# Patient Record
Sex: FEMALE | Race: WHITE | NOT HISPANIC OR LATINO | Employment: OTHER | URBAN - METROPOLITAN AREA
[De-identification: names, ages, dates, MRNs, and addresses within clinical notes are randomized per-mention and may not be internally consistent; named-entity substitution may affect disease eponyms.]

---

## 2017-01-18 RX ORDER — LOSARTAN POTASSIUM 50 MG/1
TABLET ORAL
Qty: 30 TABLET | Refills: 5 | Status: SHIPPED | OUTPATIENT
Start: 2017-01-18 | End: 2017-04-26 | Stop reason: SDUPTHER

## 2017-01-23 ENCOUNTER — OFFICE VISIT (OUTPATIENT)
Dept: GASTROENTEROLOGY | Facility: CLINIC | Age: 62
End: 2017-01-23
Payer: COMMERCIAL

## 2017-01-23 VITALS
DIASTOLIC BLOOD PRESSURE: 76 MMHG | WEIGHT: 171.94 LBS | HEART RATE: 85 BPM | HEIGHT: 61 IN | SYSTOLIC BLOOD PRESSURE: 123 MMHG | BODY MASS INDEX: 32.46 KG/M2

## 2017-01-23 DIAGNOSIS — R13.10 DYSPHAGIA, UNSPECIFIED TYPE: ICD-10-CM

## 2017-01-23 DIAGNOSIS — K21.9 GASTROESOPHAGEAL REFLUX DISEASE, ESOPHAGITIS PRESENCE NOT SPECIFIED: Primary | ICD-10-CM

## 2017-01-23 PROCEDURE — 3074F SYST BP LT 130 MM HG: CPT | Mod: S$GLB,,, | Performed by: NURSE PRACTITIONER

## 2017-01-23 PROCEDURE — 1159F MED LIST DOCD IN RCRD: CPT | Mod: S$GLB,,, | Performed by: NURSE PRACTITIONER

## 2017-01-23 PROCEDURE — 99203 OFFICE O/P NEW LOW 30 MIN: CPT | Mod: S$GLB,,, | Performed by: NURSE PRACTITIONER

## 2017-01-23 PROCEDURE — 99999 PR PBB SHADOW E&M-EST. PATIENT-LVL III: CPT | Mod: PBBFAC,,, | Performed by: NURSE PRACTITIONER

## 2017-01-23 PROCEDURE — 3078F DIAST BP <80 MM HG: CPT | Mod: S$GLB,,, | Performed by: NURSE PRACTITIONER

## 2017-01-23 NOTE — PROGRESS NOTES
Subjective:       Patient ID: Carina Montaño is a 61 y.o. female.    Chief Complaint: Dysphagia    HPI  Referred for dysphagia occurring 6-7 weeks ago which lasted for two weeks.  She described the sensation that food would get stuck when swallowing and with painful swallowing.  She denies regurgitation.  She reports that this completely resolved and she has had no trouble swallowing, food lodging or painful swallowing in over one month.  She reports EGD and colonoscopy last year with Dr De Leon.  She has history of acid reflux and heartburn that is well controlled with the use of ranitidine 300mg qhs.  She has been on pantoprazole in the past.  Has osteoporosis and takes fosamax weekly.  Reports regular bowel movements.  No blood with bowel movements or black stools.    Review of Systems   Constitutional: Negative.  Negative for activity change, appetite change, fatigue, fever and unexpected weight change.   HENT: Positive for postnasal drip. Negative for sore throat and trouble swallowing.    Respiratory: Negative for cough, choking and shortness of breath.    Cardiovascular: Negative for chest pain.   Gastrointestinal: Negative for abdominal pain, blood in stool, constipation, diarrhea, nausea and vomiting.   Musculoskeletal: Negative.    Skin: Negative.    Psychiatric/Behavioral: Negative.        Objective:      Physical Exam   Constitutional: She appears well-developed and well-nourished. No distress.   Eyes: No scleral icterus.   Neck: Normal range of motion. Neck supple.   Cardiovascular: Normal rate.    Pulmonary/Chest: Effort normal. No respiratory distress.   Neurological: She is alert.   Skin: Skin is warm and dry. She is not diaphoretic. No pallor.   Psychiatric: She has a normal mood and affect. Her behavior is normal. Judgment and thought content normal.   Vitals reviewed.      Assessment:       1. Gastroesophageal reflux disease, esophagitis presence not specified    2. Dysphagia, unspecified type         Plan:         Carina was seen today for dysphagia.    Diagnoses and all orders for this visit:    Gastroesophageal reflux disease, esophagitis presence not specified    Dysphagia, unspecified type  Comments:  Resolved  Continue ranitidine.    Chew food well.  Take all medications with plenty of water and stay upright after taking.    Will request records from previous endoscopic workup.    Will follow up in two months or sooner if needed.    She has been instructed to contact us with any recurrence, even mild, of dysphagia or painful or difficult swallowing.    She has verbalized understanding.

## 2017-01-23 NOTE — LETTER
January 23, 2017      John Gandara, DO  [address] [facility] [address] [facility] [id number]           [facility]  [address]  [address]  [place] [address]  Phone: [phone]          Patient: Carina Montaño   MR Number: [id number]   YOB: 1955   Date of Visit: 1/23/2017       Dear Dr. John Gandara:    Thank you for referring Carina Montaño to me for evaluation. Attached you will find relevant portions of my assessment and plan of care.    If you have questions, please do not hesitate to call me. I look forward to following Carina Montaño along with you.    Sincerely,    Francine Gatica, RORO    Enclosure  CC:  No Recipients    If you would like to receive this communication electronically, please contact [email or web address] or [phone] to request more information on [facility] [facility] access.    For providers and/or their staff who would like to refer a patient to [facility], please contact us through our one-stop-shop provider referral line, [facility], at [phone].    If you feel you have received this communication in error or would no longer like to receive these types of communications, please e-mail [email or web address]

## 2017-02-17 ENCOUNTER — TELEPHONE (OUTPATIENT)
Dept: INTERNAL MEDICINE | Facility: CLINIC | Age: 62
End: 2017-02-17

## 2017-02-17 RX ORDER — ALBUTEROL SULFATE 0.83 MG/ML
SOLUTION RESPIRATORY (INHALATION)
Qty: 75 ML | Refills: 5 | Status: SHIPPED | OUTPATIENT
Start: 2017-02-17 | End: 2022-03-28 | Stop reason: SDUPTHER

## 2017-02-17 RX ORDER — BUDESONIDE AND FORMOTEROL FUMARATE DIHYDRATE 160; 4.5 UG/1; UG/1
2 AEROSOL RESPIRATORY (INHALATION) EVERY 12 HOURS
Qty: 12.2 G | Refills: 11 | Status: SHIPPED | OUTPATIENT
Start: 2017-02-17 | End: 2018-03-10 | Stop reason: SDUPTHER

## 2017-02-17 NOTE — TELEPHONE ENCOUNTER
----- Message from Ashley Ofelia sent at 2/17/2017  8:05 AM CST -----  Contact: pt [phone]  RX request - refill or new RX.  Is this a refill or new RX:  refill  RX name and strength: Symbicort  Directions:   Is this a 30 day or 90 day RX:   Ph[facility] phone [facility]: [phone]  Comments:  Need authorization,pt is out at this time,also pt will like a call she has another script she want,but do not know the name of it.

## 2017-02-17 NOTE — TELEPHONE ENCOUNTER
----- Message from Ashley Gilbert sent at 2/17/2017  8:02 AM CST -----  Contact: pt [phone]  RX request - refill or new RX.  Is this a refill or new RX:  refill  RX name and strength: albuterol (PROVENTIL) 2.5 mg /3 mL (0.083 %) nebulizer solution  Directions:   Is this a 30 day or 90 day RX:30  Pharm[facility] and phone #:  [phone]  Comments:  Need authorization,pt is out at at this time

## 2017-02-20 RX ORDER — SIMVASTATIN 20 MG/1
TABLET, FILM COATED ORAL
Qty: 90 TABLET | Refills: 1 | Status: SHIPPED | OUTPATIENT
Start: 2017-02-20 | End: 2017-02-21 | Stop reason: SDUPTHER

## 2017-02-21 RX ORDER — SIMVASTATIN 20 MG/1
20 TABLET, FILM COATED ORAL DAILY
Qty: 90 TABLET | Refills: 1 | Status: SHIPPED | OUTPATIENT
Start: 2017-02-21 | End: 2018-03-16 | Stop reason: SDUPTHER

## 2017-03-28 ENCOUNTER — OFFICE VISIT (OUTPATIENT)
Dept: INTERNAL MEDICINE | Facility: CLINIC | Age: 62
End: 2017-03-28
Payer: COMMERCIAL

## 2017-03-28 ENCOUNTER — LAB VISIT (OUTPATIENT)
Dept: LAB | Facility: HOSPITAL | Age: 62
End: 2017-03-28
Attending: INTERNAL MEDICINE
Payer: COMMERCIAL

## 2017-03-28 VITALS
TEMPERATURE: 98 F | BODY MASS INDEX: 31.88 KG/M2 | HEIGHT: 61 IN | WEIGHT: 168.88 LBS | RESPIRATION RATE: 16 BRPM | HEART RATE: 72 BPM

## 2017-03-28 DIAGNOSIS — E66.9 OBESITY (BMI 30-39.9): ICD-10-CM

## 2017-03-28 DIAGNOSIS — M85.80 LOW BONE MASS: ICD-10-CM

## 2017-03-28 DIAGNOSIS — Z85.3 HISTORY OF CANCER OF LEFT BREAST: ICD-10-CM

## 2017-03-28 DIAGNOSIS — K21.9 GASTROESOPHAGEAL REFLUX DISEASE, ESOPHAGITIS PRESENCE NOT SPECIFIED: ICD-10-CM

## 2017-03-28 DIAGNOSIS — E78.00 HYPERCHOLESTEREMIA: ICD-10-CM

## 2017-03-28 DIAGNOSIS — R94.31 PROLONGED QT INTERVAL: ICD-10-CM

## 2017-03-28 DIAGNOSIS — I10 ESSENTIAL HYPERTENSION: Chronic | ICD-10-CM

## 2017-03-28 DIAGNOSIS — J30.9 ALLERGIC SINUSITIS: ICD-10-CM

## 2017-03-28 DIAGNOSIS — J45.30 MILD PERSISTENT ASTHMA WITHOUT COMPLICATION: ICD-10-CM

## 2017-03-28 DIAGNOSIS — I10 ESSENTIAL HYPERTENSION: Primary | Chronic | ICD-10-CM

## 2017-03-28 LAB
ALBUMIN SERPL BCP-MCNC: 3.7 G/DL
ALP SERPL-CCNC: 72 U/L
ALT SERPL W/O P-5'-P-CCNC: 20 U/L
ANION GAP SERPL CALC-SCNC: 10 MMOL/L
AST SERPL-CCNC: 19 U/L
BASOPHILS # BLD AUTO: 0.03 K/UL
BASOPHILS NFR BLD: 0.4 %
BILIRUB SERPL-MCNC: 0.9 MG/DL
BUN SERPL-MCNC: 12 MG/DL
CALCIUM SERPL-MCNC: 9.5 MG/DL
CHLORIDE SERPL-SCNC: 106 MMOL/L
CO2 SERPL-SCNC: 27 MMOL/L
CREAT SERPL-MCNC: 0.9 MG/DL
DIFFERENTIAL METHOD: ABNORMAL
EOSINOPHIL # BLD AUTO: 0.5 K/UL
EOSINOPHIL NFR BLD: 7.1 %
ERYTHROCYTE [DISTWIDTH] IN BLOOD BY AUTOMATED COUNT: 13.6 %
EST. GFR  (AFRICAN AMERICAN): >60 ML/MIN/1.73 M^2
EST. GFR  (NON AFRICAN AMERICAN): >60 ML/MIN/1.73 M^2
GLUCOSE SERPL-MCNC: 92 MG/DL
HCT VFR BLD AUTO: 42 %
HGB BLD-MCNC: 14.3 G/DL
LYMPHOCYTES # BLD AUTO: 1.6 K/UL
LYMPHOCYTES NFR BLD: 20.6 %
MCH RBC QN AUTO: 31.2 PG
MCHC RBC AUTO-ENTMCNC: 34 %
MCV RBC AUTO: 92 FL
MONOCYTES # BLD AUTO: 0.5 K/UL
MONOCYTES NFR BLD: 6.2 %
NEUTROPHILS # BLD AUTO: 5 K/UL
NEUTROPHILS NFR BLD: 65.6 %
PLATELET # BLD AUTO: 238 K/UL
PMV BLD AUTO: 10 FL
POTASSIUM SERPL-SCNC: 4.2 MMOL/L
PROT SERPL-MCNC: 7.3 G/DL
RBC # BLD AUTO: 4.59 M/UL
SODIUM SERPL-SCNC: 143 MMOL/L
WBC # BLD AUTO: 7.57 K/UL

## 2017-03-28 PROCEDURE — 99214 OFFICE O/P EST MOD 30 MIN: CPT | Mod: S$GLB,,, | Performed by: INTERNAL MEDICINE

## 2017-03-28 PROCEDURE — 80053 COMPREHEN METABOLIC PANEL: CPT

## 2017-03-28 PROCEDURE — 1160F RVW MEDS BY RX/DR IN RCRD: CPT | Mod: S$GLB,,, | Performed by: INTERNAL MEDICINE

## 2017-03-28 PROCEDURE — 36415 COLL VENOUS BLD VENIPUNCTURE: CPT | Mod: PO

## 2017-03-28 PROCEDURE — 99999 PR PBB SHADOW E&M-EST. PATIENT-LVL III: CPT | Mod: PBBFAC,,, | Performed by: INTERNAL MEDICINE

## 2017-03-28 PROCEDURE — 85025 COMPLETE CBC W/AUTO DIFF WBC: CPT

## 2017-03-28 RX ORDER — PREDNISONE 20 MG/1
TABLET ORAL
Qty: 20 TABLET | Refills: 0 | Status: SHIPPED | OUTPATIENT
Start: 2017-03-28 | End: 2017-09-12

## 2017-03-28 RX ORDER — MONTELUKAST SODIUM 10 MG/1
10 TABLET ORAL NIGHTLY
Qty: 30 TABLET | Refills: 11 | Status: SHIPPED | OUTPATIENT
Start: 2017-03-28 | End: 2017-04-27

## 2017-03-28 NOTE — PROGRESS NOTES
Subjective:       Patient ID: Carina Montaño is a 61 y.o. female.    Chief Complaint: Follow-up (6 month); Nasal Congestion; and Sinus Problem    HPI   Pt with HTN, HLD, Obesity, hx of left sided breast cancer, Asthma, Low bone mass, GERD, Allergic sinusitis is here for 6 month f/u. Pt has been doing well and denies any acute complaints except for mild wheezing/SOB from her asthma. No fevers/chills.   Review of Systems   Constitutional: Negative for activity change, appetite change, chills, diaphoresis, fatigue, fever and unexpected weight change.   HENT: Negative for postnasal drip, rhinorrhea, sinus pressure, sneezing, sore throat, trouble swallowing and voice change.    Respiratory: Positive for wheezing. Negative for cough and shortness of breath.    Cardiovascular: Negative for chest pain, palpitations and leg swelling.   Gastrointestinal: Negative for abdominal pain, blood in stool, constipation, diarrhea, nausea and vomiting.   Genitourinary: Negative for dysuria.   Musculoskeletal: Negative for arthralgias and myalgias.   Skin: Negative for rash and wound.   Allergic/Immunologic: Negative for environmental allergies and food allergies.   Hematological: Negative for adenopathy. Does not bruise/bleed easily.       Objective:      Physical Exam   Constitutional: She is oriented to person, place, and time. She appears well-developed and well-nourished. No distress.   HENT:   Head: Normocephalic and atraumatic.   Eyes: Conjunctivae and EOM are normal. Pupils are equal, round, and reactive to light. Right eye exhibits no discharge. Left eye exhibits no discharge. No scleral icterus.   Neck: Neck supple. No JVD present.   Cardiovascular: Normal rate, regular rhythm, normal heart sounds and intact distal pulses.    Pulmonary/Chest: Effort normal. No respiratory distress. She has wheezes (trace). She has no rales.   Abdominal: Soft. Bowel sounds are normal. There is no tenderness.   Musculoskeletal: She exhibits no  edema.   Lymphadenopathy:     She has no cervical adenopathy.   Neurological: She is alert and oriented to person, place, and time.   Skin: Skin is warm and dry. No rash noted. She is not diaphoretic. No pallor.       Assessment:       1. Essential hypertension    2. Hypercholesteremia    3. Obesity (BMI 30-39.9)    4. History of cancer of left breast    5. Gastroesophageal reflux disease, esophagitis presence not specified    6. Mild persistent asthma without complication    7. Low bone mass    8. Allergic sinusitis    9. Prolonged QT interval        Plan:    1. HTN- controlled on Losartan 50 mg daily       CBC/CMP   2. Hypercholesterolemia- stable on Zocor 20 mg daily   3. Obesity- pt advised on proper diet/exercise for weight loss   4. Hx of left sided breast cancer(2011)- s/p radiation       Followed by Oncology at        Await records   5. Mild persistent asthma- fair control on Symbicort twice daily and continue Albuterol inhaler PRN       Rx Singulair 10 mg qHS and will give a Prednisone taper    6. Low bone mass- currently on Fosamax and Calcium/Vitamin D daily       Await DEXA scan results   7. GERD- stable on Protonix 40 mg daily   8. Allergic sinusitis- fair control on Zyrtec/steroid nasal spray       Followed by Allergy at    9. Prolonged QT  10. F/u in 6 months for annual exam

## 2017-04-26 RX ORDER — LOSARTAN POTASSIUM 50 MG/1
50 TABLET ORAL EVERY MORNING
Qty: 30 TABLET | Refills: 5 | Status: SHIPPED | OUTPATIENT
Start: 2017-04-26 | End: 2017-08-01 | Stop reason: SDUPTHER

## 2017-04-26 NOTE — TELEPHONE ENCOUNTER
----- Message from Kelly Mcginnis sent at 4/26/2017  8:25 AM CDT -----  Contact: patient [phone]  RX request - refill or new RX.  Is this a refill or new RX:  losartan  RX name and strength: 50mg  Directions: 1 tab daily  Is this a 30 day or 90 day RX:  30  [facility]  [address] [phone]  Comments:  Pt will be out of meds today.

## 2017-08-01 RX ORDER — LOSARTAN POTASSIUM 50 MG/1
50 TABLET ORAL EVERY MORNING
Qty: 90 TABLET | Refills: 3 | Status: SHIPPED | OUTPATIENT
Start: 2017-08-01 | End: 2018-01-25

## 2017-08-28 RX ORDER — ALENDRONATE SODIUM 70 MG/1
TABLET ORAL
Qty: 12 TABLET | Refills: 3 | Status: SHIPPED | OUTPATIENT
Start: 2017-08-28 | End: 2018-08-14 | Stop reason: SDUPTHER

## 2017-09-12 ENCOUNTER — OFFICE VISIT (OUTPATIENT)
Dept: INTERNAL MEDICINE | Facility: CLINIC | Age: 62
End: 2017-09-12
Payer: COMMERCIAL

## 2017-09-12 VITALS
WEIGHT: 176.38 LBS | BODY MASS INDEX: 34.63 KG/M2 | HEART RATE: 84 BPM | DIASTOLIC BLOOD PRESSURE: 86 MMHG | HEIGHT: 60 IN | SYSTOLIC BLOOD PRESSURE: 137 MMHG | RESPIRATION RATE: 16 BRPM | TEMPERATURE: 98 F

## 2017-09-12 DIAGNOSIS — I10 HTN, GOAL BELOW 130/80: ICD-10-CM

## 2017-09-12 DIAGNOSIS — J20.9 ACUTE BRONCHITIS, UNSPECIFIED ORGANISM: ICD-10-CM

## 2017-09-12 DIAGNOSIS — B97.89 VIRAL SINUSITIS: Primary | ICD-10-CM

## 2017-09-12 DIAGNOSIS — J32.9 VIRAL SINUSITIS: Primary | ICD-10-CM

## 2017-09-12 PROCEDURE — 3008F BODY MASS INDEX DOCD: CPT | Mod: S$GLB,,, | Performed by: INTERNAL MEDICINE

## 2017-09-12 PROCEDURE — 3075F SYST BP GE 130 - 139MM HG: CPT | Mod: S$GLB,,, | Performed by: INTERNAL MEDICINE

## 2017-09-12 PROCEDURE — 99999 PR PBB SHADOW E&M-EST. PATIENT-LVL III: CPT | Mod: PBBFAC,,, | Performed by: INTERNAL MEDICINE

## 2017-09-12 PROCEDURE — 96372 THER/PROPH/DIAG INJ SC/IM: CPT | Mod: S$GLB,,, | Performed by: INTERNAL MEDICINE

## 2017-09-12 PROCEDURE — 99214 OFFICE O/P EST MOD 30 MIN: CPT | Mod: 25,S$GLB,, | Performed by: INTERNAL MEDICINE

## 2017-09-12 PROCEDURE — 3079F DIAST BP 80-89 MM HG: CPT | Mod: S$GLB,,, | Performed by: INTERNAL MEDICINE

## 2017-09-12 RX ORDER — TRIAMCINOLONE ACETONIDE 40 MG/ML
40 INJECTION, SUSPENSION INTRA-ARTICULAR; INTRAMUSCULAR
Status: COMPLETED | OUTPATIENT
Start: 2017-09-12 | End: 2017-09-12

## 2017-09-12 RX ORDER — CODEINE PHOSPHATE AND GUAIFENESIN 10; 100 MG/5ML; MG/5ML
5 SOLUTION ORAL 3 TIMES DAILY PRN
Qty: 236 ML | Refills: 0 | Status: SHIPPED | OUTPATIENT
Start: 2017-09-12 | End: 2017-09-22

## 2017-09-12 RX ADMIN — TRIAMCINOLONE ACETONIDE 40 MG: 40 INJECTION, SUSPENSION INTRA-ARTICULAR; INTRAMUSCULAR at 11:09

## 2017-11-17 ENCOUNTER — TELEPHONE (OUTPATIENT)
Dept: INTERNAL MEDICINE | Facility: CLINIC | Age: 62
End: 2017-11-17

## 2018-01-10 ENCOUNTER — TELEPHONE (OUTPATIENT)
Dept: INTERNAL MEDICINE | Facility: CLINIC | Age: 63
End: 2018-01-10

## 2018-01-10 DIAGNOSIS — Z00.00 ANNUAL PHYSICAL EXAM: Primary | ICD-10-CM

## 2018-01-10 NOTE — TELEPHONE ENCOUNTER
----- Message from Angel Ye sent at 1/9/2018 10:36 AM CST -----  Contact: Pt at [phone]  Doctor appointment and lab have been scheduled.  Please link lab orders to the lab appointment.  Date of doctor appointment:  1/18  Physical or EP:  physical  Date of lab appointment:  1/18  Comments:

## 2018-01-18 ENCOUNTER — LAB VISIT (OUTPATIENT)
Dept: LAB | Facility: HOSPITAL | Age: 63
End: 2018-01-18
Attending: INTERNAL MEDICINE
Payer: COMMERCIAL

## 2018-01-18 DIAGNOSIS — Z00.00 ANNUAL PHYSICAL EXAM: ICD-10-CM

## 2018-01-18 LAB
BASOPHILS # BLD AUTO: 0.01 K/UL
BASOPHILS NFR BLD: 0.1 %
DIFFERENTIAL METHOD: ABNORMAL
EOSINOPHIL # BLD AUTO: 0.2 K/UL
EOSINOPHIL NFR BLD: 2.6 %
ERYTHROCYTE [DISTWIDTH] IN BLOOD BY AUTOMATED COUNT: 13.2 %
HCT VFR BLD AUTO: 41.7 %
HGB BLD-MCNC: 14 G/DL
IMM GRANULOCYTES # BLD AUTO: 0.02 K/UL
IMM GRANULOCYTES NFR BLD AUTO: 0.2 %
LYMPHOCYTES # BLD AUTO: 0.7 K/UL
LYMPHOCYTES NFR BLD: 9.1 %
MCH RBC QN AUTO: 30.6 PG
MCHC RBC AUTO-ENTMCNC: 33.6 G/DL
MCV RBC AUTO: 91 FL
MONOCYTES # BLD AUTO: 0.5 K/UL
MONOCYTES NFR BLD: 6.7 %
NEUTROPHILS # BLD AUTO: 6.6 K/UL
NEUTROPHILS NFR BLD: 81.3 %
NRBC BLD-RTO: 0 /100 WBC
PLATELET # BLD AUTO: 254 K/UL
PMV BLD AUTO: 10.1 FL
RBC # BLD AUTO: 4.58 M/UL
WBC # BLD AUTO: 8.1 K/UL

## 2018-01-18 PROCEDURE — 80061 LIPID PANEL: CPT

## 2018-01-18 PROCEDURE — 36415 COLL VENOUS BLD VENIPUNCTURE: CPT | Mod: PO

## 2018-01-18 PROCEDURE — 84443 ASSAY THYROID STIM HORMONE: CPT

## 2018-01-18 PROCEDURE — 85025 COMPLETE CBC W/AUTO DIFF WBC: CPT

## 2018-01-18 PROCEDURE — 80053 COMPREHEN METABOLIC PANEL: CPT

## 2018-01-19 LAB
ALBUMIN SERPL BCP-MCNC: 3.7 G/DL
ALP SERPL-CCNC: 173 U/L
ALT SERPL W/O P-5'-P-CCNC: 179 U/L
ANION GAP SERPL CALC-SCNC: 8 MMOL/L
AST SERPL-CCNC: 142 U/L
BILIRUB SERPL-MCNC: 1.2 MG/DL
BUN SERPL-MCNC: 8 MG/DL
CALCIUM SERPL-MCNC: 9.4 MG/DL
CHLORIDE SERPL-SCNC: 104 MMOL/L
CHOLEST SERPL-MCNC: 168 MG/DL
CHOLEST/HDLC SERPL: 3.7 {RATIO}
CO2 SERPL-SCNC: 27 MMOL/L
CREAT SERPL-MCNC: 0.8 MG/DL
EST. GFR  (AFRICAN AMERICAN): >60 ML/MIN/1.73 M^2
EST. GFR  (NON AFRICAN AMERICAN): >60 ML/MIN/1.73 M^2
GLUCOSE SERPL-MCNC: 113 MG/DL
HDLC SERPL-MCNC: 45 MG/DL
HDLC SERPL: 26.8 %
LDLC SERPL CALC-MCNC: 108.6 MG/DL
NONHDLC SERPL-MCNC: 123 MG/DL
POTASSIUM SERPL-SCNC: 4.1 MMOL/L
PROT SERPL-MCNC: 7.2 G/DL
SODIUM SERPL-SCNC: 139 MMOL/L
TRIGL SERPL-MCNC: 72 MG/DL
TSH SERPL DL<=0.005 MIU/L-ACNC: 1.48 UIU/ML

## 2018-01-21 ENCOUNTER — OFFICE VISIT (OUTPATIENT)
Dept: URGENT CARE | Facility: CLINIC | Age: 63
End: 2018-01-21
Payer: COMMERCIAL

## 2018-01-21 VITALS
HEART RATE: 94 BPM | DIASTOLIC BLOOD PRESSURE: 79 MMHG | BODY MASS INDEX: 34.55 KG/M2 | OXYGEN SATURATION: 96 % | TEMPERATURE: 99 F | SYSTOLIC BLOOD PRESSURE: 125 MMHG | RESPIRATION RATE: 16 BRPM | WEIGHT: 176 LBS | HEIGHT: 60 IN

## 2018-01-21 DIAGNOSIS — R68.89 FLU-LIKE SYMPTOMS: ICD-10-CM

## 2018-01-21 DIAGNOSIS — J02.9 SORE THROAT: Primary | ICD-10-CM

## 2018-01-21 DIAGNOSIS — J11.1 INFLUENZA: ICD-10-CM

## 2018-01-21 DIAGNOSIS — J01.00 ACUTE NON-RECURRENT MAXILLARY SINUSITIS: ICD-10-CM

## 2018-01-21 LAB
CTP QC/QA: YES
CTP QC/QA: YES
FLUAV AG NPH QL: NEGATIVE
FLUBV AG NPH QL: NEGATIVE
S PYO RRNA THROAT QL PROBE: NEGATIVE

## 2018-01-21 PROCEDURE — 99214 OFFICE O/P EST MOD 30 MIN: CPT | Mod: 25,S$GLB,, | Performed by: FAMILY MEDICINE

## 2018-01-21 PROCEDURE — 96372 THER/PROPH/DIAG INJ SC/IM: CPT | Mod: S$GLB,,, | Performed by: FAMILY MEDICINE

## 2018-01-21 PROCEDURE — 87880 STREP A ASSAY W/OPTIC: CPT | Mod: QW,S$GLB,, | Performed by: FAMILY MEDICINE

## 2018-01-21 PROCEDURE — 87804 INFLUENZA ASSAY W/OPTIC: CPT | Mod: QW,S$GLB,, | Performed by: FAMILY MEDICINE

## 2018-01-21 RX ORDER — BENZONATATE 100 MG/1
100 CAPSULE ORAL EVERY 6 HOURS PRN
Qty: 30 CAPSULE | Refills: 1 | Status: SHIPPED | OUTPATIENT
Start: 2018-01-21 | End: 2018-02-15 | Stop reason: ALTCHOICE

## 2018-01-21 RX ORDER — AMOXICILLIN 875 MG/1
875 TABLET, FILM COATED ORAL 2 TIMES DAILY
Qty: 20 TABLET | Refills: 0 | Status: SHIPPED | OUTPATIENT
Start: 2018-01-21 | End: 2018-01-31

## 2018-01-21 RX ORDER — BETAMETHASONE SODIUM PHOSPHATE AND BETAMETHASONE ACETATE 3; 3 MG/ML; MG/ML
6 INJECTION, SUSPENSION INTRA-ARTICULAR; INTRALESIONAL; INTRAMUSCULAR; SOFT TISSUE
Status: COMPLETED | OUTPATIENT
Start: 2018-01-21 | End: 2018-01-21

## 2018-01-21 RX ADMIN — BETAMETHASONE SODIUM PHOSPHATE AND BETAMETHASONE ACETATE 6 MG: 3; 3 INJECTION, SUSPENSION INTRA-ARTICULAR; INTRALESIONAL; INTRAMUSCULAR; SOFT TISSUE at 11:01

## 2018-01-21 NOTE — PROGRESS NOTES
Subjective:       Patient ID: Carina Montaño is a 62 y.o. female.    Vitals:  height is 5' (1.524 m) and weight is 79.8 kg (176 lb). Her oral temperature is 98.7 °F (37.1 °C). Her blood pressure is 125/79 and her pulse is 94. Her respiration is 16 and oxygen saturation is 96%.     Chief Complaint: Cough    Cough   The current episode started in the past 7 days. The problem has been unchanged. The problem occurs hourly. The cough is productive of sputum. Associated symptoms include headaches, myalgias and a sore throat. Pertinent negatives include no chest pain, chills, ear pain, eye redness, fever, shortness of breath or wheezing. She has tried OTC cough suppressant for the symptoms. The treatment provided no relief.     Review of Systems   Constitution: Negative for chills, fever and malaise/fatigue.   HENT: Positive for congestion and sore throat. Negative for ear pain and hoarse voice.    Eyes: Negative for discharge and redness.   Cardiovascular: Negative for chest pain, dyspnea on exertion and leg swelling.   Respiratory: Positive for cough and sputum production. Negative for shortness of breath and wheezing.    Musculoskeletal: Positive for myalgias.   Gastrointestinal: Negative for abdominal pain and nausea.   Neurological: Positive for headaches.       Objective:      Physical Exam   Constitutional: She is oriented to person, place, and time. She appears well-developed and well-nourished. She is cooperative.  Non-toxic appearance. She does not appear ill. No distress.   HENT:   Head: Normocephalic and atraumatic.   Right Ear: Hearing, tympanic membrane, external ear and ear canal normal.   Left Ear: Hearing, tympanic membrane, external ear and ear canal normal.   Nose: Nose normal. No mucosal edema, rhinorrhea or nasal deformity. No epistaxis. Right sinus exhibits no maxillary sinus tenderness and no frontal sinus tenderness. Left sinus exhibits no maxillary sinus tenderness and no frontal sinus tenderness.    Mouth/Throat: Uvula is midline, oropharynx is clear and moist and mucous membranes are normal. No trismus in the jaw. Normal dentition. No uvula swelling. No posterior oropharyngeal erythema.   Eyes: Conjunctivae and lids are normal. Right eye exhibits no discharge. Left eye exhibits no discharge. No scleral icterus.   Sclera clear bilat   Neck: Trachea normal, normal range of motion, full passive range of motion without pain and phonation normal. Neck supple.   Cardiovascular: Normal rate, regular rhythm, normal heart sounds, intact distal pulses and normal pulses.    Pulmonary/Chest: Effort normal and breath sounds normal. She has no wheezes. She has no rales.   Abdominal: Soft. Normal appearance and bowel sounds are normal. She exhibits no distension, no pulsatile midline mass and no mass. There is no tenderness.   Musculoskeletal: Normal range of motion. She exhibits no edema or deformity.   Lymphadenopathy:     She has no cervical adenopathy.   Neurological: She is alert and oriented to person, place, and time. She exhibits normal muscle tone. Coordination normal.   Skin: Skin is warm, dry and intact. She is not diaphoretic. No pallor.   Psychiatric: She has a normal mood and affect. Her speech is normal and behavior is normal. Judgment and thought content normal. Cognition and memory are normal.   Nursing note and vitals reviewed.      Assessment:       1. Sore throat    2. Flu-like symptoms    3. Influenza    4. Acute non-recurrent maxillary sinusitis        Plan:         Sore throat  -     POCT rapid strep A    Flu-like symptoms  -     POCT Influenza A/B    Influenza    Acute non-recurrent maxillary sinusitis  -     betamethasone acetate-betamethasone sodium phosphate injection 6 mg; Inject 1 mL (6 mg total) into the muscle one time.  -     amoxicillin (AMOXIL) 875 MG tablet; Take 1 tablet (875 mg total) by mouth 2 (two) times daily.  Dispense: 20 tablet; Refill: 0  -     benzonatate (TESSALON PERLES) 100  MG capsule; Take 1 capsule (100 mg total) by mouth every 6 (six) hours as needed for Cough.  Dispense: 30 capsule; Refill: 1

## 2018-01-22 RX ORDER — FLUTICASONE PROPIONATE 50 MCG
SPRAY, SUSPENSION (ML) NASAL
Qty: 16 G | Refills: 0 | Status: SHIPPED | OUTPATIENT
Start: 2018-01-22 | End: 2018-01-25 | Stop reason: SDUPTHER

## 2018-01-25 ENCOUNTER — LAB VISIT (OUTPATIENT)
Dept: LAB | Facility: HOSPITAL | Age: 63
End: 2018-01-25
Attending: INTERNAL MEDICINE
Payer: COMMERCIAL

## 2018-01-25 ENCOUNTER — OFFICE VISIT (OUTPATIENT)
Dept: INTERNAL MEDICINE | Facility: CLINIC | Age: 63
End: 2018-01-25
Payer: COMMERCIAL

## 2018-01-25 VITALS
HEIGHT: 60 IN | TEMPERATURE: 98 F | WEIGHT: 174.19 LBS | BODY MASS INDEX: 34.2 KG/M2 | DIASTOLIC BLOOD PRESSURE: 84 MMHG | SYSTOLIC BLOOD PRESSURE: 138 MMHG

## 2018-01-25 DIAGNOSIS — R74.8 ELEVATED LIVER ENZYMES: ICD-10-CM

## 2018-01-25 DIAGNOSIS — J45.30 MILD PERSISTENT ASTHMA WITHOUT COMPLICATION: ICD-10-CM

## 2018-01-25 DIAGNOSIS — E78.00 HYPERCHOLESTEREMIA: ICD-10-CM

## 2018-01-25 DIAGNOSIS — I10 ESSENTIAL HYPERTENSION: Chronic | ICD-10-CM

## 2018-01-25 DIAGNOSIS — Z00.00 ANNUAL PHYSICAL EXAM: Primary | ICD-10-CM

## 2018-01-25 DIAGNOSIS — M85.80 LOW BONE MASS: ICD-10-CM

## 2018-01-25 DIAGNOSIS — R94.31 PROLONGED QT INTERVAL: ICD-10-CM

## 2018-01-25 DIAGNOSIS — E66.9 OBESITY (BMI 30-39.9): ICD-10-CM

## 2018-01-25 DIAGNOSIS — Z85.3 HISTORY OF CANCER OF LEFT BREAST: ICD-10-CM

## 2018-01-25 DIAGNOSIS — K21.9 GASTROESOPHAGEAL REFLUX DISEASE, ESOPHAGITIS PRESENCE NOT SPECIFIED: ICD-10-CM

## 2018-01-25 DIAGNOSIS — J30.9 ALLERGIC SINUSITIS: ICD-10-CM

## 2018-01-25 LAB
ALT SERPL W/O P-5'-P-CCNC: 28 U/L
AST SERPL-CCNC: 17 U/L

## 2018-01-25 PROCEDURE — 84450 TRANSFERASE (AST) (SGOT): CPT

## 2018-01-25 PROCEDURE — 99396 PREV VISIT EST AGE 40-64: CPT | Mod: S$GLB,,, | Performed by: INTERNAL MEDICINE

## 2018-01-25 PROCEDURE — 99999 PR PBB SHADOW E&M-EST. PATIENT-LVL III: CPT | Mod: PBBFAC,,, | Performed by: INTERNAL MEDICINE

## 2018-01-25 PROCEDURE — 84460 ALANINE AMINO (ALT) (SGPT): CPT

## 2018-01-25 PROCEDURE — 36415 COLL VENOUS BLD VENIPUNCTURE: CPT | Mod: PO

## 2018-01-25 PROCEDURE — 80074 ACUTE HEPATITIS PANEL: CPT

## 2018-01-25 RX ORDER — LOSARTAN POTASSIUM 100 MG/1
100 TABLET ORAL DAILY
Qty: 90 TABLET | Refills: 3 | Status: SHIPPED | OUTPATIENT
Start: 2018-01-25 | End: 2018-08-14 | Stop reason: SDUPTHER

## 2018-01-25 RX ORDER — FLUTICASONE PROPIONATE 50 MCG
SPRAY, SUSPENSION (ML) NASAL
Qty: 16 G | Refills: 11 | Status: SHIPPED | OUTPATIENT
Start: 2018-01-25 | End: 2018-08-14 | Stop reason: SDUPTHER

## 2018-01-25 NOTE — PROGRESS NOTES
Subjective:       Patient ID: Carina Montaño is a 62 y.o. female.    Chief Complaint: Annual Exam    HPI   62 y.o. Female here for annual exam.      Cholesterol: (normal)  Vaccines: Influenza (declined); Tetanus (2015); Zoster (2016)  Eye exam: 2016  Mammogram: 11/17  Gyn exam: May 2016  Colonoscopy: 2015- will await records   DEXA: await records     Exercise: no  Diet: regular     Past Medical History:    Arthritis                                                     Asthma                                                        Cancer                                                          Comment:breast    Hypertension                                                Past Surgical History:    KNEE SURGERY                                                   SHOULDER SURGERY                                               ABDOMINAL SURGERY                                            Social History    Marital status:              Spouse name:                       Years of education:                 Number of children: 1              Occupational History  Occupation          Employer            Comment               Human resources as*                          Social History Main Topics    Smoking status: Never Smoker                                                                 Smokeless status: Never Used                        Alcohol use: Yes                Comment: social    Drug use: No              Sexual activity: Yes                    -- Lipitor [Atorvastatin]   Ms. Montaño had no medications administered during this visit.    Review of Systems   Constitutional: Negative for activity change, appetite change, chills, diaphoresis, fatigue, fever and unexpected weight change.   HENT: Negative for congestion, mouth sores, postnasal drip, rhinorrhea, sinus pressure, sneezing, sore throat, trouble swallowing and voice change.    Eyes: Negative for pain, discharge and visual disturbance.   Respiratory: Negative for  cough, shortness of breath and wheezing.    Cardiovascular: Negative for chest pain, palpitations and leg swelling.   Gastrointestinal: Negative for abdominal pain, blood in stool, constipation, diarrhea, nausea and vomiting.   Endocrine: Negative for cold intolerance and heat intolerance.   Genitourinary: Negative for difficulty urinating, dysuria, frequency, hematuria and urgency.   Musculoskeletal: Negative for arthralgias and myalgias.   Skin: Negative for rash and wound.   Allergic/Immunologic: Negative for environmental allergies and food allergies.   Neurological: Negative for dizziness, tremors, seizures, syncope, weakness, light-headedness and headaches.   Hematological: Negative for adenopathy. Does not bruise/bleed easily.   Psychiatric/Behavioral: Negative for confusion and sleep disturbance. The patient is not nervous/anxious.        Objective:      Physical Exam   Constitutional: She is oriented to person, place, and time. She appears well-developed and well-nourished. No distress.   HENT:   Head: Normocephalic and atraumatic.   Right Ear: External ear normal.   Left Ear: External ear normal.   Nose: Nose normal.   Mouth/Throat: Oropharynx is clear and moist. No oropharyngeal exudate.   Eyes: Conjunctivae and EOM are normal. Pupils are equal, round, and reactive to light. Right eye exhibits no discharge. Left eye exhibits no discharge. No scleral icterus.   Neck: Neck supple. No JVD present. No thyromegaly present.   Cardiovascular: Normal rate, regular rhythm, normal heart sounds and intact distal pulses.    No murmur heard.  Pulmonary/Chest: Effort normal and breath sounds normal. No respiratory distress. She has no wheezes. She has no rales. She exhibits no tenderness.   Abdominal: Soft. Bowel sounds are normal. She exhibits no distension. There is no tenderness. There is no guarding.   Musculoskeletal: She exhibits no edema.   Lymphadenopathy:     She has no cervical adenopathy.   Neurological: She  is alert and oriented to person, place, and time. No cranial nerve deficit. Coordination normal.   Skin: Skin is warm and dry. No rash noted. She is not diaphoretic. No pallor.   Psychiatric: She has a normal mood and affect. Judgment normal.   Nursing note and vitals reviewed.      Assessment:       1. Annual physical exam    2. Essential hypertension    3. Hypercholesteremia    4. Obesity (BMI 30-39.9)    5. History of cancer of left breast    6. Mild persistent asthma without complication    7. Gastroesophageal reflux disease, esophagitis presence not specified    8. Low bone mass    9. Allergic sinusitis    10. Prolonged QT interval    11. Elevated liver enzymes        Plan:    1. Blood work reviewed with pt       Vaccines: Influenza (declined); Tetanus (2015); Zoster (2016)       Eye exam: 2016       Mammogram: 11/17       Gyn exam: May 2016   2. HTN- controlled on Losartan 100 mg daily   3. Hypercholesterolemia- stable on Zocor 20 mg daily   4. Obesity- pt advised on proper diet/exercise for weight loss   5. Hx of left sided breast cancer(2011)- s/p radiation       Followed by Oncology at        Await records   6. Mild persistent asthma- stable on Symbicort twice daily and continue Albuterol inhaler PRN   7. Low bone mass- currently on Fosamax       Start Calcium/Vitamin D daily       Await DEXA scan results   8. GERD- stable on Protonix 40 mg daily   9. Allergic sinusitis- fair control on Zyrtec/steroid nasal spray       Followed by Allergy at   10. Prolonged QT  11. Elevated LAEs- repeat levels with hepatitis panel and RUQ US        Possible 2/2 flu  12. F/u in 6 months or PRN

## 2018-01-26 LAB
HAV IGM SERPL QL IA: NEGATIVE
HBV CORE IGM SERPL QL IA: NEGATIVE
HBV SURFACE AG SERPL QL IA: NEGATIVE
HCV AB SERPL QL IA: NEGATIVE

## 2018-02-07 ENCOUNTER — TELEPHONE (OUTPATIENT)
Dept: INTERNAL MEDICINE | Facility: CLINIC | Age: 63
End: 2018-02-07

## 2018-02-07 NOTE — TELEPHONE ENCOUNTER
----- Message from Jeremías Richardson sent at 2/5/2018 12:19 PM CST -----  Contact: self/[phone]  Pt is calling to speak with someone in the office about blood work that she had taken on last week. Pt is very worried she states that she believes something may be wrong with her. Please call and advise.    Thank You

## 2018-02-15 ENCOUNTER — OFFICE VISIT (OUTPATIENT)
Dept: INTERNAL MEDICINE | Facility: CLINIC | Age: 63
End: 2018-02-15
Payer: COMMERCIAL

## 2018-02-15 ENCOUNTER — HOSPITAL ENCOUNTER (OUTPATIENT)
Dept: RADIOLOGY | Facility: HOSPITAL | Age: 63
Discharge: HOME | End: 2018-02-15
Attending: INTERNAL MEDICINE
Payer: COMMERCIAL

## 2018-02-15 VITALS
WEIGHT: 169.75 LBS | SYSTOLIC BLOOD PRESSURE: 118 MMHG | HEIGHT: 60 IN | RESPIRATION RATE: 16 BRPM | DIASTOLIC BLOOD PRESSURE: 80 MMHG | BODY MASS INDEX: 33.33 KG/M2 | HEART RATE: 68 BPM | TEMPERATURE: 98 F

## 2018-02-15 DIAGNOSIS — M79.605 PAIN OF LEFT LOWER EXTREMITY: ICD-10-CM

## 2018-02-15 DIAGNOSIS — M25.552 PAIN OF LEFT HIP JOINT: ICD-10-CM

## 2018-02-15 DIAGNOSIS — M25.552 PAIN OF LEFT HIP JOINT: Primary | ICD-10-CM

## 2018-02-15 PROCEDURE — 73502 X-RAY EXAM HIP UNI 2-3 VIEWS: CPT | Mod: 26,LT,, | Performed by: RADIOLOGY

## 2018-02-15 PROCEDURE — 99999 PR PBB SHADOW E&M-EST. PATIENT-LVL III: CPT | Mod: PBBFAC,,, | Performed by: INTERNAL MEDICINE

## 2018-02-15 PROCEDURE — 3008F BODY MASS INDEX DOCD: CPT | Mod: S$GLB,,, | Performed by: INTERNAL MEDICINE

## 2018-02-15 PROCEDURE — 99214 OFFICE O/P EST MOD 30 MIN: CPT | Mod: S$GLB,,, | Performed by: INTERNAL MEDICINE

## 2018-02-15 PROCEDURE — 73502 X-RAY EXAM HIP UNI 2-3 VIEWS: CPT | Mod: TC,PO,LT

## 2018-02-15 RX ORDER — NAPROXEN 500 MG/1
500 TABLET ORAL 2 TIMES DAILY WITH MEALS
Qty: 60 TABLET | Refills: 1 | Status: SHIPPED | OUTPATIENT
Start: 2018-02-15 | End: 2018-03-17

## 2018-02-15 NOTE — PROGRESS NOTES
Subjective:       Patient ID: Carina Montaño is a 62 y.o. female.    Chief Complaint: Leg Pain (left left pain at 10 today.  says hasn't never had before)    HPI   Pt here c/o 1 month of worsening left hip pain radiating to her groin and upper leg. It is described a dull ache made worse with walking/standing. She has not tried any OTC medications for relief. No obvious trauma to the area.   Review of Systems   Constitutional: Negative for activity change, appetite change, chills, diaphoresis, fatigue, fever and unexpected weight change.   HENT: Negative for postnasal drip, rhinorrhea, sinus pressure, sneezing, sore throat, trouble swallowing and voice change.    Respiratory: Negative for cough, shortness of breath and wheezing.    Cardiovascular: Negative for chest pain, palpitations and leg swelling.   Gastrointestinal: Negative for abdominal pain, blood in stool, constipation, diarrhea, nausea and vomiting.   Genitourinary: Negative for dysuria.   Musculoskeletal: Positive for myalgias. Negative for arthralgias.   Skin: Negative for rash and wound.   Allergic/Immunologic: Negative for environmental allergies and food allergies.   Hematological: Negative for adenopathy. Does not bruise/bleed easily.       Objective:      Physical Exam   Constitutional: She is oriented to person, place, and time. She appears well-developed and well-nourished. No distress.   HENT:   Head: Normocephalic and atraumatic.   Eyes: Conjunctivae and EOM are normal. Pupils are equal, round, and reactive to light. Right eye exhibits no discharge. Left eye exhibits no discharge. No scleral icterus.   Neck: Neck supple. No JVD present.   Cardiovascular: Normal rate, regular rhythm, normal heart sounds and intact distal pulses.    Pulmonary/Chest: Effort normal and breath sounds normal. No respiratory distress. She has no wheezes. She has no rales.   Musculoskeletal: She exhibits no edema.        Left hip: She exhibits decreased range of motion  and tenderness.   Lymphadenopathy:     She has no cervical adenopathy.   Neurological: She is alert and oriented to person, place, and time.   Skin: Skin is warm and dry. No rash noted. She is not diaphoretic. No pallor.       Assessment:       1. Pain of left hip joint    2. Pain of left lower extremity        Plan:    1/2. X-ray hip          Rx Naproxen 500 mg BID PRN          Referral to PT

## 2018-02-20 ENCOUNTER — TELEPHONE (OUTPATIENT)
Dept: INTERNAL MEDICINE | Facility: CLINIC | Age: 63
End: 2018-02-20

## 2018-02-20 NOTE — TELEPHONE ENCOUNTER
----- Message from Kylee Marino sent at 2/20/2018 10:25 AM CST -----  Contact: pt [phone]  Patient would like a call back in regards to a call that she received. Please advise pt Patient is returning a phone call.  Who left a message for the patient: Renata  Does patient know what this is regarding:  A call from the nurse.   Comments:

## 2018-02-27 ENCOUNTER — HOSPITAL ENCOUNTER (OUTPATIENT)
Dept: RADIOLOGY | Facility: HOSPITAL | Age: 63
Discharge: HOME | End: 2018-02-27
Attending: INTERNAL MEDICINE
Payer: COMMERCIAL

## 2018-02-27 DIAGNOSIS — R74.8 ELEVATED LIVER ENZYMES: ICD-10-CM

## 2018-02-27 PROCEDURE — 76705 ECHO EXAM OF ABDOMEN: CPT | Mod: 26,,, | Performed by: RADIOLOGY

## 2018-02-27 PROCEDURE — 76705 ECHO EXAM OF ABDOMEN: CPT | Mod: TC

## 2018-02-28 PROBLEM — K76.0 NAFLD (NONALCOHOLIC FATTY LIVER DISEASE): Status: ACTIVE | Noted: 2018-02-28

## 2018-03-12 RX ORDER — BUDESONIDE AND FORMOTEROL FUMARATE DIHYDRATE 160; 4.5 UG/1; UG/1
2 AEROSOL RESPIRATORY (INHALATION) EVERY 12 HOURS
Qty: 10.2 INHALER | Refills: 4 | Status: SHIPPED | OUTPATIENT
Start: 2018-03-12 | End: 2018-08-14 | Stop reason: SDUPTHER

## 2018-03-16 RX ORDER — SIMVASTATIN 20 MG/1
20 TABLET, FILM COATED ORAL DAILY
Qty: 90 TABLET | Refills: 3 | Status: SHIPPED | OUTPATIENT
Start: 2018-03-16 | End: 2018-08-14 | Stop reason: SDUPTHER

## 2018-08-08 ENCOUNTER — HOSPITAL ENCOUNTER (EMERGENCY)
Facility: HOSPITAL | Age: 63
Discharge: HOME | End: 2018-08-08
Attending: EMERGENCY MEDICINE
Payer: COMMERCIAL

## 2018-08-08 VITALS
OXYGEN SATURATION: 95 % | RESPIRATION RATE: 16 BRPM | HEIGHT: 61 IN | HEART RATE: 78 BPM | WEIGHT: 156 LBS | DIASTOLIC BLOOD PRESSURE: 59 MMHG | SYSTOLIC BLOOD PRESSURE: 113 MMHG | TEMPERATURE: 98 F | BODY MASS INDEX: 29.45 KG/M2

## 2018-08-08 DIAGNOSIS — M25.532 LEFT WRIST PAIN: Primary | ICD-10-CM

## 2018-08-08 DIAGNOSIS — M25.562 LEFT KNEE PAIN: ICD-10-CM

## 2018-08-08 DIAGNOSIS — R06.2 WHEEZING: ICD-10-CM

## 2018-08-08 DIAGNOSIS — R07.81 RIB PAIN ON LEFT SIDE: ICD-10-CM

## 2018-08-08 PROCEDURE — 93005 ELECTROCARDIOGRAM TRACING: CPT

## 2018-08-08 PROCEDURE — 94640 AIRWAY INHALATION TREATMENT: CPT

## 2018-08-08 PROCEDURE — 96372 THER/PROPH/DIAG INJ SC/IM: CPT

## 2018-08-08 PROCEDURE — 93010 ELECTROCARDIOGRAM REPORT: CPT | Mod: ,,, | Performed by: INTERNAL MEDICINE

## 2018-08-08 PROCEDURE — 25000003 PHARM REV CODE 250: Performed by: PHYSICIAN ASSISTANT

## 2018-08-08 PROCEDURE — 99284 EMERGENCY DEPT VISIT MOD MDM: CPT | Mod: 25

## 2018-08-08 PROCEDURE — 63600175 PHARM REV CODE 636 W HCPCS: Performed by: PHYSICIAN ASSISTANT

## 2018-08-08 PROCEDURE — 25000242 PHARM REV CODE 250 ALT 637 W/ HCPCS: Performed by: PHYSICIAN ASSISTANT

## 2018-08-08 RX ORDER — NAPROXEN 500 MG/1
500 TABLET ORAL 2 TIMES DAILY
Qty: 30 TABLET | Refills: 0 | Status: SHIPPED | OUTPATIENT
Start: 2018-08-08 | End: 2019-11-13

## 2018-08-08 RX ORDER — KETOROLAC TROMETHAMINE 30 MG/ML
30 INJECTION, SOLUTION INTRAMUSCULAR; INTRAVENOUS
Status: COMPLETED | OUTPATIENT
Start: 2018-08-08 | End: 2018-08-08

## 2018-08-08 RX ORDER — TRAMADOL HYDROCHLORIDE 50 MG/1
50 TABLET ORAL EVERY 6 HOURS PRN
Qty: 12 TABLET | Refills: 0 | Status: SHIPPED | OUTPATIENT
Start: 2018-08-08 | End: 2018-08-18

## 2018-08-08 RX ORDER — HYDROCODONE BITARTRATE AND ACETAMINOPHEN 5; 325 MG/1; MG/1
1 TABLET ORAL
Status: COMPLETED | OUTPATIENT
Start: 2018-08-08 | End: 2018-08-08

## 2018-08-08 RX ORDER — IPRATROPIUM BROMIDE AND ALBUTEROL SULFATE 2.5; .5 MG/3ML; MG/3ML
3 SOLUTION RESPIRATORY (INHALATION) EVERY 6 HOURS PRN
Qty: 1 BOX | Refills: 0 | Status: SHIPPED | OUTPATIENT
Start: 2018-08-08 | End: 2018-08-14 | Stop reason: SDUPTHER

## 2018-08-08 RX ORDER — IPRATROPIUM BROMIDE AND ALBUTEROL SULFATE 2.5; .5 MG/3ML; MG/3ML
3 SOLUTION RESPIRATORY (INHALATION)
Status: COMPLETED | OUTPATIENT
Start: 2018-08-08 | End: 2018-08-08

## 2018-08-08 RX ADMIN — KETOROLAC TROMETHAMINE 30 MG: 30 INJECTION, SOLUTION INTRAMUSCULAR at 07:08

## 2018-08-08 RX ADMIN — HYDROCODONE BITARTRATE AND ACETAMINOPHEN 1 TABLET: 5; 325 TABLET ORAL at 09:08

## 2018-08-08 RX ADMIN — IPRATROPIUM BROMIDE AND ALBUTEROL SULFATE 3 ML: .5; 3 SOLUTION RESPIRATORY (INHALATION) at 07:08

## 2018-08-08 NOTE — ED TRIAGE NOTES
Patient presents to the ED with complaints of left rib and left knee pain. Patient states she was doing a tour on [place] on  where the ground was unsteady and she fell on her hands and knees. Patient had knee surgery 5 years ago. No deformity noted. Redness noted to left knee.     Review of patient's allergies indicates:   Allergen Reactions    Lipitor [atorvastatin]         Patient has verified the spelling of their name and  on armband.   APPEARANCE: Patient is alert, calm, oriented x 4, and does not appear distressed.  SKIN: Skin is normal for race, warm, and dry. Normal skin turgor and mucous membranes moist. Redness noted to left knee. No deformity noted  CARDIAC: Normal rate and rhythm, no murmur heard.   RESPIRATORY:Normal rate and effort. Breath sounds clear bilaterally throughout chest. Respirations are equal and unlabored.    GASTRO: Bowel sounds normal, abdomen is soft, no tenderness, and no abdominal distention.  MUSCLE: Full ROM. No bony tenderness or soft tissue tenderness. No obvious deformity. +left side pain +left knee pain. No deformity noted

## 2018-08-08 NOTE — ED PROVIDER NOTES
Encounter Date: 8/8/2018       History     Chief Complaint   Patient presents with    Fall     Patient fell outside on Sunday. Complaining of left rib and left knee pain. Pain is 10/10     63-year-old female with history of asthma, HTN, breast cancer and arthritis presents to the ED for evaluation of pain status post ground level fall.  She states fall occurred on Sunday.  She states that she had a misstep causing her to fall forward on bilateral knees and outstretched hands.  She does report secondary impact of the chest to the ground.  She states that she was able to get up with some assistance at that time and had some minimal pain with prevalent to the left knee.  She states since this time the left knee pain has continued and that she began with gradual onset of left rib and left wrist pain. She states that she thought that the symptoms would resolve but they have continued.  She states pain is worse with certain movements.  She denies any shortness of breath, palpitations, numbness, tingling or inability to bear weight.  She does present with some bruising of the left knee.  She has not tried any medications for the symptoms.      The history is provided by the patient.     Review of patient's allergies indicates:   Allergen Reactions    Lipitor [atorvastatin]      Past Medical History:   Diagnosis Date    Arthritis     Asthma     Cancer     breast    Hypertension      Past Surgical History:   Procedure Laterality Date    ABDOMINAL SURGERY      KNEE SURGERY      SHOULDER SURGERY       Family History   Problem Relation Age of Onset    Hypertension Mother     Hypertension Father      Social History   Substance Use Topics    Smoking status: Never Smoker    Smokeless tobacco: Never Used    Alcohol use Yes      Comment: social     Review of Systems   Constitutional: Negative for fever.   Respiratory: Positive for cough (no change). Negative for shortness of breath and wheezing.    Cardiovascular:  Negative for chest pain (left lateral rib pain) and palpitations.   Gastrointestinal: Negative for nausea and vomiting.   Genitourinary: Negative for flank pain.   Musculoskeletal: Positive for arthralgias (left wrist and left knee pain) and joint swelling. Negative for back pain, neck pain and neck stiffness.   Skin: Positive for color change. Negative for rash.   Neurological: Negative for weakness and headaches.   Hematological: Does not bruise/bleed easily.       Physical Exam     Initial Vitals [08/08/18 0643]   BP Pulse Resp Temp SpO2   (!) 146/74 83 12 97.6 °F (36.4 °C) (!) 93 %      MAP       --         Physical Exam    Nursing note and vitals reviewed.  Constitutional: Vital signs are normal. She appears well-developed and well-nourished. She is cooperative.  Non-toxic appearance. She does not appear ill. She appears distressed (mild).   HENT:   Head: Normocephalic and atraumatic.   Eyes: Conjunctivae and lids are normal.   Neck: Neck supple. No neck rigidity.   Cardiovascular: Normal rate and regular rhythm.   Pulmonary/Chest: No respiratory distress. She has wheezes. She has no rhonchi. She exhibits tenderness. She exhibits no edema, no deformity and no swelling.       Abdominal: Soft. Normal appearance. There is no tenderness. There is no rigidity.   Musculoskeletal: Normal range of motion.        Left wrist: She exhibits tenderness. She exhibits normal range of motion and no bony tenderness.        Left knee: She exhibits swelling and ecchymosis. She exhibits normal range of motion, no effusion, no deformity, no LCL laxity and no MCL laxity. Tenderness found.        Legs:  Neurological: She is alert and oriented to person, place, and time. GCS eye subscore is 4. GCS verbal subscore is 5. GCS motor subscore is 6.   Skin: Skin is warm, dry and intact. Ecchymosis noted. No rash noted.   Psychiatric: She has a normal mood and affect. Her speech is normal and behavior is normal. Thought content normal.          ED Course   Procedures  Labs Reviewed - No data to display       Imaging Results          X-Ray Wrist 2 View Left (No Result on File)                X-Ray Chest 1 View (No Result on File)                X-Ray Ribs 2 View Left (No Result on File)                X-Ray Knee 3 View Left (No Result on File)                               Attending Attestation:     Physician Attestation Statement for NP/PA:   I have conducted a face to face encounter with this patient in addition to the NP/PA, due to Medical Complexity    Other NP/PA Attestation Additions:      Medical Decision Making: Pt presents s/p fall on outstretched hands and knees. Complaining of pain to bilat knees and wrists as well as left sided rib pain. Exam remarkable for reproducible L chest wall TTP. Left knee mild abrasion anteriorly however full ROM, no effusion/swelling. bilat wrist exam also mostly unremarkable other than mild discomfort to left wrist with extreme flexion. Workup per LYNDSAY unremarkable. MSK contusion of knees/wrist and strain to chest wall. Symptomatic care per LYNDSAY. F/U with PCP as needed. Rtn for any emergent issues.                 ED Course as of Aug 08 1240   Wed Aug 08, 2018   0654 I, Dr. Kapil Centeno, personally performed the services described in this documentation.   All medical record entries made by the scribe were at my direction and in my presence.   I have reviewed the chart and agree that the record is accurate and complete.   Kapil Centeno MD.    [NP]   5191 EKG:  Normal sinus rhythm at 75 bpm, nl axis, nl intervals, no hypertrophy, T-wave flattening in leads III and AVF, T-wave inversion in leads V3-V6 as read by me (Dr. Centeno).   There are no significant changes in comparison to previous EKG on 02/13/2014.    [NP]      ED Course User Index  [NP] Kapil Centeno MD     Clinical Impression:     1. Left wrist pain    2. Left rib pain    3. Left knee contusion    4. Wheezing                                  Kapil Centeno,  MD  08/11/18 0209

## 2018-08-14 ENCOUNTER — OFFICE VISIT (OUTPATIENT)
Dept: INTERNAL MEDICINE | Facility: CLINIC | Age: 63
End: 2018-08-14
Payer: COMMERCIAL

## 2018-08-14 ENCOUNTER — LAB VISIT (OUTPATIENT)
Dept: LAB | Facility: HOSPITAL | Age: 63
End: 2018-08-14
Attending: INTERNAL MEDICINE
Payer: COMMERCIAL

## 2018-08-14 VITALS
HEIGHT: 61 IN | WEIGHT: 175.25 LBS | OXYGEN SATURATION: 99 % | DIASTOLIC BLOOD PRESSURE: 64 MMHG | SYSTOLIC BLOOD PRESSURE: 117 MMHG | BODY MASS INDEX: 33.09 KG/M2 | HEART RATE: 81 BPM

## 2018-08-14 DIAGNOSIS — J45.30 MILD PERSISTENT ASTHMA WITHOUT COMPLICATION: ICD-10-CM
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
## 2018-08-14 DIAGNOSIS — I10 ESSENTIAL HYPERTENSION: ICD-10-CM

## 2018-08-14 DIAGNOSIS — E66.9 OBESITY (BMI 30-39.9): ICD-10-CM

## 2018-08-14 DIAGNOSIS — Z00.00 ANNUAL PHYSICAL EXAM: ICD-10-CM

## 2018-08-14 DIAGNOSIS — K21.9 GASTROESOPHAGEAL REFLUX DISEASE, ESOPHAGITIS PRESENCE NOT SPECIFIED: ICD-10-CM

## 2018-08-14 DIAGNOSIS — Z76.89 ENCOUNTER TO ESTABLISH CARE: ICD-10-CM

## 2018-08-14 DIAGNOSIS — J30.9 ALLERGIC RHINITIS, UNSPECIFIED SEASONALITY, UNSPECIFIED TRIGGER: ICD-10-CM

## 2018-08-14 DIAGNOSIS — R93.7 ABNORMAL BONE DENSITY SCREENING: ICD-10-CM

## 2018-08-14 DIAGNOSIS — E78.00 HYPERCHOLESTEREMIA: ICD-10-CM

## 2018-08-14 DIAGNOSIS — Z00.00 ANNUAL PHYSICAL EXAM: Primary | ICD-10-CM

## 2018-08-14 LAB
25(OH)D3+25(OH)D2 SERPL-MCNC: 23 NG/ML
ALBUMIN SERPL BCP-MCNC: 4 G/DL
ALP SERPL-CCNC: 86 U/L
ALT SERPL W/O P-5'-P-CCNC: 22 U/L
ANION GAP SERPL CALC-SCNC: 7 MMOL/L
AST SERPL-CCNC: 21 U/L
BASOPHILS # BLD AUTO: 0.03 K/UL
BASOPHILS NFR BLD: 0.5 %
BILIRUB SERPL-MCNC: 0.7 MG/DL
BUN SERPL-MCNC: 15 MG/DL
CALCIUM SERPL-MCNC: 9.8 MG/DL
CHLORIDE SERPL-SCNC: 106 MMOL/L
CHOLEST SERPL-MCNC: 151 MG/DL
CHOLEST/HDLC SERPL: 3.9 {RATIO}
CO2 SERPL-SCNC: 30 MMOL/L
CREAT SERPL-MCNC: 0.8 MG/DL
DIFFERENTIAL METHOD: ABNORMAL
EOSINOPHIL # BLD AUTO: 0.5 K/UL
EOSINOPHIL NFR BLD: 8 %
ERYTHROCYTE [DISTWIDTH] IN BLOOD BY AUTOMATED COUNT: 13.7 %
EST. GFR  (AFRICAN AMERICAN): >60 ML/MIN/1.73 M^2
EST. GFR  (NON AFRICAN AMERICAN): >60 ML/MIN/1.73 M^2
ESTIMATED AVG GLUCOSE: 105 MG/DL
GLUCOSE SERPL-MCNC: 118 MG/DL
HBA1C MFR BLD HPLC: 5.3 %
HCT VFR BLD AUTO: 44.7 %
HDLC SERPL-MCNC: 39 MG/DL
HDLC SERPL: 25.8 %
HGB BLD-MCNC: 14.2 G/DL
IMM GRANULOCYTES # BLD AUTO: 0.01 K/UL
IMM GRANULOCYTES NFR BLD AUTO: 0.2 %
LDLC SERPL CALC-MCNC: 85 MG/DL
LYMPHOCYTES # BLD AUTO: 1.2 K/UL
LYMPHOCYTES NFR BLD: 20.1 %
MCH RBC QN AUTO: 30.4 PG
MCHC RBC AUTO-ENTMCNC: 31.8 G/DL
MCV RBC AUTO: 96 FL
MONOCYTES # BLD AUTO: 0.3 K/UL
MONOCYTES NFR BLD: 5.2 %
NEUTROPHILS # BLD AUTO: 4.1 K/UL
NEUTROPHILS NFR BLD: 66 %
NONHDLC SERPL-MCNC: 112 MG/DL
NRBC BLD-RTO: 0 /100 WBC
PLATELET # BLD AUTO: 247 K/UL
PMV BLD AUTO: 10.5 FL
POTASSIUM SERPL-SCNC: 4.1 MMOL/L
PROT SERPL-MCNC: 7.2 G/DL
RBC # BLD AUTO: 4.67 M/UL
SODIUM SERPL-SCNC: 143 MMOL/L
TRIGL SERPL-MCNC: 135 MG/DL
TSH SERPL DL<=0.005 MIU/L-ACNC: 2.71 UIU/ML
WBC # BLD AUTO: 6.13 K/UL

## 2018-08-14 PROCEDURE — 80061 LIPID PANEL: CPT

## 2018-08-14 PROCEDURE — 99999 PR PBB SHADOW E&M-EST. PATIENT-LVL III: CPT | Mod: PBBFAC,,, | Performed by: INTERNAL MEDICINE

## 2018-08-14 PROCEDURE — 3074F SYST BP LT 130 MM HG: CPT | Mod: CPTII,S$GLB,, | Performed by: INTERNAL MEDICINE

## 2018-08-14 PROCEDURE — 84443 ASSAY THYROID STIM HORMONE: CPT

## 2018-08-14 PROCEDURE — 83036 HEMOGLOBIN GLYCOSYLATED A1C: CPT

## 2018-08-14 PROCEDURE — 85025 COMPLETE CBC W/AUTO DIFF WBC: CPT

## 2018-08-14 PROCEDURE — 80053 COMPREHEN METABOLIC PANEL: CPT

## 2018-08-14 PROCEDURE — 99214 OFFICE O/P EST MOD 30 MIN: CPT | Mod: S$GLB,,, | Performed by: INTERNAL MEDICINE

## 2018-08-14 PROCEDURE — 36415 COLL VENOUS BLD VENIPUNCTURE: CPT | Mod: PO

## 2018-08-14 PROCEDURE — 3078F DIAST BP <80 MM HG: CPT | Mod: CPTII,S$GLB,, | Performed by: INTERNAL MEDICINE

## 2018-08-14 PROCEDURE — 82306 VITAMIN D 25 HYDROXY: CPT

## 2018-08-14 RX ORDER — ALENDRONATE SODIUM 70 MG/1
TABLET ORAL
Qty: 12 TABLET | Refills: 3 | Status: SHIPPED | OUTPATIENT
Start: 2018-08-14 | End: 2019-09-14 | Stop reason: SDUPTHER

## 2018-08-14 RX ORDER — FLUTICASONE PROPIONATE 50 MCG
SPRAY, SUSPENSION (ML) NASAL
Qty: 16 G | Refills: 11 | Status: SHIPPED | OUTPATIENT
Start: 2018-08-14 | End: 2020-11-06

## 2018-08-14 RX ORDER — SIMVASTATIN 20 MG/1
20 TABLET, FILM COATED ORAL DAILY
Qty: 90 TABLET | Refills: 3 | Status: SHIPPED | OUTPATIENT
Start: 2018-08-14 | End: 2019-04-03 | Stop reason: SDUPTHER

## 2018-08-14 RX ORDER — BUDESONIDE AND FORMOTEROL FUMARATE DIHYDRATE 160; 4.5 UG/1; UG/1
2 AEROSOL RESPIRATORY (INHALATION) EVERY 12 HOURS
Qty: 10.2 INHALER | Refills: 6 | Status: SHIPPED | OUTPATIENT
Start: 2018-08-14 | End: 2019-09-25 | Stop reason: SDUPTHER

## 2018-08-14 RX ORDER — IPRATROPIUM BROMIDE AND ALBUTEROL SULFATE 2.5; .5 MG/3ML; MG/3ML
3 SOLUTION RESPIRATORY (INHALATION) EVERY 6 HOURS PRN
Qty: 1 BOX | Refills: 2 | Status: SHIPPED | OUTPATIENT
Start: 2018-08-14 | End: 2019-03-29

## 2018-08-14 RX ORDER — LOSARTAN POTASSIUM 100 MG/1
100 TABLET ORAL DAILY
Qty: 90 TABLET | Refills: 3 | Status: SHIPPED | OUTPATIENT
Start: 2018-08-14 | End: 2019-09-17 | Stop reason: SDUPTHER

## 2018-08-14 NOTE — PROGRESS NOTES
Subjective:       Patient ID: Carina Montaño is a 63 y.o. female.    Chief Complaint: Establish [facility]    HPI Mrs. Montaño is a 63 year old female with allergy, asthma, arthritis, GERD, hypertension, hyperlipidemia, history of breast cancer who presents to establish [facility] and for annual exam.  Of note, she has recently fallen twice.  She fell down on Sunday while walking outside on uneven terrain.  She went to the emergency department on Wednesday as a result of this fall.  They did an x-ray and told her that nothing was broken.  She was prescribed naproxen and tramadol for pain relief.  Her pain is much improved with these medications.  She was able to return to work yesterday.  For treatment of hypertension she takes losartan 10 mg p.o. daily  For treatment of GERD she takes ranitidine 300 mg p.o. daily  For treatment of hyperlipidemia she takes simvastatin 20 mg p.o. daily  She takes Flonase and Claritin for treatment of allergic rhinitis.  For treatment of asthma she takes Symbicort and uses a ProAir/albuterol inhalations as needed.  She has not had any recent exacerbations of her asthma.  The patient takes Fosamax.  She has been on Fosamax for 2 years.  We do not have the records of her DEXA scan here, but she states that this is at the [facility].  She plans to have a repeat DEXA scan performed next month.    Review of Systems   Musculoskeletal:        Muscle pain due to fall (see HPI)     All other systems reviewed and are negative.      Objective:      Physical Exam   Constitutional: She is oriented to person, place, and time. She appears well-developed and well-nourished. No distress.   HENT:   Head: Normocephalic and atraumatic.   Right Ear: External ear normal.   Left Ear: External ear normal.   Nose: Nose normal.   Eyes: Conjunctivae and EOM are normal.   Neck: Neck supple. No tracheal deviation present. No thyromegaly present.   Cardiovascular: Normal rate, regular rhythm, normal heart sounds and  intact distal pulses. Exam reveals no gallop and no friction rub.   No murmur heard.  Pulmonary/Chest: Effort normal and breath sounds normal. No stridor. No respiratory distress. She has no wheezes. She has no rales.   Abdominal: Soft. Bowel sounds are normal. She exhibits no distension and no mass. There is no tenderness. There is no rebound and no guarding.   Musculoskeletal: She exhibits no edema or deformity.   Lymphadenopathy:     She has no cervical adenopathy.   Neurological: She is alert and oriented to person, place, and time.   Skin: Skin is warm and dry. She is not diaphoretic.   Psychiatric: She has a normal mood and affect. Her behavior is normal. Judgment and thought content normal.   Vitals reviewed.      Assessment:       1. Annual physical exam    2. Encounter to establish care    3. Essential hypertension    4. Gastroesophageal reflux disease, esophagitis presence not specified    5. Hypercholesteremia    6. Mild persistent asthma without complication    7. Allergic rhinitis, unspecified seasonality, unspecified trigger    8. Abnormal bone density screening    9. Fall, initial encounter    10. Obesity (BMI 30-39.9)        Plan:     1.  Annual exam  CMP, CBC, lipids, A1c, vitamin-D, TSH  Obtaining records for pap smear, mammogram, colonoscopy    2.  Essential hypertension  Stable, well controlled.  Continue current medications    3.  GERD  Stable, well controlled  Continue current medications    4.  Hypercholesterolemia  Checking lipid profile today  Continue simvastatin    5.  Mild persistent asthma without complication  Stable, well controlled  Continue current medications    6.  Allergic rhinitis  Stable, well controlled.  Continue current medications    7.  Abnormal DEXA scan  Unclear if osteopenia with high FRAX index versus osteoporosis, the patient is currently on Fosamax.  Continue Fosamax at this time.  Checking vitamin-D    8. Fall  Patient declines physical therapy referral at this  time.  Checking vitamin-D level    9.  Obesity  Patient has recently started exercising with her family.  Encouraged continued exercise.    RTC 6 months

## 2018-08-21 ENCOUNTER — PATIENT MESSAGE (OUTPATIENT)
Dept: INTERNAL MEDICINE | Facility: CLINIC | Age: 63
End: 2018-08-21

## 2018-08-21 DIAGNOSIS — E55.9 VITAMIN D INSUFFICIENCY: Primary | ICD-10-CM

## 2018-08-21 RX ORDER — VIT C/E/ZN/COPPR/LUTEIN/ZEAXAN 250MG-90MG
1000 CAPSULE ORAL DAILY
Qty: 90 CAPSULE | Refills: 1 | Status: SHIPPED | OUTPATIENT
Start: 2018-08-21 | End: 2019-08-13

## 2018-09-27 ENCOUNTER — TELEPHONE (OUTPATIENT)
Dept: ADMINISTRATIVE | Facility: HOSPITAL | Age: 63
End: 2018-09-27

## 2018-12-01 ENCOUNTER — OFFICE VISIT (OUTPATIENT)
Dept: URGENT CARE | Facility: CLINIC | Age: 63
End: 2018-12-01
Payer: COMMERCIAL

## 2018-12-01 VITALS
SYSTOLIC BLOOD PRESSURE: 140 MMHG | DIASTOLIC BLOOD PRESSURE: 76 MMHG | BODY MASS INDEX: 32.47 KG/M2 | WEIGHT: 172 LBS | HEART RATE: 77 BPM | OXYGEN SATURATION: 95 % | RESPIRATION RATE: 16 BRPM | TEMPERATURE: 99 F | HEIGHT: 61 IN

## 2018-12-01 DIAGNOSIS — H65.113 ACUTE ALLERGIC SEROUS OTITIS MEDIA OF BOTH EARS: ICD-10-CM

## 2018-12-01 DIAGNOSIS — J30.1 SEASONAL ALLERGIC RHINITIS DUE TO POLLEN: Primary | ICD-10-CM

## 2018-12-01 DIAGNOSIS — J40 BRONCHITIS: ICD-10-CM

## 2018-12-01 DIAGNOSIS — M77.8 TENDONITIS OF ELBOW, LEFT: ICD-10-CM

## 2018-12-01 DIAGNOSIS — J06.9 URI, ACUTE: ICD-10-CM

## 2018-12-01 PROCEDURE — 96372 THER/PROPH/DIAG INJ SC/IM: CPT | Mod: 59,S$GLB,, | Performed by: FAMILY MEDICINE

## 2018-12-01 PROCEDURE — 3008F BODY MASS INDEX DOCD: CPT | Mod: CPTII,S$GLB,, | Performed by: FAMILY MEDICINE

## 2018-12-01 PROCEDURE — 3077F SYST BP >= 140 MM HG: CPT | Mod: CPTII,S$GLB,, | Performed by: FAMILY MEDICINE

## 2018-12-01 PROCEDURE — 3078F DIAST BP <80 MM HG: CPT | Mod: CPTII,S$GLB,, | Performed by: FAMILY MEDICINE

## 2018-12-01 PROCEDURE — 99214 OFFICE O/P EST MOD 30 MIN: CPT | Mod: 25,S$GLB,, | Performed by: FAMILY MEDICINE

## 2018-12-01 RX ORDER — PROMETHAZINE HYDROCHLORIDE AND DEXTROMETHORPHAN HYDROBROMIDE 6.25; 15 MG/5ML; MG/5ML
SYRUP ORAL
Qty: 180 ML | Refills: 0 | Status: SHIPPED | OUTPATIENT
Start: 2018-12-01 | End: 2019-05-08

## 2018-12-01 RX ORDER — NAPROXEN 500 MG/1
500 TABLET ORAL 2 TIMES DAILY WITH MEALS
Qty: 30 TABLET | Refills: 2 | Status: SHIPPED | OUTPATIENT
Start: 2018-12-01 | End: 2019-05-08 | Stop reason: SDUPTHER

## 2018-12-01 RX ORDER — BETAMETHASONE SODIUM PHOSPHATE AND BETAMETHASONE ACETATE 3; 3 MG/ML; MG/ML
6 INJECTION, SUSPENSION INTRA-ARTICULAR; INTRALESIONAL; INTRAMUSCULAR; SOFT TISSUE
Status: COMPLETED | OUTPATIENT
Start: 2018-12-01 | End: 2018-12-01

## 2018-12-01 RX ORDER — AMOXICILLIN AND CLAVULANATE POTASSIUM 875; 125 MG/1; MG/1
1 TABLET, FILM COATED ORAL 2 TIMES DAILY
Qty: 20 TABLET | Refills: 0 | Status: SHIPPED | OUTPATIENT
Start: 2018-12-01 | End: 2018-12-11

## 2018-12-01 RX ADMIN — BETAMETHASONE SODIUM PHOSPHATE AND BETAMETHASONE ACETATE 6 MG: 3; 3 INJECTION, SUSPENSION INTRA-ARTICULAR; INTRALESIONAL; INTRAMUSCULAR; SOFT TISSUE at 10:12

## 2018-12-01 NOTE — PATIENT INSTRUCTIONS
What Is Acute Bronchitis?  Acute bronchitis is when the airways in your lungs (bronchial tubes) become red and swollen (inflamed). It is usually caused by a viral infection. But it can also occur because of a bacteria or allergen. Symptoms include a cough that produces yellow or greenish mucus and can last for days or sometimes weeks.  Inside healthy lungs    Air travels in and out of the lungs through the airways. The linings of these airways produce sticky mucus. This mucus traps particles that enter the lungs. Tiny structures called cilia then sweep the particles out of the airways.     Healthy airway: Airways are normally open. Air moves in and out easily.      Healthy cilia: Tiny, hairlike cilia sweep mucus and particles up and out of the airways.   Lungs with bronchitis  Bronchitis often occurs with a cold or the flu virus. The airways become inflamed (red and swollen). There is a deep hacking cough from the extra mucus. Other symptoms may include:  · Wheezing  · Chest discomfort  · Shortness of breath  · Mild fever  A second infection, this time due to bacteria, may then occur. And airways irritated by allergens or smoke are more likely to get infected.        Inflamed airway: Inflammation and extra mucus narrow the airway, causing shortness of breath.      Impaired cilia: Extra mucus impairs cilia, causing congestion and wheezing. Smoking makes the problem worse.   Making a diagnosis  A physical exam, health history, and certain tests help your healthcare provider make the diagnosis.  Health history  Your healthcare provider will ask you about your symptoms.  The exam  Your provider listens to your chest for signs of congestion. He or she may also check your ears, nose, and throat.  Possible tests  · A sputum test for bacteria. This requires a sample of mucus from your lungs.  · A nasal or throat swab. This tests to see if you have a bacterial infection.  · A chest X-ray. This is done if your healthcare  provider thinks you have pneumonia.  · Tests to check for an underlying condition. Other tests may be done to check for things such as allergies, asthma, or COPD (chronic obstructive pulmonary disease). You may need to see a specialist for more lung function testing.  Treating a cough  The main treatment for bronchitis is easing symptoms. Avoiding smoke, allergens, and other things that trigger coughing can often help. If the infection is bacterial, you may be given antibiotics. During the illness, it's important to get plenty of sleep. To ease symptoms:  · Dont smoke. Also avoid secondhand smoke.  · Use a humidifier. Or try breathing in steam from a hot shower. This may help loosen mucus.  · Drink a lot of water and juice. They can soothe the throat and may help thin mucus.  · Sit up or use extra pillows when in bed. This helps to lessen coughing and congestion.  · Ask your provider about using medicine. Ask about using cough medicine, pain and fever medicine, or a decongestant.  Antibiotics  Most cases of bronchitis are caused by cold or flu viruses. They dont need antibiotics to treat them, even if your mucus is thick and green or yellow. Antibiotics dont treat viral illness and antibiotics have not been shown to have any benefit in cases of acute bronchitis. Taking antibiotics when they are not needed increases your risk of getting an infection later that is antibiotic-resistant. Antibiotics can also cause severe cases of diarrhea that require other antibiotics to treat.  It is important that you accept your healthcare provider's opinion to not use antibiotics. Your provider will prescribe antibiotics if the infection is caused by bacteria. If they are prescribed:  · Take all of the medicine. Take the medicine until it is used up, even if symptoms have improved. If you dont, the bronchitis may come back.  · Take the medicines as directed. For instance, some medicines should be taken with food.  · Ask about  side effects. Ask your provider or pharmacist what side effects are common, and what to do about them.  Follow-up care  You should see your provider again in 2 to 3 weeks. By this time, symptoms should have improved. An infection that lasts longer may mean you have a more serious problem.  Prevention  · Avoid tobacco smoke. If you smoke, quit. Stay away from smoky places. Ask friends and family not to smoke around you, or in your home or car.  · Get checked for allergies.  · Ask your provider about getting a yearly flu shot. Also ask about pneumococcal or pneumonia shots.  · Wash your hands often. This helps reduce the chance of picking up viruses that cause colds and flu.  Call your healthcare provider if:  · Symptoms worsen, or you have new symptoms  · Breathing problems worsen or  become severe  · Symptoms dont get better within a week, or within 3 days of taking antibiotics   Date Last Reviewed: 2/1/2017  © 1428-8465 [employer] [address] [place] [address], [place] [address]. All rights reserved. This information is not intended as a substitute for professional medical care. Always follow your healthcare professional's instructions.

## 2018-12-01 NOTE — PROGRESS NOTES
"Subjective:       Patient ID: Carina Montaño is a 63 y.o. female.    Vitals:  height is 5' 1" (1.549 m) and weight is 78 kg (172 lb). Her tympanic temperature is 99 °F (37.2 °C). Her blood pressure is 140/76 (abnormal) and her pulse is 77. Her respiration is 16 and oxygen saturation is 95%.     Chief Complaint: Cough and Arm Pain    C/o sore throat, cough and congestion x one week, now feels ear is full, and fluid feeling also c/o left elbow pain, off and on x 3 weeks, no chest pain        Cough   This is a new problem. Episode onset: 3 weeks. The problem has been gradually worsening. The problem occurs every few minutes. The cough is productive of sputum. Associated symptoms include chest pain, ear congestion, ear pain, headaches, myalgias, nasal congestion, postnasal drip and a sore throat. Pertinent negatives include no chills, eye redness, fever, hemoptysis, rash, shortness of breath or wheezing. Nothing aggravates the symptoms. She has tried OTC cough suppressant (cough drops) for the symptoms. The treatment provided mild relief. Her past medical history is significant for bronchitis and pneumonia.   Arm Pain    Incident onset: 3 weeks. There was no injury mechanism. The pain is present in the left elbow, left shoulder, left hand, right forearm, right hand, right shoulder, upper left arm and upper right arm. The quality of the pain is described as aching. The pain does not radiate (upper back). The pain is at a severity of 8/10. The pain is severe. The pain has been constant since the incident. Associated symptoms include chest pain. The symptoms are aggravated by movement. She has tried nothing for the symptoms.       Constitution: Negative for chills, sweating, fatigue and fever.   HENT: Positive for ear pain, congestion, postnasal drip, sinus pain, sinus pressure and sore throat. Negative for voice change.    Neck: Negative for painful lymph nodes.   Cardiovascular: Positive for chest pain.   Eyes: Negative " for eye redness.   Respiratory: Positive for cough and sputum production. Negative for chest tightness, bloody sputum, COPD, shortness of breath, stridor, wheezing and asthma.    Gastrointestinal: Positive for diarrhea. Negative for nausea and vomiting.   Musculoskeletal: Positive for muscle ache.   Skin: Negative for rash.   Allergic/Immunologic: Negative for seasonal allergies and asthma.   Neurological: Positive for headaches.   Hematologic/Lymphatic: Negative for swollen lymph nodes.       Objective:      Physical Exam   Constitutional: She is oriented to person, place, and time. She appears well-developed and well-nourished. She is cooperative.  Non-toxic appearance. She does not appear ill. No distress.   HENT:   Head: Normocephalic and atraumatic.   Right Ear: Hearing, tympanic membrane, external ear and ear canal normal.   Left Ear: Hearing, tympanic membrane, external ear and ear canal normal.   Nose: Nose normal. No mucosal edema, rhinorrhea or nasal deformity. No epistaxis. Right sinus exhibits no maxillary sinus tenderness and no frontal sinus tenderness. Left sinus exhibits no maxillary sinus tenderness and no frontal sinus tenderness.   Mouth/Throat: Uvula is midline, oropharynx is clear and moist and mucous membranes are normal. No trismus in the jaw. Normal dentition. No uvula swelling. No oropharyngeal exudate or posterior oropharyngeal erythema.   TMS with fluid, no eryythema     Eyes: Conjunctivae and lids are normal. Right eye exhibits no discharge. No scleral icterus.   Sclera clear bilat   Neck: Trachea normal, normal range of motion, full passive range of motion without pain and phonation normal. Neck supple. No JVD present.   Cardiovascular: Normal rate, regular rhythm, normal heart sounds, intact distal pulses and normal pulses. Exam reveals no friction rub.   No murmur heard.  Pulmonary/Chest: Effort normal and breath sounds normal. No respiratory distress. She has no wheezes.   Abdominal:  Soft. Normal appearance and bowel sounds are normal. She exhibits no distension, no pulsatile midline mass and no mass. There is no tenderness.   Musculoskeletal: Normal range of motion. She exhibits no edema or deformity.   Lymphadenopathy:     She has no cervical adenopathy.   Neurological: She is alert and oriented to person, place, and time. She exhibits normal muscle tone. Coordination normal.   Skin: Skin is warm, dry and intact. She is not diaphoretic. No pallor.   Psychiatric: She has a normal mood and affect. Her speech is normal and behavior is normal. Judgment and thought content normal. Cognition and memory are normal.   Nursing note and vitals reviewed.      Assessment:       1. Seasonal allergic rhinitis due to pollen    2. URI, acute    3. Bronchitis    4. Acute allergic serous otitis media of both ears    5. Tendonitis of elbow, left        Plan:         Seasonal allergic rhinitis due to pollen  -     betamethasone acetate-betamethasone sodium phosphate injection 6 mg  -     amoxicillin-clavulanate 875-125mg (AUGMENTIN) 875-125 mg per tablet; Take 1 tablet by mouth 2 (two) times daily. for 10 days  Dispense: 20 tablet; Refill: 0  -     promethazine-dextromethorphan (PROMETHAZINE-DM) 6.25-15 mg/5 mL Syrp; Take one tsp po q 6 hrs prn cough  Dispense: 180 mL; Refill: 0    URI, acute    Bronchitis    Acute allergic serous otitis media of both ears  -     betamethasone acetate-betamethasone sodium phosphate injection 6 mg    Tendonitis of elbow, left  -     naproxen (NAPROSYN) 500 MG tablet; Take 1 tablet (500 mg total) by mouth 2 (two) times daily with meals.  Dispense: 30 tablet; Refill: 2        FU with PCP

## 2018-12-06 ENCOUNTER — OFFICE VISIT (OUTPATIENT)
Dept: URGENT CARE | Facility: CLINIC | Age: 63
End: 2018-12-06
Payer: COMMERCIAL

## 2018-12-06 VITALS
HEIGHT: 61 IN | TEMPERATURE: 98 F | OXYGEN SATURATION: 100 % | HEART RATE: 84 BPM | WEIGHT: 172 LBS | DIASTOLIC BLOOD PRESSURE: 90 MMHG | SYSTOLIC BLOOD PRESSURE: 148 MMHG | RESPIRATION RATE: 20 BRPM | BODY MASS INDEX: 32.47 KG/M2

## 2018-12-06 DIAGNOSIS — R21 RASH: Primary | ICD-10-CM

## 2018-12-06 DIAGNOSIS — B02.9 HERPES ZOSTER WITHOUT COMPLICATION: ICD-10-CM

## 2018-12-06 PROCEDURE — 3077F SYST BP >= 140 MM HG: CPT | Mod: CPTII,S$GLB,, | Performed by: FAMILY MEDICINE

## 2018-12-06 PROCEDURE — 3008F BODY MASS INDEX DOCD: CPT | Mod: CPTII,S$GLB,, | Performed by: FAMILY MEDICINE

## 2018-12-06 PROCEDURE — 99213 OFFICE O/P EST LOW 20 MIN: CPT | Mod: S$GLB,,, | Performed by: FAMILY MEDICINE

## 2018-12-06 PROCEDURE — 3080F DIAST BP >= 90 MM HG: CPT | Mod: CPTII,S$GLB,, | Performed by: FAMILY MEDICINE

## 2018-12-06 RX ORDER — TRIAMCINOLONE ACETONIDE 1 MG/G
CREAM TOPICAL 2 TIMES DAILY
Qty: 80 G | Refills: 1 | Status: SHIPPED | OUTPATIENT
Start: 2018-12-06 | End: 2019-05-08

## 2018-12-06 RX ORDER — VALACYCLOVIR HYDROCHLORIDE 500 MG/1
500 TABLET, FILM COATED ORAL 3 TIMES DAILY
Qty: 30 TABLET | Refills: 0 | Status: SHIPPED | OUTPATIENT
Start: 2018-12-06 | End: 2019-05-08

## 2018-12-06 NOTE — PROGRESS NOTES
"Subjective:       Patient ID: Carina Montaño is a 63 y.o. female.    Vitals:  height is 5' 1" (1.549 m) and weight is 78 kg (172 lb). Her temperature is 98 °F (36.7 °C). Her blood pressure is 148/90 (abnormal) and her pulse is 84. Her respiration is 20 and oxygen saturation is 100%.     Chief Complaint: Rash (neck and back)    Rash with itching and pain,neck and back x yesterday, no unusual exposure. Pain feels like pinching sensation,       Rash   This is a new problem. The current episode started today. The affected locations include the neck. The rash is characterized by dryness and pain. Pertinent negatives include no cough, fever or sore throat. Past treatments include nothing.       Constitution: Negative for chills and fever.   HENT: Negative for facial swelling and sore throat.    Neck: Negative for painful lymph nodes.   Eyes: Negative for eye itching and eyelid swelling.   Respiratory: Negative for cough.    Musculoskeletal: Negative for joint pain and joint swelling.   Skin: Positive for rash. Negative for color change, pale, wound, abrasion, laceration, lesion, skin thickening/induration, puncture wound, erythema, bruising, abscess, avulsion and hives.   Allergic/Immunologic: Positive for itching. Negative for environmental allergies, immunocompromised state and hives.   Hematologic/Lymphatic: Negative for swollen lymph nodes.       Objective:      Physical Exam   Constitutional: She is oriented to person, place, and time. She appears well-developed and well-nourished. She is cooperative.  Non-toxic appearance. She does not appear ill. No distress.   HENT:   Head: Normocephalic and atraumatic.   Right Ear: Hearing, tympanic membrane, external ear and ear canal normal.   Left Ear: Hearing, tympanic membrane, external ear and ear canal normal.   Nose: Nose normal. No mucosal edema, rhinorrhea or nasal deformity. No epistaxis. Right sinus exhibits no maxillary sinus tenderness and no frontal sinus " tenderness. Left sinus exhibits no maxillary sinus tenderness and no frontal sinus tenderness.   Mouth/Throat: Uvula is midline, oropharynx is clear and moist and mucous membranes are normal. No trismus in the jaw. Normal dentition. No uvula swelling. No oropharyngeal exudate or posterior oropharyngeal erythema.   Eyes: Conjunctivae and lids are normal. Right eye exhibits no discharge. Left eye exhibits no discharge. No scleral icterus.   Sclera clear bilat   Neck: Trachea normal, normal range of motion, full passive range of motion without pain and phonation normal. Neck supple. No JVD present. No tracheal deviation present.   Cardiovascular: Normal rate, regular rhythm, normal heart sounds, intact distal pulses and normal pulses. Exam reveals no friction rub.   No murmur heard.  Pulmonary/Chest: Effort normal and breath sounds normal. No stridor. She has no wheezes.   Abdominal: Soft. Normal appearance and bowel sounds are normal. She exhibits no distension, no pulsatile midline mass and no mass. There is no tenderness.   Musculoskeletal: Normal range of motion. She exhibits no edema or deformity.   Lower neck and left upper shoulder area with superficial erythematous rash, no vesicles seen, no other rash seen   Lymphadenopathy:     She has no cervical adenopathy.   Neurological: She is alert and oriented to person, place, and time. She exhibits normal muscle tone. Coordination normal.   Skin: Skin is warm, dry and intact. She is not diaphoretic. No erythema. No pallor.   Psychiatric: She has a normal mood and affect. Her speech is normal and behavior is normal. Judgment and thought content normal. Cognition and memory are normal.   Nursing note and vitals reviewed.      Assessment:       1. Rash    2. Herpes zoster without complication        Plan:         Rash  -     valACYclovir (VALTREX) 500 MG tablet; Take 1 tablet (500 mg total) by mouth 3 (three) times daily. for 10 days  Dispense: 30 tablet; Refill: 0  -      triamcinolone acetonide 0.1% (KENALOG) 0.1 % cream; Apply topically 2 (two) times daily. for 10 days  Dispense: 80 g; Refill: 1    Herpes zoster without complication  -     triamcinolone acetonide 0.1% (KENALOG) 0.1 % cream; Apply topically 2 (two) times daily. for 10 days  Dispense: 80 g; Refill: 1        RTC if rash persists

## 2018-12-06 NOTE — PATIENT INSTRUCTIONS

## 2019-02-11 ENCOUNTER — OFFICE VISIT (OUTPATIENT)
Dept: URGENT CARE | Facility: CLINIC | Age: 64
End: 2019-02-11
Payer: COMMERCIAL

## 2019-02-11 VITALS
OXYGEN SATURATION: 95 % | BODY MASS INDEX: 29.83 KG/M2 | RESPIRATION RATE: 18 BRPM | SYSTOLIC BLOOD PRESSURE: 156 MMHG | HEIGHT: 61 IN | DIASTOLIC BLOOD PRESSURE: 58 MMHG | WEIGHT: 158 LBS | HEART RATE: 66 BPM | TEMPERATURE: 98 F

## 2019-02-11 DIAGNOSIS — J40 BRONCHITIS: Primary | ICD-10-CM

## 2019-02-11 PROCEDURE — 3008F PR BODY MASS INDEX (BMI) DOCUMENTED: ICD-10-PCS | Mod: CPTII,S$GLB,, | Performed by: NURSE PRACTITIONER

## 2019-02-11 PROCEDURE — 3078F PR MOST RECENT DIASTOLIC BLOOD PRESSURE < 80 MM HG: ICD-10-PCS | Mod: CPTII,S$GLB,, | Performed by: NURSE PRACTITIONER

## 2019-02-11 PROCEDURE — 3008F BODY MASS INDEX DOCD: CPT | Mod: CPTII,S$GLB,, | Performed by: NURSE PRACTITIONER

## 2019-02-11 PROCEDURE — 71046 X-RAY EXAM CHEST 2 VIEWS: CPT | Mod: FY,S$GLB,, | Performed by: RADIOLOGY

## 2019-02-11 PROCEDURE — 3078F DIAST BP <80 MM HG: CPT | Mod: CPTII,S$GLB,, | Performed by: NURSE PRACTITIONER

## 2019-02-11 PROCEDURE — 99214 OFFICE O/P EST MOD 30 MIN: CPT | Mod: 25,S$GLB,, | Performed by: NURSE PRACTITIONER

## 2019-02-11 PROCEDURE — 96372 THER/PROPH/DIAG INJ SC/IM: CPT | Mod: 59,S$GLB,, | Performed by: NURSE PRACTITIONER

## 2019-02-11 PROCEDURE — 96372 PR INJECTION,THERAP/PROPH/DIAG2ST, IM OR SUBCUT: ICD-10-PCS | Mod: 59,S$GLB,, | Performed by: NURSE PRACTITIONER

## 2019-02-11 PROCEDURE — 71046 XR CHEST PA AND LATERAL: ICD-10-PCS | Mod: FY,S$GLB,, | Performed by: RADIOLOGY

## 2019-02-11 PROCEDURE — 3077F SYST BP >= 140 MM HG: CPT | Mod: CPTII,S$GLB,, | Performed by: NURSE PRACTITIONER

## 2019-02-11 PROCEDURE — 99214 PR OFFICE/OUTPT VISIT, EST, LEVL IV, 30-39 MIN: ICD-10-PCS | Mod: 25,S$GLB,, | Performed by: NURSE PRACTITIONER

## 2019-02-11 PROCEDURE — 3077F PR MOST RECENT SYSTOLIC BLOOD PRESSURE >= 140 MM HG: ICD-10-PCS | Mod: CPTII,S$GLB,, | Performed by: NURSE PRACTITIONER

## 2019-02-11 RX ORDER — ALBUTEROL SULFATE 90 UG/1
2 AEROSOL, METERED RESPIRATORY (INHALATION) EVERY 6 HOURS PRN
Qty: 18 G | Refills: 0 | Status: SHIPPED | OUTPATIENT
Start: 2019-02-11 | End: 2019-03-13 | Stop reason: SDUPTHER

## 2019-02-11 RX ORDER — BETAMETHASONE SODIUM PHOSPHATE AND BETAMETHASONE ACETATE 3; 3 MG/ML; MG/ML
9 INJECTION, SUSPENSION INTRA-ARTICULAR; INTRALESIONAL; INTRAMUSCULAR; SOFT TISSUE
Status: COMPLETED | OUTPATIENT
Start: 2019-02-11 | End: 2019-02-11

## 2019-02-11 RX ORDER — AZITHROMYCIN 250 MG/1
TABLET, FILM COATED ORAL
Qty: 6 TABLET | Refills: 0 | Status: SHIPPED | OUTPATIENT
Start: 2019-02-11 | End: 2019-05-08

## 2019-02-11 RX ADMIN — BETAMETHASONE SODIUM PHOSPHATE AND BETAMETHASONE ACETATE 9 MG: 3; 3 INJECTION, SUSPENSION INTRA-ARTICULAR; INTRALESIONAL; INTRAMUSCULAR; SOFT TISSUE at 10:02

## 2019-02-11 NOTE — PATIENT INSTRUCTIONS
Use an antihistamine such as Claritin, Zyrtec or Allegra to dry you out.     Use mucinex (guaifenisin) to break up mucous up to 2400mg/day to loosen any mucous. The mucinex DM pill has a cough suppressant that can be used at night to stop the tickle at the back of your throat.    Use Flonase 1-2 sprays/nostril per day. It is a local acting steroid nasal spray, if you develop a bloody nose, stop using the medication immediately.    Use warm salt water gargles to ease your throat pain. Warm salt water gargles as needed for sore throat-  1/2 tsp salt to 1 cup warm water, gargle as desired. Hot tea with honey.     Sometimes Nyquil at night is beneficial to help you get some rest, however it is sedating and it does have an antihistamine, and tylenol.    Use inhaler every 6 hours as needed for wheezing    If symptoms persist or worsen follow up with primary.       Bronchitis with Wheezing (Viral or Bacterial: Adult)    Bronchitis is an infection of the air passages. It often occurs during a cold and is usually caused by a virus. Symptoms include cough with mucus (phlegm) and low-grade fever. This illness is contagious during the first few days and is spread through the air by coughing and sneezing, or by direct contact (touching the sick person and then touching your own eyes, nose, or mouth).  If there is a lot of inflammation, air flow is restricted. The air passages may also go into spasm, especially if you have asthma. This causes wheezing and difficulty breathing even in people who do not have asthma.  Bronchitis usually lasts 7 to 14 days. The wheezing should improve with treatment during the first week. An inhaler is often prescribed to relax the air passages and stop wheezing. Antibiotics will be prescribed if your doctor thinks there is also a secondary bacterial infection.  Home care  · If symptoms are severe, rest at home for the first 2 to 3 days. When you go back to your usual activities, don't let yourself  get too tired.  · Do not smoke. Also avoid being exposed to secondhand smoke.  · You may use over-the-counter medicine to control fever or pain, unless another medicine was prescribed. Note: If you have chronic liver or kidney disease or have ever had a stomach ulcer or gastrointestinal bleeding, talk with your healthcare provider before using these medicines. Also talk to your provider if you are taking medicine to prevent blood clots.) Aspirin should never be given to anyone younger than 18 years of age who is ill with a viral infection or fever. It may cause severe liver or brain damage.  · Your appetite may be poor, so a light diet is fine. Avoid dehydration by drinking 6 to 8 glasses of fluids per day (such as water, soft drinks, sports drinks, juices, tea, or soup). Extra fluids will help loosen secretions in the nose and lungs.  · Over-the-counter cough, cold, and sore-throat medicines will not shorten the length of the illness, but they may be helpful to reduce symptoms. (Note: Do not use decongestants if you have high blood pressure.)  · If you were given an inhaler, use it exactly as directed. If you need to use it more often than prescribed, your condition may be worsening. If this happens, contact your healthcare provider.  · If prescribed, finish all antibiotic medicine, even if you are feeling better after only a few days.  Follow-up care  Follow up with your healthcare provider, or as advised. If you had an X-ray or ECG (electrocardiogram), a specialist will review it. You will be notified of any new findings that may affect your care.  Note: If you are age 65 or older, or if you have a chronic lung disease or condition that affects your immune system, or you smoke, talk to your healthcare provider about having a pneumococcal vaccinations and a yearly influenza vaccination (flu shot).  When to seek medical advice  Call your healthcare provider right away if any of these occur:  · Fever of 100.4°F  (38°C) or higher  · Coughing up increasing amounts of colored sputum  · Weakness, drowsiness, headache, facial pain, ear pain, or a stiff neck  Call 911, or get immediate medical care  Contact emergency services right away if any of these occur.  · Coughing up blood  · Worsening weakness, drowsiness, headache, or stiff neck  · Increased wheezing not helped with medication, shortness of breath, or pain with breathing  Date Last Reviewed: 9/13/2015  © 4011-6276 [employer]. [address] [place] [address]. All rights reserved. This information is not intended as a substitute for professional medical care. Always follow your healthcare professional's instructions.

## 2019-02-11 NOTE — PROGRESS NOTES
"Subjective:       Patient ID: Carina Montaño is a 63 y.o. female.    Vitals:  height is 5' 1" (1.549 m) and weight is 71.7 kg (158 lb). Her temperature is 97.7 °F (36.5 °C). Her blood pressure is 156/58 (abnormal) and her pulse is 66. Her respiration is 18 and oxygen saturation is 95%.     Chief Complaint: Cough    Cough   This is a new problem. Episode onset: 3 days. The problem has been unchanged. The problem occurs constantly. The cough is productive of sputum. Associated symptoms include shortness of breath and wheezing. Pertinent negatives include no chills, ear pain, eye redness, fever, hemoptysis, myalgias, rash or sore throat. Nothing aggravates the symptoms. She has tried OTC cough suppressant for the symptoms. The treatment provided mild relief.       Constitution: Negative for chills, sweating, fatigue and fever.   HENT: Positive for congestion. Negative for ear pain, sinus pain, sinus pressure, sore throat and voice change.    Neck: Negative for painful lymph nodes.   Eyes: Negative for eye redness.   Respiratory: Positive for cough, sputum production, shortness of breath and wheezing. Negative for chest tightness, bloody sputum, COPD, stridor and asthma.    Gastrointestinal: Negative for nausea and vomiting.   Musculoskeletal: Negative for muscle ache.   Skin: Negative for rash.   Allergic/Immunologic: Negative for seasonal allergies and asthma.   Hematologic/Lymphatic: Negative for swollen lymph nodes.       Objective:      Physical Exam   Constitutional: She is oriented to person, place, and time. She appears well-developed and well-nourished. She is cooperative.  Non-toxic appearance. She does not appear ill. No distress.   HENT:   Head: Normocephalic and atraumatic.   Right Ear: Hearing, tympanic membrane, external ear and ear canal normal.   Left Ear: Hearing, tympanic membrane, external ear and ear canal normal.   Nose: Nose normal. No mucosal edema, rhinorrhea or nasal deformity. No epistaxis. " Right sinus exhibits no maxillary sinus tenderness and no frontal sinus tenderness. Left sinus exhibits no maxillary sinus tenderness and no frontal sinus tenderness.   Mouth/Throat: Uvula is midline and mucous membranes are normal. No trismus in the jaw. Normal dentition. No uvula swelling. Posterior oropharyngeal erythema present.   Post nasal drip  Hoarseness     Eyes: Conjunctivae and lids are normal. No scleral icterus.   Sclera clear bilat   Neck: Trachea normal, full passive range of motion without pain and phonation normal. Neck supple.   Cardiovascular: Normal rate, regular rhythm, normal heart sounds, intact distal pulses and normal pulses.   Pulmonary/Chest: Effort normal. No respiratory distress. She has wheezes.   Abdominal: Soft. Normal appearance and bowel sounds are normal. She exhibits no distension. There is no tenderness.   Musculoskeletal: Normal range of motion. She exhibits no edema or deformity.   Neurological: She is alert and oriented to person, place, and time. She exhibits normal muscle tone. Coordination normal.   Skin: Skin is warm, dry and intact. She is not diaphoretic. No pallor.   Psychiatric: She has a normal mood and affect. Her speech is normal and behavior is normal. Judgment and thought content normal. Cognition and memory are normal.   Nursing note and vitals reviewed.      X-ray Chest Pa And Lateral    Result Date: 2/11/2019  EXAMINATION: XR CHEST PA AND LATERAL CLINICAL HISTORY: Bronchitis, not specified as acute or chronic TECHNIQUE: PA and lateral views of the chest were performed. COMPARISON: Chest 08/08/2018 FINDINGS: The bilateral lungs are clear.  No pleural effusion or pneumothorax.  There is nonspecific prominence of the pulmonary vasculature.  The heart and mediastinum are otherwise unremarkable.  There is grossly stable postsurgical change of the right humeral head.  The osseous structures are otherwise unremarkable.     #1. As above. Electronically signed  by: Chun Kay MD Date:    02/11/2019 Time:    10:21  Assessment:       1. Bronchitis        Plan:         Bronchitis  -     X-Ray Chest PA And Lateral; Future; Expected date: 02/11/2019  -     albuterol (PROVENTIL HFA) 90 mcg/actuation inhaler; Inhale 2 puffs into the lungs every 6 (six) hours as needed for Wheezing. Rescue  Dispense: 18 g; Refill: 0  -     betamethasone acetate-betamethasone sodium phosphate injection 9 mg  -     azithromycin (ZITHROMAX Z-ELIZABETH) 250 MG tablet; Take 2 tablets (500 mg) on  Day 1,  followed by 1 tablet (250 mg) once daily on Days 2 through 5.  Dispense: 6 tablet; Refill: 0      Patient Instructions   Use an antihistamine such as Claritin, Zyrtec or Allegra to dry you out.     Use mucinex (guaifenisin) to break up mucous up to 2400mg/day to loosen any mucous. The mucinex DM pill has a cough suppressant that can be used at night to stop the tickle at the back of your throat.    Use Flonase 1-2 sprays/nostril per day. It is a local acting steroid nasal spray, if you develop a bloody nose, stop using the medication immediately.    Use warm salt water gargles to ease your throat pain. Warm salt water gargles as needed for sore throat-  1/2 tsp salt to 1 cup warm water, gargle as desired. Hot tea with honey.     Sometimes Nyquil at night is beneficial to help you get some rest, however it is sedating and it does have an antihistamine, and tylenol.    Use inhaler every 6 hours as needed for wheezing    If symptoms persist or worsen follow up with primary.       Bronchitis with Wheezing (Viral or Bacterial: Adult)    Bronchitis is an infection of the air passages. It often occurs during a cold and is usually caused by a virus. Symptoms include cough with mucus (phlegm) and low-grade fever. This illness is contagious during the first few days and is spread through the air by coughing and sneezing, or by direct contact (touching the sick person and then touching your own eyes, nose, or  mouth).  If there is a lot of inflammation, air flow is restricted. The air passages may also go into spasm, especially if you have asthma. This causes wheezing and difficulty breathing even in people who do not have asthma.  Bronchitis usually lasts 7 to 14 days. The wheezing should improve with treatment during the first week. An inhaler is often prescribed to relax the air passages and stop wheezing. Antibiotics will be prescribed if your doctor thinks there is also a secondary bacterial infection.  Home care  · If symptoms are severe, rest at home for the first 2 to 3 days. When you go back to your usual activities, don't let yourself get too tired.  · Do not smoke. Also avoid being exposed to secondhand smoke.  · You may use over-the-counter medicine to control fever or pain, unless another medicine was prescribed. Note: If you have chronic liver or kidney disease or have ever had a stomach ulcer or gastrointestinal bleeding, talk with your healthcare provider before using these medicines. Also talk to your provider if you are taking medicine to prevent blood clots.) Aspirin should never be given to anyone younger than 18 years of age who is ill with a viral infection or fever. It may cause severe liver or brain damage.  · Your appetite may be poor, so a light diet is fine. Avoid dehydration by drinking 6 to 8 glasses of fluids per day (such as water, soft drinks, sports drinks, juices, tea, or soup). Extra fluids will help loosen secretions in the nose and lungs.  · Over-the-counter cough, cold, and sore-throat medicines will not shorten the length of the illness, but they may be helpful to reduce symptoms. (Note: Do not use decongestants if you have high blood pressure.)  · If you were given an inhaler, use it exactly as directed. If you need to use it more often than prescribed, your condition may be worsening. If this happens, contact your healthcare provider.  · If prescribed, finish all antibiotic  medicine, even if you are feeling better after only a few days.  Follow-up care  Follow up with your healthcare provider, or as advised. If you had an X-ray or ECG (electrocardiogram), a specialist will review it. You will be notified of any new findings that may affect your care.  Note: If you are age 65 or older, or if you have a chronic lung disease or condition that affects your immune system, or you smoke, talk to your healthcare provider about having a pneumococcal vaccinations and a yearly influenza vaccination (flu shot).  When to seek medical advice  Call your healthcare provider right away if any of these occur:  · Fever of 100.4°F (38°C) or higher  · Coughing up increasing amounts of colored sputum  · Weakness, drowsiness, headache, facial pain, ear pain, or a stiff neck  Call 911, or get immediate medical care  Contact emergency services right away if any of these occur.  · Coughing up blood  · Worsening weakness, drowsiness, headache, or stiff neck  · Increased wheezing not helped with medication, shortness of breath, or pain with breathing  Date Last Reviewed: 9/13/2015 © 2000-2017 [employer]. [address] [place] [address]. All rights reserved. This information is not intended as a substitute for professional medical care. Always follow your healthcare professional's instructions.

## 2019-03-13 ENCOUNTER — OFFICE VISIT (OUTPATIENT)
Dept: URGENT CARE | Facility: CLINIC | Age: 64
End: 2019-03-13
Payer: COMMERCIAL

## 2019-03-13 VITALS
WEIGHT: 158 LBS | BODY MASS INDEX: 29.83 KG/M2 | OXYGEN SATURATION: 95 % | RESPIRATION RATE: 18 BRPM | DIASTOLIC BLOOD PRESSURE: 78 MMHG | HEIGHT: 61 IN | HEART RATE: 79 BPM | TEMPERATURE: 98 F | SYSTOLIC BLOOD PRESSURE: 119 MMHG

## 2019-03-13 DIAGNOSIS — J30.1 SEASONAL ALLERGIC RHINITIS DUE TO POLLEN: ICD-10-CM

## 2019-03-13 DIAGNOSIS — J45.909 ASTHMA, UNSPECIFIED ASTHMA SEVERITY, UNSPECIFIED WHETHER COMPLICATED, UNSPECIFIED WHETHER PERSISTENT: Primary | ICD-10-CM

## 2019-03-13 DIAGNOSIS — R05.9 COUGH IN ADULT: ICD-10-CM

## 2019-03-13 PROCEDURE — 3008F BODY MASS INDEX DOCD: CPT | Mod: CPTII,S$GLB,, | Performed by: PHYSICIAN ASSISTANT

## 2019-03-13 PROCEDURE — 3008F PR BODY MASS INDEX (BMI) DOCUMENTED: ICD-10-PCS | Mod: CPTII,S$GLB,, | Performed by: PHYSICIAN ASSISTANT

## 2019-03-13 PROCEDURE — 99214 PR OFFICE/OUTPT VISIT, EST, LEVL IV, 30-39 MIN: ICD-10-PCS | Mod: S$GLB,,, | Performed by: PHYSICIAN ASSISTANT

## 2019-03-13 PROCEDURE — 99214 OFFICE O/P EST MOD 30 MIN: CPT | Mod: S$GLB,,, | Performed by: PHYSICIAN ASSISTANT

## 2019-03-13 PROCEDURE — 3074F PR MOST RECENT SYSTOLIC BLOOD PRESSURE < 130 MM HG: ICD-10-PCS | Mod: CPTII,S$GLB,, | Performed by: PHYSICIAN ASSISTANT

## 2019-03-13 PROCEDURE — 3078F PR MOST RECENT DIASTOLIC BLOOD PRESSURE < 80 MM HG: ICD-10-PCS | Mod: CPTII,S$GLB,, | Performed by: PHYSICIAN ASSISTANT

## 2019-03-13 PROCEDURE — 3074F SYST BP LT 130 MM HG: CPT | Mod: CPTII,S$GLB,, | Performed by: PHYSICIAN ASSISTANT

## 2019-03-13 PROCEDURE — 3078F DIAST BP <80 MM HG: CPT | Mod: CPTII,S$GLB,, | Performed by: PHYSICIAN ASSISTANT

## 2019-03-13 RX ORDER — PREDNISONE 20 MG/1
40 TABLET ORAL DAILY
Qty: 10 TABLET | Refills: 0 | Status: SHIPPED | OUTPATIENT
Start: 2019-03-13 | End: 2019-03-18

## 2019-03-13 RX ORDER — ALBUTEROL SULFATE 90 UG/1
2 AEROSOL, METERED RESPIRATORY (INHALATION) EVERY 4 HOURS PRN
Qty: 18 G | Refills: 0 | Status: SHIPPED | OUTPATIENT
Start: 2019-03-13 | End: 2019-05-08 | Stop reason: SDUPTHER

## 2019-03-13 RX ORDER — LEVOCETIRIZINE DIHYDROCHLORIDE 5 MG/1
5 TABLET, FILM COATED ORAL NIGHTLY
Qty: 30 TABLET | Refills: 0 | Status: SHIPPED | OUTPATIENT
Start: 2019-03-13 | End: 2019-04-08

## 2019-03-13 NOTE — PATIENT INSTRUCTIONS
Please return here or go to the Emergency Department for any concerns or worsening of condition.  If you were prescribed antibiotics, please take them to completion.  If you were prescribed a narcotic medication, do not drive or operate heavy equipment or machinery while taking these medications.  Please follow up with your primary care doctor or specialist as needed.    If you  smoke, please stop smoking.        Allergic Rhinitis  Allergic rhinitis is an allergic reaction that affects the nose, and often the eyes. Its often known as nasal allergies. Nasal allergies are often due to things in the environment that are breathed in. Depending what you are sensitive to, nasal allergies may occur only during certain seasons. Or they may occur year round. Common indoor allergens include house dust mites, mold, cockroaches, and pet dander. Outdoor allergens include pollen from trees, grasses, and weeds.   Symptoms include a drippy, stuffy, and itchy nose. They also include sneezing and red and itchy eyes. You may feel tired more often. Severe allergies may also affect your breathing and trigger a condition called asthma.   Tests can be done to see what allergens are affecting you. You may be referred to an allergy specialist for testing and further evaluation.  Home care  Your healthcare provider may prescribe medicines to help relieve allergy symptoms. These may include oral medicines, nasal sprays, or eye drops.  Ask your provider for advice on how to avoid substances that you are allergic to. Below are a few tips for each type of allergen.  Pet dander:  · Do not have pets with fur and feathers.  · If you can't avoid having a pet, keep it out of your bedroom and off upholstered furniture.  Pollen:  · When pollen counts are high, keep windows of your car and home closed. If possible, use an air conditioner instead.  · Wear a filter mask when mowing or doing yard work.  House dust mites:  · Wash bedding every week in  warm water and detergent and dry on a hot setting.  · Cover the mattress, box spring, and pillows with allergy covers.   · If possible, sleep in a room with no carpet, curtains, or upholstered furniture.  Cockroaches:  · Store food in sealed containers.  · Remove garbage from the home promptly.  · Fix water leaks  Mold:  · Keep humidity low by using a dehumidifier or air conditioner. Keep the dehumidifier and air conditioner clean and free of mold.  · Clean moldy areas with bleach and water.  In general:  · Vacuum once or twice a week. If possible, use a vacuum with a high-efficiency particulate air (HEPA) filter.  · Do not smoke. Avoid cigarette smoke. Cigarette smoke is an irritant that can make symptoms worse.  Follow-up care  Follow up as advised by the healthcare provider or our staff. If you were referred to an allergy specialist, make this appointment promptly.  When to seek medical advice  Call your healthcare provider right away if the following occur:  · Coughing or wheezing  · Fever greater than 100.4°F (38°C)  · Hives (raised red bumps)  · Continuing symptoms, new symptoms, or worsening symptoms  Call 911 right away if you have:  · Trouble breathing  · Severe swelling of the face or severe itching of the eyes or mouth  Date Last Reviewed: 3/1/2017  © 8147-3093 [employer]. [address] [place], [place] [id number]. All rights reserved. This information is not intended as a substitute for professional medical care. Always follow your healthcare professional's instructions.        Controlling Your Asthma  You can do a lot to manage your asthma and improve your quality of life. You will need to work with your healthcare provider to develop a plan. But its up to you to put this plan into action.  Why you need to take control  You need to control the inflammation in your lungs. Take all medicine as directed, especially controller medicines, even if you feel that your asthma is under good  control. You also need to relieve symptoms when you have them. These are long-term tasks. But the more you stay in control, the better youll feel. If you dont stay in control:  · Asthma symptoms may cause you to miss school, work, or activities that you enjoy.  · Asthma flare-ups can be dangerous, even deadly.  · Uncontrolled asthma makes it more likely that you will need emergency department and in-hospital care.  · Uncontrolled asthma may cause permanent damage to your lungs.    Peak flow monitoring helps measure how open your airways are.   Taking medicine helps you control your asthma and relieve symptoms when they occur.     Using an Asthma Action Plan will help you keep track of and respond to asthma symptoms.   Avoiding triggers--the things that inflame your airways--will help prevent symptoms and flare-ups.   Your action plan  Your healthcare provider will help you prepare, and when needed, update your personal Asthma Action Plan. Your plan tells you what to do based on your current symptoms. If you don't have an Asthma Action Plan, or if yours isn't up-to-date, make sure you talk with your healthcare provider.  Date Last Reviewed: 1/1/2017  © 7692-3048 [employer]. [address] [place] [address], [place] [address]. All rights reserved. This information is not intended as a substitute for professional medical care. Always follow your healthcare professional's instructions.

## 2019-03-13 NOTE — PROGRESS NOTES
"Subjective:       Patient ID: Carina Montaño is a 63 y.o. female.    Vitals:  height is 5' 1" (1.549 m) and weight is 71.7 kg (158 lb). Her temperature is 98 °F (36.7 °C). Her blood pressure is 119/78 and her pulse is 79. Her respiration is 18 and oxygen saturation is 95%.     Chief Complaint: Cough    Patient presents with persistent cough has gotten worse over the past few days.  Patient has asthma and has been taking her Symbicort daily as well as nebulizer treatments occasionally.  She denies increased shortness of breath or leg swelling.  Patient was seen here last month and diagnosed with bronchitis.  She was given a Z-Lang in a steroid shot which she states helped.  She was using her metered dose inhaler of albuterol however has run out of that recently.       Cough   This is a new problem. The current episode started 1 to 4 weeks ago. The problem has been unchanged. The problem occurs constantly. The cough is productive of sputum. Associated symptoms include wheezing. Pertinent negatives include no chest pain, chills, ear pain, eye redness, fever, hemoptysis, myalgias, rash, sore throat or shortness of breath. Nothing aggravates the symptoms. Treatments tried: mucinex. The treatment provided no relief.       Constitution: Negative for chills, sweating, fatigue and fever.   HENT: Positive for congestion. Negative for ear pain, sinus pain, sinus pressure, sore throat and voice change.    Neck: Negative for painful lymph nodes.   Cardiovascular: Negative for chest pain and palpitations.   Eyes: Negative for eye redness.   Respiratory: Positive for chest tightness, cough, sputum production, wheezing and asthma. Negative for bloody sputum, COPD, shortness of breath and stridor.    Gastrointestinal: Negative for nausea and vomiting.   Musculoskeletal: Negative for trauma and muscle ache.   Skin: Negative for rash.   Allergic/Immunologic: Positive for seasonal allergies and asthma.   Hematologic/Lymphatic: Negative " for swollen lymph nodes.       Objective:      Physical Exam   Constitutional: She is oriented to person, place, and time. She appears well-developed and well-nourished. She is cooperative.  Non-toxic appearance. She does not appear ill. No distress.   HENT:   Head: Normocephalic and atraumatic.   Right Ear: Hearing, tympanic membrane, external ear and ear canal normal.   Left Ear: Hearing, tympanic membrane, external ear and ear canal normal.   Nose: Nose normal. No mucosal edema, rhinorrhea or nasal deformity. No epistaxis. Right sinus exhibits no maxillary sinus tenderness and no frontal sinus tenderness. Left sinus exhibits no maxillary sinus tenderness and no frontal sinus tenderness.   Mouth/Throat: Uvula is midline, oropharynx is clear and moist and mucous membranes are normal. No trismus in the jaw. Normal dentition. No uvula swelling. No posterior oropharyngeal erythema.   Eyes: Conjunctivae and lids are normal. No scleral icterus.   Sclera clear bilat   Neck: Trachea normal, full passive range of motion without pain and phonation normal. Neck supple.   Cardiovascular: Normal rate, regular rhythm, normal heart sounds, intact distal pulses and normal pulses.   Pulmonary/Chest: Effort normal and breath sounds normal. No respiratory distress.   Abdominal: Soft. Normal appearance and bowel sounds are normal. She exhibits no distension. There is no tenderness.   Musculoskeletal: Normal range of motion. She exhibits no edema or deformity.   Neurological: She is alert and oriented to person, place, and time. She exhibits normal muscle tone. Coordination normal.   Skin: Skin is warm, dry and intact. She is not diaphoretic. No pallor.   Psychiatric: She has a normal mood and affect. Her speech is normal and behavior is normal. Judgment and thought content normal. Cognition and memory are normal.   Nursing note and vitals reviewed.      Assessment:       1. Asthma, unspecified asthma severity, unspecified whether  complicated, unspecified whether persistent    2. Cough in adult    3. Seasonal allergic rhinitis due to pollen        Plan:         Asthma, unspecified asthma severity, unspecified whether complicated, unspecified whether persistent  -     predniSONE (DELTASONE) 20 MG tablet; Take 2 tablets (40 mg total) by mouth once daily. for 5 days  Dispense: 10 tablet; Refill: 0  -     albuterol (PROVENTIL HFA) 90 mcg/actuation inhaler; Inhale 2 puffs into the lungs every 4 (four) hours as needed for Wheezing (cough). Rescue  Dispense: 18 g; Refill: 0    Cough in adult    Seasonal allergic rhinitis due to pollen  -     levocetirizine (XYZAL) 5 MG tablet; Take 1 tablet (5 mg total) by mouth every evening.  Dispense: 30 tablet; Refill: 0      Patient Instructions       Please return here or go to the Emergency Department for any concerns or worsening of condition.  If you were prescribed antibiotics, please take them to completion.  If you were prescribed a narcotic medication, do not drive or operate heavy equipment or machinery while taking these medications.  Please follow up with your primary care doctor or specialist as needed.    If you  smoke, please stop smoking.        Allergic Rhinitis  Allergic rhinitis is an allergic reaction that affects the nose, and often the eyes. Its often known as nasal allergies. Nasal allergies are often due to things in the environment that are breathed in. Depending what you are sensitive to, nasal allergies may occur only during certain seasons. Or they may occur year round. Common indoor allergens include house dust mites, mold, cockroaches, and pet dander. Outdoor allergens include pollen from trees, grasses, and weeds.   Symptoms include a drippy, stuffy, and itchy nose. They also include sneezing and red and itchy eyes. You may feel tired more often. Severe allergies may also affect your breathing and trigger a condition called asthma.   Tests can be done to see what allergens are  affecting you. You may be referred to an allergy specialist for testing and further evaluation.  Home care  Your healthcare provider may prescribe medicines to help relieve allergy symptoms. These may include oral medicines, nasal sprays, or eye drops.  Ask your provider for advice on how to avoid substances that you are allergic to. Below are a few tips for each type of allergen.  Pet dander:  · Do not have pets with fur and feathers.  · If you can't avoid having a pet, keep it out of your bedroom and off upholstered furniture.  Pollen:  · When pollen counts are high, keep windows of your car and home closed. If possible, use an air conditioner instead.  · Wear a filter mask when mowing or doing yard work.  House dust mites:  · Wash bedding every week in warm water and detergent and dry on a hot setting.  · Cover the mattress, box spring, and pillows with allergy covers.   · If possible, sleep in a room with no carpet, curtains, or upholstered furniture.  Cockroaches:  · Store food in sealed containers.  · Remove garbage from the home promptly.  · Fix water leaks  Mold:  · Keep humidity low by using a dehumidifier or air conditioner. Keep the dehumidifier and air conditioner clean and free of mold.  · Clean moldy areas with bleach and water.  In general:  · Vacuum once or twice a week. If possible, use a vacuum with a high-efficiency particulate air (HEPA) filter.  · Do not smoke. Avoid cigarette smoke. Cigarette smoke is an irritant that can make symptoms worse.  Follow-up care  Follow up as advised by the healthcare provider or our staff. If you were referred to an allergy specialist, make this appointment promptly.  When to seek medical advice  Call your healthcare provider right away if the following occur:  · Coughing or wheezing  · Fever greater than 100.4°F (38°C)  · Hives (raised red bumps)  · Continuing symptoms, new symptoms, or worsening symptoms  Call 911 right away if you have:  · Trouble  breathing  · Severe swelling of the face or severe itching of the eyes or mouth  Date Last Reviewed: 3/1/2017  © 7175-5575 [employer] [address] [place] [address], [place] [address]. All rights reserved. This information is not intended as a substitute for professional medical care. Always follow your healthcare professional's instructions.        Controlling Your Asthma  You can do a lot to manage your asthma and improve your quality of life. You will need to work with your healthcare provider to develop a plan. But its up to you to put this plan into action.  Why you need to take control  You need to control the inflammation in your lungs. Take all medicine as directed, especially controller medicines, even if you feel that your asthma is under good control. You also need to relieve symptoms when you have them. These are long-term tasks. But the more you stay in control, the better youll feel. If you dont stay in control:  · Asthma symptoms may cause you to miss school, work, or activities that you enjoy.  · Asthma flare-ups can be dangerous, even deadly.  · Uncontrolled asthma makes it more likely that you will need emergency department and in-hospital care.  · Uncontrolled asthma may cause permanent damage to your lungs.    Peak flow monitoring helps measure how open your airways are.   Taking medicine helps you control your asthma and relieve symptoms when they occur.     Using an Asthma Action Plan will help you keep track of and respond to asthma symptoms.   Avoiding triggers--the things that inflame your airways--will help prevent symptoms and flare-ups.   Your action plan  Your healthcare provider will help you prepare, and when needed, update your personal Asthma Action Plan. Your plan tells you what to do based on your current symptoms. If you don't have an Asthma Action Plan, or if yours isn't up-to-date, make sure you talk with your healthcare provider.  Date Last Reviewed: 1/1/2017  ©  5993-1661 [employer]. [address] [place] [address], [place] [address]. All rights reserved. This information is not intended as a substitute for professional medical care. Always follow your healthcare professional's instructions.

## 2019-03-20 ENCOUNTER — PATIENT MESSAGE (OUTPATIENT)
Dept: INTERNAL MEDICINE | Facility: CLINIC | Age: 64
End: 2019-03-20

## 2019-03-25 ENCOUNTER — TELEPHONE (OUTPATIENT)
Dept: INTERNAL MEDICINE | Facility: CLINIC | Age: 64
End: 2019-03-25

## 2019-03-25 NOTE — TELEPHONE ENCOUNTER
----- Message from Awais Ferguson sent at 3/22/2019 10:30 AM CDT -----  Contact: self/[phone]  Patient called to state she received a letter from her pharmacy that her losartan has been recalled and she needs a replacement.    Please call and advise if she needs to come in to see you or if a replacement will be sent to the pharmacy.

## 2019-03-25 NOTE — TELEPHONE ENCOUNTER
Spoke with patient and instructed to call pharmacy to see if medication is on the voluntary recall. Patient voices understanding.

## 2019-03-28 ENCOUNTER — HOSPITAL ENCOUNTER (EMERGENCY)
Facility: HOSPITAL | Age: 64
Discharge: HOME | End: 2019-03-29
Attending: EMERGENCY MEDICINE
Payer: COMMERCIAL

## 2019-03-28 DIAGNOSIS — J45.909 ASTHMA, UNSPECIFIED ASTHMA SEVERITY, UNSPECIFIED WHETHER COMPLICATED, UNSPECIFIED WHETHER PERSISTENT: ICD-10-CM

## 2019-03-28 DIAGNOSIS — R06.00 DYSPNEA: ICD-10-CM

## 2019-03-28 DIAGNOSIS — J45.901 EXACERBATION OF ASTHMA, UNSPECIFIED ASTHMA SEVERITY, UNSPECIFIED WHETHER PERSISTENT: Primary | ICD-10-CM

## 2019-03-28 LAB
ALBUMIN SERPL BCP-MCNC: 4.1 G/DL (ref 3.5–5.2)
ALP SERPL-CCNC: 71 U/L (ref 55–135)
ALT SERPL W/O P-5'-P-CCNC: 19 U/L (ref 10–44)
ANION GAP SERPL CALC-SCNC: 8 MMOL/L (ref 8–16)
AST SERPL-CCNC: 19 U/L (ref 10–40)
BASOPHILS # BLD AUTO: 0.02 K/UL (ref 0–0.2)
BASOPHILS NFR BLD: 0.2 % (ref 0–1.9)
BILIRUB SERPL-MCNC: 0.7 MG/DL (ref 0.1–1)
BNP SERPL-MCNC: 21 PG/ML (ref 0–99)
BUN SERPL-MCNC: 12 MG/DL (ref 8–23)
CALCIUM SERPL-MCNC: 9.6 MG/DL (ref 8.7–10.5)
CHLORIDE SERPL-SCNC: 105 MMOL/L (ref 95–110)
CO2 SERPL-SCNC: 27 MMOL/L (ref 23–29)
CREAT SERPL-MCNC: 1 MG/DL (ref 0.5–1.4)
DIFFERENTIAL METHOD: ABNORMAL
EOSINOPHIL # BLD AUTO: 0.8 K/UL (ref 0–0.5)
EOSINOPHIL NFR BLD: 8.3 % (ref 0–8)
ERYTHROCYTE [DISTWIDTH] IN BLOOD BY AUTOMATED COUNT: 13.5 % (ref 11.5–14.5)
EST. GFR  (AFRICAN AMERICAN): >60 ML/MIN/1.73 M^2
EST. GFR  (NON AFRICAN AMERICAN): >60 ML/MIN/1.73 M^2
GLUCOSE SERPL-MCNC: 99 MG/DL (ref 70–110)
HCT VFR BLD AUTO: 41 % (ref 37–48.5)
HGB BLD-MCNC: 13.7 G/DL (ref 12–16)
INFLUENZA A, MOLECULAR: NEGATIVE
INFLUENZA B, MOLECULAR: NEGATIVE
LYMPHOCYTES # BLD AUTO: 1.6 K/UL (ref 1–4.8)
LYMPHOCYTES NFR BLD: 15.6 % (ref 18–48)
MCH RBC QN AUTO: 30.9 PG (ref 27–31)
MCHC RBC AUTO-ENTMCNC: 33.4 G/DL (ref 32–36)
MCV RBC AUTO: 92 FL (ref 82–98)
MONOCYTES # BLD AUTO: 0.6 K/UL (ref 0.3–1)
MONOCYTES NFR BLD: 6 % (ref 4–15)
NEUTROPHILS # BLD AUTO: 7 K/UL (ref 1.8–7.7)
NEUTROPHILS NFR BLD: 69.6 % (ref 38–73)
PLATELET # BLD AUTO: 229 K/UL (ref 150–350)
PMV BLD AUTO: 9.7 FL (ref 9.2–12.9)
POTASSIUM SERPL-SCNC: 3.8 MMOL/L (ref 3.5–5.1)
PROT SERPL-MCNC: 6.8 G/DL (ref 6–8.4)
RBC # BLD AUTO: 4.44 M/UL (ref 4–5.4)
SODIUM SERPL-SCNC: 140 MMOL/L (ref 136–145)
SPECIMEN SOURCE: NORMAL
TROPONIN I SERPL DL<=0.01 NG/ML-MCNC: <0.006 NG/ML (ref 0–0.03)
WBC # BLD AUTO: 10.04 K/UL (ref 3.9–12.7)

## 2019-03-28 PROCEDURE — 80053 COMPREHEN METABOLIC PANEL: CPT

## 2019-03-28 PROCEDURE — 84484 ASSAY OF TROPONIN QUANT: CPT

## 2019-03-28 PROCEDURE — 83880 ASSAY OF NATRIURETIC PEPTIDE: CPT

## 2019-03-28 PROCEDURE — 99283 EMERGENCY DEPT VISIT LOW MDM: CPT

## 2019-03-28 PROCEDURE — 94644 CONT INHLJ TX 1ST HOUR: CPT

## 2019-03-28 PROCEDURE — 85025 COMPLETE CBC W/AUTO DIFF WBC: CPT

## 2019-03-28 PROCEDURE — 93005 ELECTROCARDIOGRAM TRACING: CPT

## 2019-03-28 PROCEDURE — 99285 EMERGENCY DEPT VISIT HI MDM: CPT

## 2019-03-28 PROCEDURE — 93010 EKG 12-LEAD: ICD-10-PCS | Mod: ,,, | Performed by: STUDENT IN AN ORGANIZED HEALTH CARE EDUCATION/TRAINING PROGRAM

## 2019-03-28 PROCEDURE — 87502 INFLUENZA DNA AMP PROBE: CPT

## 2019-03-28 PROCEDURE — 93010 ELECTROCARDIOGRAM REPORT: CPT | Mod: ,,, | Performed by: STUDENT IN AN ORGANIZED HEALTH CARE EDUCATION/TRAINING PROGRAM

## 2019-03-28 PROCEDURE — 81000 URINALYSIS NONAUTO W/SCOPE: CPT

## 2019-03-28 PROCEDURE — 63600175 PHARM REV CODE 636 W HCPCS: Performed by: EMERGENCY MEDICINE

## 2019-03-28 PROCEDURE — 25000242 PHARM REV CODE 250 ALT 637 W/ HCPCS: Performed by: EMERGENCY MEDICINE

## 2019-03-28 RX ORDER — IPRATROPIUM BROMIDE 0.5 MG/2.5ML
1 SOLUTION RESPIRATORY (INHALATION)
Status: DISCONTINUED | OUTPATIENT
Start: 2019-03-28 | End: 2019-03-28

## 2019-03-28 RX ORDER — IPRATROPIUM BROMIDE 0.5 MG/2.5ML
500 SOLUTION RESPIRATORY (INHALATION)
Status: COMPLETED | OUTPATIENT
Start: 2019-03-28 | End: 2019-03-28

## 2019-03-28 RX ORDER — ALBUTEROL SULFATE 2.5 MG/.5ML
10 SOLUTION RESPIRATORY (INHALATION)
Status: COMPLETED | OUTPATIENT
Start: 2019-03-28 | End: 2019-03-28

## 2019-03-28 RX ORDER — PREDNISONE 20 MG/1
60 TABLET ORAL
Status: COMPLETED | OUTPATIENT
Start: 2019-03-28 | End: 2019-03-28

## 2019-03-28 RX ORDER — ALBUTEROL SULFATE 2.5 MG/.5ML
15 SOLUTION RESPIRATORY (INHALATION)
Status: DISCONTINUED | OUTPATIENT
Start: 2019-03-28 | End: 2019-03-28

## 2019-03-28 RX ADMIN — PREDNISONE 60 MG: 20 TABLET ORAL at 10:03

## 2019-03-28 RX ADMIN — IPRATROPIUM BROMIDE 500 MCG: 0.5 SOLUTION RESPIRATORY (INHALATION) at 09:03

## 2019-03-28 RX ADMIN — ALBUTEROL SULFATE 10 MG: 2.5 SOLUTION RESPIRATORY (INHALATION) at 09:03

## 2019-03-29 ENCOUNTER — PATIENT MESSAGE (OUTPATIENT)
Dept: INTERNAL MEDICINE | Facility: CLINIC | Age: 64
End: 2019-03-29

## 2019-03-29 VITALS
DIASTOLIC BLOOD PRESSURE: 72 MMHG | TEMPERATURE: 99 F | SYSTOLIC BLOOD PRESSURE: 134 MMHG | WEIGHT: 158 LBS | OXYGEN SATURATION: 96 % | BODY MASS INDEX: 29.83 KG/M2 | RESPIRATION RATE: 17 BRPM | HEART RATE: 92 BPM | HEIGHT: 61 IN

## 2019-03-29 LAB
BACTERIA #/AREA URNS HPF: ABNORMAL /HPF
BILIRUB UR QL STRIP: NEGATIVE
CLARITY UR: ABNORMAL
COLOR UR: YELLOW
GLUCOSE UR QL STRIP: NEGATIVE
HGB UR QL STRIP: NEGATIVE
HYALINE CASTS #/AREA URNS LPF: 0 /LPF
KETONES UR QL STRIP: ABNORMAL
LEUKOCYTE ESTERASE UR QL STRIP: ABNORMAL
MICROSCOPIC COMMENT: ABNORMAL
NITRITE UR QL STRIP: NEGATIVE
PH UR STRIP: >8 [PH] (ref 5–8)
PROT UR QL STRIP: ABNORMAL
RBC #/AREA URNS HPF: 2 /HPF (ref 0–4)
SP GR UR STRIP: 1.01 (ref 1–1.03)
SQUAMOUS #/AREA URNS HPF: 1 /HPF
URN SPEC COLLECT METH UR: ABNORMAL
UROBILINOGEN UR STRIP-ACNC: NEGATIVE EU/DL
WBC #/AREA URNS HPF: 3 /HPF (ref 0–5)

## 2019-03-29 RX ORDER — PREDNISONE 50 MG/1
50 TABLET ORAL DAILY
Qty: 4 TABLET | Refills: 0 | Status: SHIPPED | OUTPATIENT
Start: 2019-03-29 | End: 2019-04-02

## 2019-03-29 RX ORDER — BENZONATATE 100 MG/1
100 CAPSULE ORAL 3 TIMES DAILY PRN
Qty: 20 CAPSULE | Refills: 0 | Status: SHIPPED | OUTPATIENT
Start: 2019-03-29 | End: 2019-04-08

## 2019-03-29 RX ORDER — IPRATROPIUM BROMIDE AND ALBUTEROL SULFATE 2.5; .5 MG/3ML; MG/3ML
3 SOLUTION RESPIRATORY (INHALATION) EVERY 4 HOURS PRN
Qty: 1 BOX | Refills: 1 | Status: SHIPPED | OUTPATIENT
Start: 2019-03-29 | End: 2019-04-08

## 2019-03-29 NOTE — ED PROVIDER NOTES
"Encounter Date: 3/28/2019    SCRIBE #1 NOTE: I, Indiana Deal, am scribing for, and in the presence of,  Dr. Zapien. I have scribed the entire note.       History     Chief Complaint   Patient presents with    Shortness of Breath     63y F ambulatory to ED with c/o SOB x2 days, cough "for a while." pt reports home resp treatments not working, h/o asthma     Carina Montaño is a 63 y.o. female who  has a past medical history of Allergy, Arthritis, Asthma, Cancer, GERD (gastroesophageal reflux disease), Hyperlipidemia, and Hypertension.    The patient presents to the ED due to increasing SOB with wheezing an onset of yesterday. She also reports of associating cough productive of mucus today. Patient reports of no relief with at home Albuterol and nebulizer treatment. She reports of associating "white pee." She denies nausea, vomiting, abdominal pain, or fever.  She states she has not seen her pulmonologist in 2 years. Patient with history of breast cancer.  at home with sore throat.     The history is provided by the patient.     Review of patient's allergies indicates:   Allergen Reactions    Grass pollen-perennial rye, standard     Lipitor [atorvastatin]      Past Medical History:   Diagnosis Date    Allergy     Arthritis     Asthma     Cancer     breast    GERD (gastroesophageal reflux disease)     Hyperlipidemia     Hypertension      Past Surgical History:   Procedure Laterality Date    ABDOMINAL SURGERY      KNEE SURGERY      SHOULDER SURGERY       Family History   Problem Relation Age of Onset    Hypertension Mother     Hypertension Father      Social History     Tobacco Use    Smoking status: Never Smoker    Smokeless tobacco: Never Used   Substance Use Topics    Alcohol use: Yes     Comment: social    Drug use: No     Review of Systems   Constitutional: Negative for chills and fever.   HENT: Negative for rhinorrhea, sore throat and trouble swallowing.    Eyes: Negative for visual " disturbance.   Respiratory: Positive for cough, shortness of breath and wheezing.    Cardiovascular: Negative for chest pain.   Gastrointestinal: Negative for abdominal pain, diarrhea and vomiting.   Genitourinary: Negative for dysuria and hematuria.        (+) white pee   Musculoskeletal: Negative for back pain.   Skin: Negative for rash.   Neurological: Negative for numbness and headaches.   Hematological: Negative for adenopathy.       Physical Exam     Initial Vitals [03/28/19 2100]   BP Pulse Resp Temp SpO2   (!) 165/78 103 18 98.2 °F (36.8 °C) 97 %      MAP       --         Physical Exam    Nursing note and vitals reviewed.  Constitutional: She appears well-developed and well-nourished. She is not diaphoretic. No distress.   HENT:   Head: Normocephalic and atraumatic.   Mouth/Throat: Oropharynx is clear and moist.   Eyes: EOM are normal. Pupils are equal, round, and reactive to light.   Neck: No tracheal deviation present.   Cardiovascular: Normal rate, regular rhythm, normal heart sounds and intact distal pulses.   Pulmonary/Chest: No stridor. No respiratory distress. She has wheezes (bilateral inspiratory and expiratory).   Diminished breath sounds   Abdominal: Soft. Bowel sounds are normal. She exhibits no distension and no mass. There is no tenderness.   Musculoskeletal: Normal range of motion. She exhibits no edema.   Neurological: She is alert and oriented to person, place, and time. No cranial nerve deficit or sensory deficit.   Skin: Skin is warm and dry. Capillary refill takes less than 2 seconds. No rash noted.   Psychiatric: She has a normal mood and affect. Her behavior is normal. Thought content normal.         ED Course   Procedures  Labs Reviewed   CBC W/ AUTO DIFFERENTIAL - Abnormal; Notable for the following components:       Result Value    Eos # 0.8 (*)     Lymph% 15.6 (*)     Eosinophil% 8.3 (*)     All other components within normal limits   URINALYSIS, REFLEX TO URINE CULTURE - Abnormal;  Notable for the following components:    Appearance, UA Cloudy (*)     pH, UA >8.0 (*)     Protein, UA 2+ (*)     Ketones, UA Trace (*)     Leukocytes, UA 2+ (*)     All other components within normal limits    Narrative:     Preferred Collection Type->Urine, Clean Catch   URINALYSIS MICROSCOPIC - Abnormal; Notable for the following components:    Bacteria, UA Few (*)     All other components within normal limits    Narrative:     Preferred Collection Type->Urine, Clean Catch   INFLUENZA A & B BY MOLECULAR   B-TYPE NATRIURETIC PEPTIDE   COMPREHENSIVE METABOLIC PANEL   TROPONIN I     EKG Readings: (Independently Interpreted)   Rate of 84 bpm, Normal sinus rhythm, no STEMI       Imaging Results          X-Ray Chest AP Portable (Final result)  Result time 03/28/19 22:09:41    Final result by Julio Wheeler MD (03/28/19 22:09:41)                 Impression:      No acute or active disease.      Electronically signed by: Julio Wheeler  Date:    03/28/2019  Time:    22:09             Narrative:    EXAMINATION:  XR CHEST AP PORTABLE    CLINICAL HISTORY:  Asthma;    TECHNIQUE:  Single frontal view of the chest was performed.    COMPARISON:  02/11/2019    FINDINGS:  Single frontal view at 21:51.  There is a single metallic fixation screw of the right humeral head.  There is overlying leads.  There is overlying metallic necklace.    Heart and hilar shadows in trachea and mediastinum contours appear normal.  Lungs are clear.  No pneumothorax or pleural effusion or consolidation or nodule or cavitary lesion.  No abdominal free air or fracture.                                 Medical Decision Making:   Differential Diagnosis:   Differential Diagnosis includes, but is not limited to:  PE, MI/ACS, pneumothorax, pericardial effusion/tamonade, pneumonia, lung abscess, pericarditis/myocarditis, pleural effusion, lung mass, CHF exacerbation, asthma exacerbation, COPD exacerbation, aspirated/ingested foreign body, airway  obstruction, CO poisoning, anemia, metabolic derangement, allergy/atopy, influenza, viral URI, viral syndrome.    Independently Interpreted Test(s):   I have ordered and independently interpreted EKG Reading(s) - see prior notes  Clinical Tests:   Lab Tests: Ordered and Reviewed  Radiological Study: Ordered and Reviewed  Medical Tests: Ordered and Reviewed  ED Management:  62 yo female with history of asthma presents with dyspnea. Reports similar symptoms to previous asthma attacks. Vital signs do not suggest sepsis. Labs were grossly wnl, cxr did not reveal pneumonia, ECG did not demonstrate acute MI. Suspect asthma exacerbation, will dc with steroid course albuterol neb. Recommended pcp follow up, return precautions for worsening dyspnea, fever, chest pain or any other concerns.    After taking into careful account the historical factors and physical exam findings of the patient's presentation today, in conjunction with the empirical and objective data obtained on ED workup, no acute emergent medical condition has been identified. The patient appears to be low risk for an emergent medical condition and I feel it is safe and appropriate at this time for the patient to be discharged to follow-up as detailed in their discharge instructions for reevaluation and possible continued outpatient workup and management. I have discussed the specifics of the workup with the patient and the patient has verbalized understanding of the details of the workup, the diagnosis, the treatment plan, and the need for outpatient follow-up.  Although the patient has no emergent etiology today this does not preclude the development of an emergent condition so in addition, I have advised the patient that they can return to the ED and/or activate EMS at any time with worsening of their symptoms, change of their symptoms, or with any other medical complaint.  The patient remained comfortable and stable during their visit in the ED.  Discharge  and follow-up instructions discussed with the patient who expressed understanding and willingness to comply with my recommendations.                      ED Course as of Mar 29 2335   Fri Mar 29, 2019   0116 Reassess patient.  She feels much better and would like to go home.  Discussed need for admission for respiratory treatments if she begins to feel recurrence shortness of breath.  Patient verbalized understanding and feels ready to go home.    [RN]      ED Course User Index  [RN] Newton Zapien Jr., MD     Clinical Impression:       ICD-10-CM ICD-9-CM   1. Exacerbation of asthma, unspecified asthma severity, unspecified whether persistent J45.901 493.92   2. Dyspnea R06.00 786.09   3. Asthma, unspecified asthma severity, unspecified whether complicated, unspecified whether persistent J45.909 493.90     Disposition:   Disposition: Discharged  Condition: Stable    I, Newton Zapien,  personally performed the services described in this documentation. All medical record entries made by the scribe were at my direction and in my presence.  I have reviewed the chart and agree that the record reflects my personal performance and is accurate and complete. Newton Zapien M.D. 11:39 PM03/29/2019 Scribe attestation                    Newton Zapien Jr., MD  03/29/19 1095

## 2019-03-29 NOTE — ED NOTES
"Pt ambulated back from bathroom with a steady gait. Pt connected to cardiac monitor. Pt reports "I am feeling better after that breathing treatment."   "

## 2019-03-29 NOTE — ED TRIAGE NOTES
"Patient states she has had difficulty breathing x 1 month. Patient states "I haven't been able to breathe right since the weather changed and we got all this pollen". Patient states home albuterol and nebulizing treatments have been ineffective. Patient denies any chest pain, fever, or any other complaints at this time.   "

## 2019-04-02 ENCOUNTER — TELEPHONE (OUTPATIENT)
Dept: INTERNAL MEDICINE | Facility: CLINIC | Age: 64
End: 2019-04-02

## 2019-04-02 NOTE — TELEPHONE ENCOUNTER
----- Message from Lacy Lopez sent at 4/2/2019  9:41 AM CDT -----  Contact: [phone]/self  Patient would like a callback concerning her recent ER visit. Please call and advise.

## 2019-04-03 DIAGNOSIS — E78.00 HYPERCHOLESTEREMIA: ICD-10-CM

## 2019-04-04 RX ORDER — SIMVASTATIN 20 MG/1
20 TABLET, FILM COATED ORAL DAILY
Qty: 90 TABLET | Refills: 2 | Status: SHIPPED | OUTPATIENT
Start: 2019-04-04 | End: 2019-09-25 | Stop reason: SDUPTHER

## 2019-04-08 ENCOUNTER — OFFICE VISIT (OUTPATIENT)
Dept: INTERNAL MEDICINE | Facility: CLINIC | Age: 64
End: 2019-04-08
Payer: COMMERCIAL

## 2019-04-08 VITALS
HEART RATE: 90 BPM | DIASTOLIC BLOOD PRESSURE: 60 MMHG | OXYGEN SATURATION: 96 % | TEMPERATURE: 99 F | HEIGHT: 61 IN | SYSTOLIC BLOOD PRESSURE: 98 MMHG | BODY MASS INDEX: 31.34 KG/M2 | WEIGHT: 166 LBS

## 2019-04-08 DIAGNOSIS — J30.9 ALLERGIC RHINITIS, UNSPECIFIED SEASONALITY, UNSPECIFIED TRIGGER: ICD-10-CM

## 2019-04-08 DIAGNOSIS — J45.51 SEVERE PERSISTENT ASTHMA WITH ACUTE EXACERBATION: Primary | ICD-10-CM

## 2019-04-08 DIAGNOSIS — K21.9 GASTROESOPHAGEAL REFLUX DISEASE, ESOPHAGITIS PRESENCE NOT SPECIFIED: ICD-10-CM

## 2019-04-08 DIAGNOSIS — R94.31 ABNORMAL EKG: ICD-10-CM

## 2019-04-08 PROCEDURE — 99214 OFFICE O/P EST MOD 30 MIN: CPT | Mod: S$GLB,,, | Performed by: INTERNAL MEDICINE

## 2019-04-08 PROCEDURE — 99999 PR PBB SHADOW E&M-EST. PATIENT-LVL IV: ICD-10-PCS | Mod: PBBFAC,,, | Performed by: INTERNAL MEDICINE

## 2019-04-08 PROCEDURE — 3074F SYST BP LT 130 MM HG: CPT | Mod: CPTII,S$GLB,, | Performed by: INTERNAL MEDICINE

## 2019-04-08 PROCEDURE — 99214 PR OFFICE/OUTPT VISIT, EST, LEVL IV, 30-39 MIN: ICD-10-PCS | Mod: S$GLB,,, | Performed by: INTERNAL MEDICINE

## 2019-04-08 PROCEDURE — 3008F PR BODY MASS INDEX (BMI) DOCUMENTED: ICD-10-PCS | Mod: CPTII,S$GLB,, | Performed by: INTERNAL MEDICINE

## 2019-04-08 PROCEDURE — 3008F BODY MASS INDEX DOCD: CPT | Mod: CPTII,S$GLB,, | Performed by: INTERNAL MEDICINE

## 2019-04-08 PROCEDURE — 99999 PR PBB SHADOW E&M-EST. PATIENT-LVL IV: CPT | Mod: PBBFAC,,, | Performed by: INTERNAL MEDICINE

## 2019-04-08 PROCEDURE — 3078F PR MOST RECENT DIASTOLIC BLOOD PRESSURE < 80 MM HG: ICD-10-PCS | Mod: CPTII,S$GLB,, | Performed by: INTERNAL MEDICINE

## 2019-04-08 PROCEDURE — 3078F DIAST BP <80 MM HG: CPT | Mod: CPTII,S$GLB,, | Performed by: INTERNAL MEDICINE

## 2019-04-08 PROCEDURE — 3074F PR MOST RECENT SYSTOLIC BLOOD PRESSURE < 130 MM HG: ICD-10-PCS | Mod: CPTII,S$GLB,, | Performed by: INTERNAL MEDICINE

## 2019-04-08 RX ORDER — ALBUTEROL SULFATE 90 UG/1
2 AEROSOL, METERED RESPIRATORY (INHALATION) EVERY 6 HOURS PRN
Qty: 1 EACH | Refills: 2 | Status: SHIPPED | OUTPATIENT
Start: 2019-04-08 | End: 2020-09-10 | Stop reason: SDUPTHER

## 2019-04-08 RX ORDER — PREDNISONE 20 MG/1
TABLET ORAL
Qty: 21 TABLET | Refills: 0 | Status: SHIPPED | OUTPATIENT
Start: 2019-04-08 | End: 2019-05-08

## 2019-04-08 RX ORDER — FLUTICASONE PROPIONATE 50 MCG
2 SPRAY, SUSPENSION (ML) NASAL DAILY
Qty: 16 G | Refills: 11 | Status: SHIPPED | OUTPATIENT
Start: 2019-04-08 | End: 2019-05-08

## 2019-04-08 RX ORDER — IPRATROPIUM BROMIDE AND ALBUTEROL SULFATE 2.5; .5 MG/3ML; MG/3ML
3 SOLUTION RESPIRATORY (INHALATION)
Status: DISCONTINUED | OUTPATIENT
Start: 2019-04-08 | End: 2021-09-22

## 2019-04-08 RX ORDER — PANTOPRAZOLE SODIUM 40 MG/1
40 TABLET, DELAYED RELEASE ORAL DAILY
Qty: 30 TABLET | Refills: 11 | Status: SHIPPED | OUTPATIENT
Start: 2019-04-08 | End: 2020-09-10 | Stop reason: SDUPTHER

## 2019-04-08 RX ORDER — ALBUTEROL SULFATE 0.63 MG/3ML
0.63 SOLUTION RESPIRATORY (INHALATION) EVERY 6 HOURS PRN
Qty: 1 BOX | Refills: 2 | Status: SHIPPED | OUTPATIENT
Start: 2019-04-08 | End: 2020-04-07

## 2019-04-08 RX ORDER — MONTELUKAST SODIUM 10 MG/1
10 TABLET ORAL NIGHTLY
Qty: 30 TABLET | Refills: 11 | Status: SHIPPED | OUTPATIENT
Start: 2019-04-08 | End: 2019-05-08

## 2019-04-08 NOTE — PROGRESS NOTES
Subjective:       Patient ID: Carina Montaño is a 63 y.o. female.    Chief Complaint: Asthma; Cough; and URI    HPI Mrs. Montaño is a 63-year-old female with asthma and GERD who presents for follow-up of asthma after emergency room visit.  The patient states that she has had trouble breathing and swallowing.  She was seen in the emergency department on March 28th for acute asthma exacerbation.  A chest x-ray was normal.  Patient was treated in the emergency department and discharged. Patient has previously been followed by pulmonologist Dr. Proctor. She is currently taking symbicort at a maximum dose of 2 puffs every 12 hr.  Despite being compliant with her Symbicort, she must take her albuterol frequently due to symptoms such as shortness of breath.  She typically uses her albuterol 4 times daily and at nighttime as well.  Last use was this morning. This provides minimal, temporary relief.  Patient is not currently taking an allergy pill.  She does not regularly use any sort of nasal spray.    Onset of asthma was years ago. It is a constant problem. Relieved by albuterol. It has increased in frequency lately.    She reports symptoms of acid reflux and has been seen by Gastroenterology in the past.  She is taking Zantac twice daily, but with no relief of her symptoms.  She has noticed that they are worsened by eating certain foods.      Finally patient reports that she was asked by someone if she had seen a cardiologist recently.  Thinks this may have been due to her EKGs.    Review of Systems   Constitutional: Negative for chills and fever.   HENT: Positive for postnasal drip. Negative for ear pain, rhinorrhea and sore throat.    Respiratory: Positive for cough and shortness of breath. Negative for wheezing.    Cardiovascular: Positive for chest pain.   Musculoskeletal: Negative for myalgias.   Skin: Negative for rash.   Neurological: Negative for headaches.       Objective:      Physical Exam   Constitutional: She is  oriented to person, place, and time. She appears well-developed and well-nourished. No distress.   HENT:   Head: Normocephalic and atraumatic.   Eyes: Conjunctivae and EOM are normal.   Cardiovascular: Normal rate, regular rhythm, normal heart sounds and intact distal pulses. Exam reveals no gallop and no friction rub.   No murmur heard.  Pulmonary/Chest: Effort normal. No stridor. No respiratory distress. She has wheezes (diffuse, bilateral, inspiratory and expiratory). She has rales (diffuse and bilateral).   Neurological: She is alert and oriented to person, place, and time.   Skin: Skin is warm. She is not diaphoretic.   Psychiatric: She has a normal mood and affect. Her behavior is normal. Judgment and thought content normal.   Vitals reviewed.      Assessment:       1. Severe persistent asthma with acute exacerbation    2. Allergic rhinitis, unspecified seasonality, unspecified trigger    3. Gastroesophageal reflux disease, esophagitis presence not specified    4. Abnormal EKG        Plan:     1.  Severe persistent asthma with acute exacerbation  DuoNeb treatment given in clinic today  Six day prednisone taper prescribed  Continue albuterol as needed for symptoms such as shortness of breath, coughing, or wheezing  Continue Symbicort at maximum dose  Referral placed to pulmonology-Dr. Proctor    2.  Allergic rhinitis  Allergies are likely precipitant for acute asthma exacerbations  Starting patient on Singulair 10 mg p.o. daily  Starting Flonase, 2 sprays daily    3. GERD  Counseled patient to avoid offending foods such as alcohol, coffee, tomato sauces  Recommend patient stops eating 3 hr prior to bedtime  Starting Protonix 40 mg p.o. Daily  Referral placed back to TOMMY Gatica    4. Abnormal EKGs  EKGs reviewed. Some abnormalities in T waves noted  Stress echo for further evaluation    RTC one month, f/u asthma

## 2019-04-12 ENCOUNTER — OFFICE VISIT (OUTPATIENT)
Dept: GASTROENTEROLOGY | Facility: CLINIC | Age: 64
End: 2019-04-12
Payer: COMMERCIAL

## 2019-04-12 VITALS
WEIGHT: 171.06 LBS | DIASTOLIC BLOOD PRESSURE: 65 MMHG | SYSTOLIC BLOOD PRESSURE: 114 MMHG | BODY MASS INDEX: 32.3 KG/M2 | HEIGHT: 61 IN

## 2019-04-12 DIAGNOSIS — R10.13 ABDOMINAL PAIN, EPIGASTRIC: ICD-10-CM

## 2019-04-12 DIAGNOSIS — R12 HEARTBURN: ICD-10-CM

## 2019-04-12 DIAGNOSIS — R13.10 ODYNOPHAGIA: ICD-10-CM

## 2019-04-12 DIAGNOSIS — K21.9 GASTROESOPHAGEAL REFLUX DISEASE, ESOPHAGITIS PRESENCE NOT SPECIFIED: Primary | ICD-10-CM

## 2019-04-12 PROCEDURE — 3078F DIAST BP <80 MM HG: CPT | Mod: CPTII,S$GLB,, | Performed by: NURSE PRACTITIONER

## 2019-04-12 PROCEDURE — 3074F PR MOST RECENT SYSTOLIC BLOOD PRESSURE < 130 MM HG: ICD-10-PCS | Mod: CPTII,S$GLB,, | Performed by: NURSE PRACTITIONER

## 2019-04-12 PROCEDURE — 99999 PR PBB SHADOW E&M-EST. PATIENT-LVL III: CPT | Mod: PBBFAC,,, | Performed by: NURSE PRACTITIONER

## 2019-04-12 PROCEDURE — 3008F BODY MASS INDEX DOCD: CPT | Mod: CPTII,S$GLB,, | Performed by: NURSE PRACTITIONER

## 2019-04-12 PROCEDURE — 3008F PR BODY MASS INDEX (BMI) DOCUMENTED: ICD-10-PCS | Mod: CPTII,S$GLB,, | Performed by: NURSE PRACTITIONER

## 2019-04-12 PROCEDURE — 99213 OFFICE O/P EST LOW 20 MIN: CPT | Mod: S$GLB,,, | Performed by: NURSE PRACTITIONER

## 2019-04-12 PROCEDURE — 99213 PR OFFICE/OUTPT VISIT, EST, LEVL III, 20-29 MIN: ICD-10-PCS | Mod: S$GLB,,, | Performed by: NURSE PRACTITIONER

## 2019-04-12 PROCEDURE — 3074F SYST BP LT 130 MM HG: CPT | Mod: CPTII,S$GLB,, | Performed by: NURSE PRACTITIONER

## 2019-04-12 PROCEDURE — 99999 PR PBB SHADOW E&M-EST. PATIENT-LVL III: ICD-10-PCS | Mod: PBBFAC,,, | Performed by: NURSE PRACTITIONER

## 2019-04-12 PROCEDURE — 3078F PR MOST RECENT DIASTOLIC BLOOD PRESSURE < 80 MM HG: ICD-10-PCS | Mod: CPTII,S$GLB,, | Performed by: NURSE PRACTITIONER

## 2019-04-12 NOTE — PATIENT INSTRUCTIONS
EGD Prep Instructions    [facility] [address] Clinic Office [phone]  Endoscopy Lab [phone]    You are scheduled for an EGD with Dr. Vidal on 5/17/19 at [facility].  You will check in at Admit on the first floor of the hospital.    Nothing to eat or drink after midnight before the procedure.  You MAY brush your teeth.    You MAY take your blood pressure, heart, and seizure medication on the morning of the procedure, with a SIP of water.  Hold ALL other medications until after the procedure.    If you are on blood thinners THAT YOU HAVE BEEN INSTRUCTED TO HOLD BY YOUR DOCTOR FOR THIS PROCEDURE, then do NOT take this the morning of your EGD.  Do NOT stop these medications on your own, they must be approved to be held by your doctor.  Your EGD can NOT be done if you are on these medications.  Examples of blood thinners include: Coumadin, Aggrenox, Plavix, Pradaxa, Reapro, Pletal, Xarelto, Ticagrelor, Brilinta, Eliquis, and high dose aspirin (325 mg).  You do not have to stop baby aspirin 81 mg.    You will receive a call 2 days before your EGD to tell you the time to arrive.  If you have not received a call by the day before your procedure, call the Endoscopy Lab at [phone].

## 2019-04-12 NOTE — PROGRESS NOTES
Subjective:       Patient ID: Carina Montaño is a 63 y.o. female.    Chief Complaint: Gastroesophageal Reflux    HPI Reports painful swallowing.  Describes chest pain with swallowing and will have to work hard to swallow.  Does not think food lodges in the esophagus.  No ansuea, vomiting, abdominal pain.    She has had reflux and heartburn.  Recently rantidine was changed to pantoprazole which she has only been taking a few days.  Has had coughing, hoarseness and asthma complaints.  Does report increased stress at home and workk.  The pain and burning has been present for at least several months.  Has upcoming stress test and appointment with pulmonology.    She had EGD and colonoscopy in 2015.  Polyp removed from colon ( due in 2020 for repeat).  EGD with normal stomach, erosive gastritis, non erosive duodenitis.    Review of Systems   Constitutional: Negative.  Negative for activity change, appetite change, fatigue, fever and unexpected weight change.   HENT: Positive for sore throat and trouble swallowing.    Respiratory: Positive for cough.    Cardiovascular: Positive for chest pain.   Gastrointestinal: Negative for abdominal pain, blood in stool, constipation, diarrhea, nausea and vomiting.   Skin: Negative.    Neurological: Negative.    Psychiatric/Behavioral: Negative.        Objective:      Physical Exam   Constitutional: She is oriented to person, place, and time. She appears well-developed and well-nourished. No distress.   HENT:   hoarseness   Eyes: No scleral icterus.   Neck: Neck supple.   Cardiovascular: Normal rate.   Pulmonary/Chest: Effort normal. No respiratory distress.   Abdominal: Soft. She exhibits no distension and no mass. There is tenderness in the epigastric area. There is no guarding.   Lymphadenopathy:     She has no cervical adenopathy.   Neurological: She is alert and oriented to person, place, and time.   Skin: Skin is warm and dry. She is not diaphoretic.   Psychiatric: She has a  normal mood and affect. Her behavior is normal. Judgment and thought content normal.   Vitals reviewed.      Assessment:       1. Gastroesophageal reflux disease, esophagitis presence not specified    2. Odynophagia    3. Heartburn    4. Abdominal pain, epigastric        Plan:         Carina was seen today for gastroesophageal reflux.    Diagnoses and all orders for this visit:    Gastroesophageal reflux disease, esophagitis presence not specified  -     Case request GI: ESOPHAGOGASTRODUODENOSCOPY (EGD)    Odynophagia  -     Case request GI: ESOPHAGOGASTRODUODENOSCOPY (EGD)    Heartburn  -     Case request GI: ESOPHAGOGASTRODUODENOSCOPY (EGD)    Abdominal pain, epigastric  -     Case request GI: ESOPHAGOGASTRODUODENOSCOPY (EGD)         I have explained the planned procedures to the patient.The risks, benefits and alternatives of the procedure were also explained in detail. Patient verbalized understanding, all questions were answered. The patient agrees to proceed as planned    Continue PPI.  Discussed reflux prevention.

## 2019-04-12 NOTE — LETTER
April 12, 2019      Adelaide De Leon MD  [address] [facility]  [place] [id number]           [place]  [address] [place] [address]  [place] [address]  Phone: [phone]          Patient: Carina Montaño   MR Number: [id number]   YOB: 1955   Date of Visit: 4/12/2019       Dear Dr. Adelaide De Leon:    Thank you for referring Carina Montaño to me for evaluation. Attached you will find relevant portions of my assessment and plan of care.    If you have questions, please do not hesitate to call me. I look forward to following Carina Montaño along with you.    Sincerely,    Francine Gatica, [facility]    Enclosure  CC:  No Recipients    If you would like to receive this communication electronically, please contact [email or web address] or [phone] to request more information on [employer] access.    For providers and/or their staff who would like to refer a patient to [facility], please contact us through our one-stop-shop provider referral line, [facility], at [phone].    If you feel you have received this communication in error or would no longer like to receive these types of communications, please e-mail [email or web address]

## 2019-04-22 ENCOUNTER — HOSPITAL ENCOUNTER (OUTPATIENT)
Dept: CARDIOLOGY | Facility: HOSPITAL | Age: 64
Discharge: HOME | End: 2019-04-22
Attending: INTERNAL MEDICINE
Payer: COMMERCIAL

## 2019-04-22 DIAGNOSIS — R94.31 ABNORMAL EKG: ICD-10-CM

## 2019-04-22 LAB
CV STRESS BASE HR: 77 BPM
DIASTOLIC BLOOD PRESSURE: 67 MMHG
OHS CV CPX 1 MINUTE RECOVERY HEART RATE: 112 BPM
OHS CV CPX 85 PERCENT MAX PREDICTED HEART RATE MALE: 128
OHS CV CPX ESTIMATED METS: 10
OHS CV CPX MAX PREDICTED HEART RATE: 151
OHS CV CPX PATIENT IS FEMALE: 1
OHS CV CPX PATIENT IS MALE: 0
OHS CV CPX PEAK DIASTOLIC BLOOD PRESSURE: 69 MMHG
OHS CV CPX PEAK HEAR RATE: 141 BPM
OHS CV CPX PEAK RATE PRESSURE PRODUCT: NORMAL
OHS CV CPX PEAK SYSTOLIC BLOOD PRESSURE: 144 MMHG
OHS CV CPX PERCENT MAX PREDICTED HEART RATE ACHIEVED: 94
OHS CV CPX PERCENT TARGET HEART RATE ACHIEVED: 106.02
OHS CV CPX RATE PRESSURE PRODUCT PRESENTING: 9394
OHS CV CPX TARGET HEART RATE: 133
STRESS ANGINA INDEX: 0
STRESS ECHO POST EXERCISE DUR MIN: 9 MIN
STRESS ECHO POST EXERCISE DUR SEC: 15
SYSTOLIC BLOOD PRESSURE: 122 MMHG

## 2019-04-22 PROCEDURE — 93351 STRESS TTE COMPLETE: CPT | Mod: 26,,, | Performed by: INTERNAL MEDICINE

## 2019-04-22 PROCEDURE — 93351 ECHOCARDIOGRAM STRESS TEST (CUPID ONLY): ICD-10-PCS | Mod: 26,,, | Performed by: INTERNAL MEDICINE

## 2019-04-22 PROCEDURE — 93351 STRESS TTE COMPLETE: CPT

## 2019-04-23 ENCOUNTER — PATIENT MESSAGE (OUTPATIENT)
Dept: INTERNAL MEDICINE | Facility: CLINIC | Age: 64
End: 2019-04-23

## 2019-05-08 ENCOUNTER — OFFICE VISIT (OUTPATIENT)
Dept: INTERNAL MEDICINE | Facility: CLINIC | Age: 64
End: 2019-05-08
Payer: COMMERCIAL

## 2019-05-08 VITALS
DIASTOLIC BLOOD PRESSURE: 84 MMHG | HEART RATE: 84 BPM | BODY MASS INDEX: 32.89 KG/M2 | WEIGHT: 174.19 LBS | OXYGEN SATURATION: 96 % | HEIGHT: 61 IN | TEMPERATURE: 98 F | SYSTOLIC BLOOD PRESSURE: 122 MMHG

## 2019-05-08 DIAGNOSIS — K21.9 GASTROESOPHAGEAL REFLUX DISEASE, ESOPHAGITIS PRESENCE NOT SPECIFIED: ICD-10-CM

## 2019-05-08 DIAGNOSIS — Z00.00 ANNUAL PHYSICAL EXAM: ICD-10-CM

## 2019-05-08 DIAGNOSIS — J45.50 SEVERE PERSISTENT ASTHMA WITHOUT COMPLICATION: Primary | ICD-10-CM

## 2019-05-08 DIAGNOSIS — J30.9 ALLERGIC RHINITIS, UNSPECIFIED SEASONALITY, UNSPECIFIED TRIGGER: ICD-10-CM

## 2019-05-08 PROCEDURE — 3008F PR BODY MASS INDEX (BMI) DOCUMENTED: ICD-10-PCS | Mod: CPTII,S$GLB,, | Performed by: INTERNAL MEDICINE

## 2019-05-08 PROCEDURE — 3079F PR MOST RECENT DIASTOLIC BLOOD PRESSURE 80-89 MM HG: ICD-10-PCS | Mod: CPTII,S$GLB,, | Performed by: INTERNAL MEDICINE

## 2019-05-08 PROCEDURE — 99999 PR PBB SHADOW E&M-EST. PATIENT-LVL III: CPT | Mod: PBBFAC,,, | Performed by: INTERNAL MEDICINE

## 2019-05-08 PROCEDURE — 3008F BODY MASS INDEX DOCD: CPT | Mod: CPTII,S$GLB,, | Performed by: INTERNAL MEDICINE

## 2019-05-08 PROCEDURE — 3074F SYST BP LT 130 MM HG: CPT | Mod: CPTII,S$GLB,, | Performed by: INTERNAL MEDICINE

## 2019-05-08 PROCEDURE — 99214 OFFICE O/P EST MOD 30 MIN: CPT | Mod: S$GLB,,, | Performed by: INTERNAL MEDICINE

## 2019-05-08 PROCEDURE — 3079F DIAST BP 80-89 MM HG: CPT | Mod: CPTII,S$GLB,, | Performed by: INTERNAL MEDICINE

## 2019-05-08 PROCEDURE — 99999 PR PBB SHADOW E&M-EST. PATIENT-LVL III: ICD-10-PCS | Mod: PBBFAC,,, | Performed by: INTERNAL MEDICINE

## 2019-05-08 PROCEDURE — 99214 PR OFFICE/OUTPT VISIT, EST, LEVL IV, 30-39 MIN: ICD-10-PCS | Mod: S$GLB,,, | Performed by: INTERNAL MEDICINE

## 2019-05-08 PROCEDURE — 3074F PR MOST RECENT SYSTOLIC BLOOD PRESSURE < 130 MM HG: ICD-10-PCS | Mod: CPTII,S$GLB,, | Performed by: INTERNAL MEDICINE

## 2019-05-08 RX ORDER — ALBUTEROL SULFATE 90 UG/1
2 AEROSOL, METERED RESPIRATORY (INHALATION) EVERY 4 HOURS PRN
Qty: 18 G | Refills: 2 | Status: SHIPPED | OUTPATIENT
Start: 2019-05-08 | End: 2019-09-25 | Stop reason: SDUPTHER

## 2019-05-08 NOTE — PROGRESS NOTES
Subjective:       Patient ID: Carina Montaño is a 63 y.o. female.    Chief Complaint: Follow-up    HPI Mrs. Montaño is a 63-year-old female with severe persistent asthma, GERD, and allergic rhinitis who presents for follow-up of these medical conditions.  The patient has been compliant with use of Symbicort.  She has not had to use her albuterol since she was last seen in clinic about 1 month ago.  She plans to go on a trip out of state soon and I did recommend that she take her albuterol with her.  She is also using Singulair and Flonase for treatment of allergic rhinitis.  She is not currently having any allergic issues today other than some dry itchy eyes.  She has followed up with Gastroenterology.  She plans to have an EGD performed on May 17th.  She is on PPI daily.  No further acute complaints or concerns today.    Review of Systems   Eyes: Positive for itching.   All other systems reviewed and are negative.      Objective:      Physical Exam   Constitutional: She is oriented to person, place, and time. She appears well-developed and well-nourished. No distress.   HENT:   Head: Normocephalic and atraumatic.   Right Ear: External ear normal.   Left Ear: External ear normal.   Nose: Nose normal.   Eyes: Conjunctivae and EOM are normal.   Cardiovascular: Normal rate, regular rhythm, normal heart sounds and intact distal pulses. Exam reveals no gallop and no friction rub.   No murmur heard.  Pulmonary/Chest: Effort normal and breath sounds normal. No stridor. No respiratory distress. She has no wheezes. She has no rales.   Neurological: She is alert and oriented to person, place, and time.   Skin: Skin is warm and dry. She is not diaphoretic.   Psychiatric: She has a normal mood and affect. Her behavior is normal. Judgment and thought content normal.   Vitals reviewed.      Assessment:       1. Severe persistent asthma without complication    2. Gastroesophageal reflux disease, esophagitis presence not specified     3. Allergic rhinitis, unspecified seasonality, unspecified trigger    4. Annual physical exam        Plan:     1.  Severe persistent asthma without complication  Stable, well controlled  Continue current medications  Has upcoming appt with Pulm    2.  GERD  Stable, well controlled  Continue current medication  Followed by Gastroenterology; has plans for EGD on May 17th.    3.  Allergic rhinitis  Stable, well controlled  Continue current medication    4.  Annual exam  Patient due for annual exam in August; pre labs ordered    Return to clinic in August for annual exam

## 2019-05-14 ENCOUNTER — TELEPHONE (OUTPATIENT)
Dept: GASTROENTEROLOGY | Facility: CLINIC | Age: 64
End: 2019-05-14

## 2019-05-14 NOTE — TELEPHONE ENCOUNTER
----- Message from Selene Vaca sent at 5/14/2019  8:45 AM CDT -----  Contact: Self [phone] or [phone]  Patient would like to speak with you about needing the time of her procedure. Please advise

## 2019-05-14 NOTE — TELEPHONE ENCOUNTER
----- Message from Stefanie Aponte sent at 5/14/2019  9:17 AM CDT -----  Contact: [phone] Self  Patient is requesting an endo procedure time for early Friday morning.

## 2019-05-15 ENCOUNTER — TELEPHONE (OUTPATIENT)
Dept: ENDOSCOPY | Facility: HOSPITAL | Age: 64
End: 2019-05-15

## 2019-05-15 NOTE — TELEPHONE ENCOUNTER
Spoke with patient about arrival time @ 1000.     NPO status reviewed: Patient must have nothing to eat after midnight.    Medications: Do not take Insulin or oral diabetic medications the day of the procedure.  Take as prescribed: heart, seizure and blood pressure medication in the morning with a sip of water (less than an ounce).  Take any breathing medications and bring inhalers to hospital with you Leave all valuables and jewelry at home.     Wear comfortable clothes to procedure to change into hospital gown You cannot drive for 24 hours after your procedure because you will receive sedation for your procedure to make you comfortable.  A ride must be provided at discharge.

## 2019-05-17 ENCOUNTER — HOSPITAL ENCOUNTER (OUTPATIENT)
Facility: HOSPITAL | Age: 64
Discharge: HOME | End: 2019-05-17
Attending: INTERNAL MEDICINE | Admitting: INTERNAL MEDICINE
Payer: COMMERCIAL

## 2019-05-17 ENCOUNTER — ANESTHESIA (OUTPATIENT)
Dept: ENDOSCOPY | Facility: HOSPITAL | Age: 64
End: 2019-05-17
Payer: COMMERCIAL

## 2019-05-17 ENCOUNTER — ANESTHESIA EVENT (OUTPATIENT)
Dept: ENDOSCOPY | Facility: HOSPITAL | Age: 64
End: 2019-05-17
Payer: COMMERCIAL

## 2019-05-17 VITALS
WEIGHT: 171 LBS | OXYGEN SATURATION: 99 % | DIASTOLIC BLOOD PRESSURE: 70 MMHG | BODY MASS INDEX: 32.28 KG/M2 | HEART RATE: 71 BPM | HEIGHT: 61 IN | SYSTOLIC BLOOD PRESSURE: 113 MMHG | TEMPERATURE: 97 F | RESPIRATION RATE: 18 BRPM

## 2019-05-17 DIAGNOSIS — K21.9 GERD (GASTROESOPHAGEAL REFLUX DISEASE): ICD-10-CM

## 2019-05-17 PROCEDURE — 27201012 HC FORCEPS, HOT/COLD, DISP: Performed by: INTERNAL MEDICINE

## 2019-05-17 PROCEDURE — 88305 TISSUE EXAM BY PATHOLOGIST: CPT | Mod: 26,,, | Performed by: PATHOLOGY

## 2019-05-17 PROCEDURE — 43239 EGD BIOPSY SINGLE/MULTIPLE: CPT | Performed by: INTERNAL MEDICINE

## 2019-05-17 PROCEDURE — 37000009 HC ANESTHESIA EA ADD 15 MINS: Performed by: INTERNAL MEDICINE

## 2019-05-17 PROCEDURE — 25000003 PHARM REV CODE 250: Performed by: INTERNAL MEDICINE

## 2019-05-17 PROCEDURE — 88305 TISSUE SPECIMEN TO PATHOLOGY - SURGERY: ICD-10-PCS | Mod: 26,,, | Performed by: PATHOLOGY

## 2019-05-17 PROCEDURE — 37000008 HC ANESTHESIA 1ST 15 MINUTES: Performed by: INTERNAL MEDICINE

## 2019-05-17 PROCEDURE — 43239 PR EGD, FLEX, W/BIOPSY, SGL/MULTI: ICD-10-PCS | Mod: ,,, | Performed by: INTERNAL MEDICINE

## 2019-05-17 PROCEDURE — 43239 EGD BIOPSY SINGLE/MULTIPLE: CPT | Mod: ,,, | Performed by: INTERNAL MEDICINE

## 2019-05-17 PROCEDURE — 63600175 PHARM REV CODE 636 W HCPCS: Performed by: NURSE ANESTHETIST, CERTIFIED REGISTERED

## 2019-05-17 PROCEDURE — 88305 TISSUE EXAM BY PATHOLOGIST: CPT | Performed by: PATHOLOGY

## 2019-05-17 RX ORDER — SODIUM CHLORIDE 9 MG/ML
INJECTION, SOLUTION INTRAVENOUS CONTINUOUS
Status: DISCONTINUED | OUTPATIENT
Start: 2019-05-17 | End: 2019-05-17 | Stop reason: HOSPADM

## 2019-05-17 RX ORDER — SODIUM CHLORIDE 0.9 % (FLUSH) 0.9 %
10 SYRINGE (ML) INJECTION
Status: DISCONTINUED | OUTPATIENT
Start: 2019-05-17 | End: 2019-05-17 | Stop reason: HOSPADM

## 2019-05-17 RX ORDER — PROPOFOL 10 MG/ML
VIAL (ML) INTRAVENOUS
Status: DISCONTINUED | OUTPATIENT
Start: 2019-05-17 | End: 2019-05-17

## 2019-05-17 RX ORDER — LIDOCAINE HCL/PF 100 MG/5ML
SYRINGE (ML) INTRAVENOUS
Status: DISCONTINUED | OUTPATIENT
Start: 2019-05-17 | End: 2019-05-17

## 2019-05-17 RX ADMIN — SODIUM CHLORIDE: 0.9 INJECTION, SOLUTION INTRAVENOUS at 10:05

## 2019-05-17 RX ADMIN — LIDOCAINE HYDROCHLORIDE 100 MG: 20 INJECTION, SOLUTION INTRAVENOUS at 11:05

## 2019-05-17 RX ADMIN — PROPOFOL 80 MG: 10 INJECTION, EMULSION INTRAVENOUS at 11:05

## 2019-05-17 NOTE — DISCHARGE INSTRUCTIONS
Post EGD Discharge Instruction    Carina Beatty Danyel  5/17/2019  Ema Vidal MD    RESTRICTIONS ON ACTIVITY:    -DO NOT drive a car, operate machinery or make critical decisions, or do activities that require coordination or balance for 24 hours.  -Following Day: Return to full activities including work.  -Diet: Eat and drink normally unless instructed otherwise.    TREATMENT FOR COMMON SIDE EFFECTS:  *Sore Throat - treat with throat lozenges, gargle with warm salt water.  *Mild abdominal pain & bloating- rest and take liquids only.    SYMPTOMS TO WATCH FOR AND REPORT TO YOUR PHYSICIAN:  1. Chills or fever occurring 24 hours after procedure.  2. Pain in chest.  3. SEVERE abdominal pain or bloating.  4. Rectal bleeding which could be maroon or black.    If you have any questions or problems, please call your Physician:    Ema Vidal MD     If a complication or emergency situation arises and you are unable to reach your Physician - GO TO THE EMERGENCY ROOM.

## 2019-05-17 NOTE — ANESTHESIA PREPROCEDURE EVALUATION
05/17/2019  Carina Montaño is a 63 y.o., female for EGD under MAC    Past Medical History:   Diagnosis Date    Allergy     Arthritis     Asthma     Cancer     breast    GERD (gastroesophageal reflux disease)     Hyperlipidemia     Hypertension          Anesthesia Evaluation    I have reviewed the Patient Summary Reports.    I have reviewed the Nursing Notes.   I have reviewed the Medications.     Review of Systems  Social:  Non-Smoker        Physical Exam  General:  Obesity    Airway/Jaw/Neck:  Airway Findings: Mallampati: II      Chest/Lungs:  Chest/Lungs Clear    Heart/Vascular:  Heart Findings: Normal                Anesthesia Plan  Type of Anesthesia, risks & benefits discussed:  Anesthesia Type:  MAC  Patient's Preference:   Intra-op Monitoring Plan:   Intra-op Monitoring Plan Comments:   Post Op Pain Control Plan:   Post Op Pain Control Plan Comments:   Induction:    Beta Blocker:  Patient is not currently on a Beta-Blocker (No further documentation required).       Informed Consent: Patient understands risks and agrees with Anesthesia plan.  Questions answered. Anesthesia consent signed with patient.  ASA Score: 2     Day of Surgery Review of History & Physical:            Ready For Surgery From Anesthesia Perspective.

## 2019-05-17 NOTE — TRANSFER OF CARE
"Anesthesia Transfer of Care Note    Patient: Carina Montaño    Procedure(s) Performed: Procedure(s) (LRB):  ESOPHAGOGASTRODUODENOSCOPY (EGD) (N/A)    Patient location: GI    Anesthesia Type: MAC    Transport from OR: Transported from OR on room air with adequate spontaneous ventilation    Post pain: adequate analgesia    Post assessment: no apparent anesthetic complications and tolerated procedure well    Post vital signs: stable    Level of consciousness: awake, alert and oriented    Nausea/Vomiting: no nausea/vomiting    Complications: none    Transfer of care protocol was followed      Last vitals:   Visit Vitals  /61 (BP Location: Left arm, Patient Position: Lying)   Pulse 80   Temp 36.4 °C (97.5 °F)   Resp 17   Ht 5' 1" (1.549 m)   Wt 77.6 kg (171 lb)   SpO2 96%   Breastfeeding? No   BMI 32.31 kg/m²     "

## 2019-05-17 NOTE — ANESTHESIA POSTPROCEDURE EVALUATION
Anesthesia Post Evaluation    Patient: Carina Montaño    Procedure(s) Performed: Procedure(s) (LRB):  ESOPHAGOGASTRODUODENOSCOPY (EGD) (N/A)    Final Anesthesia Type: MAC  Patient location during evaluation: GI PACU  Patient participation: Yes- Able to Participate  Level of consciousness: awake and alert and oriented  Post-procedure vital signs: reviewed and stable  Pain management: adequate  Airway patency: patent  PONV status at discharge: No PONV  Anesthetic complications: no      Cardiovascular status: blood pressure returned to baseline, hemodynamically stable and stable  Respiratory status: unassisted, spontaneous ventilation and room air  Hydration status: euvolemic  Follow-up not needed.          Vitals Value Taken Time   /64 5/17/2019 11:59 AM   Temp 36 °C (96.8 °F) 5/17/2019 11:59 AM   Pulse 70 5/17/2019 11:59 AM   Resp 15 5/17/2019 11:59 AM   SpO2 96 % 5/17/2019 11:59 AM         No case tracking events are documented in the log.      Pain/Mary Alice Score: No data recorded

## 2019-05-17 NOTE — H&P
[facility]  Gastroenterology  H&P    Patient Name: Carina Montaño  MRN: [id number]  Admission Date: 5/17/2019  Code Status: Full Code    Attending Provider: Ema Vidal MD   Primary Care Physician: Adelaide De Leon MD  Principal Problem:<principal problem not specified>    Subjective:     History of Present Illness: GERD    Past Medical History:   Diagnosis Date    Allergy     Arthritis     Asthma     Cancer     breast    GERD (gastroesophageal reflux disease)     Hyperlipidemia     Hypertension        Past Surgical History:   Procedure Laterality Date    ABDOMINAL SURGERY      KNEE SURGERY      SHOULDER SURGERY         Review of patient's allergies indicates:   Allergen Reactions    Grass pollen-perennial rye, standard     Lipitor [atorvastatin]      Family History     Problem Relation (Age of Onset)    Hypertension Mother, Father        Tobacco Use    Smoking status: Never Smoker    Smokeless tobacco: Never Used   Substance and Sexual Activity    Alcohol use: Yes     Frequency: Monthly or less     Drinks per session: 1 or 2     Binge frequency: Never     Comment: social    Drug use: No    Sexual activity: Yes     Review of Systems   Constitutional: Negative for chills and unexpected weight change.   Cardiovascular: Negative for chest pain and palpitations.   Gastrointestinal: Negative for abdominal distention and abdominal pain.     Objective:     Vital Signs (Most Recent):  Temp: 97.5 °F (36.4 °C) (05/17/19 1039)  Pulse: 80 (05/17/19 1039)  Resp: 17 (05/17/19 1039)  BP: 125/61 (05/17/19 1039)  SpO2: 96 % (05/17/19 1039) Vital Signs (24h Range):  Temp:  [97.5 °F (36.4 °C)] 97.5 °F (36.4 °C)  Pulse:  [80] 80  Resp:  [17] 17  SpO2:  [96 %] 96 %  BP: (125)/(61) 125/61     Weight: 77.6 kg (171 lb) (05/17/19 1039)  Body mass index is 32.31 kg/m².    No intake or output data in the 24 hours ending 05/17/19 1055    Lines/Drains/Airways     Peripheral Intravenous Line                  Peripheral IV - Single Lumen 05/17/19 1049 20 G Right Antecubital less than 1 day                Physical Exam   Constitutional: She is oriented to person, place, and time. She appears well-developed and well-nourished. No distress.   HENT:   Head: Normocephalic and atraumatic.   Eyes: Conjunctivae are normal. No scleral icterus.   Neck: Normal range of motion. Neck supple. No tracheal deviation present. No thyromegaly present.   Cardiovascular: Normal rate and regular rhythm. Exam reveals no gallop and no friction rub.   Pulmonary/Chest: Effort normal and breath sounds normal. No respiratory distress. She has no wheezes.   Abdominal: Soft. Bowel sounds are normal. She exhibits no distension. There is no tenderness.   Neurological: She is alert and oriented to person, place, and time.   Skin: Skin is warm and dry. No rash noted. She is not diaphoretic. No erythema.   Psychiatric: She has a normal mood and affect. Her behavior is normal.   Nursing note and vitals reviewed.          Assessment/Plan:     Active Diagnoses:    Diagnosis Date Noted POA    GERD (gastroesophageal reflux disease) [K21.9] 05/17/2019 Yes      Problems Resolved During this Admission:     Plan  1. EGD    Ema Vidal MD  Gastroenterology  [facility]

## 2019-05-17 NOTE — [employer] PATIENT INSTRUCTIONS
Discharge Summary/Instructions after an Endoscopic Procedure  Patient Name: Carina Montaño  Patient MRN: [id number]  Patient YOB: 1955  Friday, May 17, 2019  Ema Vidal MD  RESTRICTIONS:  During your procedure today, you received medications for sedation.  These   medications may affect your judgment, balance and coordination.  Therefore,   for 24 hours, you have the following restrictions:   - DO NOT drive a car, operate machinery, make legal/financial decisions,   sign important papers or drink alcohol.    ACTIVITY:  Today: no heavy lifting, straining or running due to procedural   sedation/anesthesia.  The following day: return to full activity including work.  DIET:  Eat and drink normally unless instructed otherwise.     TREATMENT FOR COMMON SIDE EFFECTS:  - Mild abdominal pain, nausea, belching, bloating or excessive gas:  rest,   eat lightly and use a heating pad.  - Sore Throat: treat with throat lozenges and/or gargle with warm salt   water.  - Because air was used during the procedure, expelling large amounts of air   from your rectum or belching is normal.  - If a bowel prep was taken, you may not have a bowel movement for 1-3 days.    This is normal.  SYMPTOMS TO WATCH FOR AND REPORT TO YOUR PHYSICIAN:  1. Abdominal pain or bloating, other than gas cramps.  2. Chest pain.  3. Back pain.  4. Signs of infection such as: chills or fever occurring within 24 hours   after the procedure.  5. Rectal bleeding, which would show as bright red, maroon, or black stools.   (A tablespoon of blood from the rectum is not serious, especially if   hemorrhoids are present.)  6. Vomiting.  7. Weakness or dizziness.  GO DIRECTLY TO THE NEAREST EMERGENCY ROOM IF YOU HAVE ANY OF THE FOLLOWING:      Difficulty breathing              Chills and/or fever over 101 F   Persistent vomiting and/or vomiting blood   Severe abdominal pain   Severe chest pain   Black, tarry stools   Bleeding- more than one tablespoon   Any  other symptom or condition that you feel may need urgent attention  Your doctor recommends these additional instructions:  If any biopsies were taken, your doctors clinic will contact you in 1 to 2   weeks with any results.  - Discharge patient to home (via wheelchair).   - Patient has a contact number available for emergencies.  The signs and   symptoms of potential delayed complications were discussed with the   patient.  Return to normal activities tomorrow.  Written discharge   instructions were provided to the patient.   - Resume previous diet.   - Continue present medications.   - Await pathology results.   - If symptoms persist and biopsies negative, will proceed with esophagram   with tablet or manometry  For questions, problems or results please call your physician - Ema Vidal MD at Work:  [phone].  EMERGENCY PHONE NUMBER: [phone],  LAB RESULTS: [phone]  IF A COMPLICATION OR EMERGENCY SITUATION ARISES AND YOU ARE UNABLE TO REACH   YOUR PHYSICIAN - GO DIRECTLY TO THE EMERGENCY ROOM.  Ema Vidal MD  5/17/2019 12:07:17 PM  This report has been verified and signed electronically.  [employer]

## 2019-05-31 ENCOUNTER — TELEPHONE (OUTPATIENT)
Dept: GASTROENTEROLOGY | Facility: CLINIC | Age: 64
End: 2019-05-31

## 2019-05-31 ENCOUNTER — PATIENT MESSAGE (OUTPATIENT)
Dept: GASTROENTEROLOGY | Facility: CLINIC | Age: 64
End: 2019-05-31

## 2019-05-31 NOTE — TELEPHONE ENCOUNTER
----- Message from Ema Vidal MD sent at 5/30/2019  9:17 PM CDT -----  Esophageal bx with mild distal inflammation but no evidence of eosinophilic esophagitis. Can we schedule an esophagram with tablet then f/u with me in clinic afterwards

## 2019-06-23 ENCOUNTER — OFFICE VISIT (OUTPATIENT)
Dept: URGENT CARE | Facility: CLINIC | Age: 64
End: 2019-06-23
Payer: COMMERCIAL

## 2019-06-23 VITALS
DIASTOLIC BLOOD PRESSURE: 70 MMHG | RESPIRATION RATE: 18 BRPM | SYSTOLIC BLOOD PRESSURE: 121 MMHG | WEIGHT: 171 LBS | OXYGEN SATURATION: 95 % | BODY MASS INDEX: 32.28 KG/M2 | HEART RATE: 95 BPM | TEMPERATURE: 99 F | HEIGHT: 61 IN

## 2019-06-23 DIAGNOSIS — J30.2 SEASONAL ALLERGIES: ICD-10-CM

## 2019-06-23 DIAGNOSIS — J00 NASOPHARYNGITIS: Primary | ICD-10-CM

## 2019-06-23 PROCEDURE — 3074F SYST BP LT 130 MM HG: CPT | Mod: CPTII,S$GLB,, | Performed by: STUDENT IN AN ORGANIZED HEALTH CARE EDUCATION/TRAINING PROGRAM

## 2019-06-23 PROCEDURE — 3074F PR MOST RECENT SYSTOLIC BLOOD PRESSURE < 130 MM HG: ICD-10-PCS | Mod: CPTII,S$GLB,, | Performed by: STUDENT IN AN ORGANIZED HEALTH CARE EDUCATION/TRAINING PROGRAM

## 2019-06-23 PROCEDURE — 3008F BODY MASS INDEX DOCD: CPT | Mod: CPTII,S$GLB,, | Performed by: STUDENT IN AN ORGANIZED HEALTH CARE EDUCATION/TRAINING PROGRAM

## 2019-06-23 PROCEDURE — 99214 PR OFFICE/OUTPT VISIT, EST, LEVL IV, 30-39 MIN: ICD-10-PCS | Mod: S$GLB,,, | Performed by: STUDENT IN AN ORGANIZED HEALTH CARE EDUCATION/TRAINING PROGRAM

## 2019-06-23 PROCEDURE — 99214 OFFICE O/P EST MOD 30 MIN: CPT | Mod: S$GLB,,, | Performed by: STUDENT IN AN ORGANIZED HEALTH CARE EDUCATION/TRAINING PROGRAM

## 2019-06-23 PROCEDURE — 3008F PR BODY MASS INDEX (BMI) DOCUMENTED: ICD-10-PCS | Mod: CPTII,S$GLB,, | Performed by: STUDENT IN AN ORGANIZED HEALTH CARE EDUCATION/TRAINING PROGRAM

## 2019-06-23 PROCEDURE — 3078F DIAST BP <80 MM HG: CPT | Mod: CPTII,S$GLB,, | Performed by: STUDENT IN AN ORGANIZED HEALTH CARE EDUCATION/TRAINING PROGRAM

## 2019-06-23 PROCEDURE — 3078F PR MOST RECENT DIASTOLIC BLOOD PRESSURE < 80 MM HG: ICD-10-PCS | Mod: CPTII,S$GLB,, | Performed by: STUDENT IN AN ORGANIZED HEALTH CARE EDUCATION/TRAINING PROGRAM

## 2019-06-23 RX ORDER — OLOPATADINE HYDROCHLORIDE 1 MG/ML
1 SOLUTION/ DROPS OPHTHALMIC 2 TIMES DAILY
Qty: 5 ML | Refills: 0 | Status: SHIPPED | OUTPATIENT
Start: 2019-06-23 | End: 2020-06-09 | Stop reason: SDUPTHER

## 2019-06-23 RX ORDER — MONTELUKAST SODIUM 10 MG/1
10 TABLET ORAL NIGHTLY
Refills: 11 | COMMUNITY
Start: 2019-06-03 | End: 2019-09-25 | Stop reason: SDUPTHER

## 2019-06-23 RX ORDER — PROMETHAZINE HYDROCHLORIDE AND DEXTROMETHORPHAN HYDROBROMIDE 6.25; 15 MG/5ML; MG/5ML
5 SYRUP ORAL NIGHTLY PRN
Qty: 75 ML | Refills: 0 | Status: SHIPPED | OUTPATIENT
Start: 2019-06-23 | End: 2019-07-07

## 2019-06-23 RX ORDER — AZELASTINE 1 MG/ML
1 SPRAY, METERED NASAL 2 TIMES DAILY
Qty: 10 ML | Refills: 0 | Status: SHIPPED | OUTPATIENT
Start: 2019-06-23 | End: 2019-06-25 | Stop reason: SDUPTHER

## 2019-06-23 NOTE — PROGRESS NOTES
"Subjective:       Patient ID: Carina Montaño is a 63 y.o. female.    Vitals:  height is 5' 1" (1.549 m) and weight is 77.6 kg (171 lb). Her temperature is 98.6 °F (37 °C). Her blood pressure is 121/70 and her pulse is 95. Her respiration is 18 and oxygen saturation is 95%.     Chief Complaint: Sinus Problem    Pt states that she has been having congestion, runny nose, and sore throat since Friday. Here with daughter who became concerned today because she had subjective fever while at friends house with fatigue and decreased appetite. Took tylenol @ ~1pm. Pt with asthma, has some worsening of chronic cough at night but no wheezing, feels near baseline with breathing. Taking flonase and singulair for allergies currently.    Sinus Problem   This is a new problem. The current episode started in the past 7 days. The problem has been gradually worsening since onset. Maximum temperature: subjective. The fever has been present for less than 1 day. Her pain is at a severity of 5/10. The pain is moderate. Associated symptoms include congestion, coughing (chronic), sinus pressure and a sore throat. Pertinent negatives include no chills, diaphoresis, ear pain, headaches, hoarse voice, neck pain, shortness of breath, sneezing or swollen glands. Past treatments include oral decongestants, acetaminophen and spray decongestants. The treatment provided mild relief.       Constitution: Positive for fatigue and fever (subjective). Negative for chills and sweating.   HENT: Positive for congestion, sinus pressure and sore throat. Negative for ear pain, sinus pain, trouble swallowing and voice change.    Neck: Negative for neck pain, neck stiffness, painful lymph nodes and neck swelling.   Cardiovascular: Negative for chest pain, leg swelling and palpitations.   Eyes: Negative for eye discharge, eye itching and eye redness.   Respiratory: Positive for cough (chronic), sputum production (chronic) and asthma. Negative for chest " "tightness, shortness of breath, stridor and wheezing.    Gastrointestinal: Negative for nausea, vomiting and diarrhea.   Musculoskeletal: Negative for joint pain, joint swelling and muscle ache.   Skin: Negative for color change, rash and lesion.   Allergic/Immunologic: Positive for environmental allergies, seasonal allergies and asthma. Negative for sneezing.   Neurological: Negative for dizziness, light-headedness, passing out and headaches.   Hematologic/Lymphatic: Negative for swollen lymph nodes.   Psychiatric/Behavioral: Positive for sleep disturbance (2/2 sx's). Negative for confusion and agitation.       Objective:       Vitals:    06/23/19 1539   BP: 121/70   Pulse: 95   Resp: 18   Temp: 98.6 °F (37 °C)   SpO2: 95%   Weight: 77.6 kg (171 lb)   Height: 5' 1" (1.549 m)     Physical Exam   Constitutional: She is oriented to person, place, and time. She appears well-developed and well-nourished. No distress.   HENT:   Head: Normocephalic and atraumatic.   Right Ear: Hearing, tympanic membrane, external ear and ear canal normal.   Left Ear: Hearing, tympanic membrane, external ear and ear canal normal.   Nose: Mucosal edema and rhinorrhea present. Right sinus exhibits no maxillary sinus tenderness and no frontal sinus tenderness. Left sinus exhibits no maxillary sinus tenderness and no frontal sinus tenderness.   Mouth/Throat: Uvula is midline and mucous membranes are normal. Posterior oropharyngeal erythema present. No oropharyngeal exudate, posterior oropharyngeal edema or tonsillar abscesses. Tonsils are 1+ on the right. Tonsils are 1+ on the left. No tonsillar exudate.   Eyes: Conjunctivae and EOM are normal. Right eye exhibits no discharge. Left eye exhibits no discharge.   Neck: Normal range of motion. Neck supple.   Cardiovascular: Normal rate, regular rhythm and normal heart sounds. Exam reveals no gallop and no friction rub.   No murmur heard.  Pulmonary/Chest: Effort normal and breath sounds normal. No " stridor. She has no wheezes. She has no rales.   Abdominal: Soft. Bowel sounds are normal. She exhibits no distension. There is no tenderness.   Musculoskeletal: She exhibits no edema or tenderness.   Lymphadenopathy:     She has no cervical adenopathy.   Neurological: She is alert and oriented to person, place, and time. No cranial nerve deficit (grossly intact).   Skin: Skin is warm and dry. No rash noted.   Psychiatric: She has a normal mood and affect. Her behavior is normal. Judgment and thought content normal.   Nursing note and vitals reviewed.      Assessment:       1. Nasopharyngitis    2. Seasonal allergies        Plan:         Nasopharyngitis  -     azelastine (ASTELIN) 137 mcg (0.1 %) nasal spray; 1 spray (137 mcg total) by Nasal route 2 (two) times daily. for 14 days  Dispense: 10 mL; Refill: 0  -     promethazine-dextromethorphan (PROMETHAZINE-DM) 6.25-15 mg/5 mL Syrp; Take 5 mLs by mouth nightly as needed (congestion/cough).  Dispense: 75 mL; Refill: 0  - start AM claritin, continue singulair and flonase  - discussed steroids with pt, reviewed chart and she has received at least 5 doses IM/PO in last year, agreed to defer for now and will follow-up with PCP this week for further management if not improving    Seasonal allergies  -     olopatadine (PATANOL) 0.1 % ophthalmic solution; Place 1 drop into both eyes 2 (two) times daily.  Dispense: 5 mL; Refill: 0    Medications and diagnosis reviewed with patient and daughter, questions answered, and return precautions given.    Follow up in about 5 days (around 6/28/2019) for re-evaluation of symptoms and further management.    Erasmo Escamilla MD/MPH  [facility]  [facility]

## 2019-06-23 NOTE — PATIENT INSTRUCTIONS

## 2019-06-25 ENCOUNTER — OFFICE VISIT (OUTPATIENT)
Dept: INTERNAL MEDICINE | Facility: CLINIC | Age: 64
End: 2019-06-25
Payer: COMMERCIAL

## 2019-06-25 VITALS
OXYGEN SATURATION: 95 % | DIASTOLIC BLOOD PRESSURE: 82 MMHG | SYSTOLIC BLOOD PRESSURE: 124 MMHG | HEIGHT: 63 IN | TEMPERATURE: 100 F | HEART RATE: 104 BPM | WEIGHT: 170.88 LBS | BODY MASS INDEX: 30.28 KG/M2

## 2019-06-25 DIAGNOSIS — B96.89 ACUTE BACTERIAL SINUSITIS: Primary | ICD-10-CM

## 2019-06-25 DIAGNOSIS — J01.90 ACUTE BACTERIAL SINUSITIS: Primary | ICD-10-CM

## 2019-06-25 PROCEDURE — 3079F PR MOST RECENT DIASTOLIC BLOOD PRESSURE 80-89 MM HG: ICD-10-PCS | Mod: CPTII,S$GLB,, | Performed by: INTERNAL MEDICINE

## 2019-06-25 PROCEDURE — 99999 PR PBB SHADOW E&M-EST. PATIENT-LVL III: CPT | Mod: PBBFAC,,, | Performed by: INTERNAL MEDICINE

## 2019-06-25 PROCEDURE — 99214 PR OFFICE/OUTPT VISIT, EST, LEVL IV, 30-39 MIN: ICD-10-PCS | Mod: S$GLB,,, | Performed by: INTERNAL MEDICINE

## 2019-06-25 PROCEDURE — 3008F BODY MASS INDEX DOCD: CPT | Mod: CPTII,S$GLB,, | Performed by: INTERNAL MEDICINE

## 2019-06-25 PROCEDURE — 99214 OFFICE O/P EST MOD 30 MIN: CPT | Mod: S$GLB,,, | Performed by: INTERNAL MEDICINE

## 2019-06-25 PROCEDURE — 3008F PR BODY MASS INDEX (BMI) DOCUMENTED: ICD-10-PCS | Mod: CPTII,S$GLB,, | Performed by: INTERNAL MEDICINE

## 2019-06-25 PROCEDURE — 3074F SYST BP LT 130 MM HG: CPT | Mod: CPTII,S$GLB,, | Performed by: INTERNAL MEDICINE

## 2019-06-25 PROCEDURE — 3074F PR MOST RECENT SYSTOLIC BLOOD PRESSURE < 130 MM HG: ICD-10-PCS | Mod: CPTII,S$GLB,, | Performed by: INTERNAL MEDICINE

## 2019-06-25 PROCEDURE — 99999 PR PBB SHADOW E&M-EST. PATIENT-LVL III: ICD-10-PCS | Mod: PBBFAC,,, | Performed by: INTERNAL MEDICINE

## 2019-06-25 PROCEDURE — 3079F DIAST BP 80-89 MM HG: CPT | Mod: CPTII,S$GLB,, | Performed by: INTERNAL MEDICINE

## 2019-06-25 RX ORDER — AZELASTINE 1 MG/ML
1 SPRAY, METERED NASAL 2 TIMES DAILY
Qty: 10 ML | Refills: 5 | Status: SHIPPED | OUTPATIENT
Start: 2019-06-25 | End: 2019-09-25 | Stop reason: SDUPTHER

## 2019-06-25 RX ORDER — AMOXICILLIN AND CLAVULANATE POTASSIUM 875; 125 MG/1; MG/1
1 TABLET, FILM COATED ORAL EVERY 12 HOURS
Qty: 14 TABLET | Refills: 0 | Status: SHIPPED | OUTPATIENT
Start: 2019-06-25 | End: 2019-07-02

## 2019-06-25 RX ORDER — PREDNISONE 20 MG/1
TABLET ORAL
Qty: 21 TABLET | Refills: 0 | Status: SHIPPED | OUTPATIENT
Start: 2019-06-25 | End: 2019-07-09

## 2019-06-25 NOTE — PATIENT INSTRUCTIONS

## 2019-06-25 NOTE — PROGRESS NOTES
Subjective:       Patient ID: Carina Montaño is a 63 y.o. female.    Chief Complaint: Sinusitis (x 5 days) and Cough    HPI Mrs. Montaño is a 63-year-old female with asthma who presents with a chief complaint of cough and congestion.  Onset of symptoms was Friday.  She began having cough with associated sneezing.  Sunday was the worst.  Her symptoms are constant.  They have waxed and waned in severity.  She has associated sinus pressure/pain, sore throat, and postnasal drip.  She has no associated ear pain. She was seen at an urgent care 2 days ago and diagnosed with nasopharyngitis.  She was prescribed medications, but she reports medications are not helping.  Currently nothing is making her symptoms better.    Review of Systems   Constitutional: Positive for fever.   HENT: Positive for congestion, postnasal drip, sinus pressure, sinus pain, sneezing and sore throat. Negative for ear pain.    Respiratory: Positive for cough.        Objective:      Physical Exam   Constitutional: She is oriented to person, place, and time. She appears well-developed and well-nourished. No distress.   HENT:   Head: Normocephalic and atraumatic.   Right Ear: Tympanic membrane and external ear normal.   Left Ear: Tympanic membrane and external ear normal.   Nose: Mucosal edema present.   Mouth/Throat: Posterior oropharyngeal erythema present. No oropharyngeal exudate or posterior oropharyngeal edema.   Erythema bilateral auditory canals.   Eyes: Conjunctivae and EOM are normal.   Cardiovascular: Normal rate, regular rhythm, normal heart sounds and intact distal pulses. Exam reveals no gallop and no friction rub.   No murmur heard.  Pulmonary/Chest: Effort normal and breath sounds normal. No stridor. No respiratory distress. She has no wheezes. She has no rales.   Neurological: She is alert and oriented to person, place, and time.   Skin: Skin is warm and dry. She is not diaphoretic.   Psychiatric: She has a normal mood and affect.  Her behavior is normal. Judgment and thought content normal.   Vitals reviewed.      Assessment:       1. Acute bacterial sinusitis        Plan:     1.  Acute bacterial sinusitis  Augmentin 875/125 mg p.o. b.i.d. x7 days  Six day prednisone taper prescribed  Continue azelastine nasal spray  Alert me if no improvement in symptoms after two days of Abx and steroids    RTC PRN

## 2019-06-28 ENCOUNTER — TELEPHONE (OUTPATIENT)
Dept: PULMONOLOGY | Facility: CLINIC | Age: 64
End: 2019-06-28

## 2019-06-28 DIAGNOSIS — J45.909 ASTHMA, UNSPECIFIED ASTHMA SEVERITY, UNSPECIFIED WHETHER COMPLICATED, UNSPECIFIED WHETHER PERSISTENT: Primary | ICD-10-CM

## 2019-07-09 ENCOUNTER — OFFICE VISIT (OUTPATIENT)
Dept: INTERNAL MEDICINE | Facility: CLINIC | Age: 64
End: 2019-07-09
Payer: COMMERCIAL

## 2019-07-09 ENCOUNTER — LAB VISIT (OUTPATIENT)
Dept: LAB | Facility: HOSPITAL | Age: 64
End: 2019-07-09
Attending: INTERNAL MEDICINE
Payer: COMMERCIAL

## 2019-07-09 ENCOUNTER — TELEPHONE (OUTPATIENT)
Dept: INTERNAL MEDICINE | Facility: CLINIC | Age: 64
End: 2019-07-09

## 2019-07-09 VITALS
DIASTOLIC BLOOD PRESSURE: 78 MMHG | HEIGHT: 61 IN | BODY MASS INDEX: 32.72 KG/M2 | HEART RATE: 75 BPM | TEMPERATURE: 98 F | SYSTOLIC BLOOD PRESSURE: 118 MMHG | WEIGHT: 173.31 LBS | OXYGEN SATURATION: 96 %

## 2019-07-09 DIAGNOSIS — Z01.818 PREOPERATIVE EXAMINATION: ICD-10-CM

## 2019-07-09 DIAGNOSIS — Z01.818 PREOPERATIVE EXAMINATION: Primary | ICD-10-CM

## 2019-07-09 LAB
ALBUMIN SERPL BCP-MCNC: 3.7 G/DL (ref 3.5–5.2)
ALP SERPL-CCNC: 67 U/L (ref 55–135)
ALT SERPL W/O P-5'-P-CCNC: 20 U/L (ref 10–44)
ANION GAP SERPL CALC-SCNC: 8 MMOL/L (ref 8–16)
AST SERPL-CCNC: 18 U/L (ref 10–40)
BASOPHILS # BLD AUTO: 0.03 K/UL (ref 0–0.2)
BASOPHILS NFR BLD: 0.3 % (ref 0–1.9)
BILIRUB SERPL-MCNC: 1 MG/DL (ref 0.1–1)
BUN SERPL-MCNC: 14 MG/DL (ref 8–23)
CALCIUM SERPL-MCNC: 9.9 MG/DL (ref 8.7–10.5)
CHLORIDE SERPL-SCNC: 104 MMOL/L (ref 95–110)
CO2 SERPL-SCNC: 29 MMOL/L (ref 23–29)
CREAT SERPL-MCNC: 0.8 MG/DL (ref 0.5–1.4)
DIFFERENTIAL METHOD: ABNORMAL
EOSINOPHIL # BLD AUTO: 0.3 K/UL (ref 0–0.5)
EOSINOPHIL NFR BLD: 3.4 % (ref 0–8)
ERYTHROCYTE [DISTWIDTH] IN BLOOD BY AUTOMATED COUNT: 13.2 % (ref 11.5–14.5)
EST. GFR  (AFRICAN AMERICAN): >60 ML/MIN/1.73 M^2
EST. GFR  (NON AFRICAN AMERICAN): >60 ML/MIN/1.73 M^2
GLUCOSE SERPL-MCNC: 115 MG/DL (ref 70–110)
HCT VFR BLD AUTO: 45.9 % (ref 37–48.5)
HGB BLD-MCNC: 14.4 G/DL (ref 12–16)
IMM GRANULOCYTES # BLD AUTO: 0.07 K/UL (ref 0–0.04)
IMM GRANULOCYTES NFR BLD AUTO: 0.7 % (ref 0–0.5)
LYMPHOCYTES # BLD AUTO: 1.6 K/UL (ref 1–4.8)
LYMPHOCYTES NFR BLD: 17.1 % (ref 18–48)
MCH RBC QN AUTO: 30.3 PG (ref 27–31)
MCHC RBC AUTO-ENTMCNC: 31.4 G/DL (ref 32–36)
MCV RBC AUTO: 97 FL (ref 82–98)
MONOCYTES # BLD AUTO: 0.5 K/UL (ref 0.3–1)
MONOCYTES NFR BLD: 5.7 % (ref 4–15)
NEUTROPHILS # BLD AUTO: 6.9 K/UL (ref 1.8–7.7)
NEUTROPHILS NFR BLD: 72.8 % (ref 38–73)
NRBC BLD-RTO: 0 /100 WBC
PLATELET # BLD AUTO: 247 K/UL (ref 150–350)
PMV BLD AUTO: 10.5 FL (ref 9.2–12.9)
POTASSIUM SERPL-SCNC: 4.2 MMOL/L (ref 3.5–5.1)
PROT SERPL-MCNC: 7.1 G/DL (ref 6–8.4)
RBC # BLD AUTO: 4.75 M/UL (ref 4–5.4)
SODIUM SERPL-SCNC: 141 MMOL/L (ref 136–145)
WBC # BLD AUTO: 9.42 K/UL (ref 3.9–12.7)

## 2019-07-09 PROCEDURE — 99999 PR PBB SHADOW E&M-EST. PATIENT-LVL III: CPT | Mod: PBBFAC,,, | Performed by: INTERNAL MEDICINE

## 2019-07-09 PROCEDURE — 99999 PR PBB SHADOW E&M-EST. PATIENT-LVL III: ICD-10-PCS | Mod: PBBFAC,,, | Performed by: INTERNAL MEDICINE

## 2019-07-09 PROCEDURE — 3074F SYST BP LT 130 MM HG: CPT | Mod: CPTII,S$GLB,, | Performed by: INTERNAL MEDICINE

## 2019-07-09 PROCEDURE — 3008F PR BODY MASS INDEX (BMI) DOCUMENTED: ICD-10-PCS | Mod: CPTII,S$GLB,, | Performed by: INTERNAL MEDICINE

## 2019-07-09 PROCEDURE — 3078F DIAST BP <80 MM HG: CPT | Mod: CPTII,S$GLB,, | Performed by: INTERNAL MEDICINE

## 2019-07-09 PROCEDURE — 80053 COMPREHEN METABOLIC PANEL: CPT

## 2019-07-09 PROCEDURE — 99214 OFFICE O/P EST MOD 30 MIN: CPT | Mod: S$GLB,,, | Performed by: INTERNAL MEDICINE

## 2019-07-09 PROCEDURE — 36415 COLL VENOUS BLD VENIPUNCTURE: CPT | Mod: PO

## 2019-07-09 PROCEDURE — 3008F BODY MASS INDEX DOCD: CPT | Mod: CPTII,S$GLB,, | Performed by: INTERNAL MEDICINE

## 2019-07-09 PROCEDURE — 99214 PR OFFICE/OUTPT VISIT, EST, LEVL IV, 30-39 MIN: ICD-10-PCS | Mod: S$GLB,,, | Performed by: INTERNAL MEDICINE

## 2019-07-09 PROCEDURE — 85025 COMPLETE CBC W/AUTO DIFF WBC: CPT

## 2019-07-09 PROCEDURE — 3074F PR MOST RECENT SYSTOLIC BLOOD PRESSURE < 130 MM HG: ICD-10-PCS | Mod: CPTII,S$GLB,, | Performed by: INTERNAL MEDICINE

## 2019-07-09 PROCEDURE — 3078F PR MOST RECENT DIASTOLIC BLOOD PRESSURE < 80 MM HG: ICD-10-PCS | Mod: CPTII,S$GLB,, | Performed by: INTERNAL MEDICINE

## 2019-07-09 NOTE — PROGRESS NOTES
Patient complains of leg pain, states it is related to her diabetes. Tylenol was administered this morning with no relief.  Notified  via perfect serve for her request for norco. Awaiting response Subjective:       Patient ID: Carina Montaño is a 63 y.o. female.    Chief Complaint: Pre-op Exam    HPI Mrs Montaño is a 63 year old female with seasonal allergies, severe persistent asthma, arthritis, GERD, hypertension, hyperlipidemia, and history of breast cancer who presents for pre-op evaluation. She plans to have left hand surgery with orthopedist Dr. Dixon.   She is unsure if they plan general anesthesia but thinks it will be topical/local only. She has not brought any documentation from Dr. Dixon's office with her today.  She has hypertension but this is well controlled on current medication. Severe persistent asthma is currently well controlled with current medication and she plans to follow up with pulmonology soon. She has no history of CHF, abnormal cardiac valves, cardiac arrhythmias, myocardial infarction, kidney disease or COPD. She has undergone multiple operations in the past (knee surgery, shoulder surgery, abdominal surgery) with no known complications.   She is able to walk up a flight of stairs without shortness of breath or chest pain. She believes she could play a sport without shortness of breath or chest pain.  Of note, patient states she also plans to have left breast biopsy soon (7/16/19) at [facility].  She has no acute complaints or concerns today.    Review of Systems   All other systems reviewed and are negative.      Objective:      Physical Exam   Constitutional: She is oriented to person, place, and time. She appears well-developed and well-nourished. No distress.   HENT:   Head: Normocephalic and atraumatic.   Right Ear: External ear normal.   Left Ear: External ear normal.   Nose: Nose normal.   Cardiovascular: Normal rate, regular rhythm, normal heart sounds and intact distal pulses. Exam reveals no gallop and no friction rub.   No murmur heard.  Pulmonary/Chest: Effort normal and breath sounds normal. No stridor. No respiratory distress. She has no wheezes. She has no rales.    Neurological: She is alert and oriented to person, place, and time.   Skin: Skin is warm and dry. She is not diaphoretic.   Psychiatric: She has a normal mood and affect. Her behavior is normal. Judgment and thought content normal.   Vitals reviewed.      Assessment:       1. Preoperative examination        Plan:     1. Preoperative evaluation  CMP and CBC both within normal ranges  Revised cardiac risk index score 0 indicating low risk of any adverse cardiac events occurring during this low risk surgery.  Overall, patient is at low risk for any adverse events occurring during this low risk surgery    RTC PRN

## 2019-07-10 ENCOUNTER — HOSPITAL ENCOUNTER (OUTPATIENT)
Dept: PULMONOLOGY | Facility: CLINIC | Age: 64
Discharge: HOME | End: 2019-07-10
Payer: COMMERCIAL

## 2019-07-10 ENCOUNTER — OFFICE VISIT (OUTPATIENT)
Dept: PULMONOLOGY | Facility: CLINIC | Age: 64
End: 2019-07-10
Payer: COMMERCIAL

## 2019-07-10 VITALS
SYSTOLIC BLOOD PRESSURE: 118 MMHG | OXYGEN SATURATION: 98 % | WEIGHT: 174 LBS | DIASTOLIC BLOOD PRESSURE: 72 MMHG | BODY MASS INDEX: 34.16 KG/M2 | HEIGHT: 60 IN | HEART RATE: 90 BPM

## 2019-07-10 DIAGNOSIS — J45.30 MILD PERSISTENT ASTHMA WITHOUT COMPLICATION: ICD-10-CM

## 2019-07-10 DIAGNOSIS — J45.909 ASTHMA, UNSPECIFIED ASTHMA SEVERITY, UNSPECIFIED WHETHER COMPLICATED, UNSPECIFIED WHETHER PERSISTENT: ICD-10-CM

## 2019-07-10 DIAGNOSIS — J20.9 BRONCHITIS, ACUTE, WITH BRONCHOSPASM: ICD-10-CM

## 2019-07-10 DIAGNOSIS — J30.9 ALLERGIC SINUSITIS: ICD-10-CM

## 2019-07-10 LAB
POST FEV1 FVC: 0.78
POST FEV1: 1.9
POST FVC: 2.43
PRE FEV1 FVC: 80
PRE FEV1: 1.78
PRE FVC: 2.22
PREDICTED FEV1 FVC: 82
PREDICTED FEV1: 2.02
PREDICTED FVC: 2.52

## 2019-07-10 PROCEDURE — 99244 OFF/OP CNSLTJ NEW/EST MOD 40: CPT | Mod: 25,S$GLB,, | Performed by: INTERNAL MEDICINE

## 2019-07-10 PROCEDURE — 99999 PR PBB SHADOW E&M-EST. PATIENT-LVL III: ICD-10-PCS | Mod: PBBFAC,,, | Performed by: INTERNAL MEDICINE

## 2019-07-10 PROCEDURE — 99244 PR OFFICE CONSULTATION,LEVEL IV: ICD-10-PCS | Mod: 25,S$GLB,, | Performed by: INTERNAL MEDICINE

## 2019-07-10 PROCEDURE — 99999 PR PBB SHADOW E&M-EST. PATIENT-LVL III: CPT | Mod: PBBFAC,,, | Performed by: INTERNAL MEDICINE

## 2019-07-10 NOTE — LETTER
July 10, 2019      Adelaide De Leon MD  [address]  [place] [address]           [facility]  [address]  [place] [address]  Phone: [phone]          Patient: Carina Montaño   MR Number: [id number]   YOB: 1955   Date of Visit: 7/10/2019       Dear Dr. Adelaide De Leon:    Thank you for referring Carina Montaño to me for evaluation. Attached you will find relevant portions of my assessment and plan of care.    If you have questions, please do not hesitate to call me. I look forward to following Carina Montaño along with you.    Sincerely,    Lali Proctor MD    Enclosure  CC:  No Recipients    If you would like to receive this communication electronically, please contact [email or web address] or [phone] to request more information on [facility] [email or web address] Link access.    For providers and/or their staff who would like to refer a patient to [facility], please contact us through our one-stop-shop provider referral line, [facility], at [phone].    If you feel you have received this communication in error or would no longer like to receive these types of communications, please e-mail [email or web address]

## 2019-07-10 NOTE — PROGRESS NOTES
"Subjective:       Patient ID: Carina Montaño is a 64 y.o. female.    Chief Complaint: Asthma    Consult from Dr. De Leon for asthma/shortness of breath.    Patient was previously seen for allergic rhinitis/bronchitis in 2014.    64 year old with history of URI.  Had a viral URI with fever and cough.  Since that time had some allergic rhinitis and asthma.  Is not using symbicort regularly.  Previously had used "as needed" and is not taking it daily.  Has sinus congestion as well.     Triggers include allergic rhinitis/sinusitis.     Controller medications that she takes regularly include  Singulair, claritin and flonase.  Does not use albuterol daily  Does not use symbicort regularly.     Review of Systems   Constitutional: Negative for weight loss and weight gain.        Fever resolved.   HENT: Negative for trouble swallowing.    Eyes: Negative for redness and itching.   Respiratory: Positive for cough, sputum production (clear to green) and wheezing. Negative for shortness of breath.    Cardiovascular: Negative for chest pain and leg swelling.   Genitourinary: Negative for difficulty urinating.   Endocrine: Negative for cold intolerance and heat intolerance.    Musculoskeletal: Negative for arthralgias.   Skin: Negative for rash.   Gastrointestinal: Negative for acid reflux.        Controlled with protonix   Neurological: Negative for headaches.   Hematological: Negative for adenopathy.   Psychiatric/Behavioral: Negative for confusion.       Objective:       Vitals:    07/10/19 0958   BP: 118/72   BP Location: Right arm   Patient Position: Sitting   Pulse: 90   SpO2: 98%   Weight: 78.9 kg (174 lb)   Height: 5' (1.524 m)     Physical Exam   Constitutional: She is oriented to person, place, and time. She appears well-developed and well-nourished.   HENT:   Head: Normocephalic.   Nose: Nose normal.   Neck: Normal range of motion. Neck supple. No tracheal deviation present.   Cardiovascular: Normal rate, regular " rhythm, normal heart sounds and intact distal pulses.   Pulmonary/Chest: Normal expansion, symmetric chest wall expansion, effort normal and breath sounds normal.   Abdominal: Soft. Bowel sounds are normal. There is no hepatosplenomegaly.   Musculoskeletal: Normal range of motion. She exhibits no edema.   Lymphadenopathy: No supraclavicular adenopathy is present.     She has no cervical adenopathy.   Neurological: She is alert and oriented to person, place, and time. No cranial nerve deficit.   Skin: Skin is warm and dry.   Psychiatric: She has a normal mood and affect.   Vitals reviewed.       Personal Diagnostic Review:  PFTs today are normal without obstruction and normal diffusion.  Markedly improved when compared to 12/9/2014.  Pulmonary Studies Review 7/10/2019   FVC 2.43   FVC% 98   FEV1 1.9   FEV1% 95   FEV1/FVC 78.2   DLCO (ml/mmHg sec) 18.6   DLCO% 108   SpO2 98   FiO2 (%) 21   Height 60.000   Weight 2784   BMI (Calculated) 34.1   Predicted Distance 292.1   Predicted Distance Meters (Calculated) 437.9     CXR normal from March 2019    No flowsheet data found.      Assessment:       1. Allergic sinusitis    2. Mild persistent asthma without complication    3. Bronchitis, acute, with bronchospasm        Outpatient Encounter Medications as of 7/10/2019   Medication Sig Dispense Refill    albuterol (ACCUNEB) 0.63 mg/3 mL Nebu Take 3 mLs (0.63 mg total) by nebulization every 6 (six) hours as needed. Rescue 1 Box 2    albuterol (PROVENTIL HFA) 90 mcg/actuation inhaler Inhale 2 puffs into the lungs every 4 (four) hours as needed for Wheezing (cough). Rescue 18 g 2    albuterol (PROVENTIL) 2.5 mg /3 mL (0.083 %) nebulizer solution USE 1 VIAL IN NEBULIZER 4 TIMES PER DAY 75 mL 5    albuterol (PROVENTIL/VENTOLIN HFA) 90 mcg/actuation inhaler Inhale 2 puffs into the lungs every 6 (six) hours as needed for Wheezing. 1 each 2    alendronate (FOSAMAX) 70 MG tablet 1 TAB ORAL EVERY WEEK IN A.M. W/ GLASS OF WATER ON  EMPTY STOMACH DO NOT LIE DOWN/EAT FOR 30MIN AFTER 12 tablet 3    budesonide-formoterol 160-4.5 mcg (SYMBICORT) 160-4.5 mcg/actuation HFAA Inhale 2 puffs into the lungs every 12 (twelve) hours. 10.2 Inhaler 6    cholecalciferol, vitamin D3, 1,000 unit capsule Take 1 capsule (1,000 Units total) by mouth once daily. 90 capsule 1    fluticasone (FLONASE) 50 mcg/actuation nasal spray TAKE 1-2 SPRAY(S) (INTRANASAL) 2 TIMES PER DAY AS NEEDED FOR CONGESTION 16 g 11    losartan (COZAAR) 100 MG tablet Take 1 tablet (100 mg total) by mouth once daily. 90 tablet 3    montelukast (SINGULAIR) 10 mg tablet Take 10 mg by mouth every evening.  11    MULTIVIT,CALC,MINS/IRON/FOLIC (ONE-A-DAY WOMENS FORMULA ORAL) Take 2 tablets by mouth once daily.      naproxen (NAPROSYN) 500 MG tablet Take 1 tablet (500 mg total) by mouth 2 (two) times daily. 30 tablet 0    olopatadine (PATANOL) 0.1 % ophthalmic solution Place 1 drop into both eyes 2 (two) times daily. 5 mL 0    pantoprazole (PROTONIX) 40 MG tablet Take 1 tablet (40 mg total) by mouth once daily. 30 tablet 11    simvastatin (ZOCOR) 20 MG tablet TAKE 1 TABLET (20 MG TOTAL) BY MOUTH ONCE DAILY. 90 tablet 2    azelastine (ASTELIN) 137 mcg (0.1 %) nasal spray 1 spray (137 mcg total) by Nasal route 2 (two) times daily. for 14 days 10 mL 5     Facility-Administered Encounter Medications as of 7/10/2019   Medication Dose Route Frequency Provider Last Rate Last Dose    albuterol-ipratropium 2.5 mg-0.5 mg/3 mL nebulizer solution 3 mL  3 mL Nebulization 1 time in Clinic/ALENA De Leon MD        albuterol-ipratropium 2.5 mg-0.5 mg/3 mL nebulizer solution 3 mL  3 mL Nebulization 1 time in Clinic/HOD Adelaide De Leon MD         No orders of the defined types were placed in this encounter.    Plan:     Problem List Items Addressed This Visit     Mild persistent asthma without complication    Overview     Stable to improved.  Per PCP, Dr. De Leon.  Triggered by allergies.   See allergic sinusitis  Must be adherent to symbicort 2 puffs twice daily for control  Albuterol for rescue and prevention.         Allergic sinusitis    Overview     Change from claritin to allegra daily  Continue singulair daily  Flonase, two sprays per nostril daily  Simply saline mist to irrigate sinuses daily while in the shower.         RESOLVED: Bronchitis, acute, with bronchospasm    Overview     Resolved.

## 2019-07-10 NOTE — TELEPHONE ENCOUNTER
Spoke with patient and informed labs normal and will fax surg clr to Dr. Dixon tomorrow. Patient voices understanding.

## 2019-07-10 NOTE — TELEPHONE ENCOUNTER
"----- Message from Adelaide De Leon MD sent at 7/9/2019  7:30 PM CDT -----  Please tell patient that her labs are normal. We need to print out my note from today and a list of her current medications, and fax this to "Dr Dixon" the orthopedist who is doing her upcoming surgery. Thanks  "

## 2019-07-10 NOTE — PATIENT INSTRUCTIONS
Change from claritin to allegra daily  Continue singulair daily  Flonase, two sprays per nostril daily  Simply saline mist to irrigate sinuses daily while in the shower.    Triggered by allergies.  See allergic sinusitis  Must be adherent to symbicort 2 puffs twice daily for control  Albuterol for rescue and prevention.      
Detail Level: Simple
Add 74252 Cpt? (Important Note: In 2017 The Use Of 99668 Is Being Tracked By Cms To Determine Future Global Period Reimbursement For Global Periods): yes
Body Location Override (Optional - Billing Will Still Be Based On Selected Body Map Location If Applicable): left superior lateral buccal cheek

## 2019-07-23 ENCOUNTER — TELEPHONE (OUTPATIENT)
Dept: INTERNAL MEDICINE | Facility: CLINIC | Age: 64
End: 2019-07-23

## 2019-07-23 NOTE — TELEPHONE ENCOUNTER
----- Message from Candi Ferguson sent at 7/23/2019  9:57 AM CDT -----  Contact: [phone]/ self   Patient is requesting results of labs and surgery clearance be faxed to the following as she's scheduled for surgery 7/26.  Please advise.     [phone] Dr.Eric Dixon

## 2019-08-12 ENCOUNTER — LAB VISIT (OUTPATIENT)
Dept: LAB | Facility: HOSPITAL | Age: 64
End: 2019-08-12
Attending: INTERNAL MEDICINE
Payer: COMMERCIAL

## 2019-08-12 DIAGNOSIS — Z00.00 ANNUAL PHYSICAL EXAM: ICD-10-CM

## 2019-08-12 LAB
25(OH)D3+25(OH)D2 SERPL-MCNC: 14 NG/ML (ref 30–96)
ALBUMIN SERPL BCP-MCNC: 3.8 G/DL (ref 3.5–5.2)
ALP SERPL-CCNC: 83 U/L (ref 55–135)
ALT SERPL W/O P-5'-P-CCNC: 24 U/L (ref 10–44)
ANION GAP SERPL CALC-SCNC: 9 MMOL/L (ref 8–16)
AST SERPL-CCNC: 19 U/L (ref 10–40)
BASOPHILS # BLD AUTO: 0.03 K/UL (ref 0–0.2)
BASOPHILS NFR BLD: 0.3 % (ref 0–1.9)
BILIRUB SERPL-MCNC: 0.6 MG/DL (ref 0.1–1)
BUN SERPL-MCNC: 15 MG/DL (ref 8–23)
CALCIUM SERPL-MCNC: 9.4 MG/DL (ref 8.7–10.5)
CHLORIDE SERPL-SCNC: 106 MMOL/L (ref 95–110)
CHOLEST SERPL-MCNC: 171 MG/DL (ref 120–199)
CHOLEST/HDLC SERPL: 3.3 {RATIO} (ref 2–5)
CO2 SERPL-SCNC: 27 MMOL/L (ref 23–29)
CREAT SERPL-MCNC: 0.8 MG/DL (ref 0.5–1.4)
DIFFERENTIAL METHOD: ABNORMAL
EOSINOPHIL # BLD AUTO: 0.4 K/UL (ref 0–0.5)
EOSINOPHIL NFR BLD: 3.6 % (ref 0–8)
ERYTHROCYTE [DISTWIDTH] IN BLOOD BY AUTOMATED COUNT: 13.7 % (ref 11.5–14.5)
EST. GFR  (AFRICAN AMERICAN): >60 ML/MIN/1.73 M^2
EST. GFR  (NON AFRICAN AMERICAN): >60 ML/MIN/1.73 M^2
ESTIMATED AVG GLUCOSE: 117 MG/DL (ref 68–131)
GLUCOSE SERPL-MCNC: 102 MG/DL (ref 70–110)
HBA1C MFR BLD HPLC: 5.7 % (ref 4–5.6)
HCT VFR BLD AUTO: 44.5 % (ref 37–48.5)
HDLC SERPL-MCNC: 52 MG/DL (ref 40–75)
HDLC SERPL: 30.4 % (ref 20–50)
HGB BLD-MCNC: 14 G/DL (ref 12–16)
IMM GRANULOCYTES # BLD AUTO: 0.02 K/UL (ref 0–0.04)
IMM GRANULOCYTES NFR BLD AUTO: 0.2 % (ref 0–0.5)
LDLC SERPL CALC-MCNC: 108.2 MG/DL (ref 63–159)
LYMPHOCYTES # BLD AUTO: 1.6 K/UL (ref 1–4.8)
LYMPHOCYTES NFR BLD: 16 % (ref 18–48)
MCH RBC QN AUTO: 30.3 PG (ref 27–31)
MCHC RBC AUTO-ENTMCNC: 31.5 G/DL (ref 32–36)
MCV RBC AUTO: 96 FL (ref 82–98)
MONOCYTES # BLD AUTO: 0.7 K/UL (ref 0.3–1)
MONOCYTES NFR BLD: 7.1 % (ref 4–15)
NEUTROPHILS # BLD AUTO: 7.1 K/UL (ref 1.8–7.7)
NEUTROPHILS NFR BLD: 72.8 % (ref 38–73)
NONHDLC SERPL-MCNC: 119 MG/DL
NRBC BLD-RTO: 0 /100 WBC
PLATELET # BLD AUTO: 237 K/UL (ref 150–350)
PMV BLD AUTO: 10.5 FL (ref 9.2–12.9)
POTASSIUM SERPL-SCNC: 4.3 MMOL/L (ref 3.5–5.1)
PROT SERPL-MCNC: 7 G/DL (ref 6–8.4)
RBC # BLD AUTO: 4.62 M/UL (ref 4–5.4)
SODIUM SERPL-SCNC: 142 MMOL/L (ref 136–145)
TRIGL SERPL-MCNC: 54 MG/DL (ref 30–150)
TSH SERPL DL<=0.005 MIU/L-ACNC: 2.62 UIU/ML (ref 0.4–4)
WBC # BLD AUTO: 9.77 K/UL (ref 3.9–12.7)

## 2019-08-12 PROCEDURE — 80053 COMPREHEN METABOLIC PANEL: CPT

## 2019-08-12 PROCEDURE — 85025 COMPLETE CBC W/AUTO DIFF WBC: CPT

## 2019-08-12 PROCEDURE — 36415 COLL VENOUS BLD VENIPUNCTURE: CPT | Mod: PO

## 2019-08-12 PROCEDURE — 84443 ASSAY THYROID STIM HORMONE: CPT

## 2019-08-12 PROCEDURE — 82306 VITAMIN D 25 HYDROXY: CPT

## 2019-08-12 PROCEDURE — 83036 HEMOGLOBIN GLYCOSYLATED A1C: CPT

## 2019-08-12 PROCEDURE — 80061 LIPID PANEL: CPT

## 2019-08-13 DIAGNOSIS — E55.9 VITAMIN D DEFICIENCY: Primary | ICD-10-CM

## 2019-08-13 RX ORDER — ASPIRIN 325 MG
50000 TABLET, DELAYED RELEASE (ENTERIC COATED) ORAL WEEKLY
Qty: 12 CAPSULE | Refills: 3 | COMMUNITY
Start: 2019-08-13 | End: 2019-09-25 | Stop reason: SDUPTHER

## 2019-08-14 ENCOUNTER — TELEPHONE (OUTPATIENT)
Dept: INTERNAL MEDICINE | Facility: CLINIC | Age: 64
End: 2019-08-14

## 2019-08-14 ENCOUNTER — PATIENT MESSAGE (OUTPATIENT)
Dept: INTERNAL MEDICINE | Facility: CLINIC | Age: 64
End: 2019-08-14

## 2019-08-14 NOTE — TELEPHONE ENCOUNTER
----- Message from Adelaide De Leon MD sent at 8/13/2019  3:16 PM CDT -----  Does this patient need surgical clearance? If so, can we make sure she is scheduled to see me in an urgent care appt for this surgical clearance? Thanks!

## 2019-08-21 ENCOUNTER — TELEPHONE (OUTPATIENT)
Dept: INTERNAL MEDICINE | Facility: CLINIC | Age: 64
End: 2019-08-21

## 2019-08-21 NOTE — TELEPHONE ENCOUNTER
Called pt LVM requesting a call back regarding previous message sent about surgery clearance and labs.

## 2019-09-14 DIAGNOSIS — R93.7 ABNORMAL BONE DENSITY SCREENING: ICD-10-CM

## 2019-09-16 ENCOUNTER — TELEPHONE (OUTPATIENT)
Dept: INTERNAL MEDICINE | Facility: CLINIC | Age: 64
End: 2019-09-16

## 2019-09-16 ENCOUNTER — OFFICE VISIT (OUTPATIENT)
Dept: FAMILY MEDICINE | Facility: CLINIC | Age: 64
End: 2019-09-16
Payer: COMMERCIAL

## 2019-09-16 VITALS
HEART RATE: 76 BPM | SYSTOLIC BLOOD PRESSURE: 120 MMHG | WEIGHT: 176.38 LBS | TEMPERATURE: 98 F | OXYGEN SATURATION: 98 % | BODY MASS INDEX: 34.63 KG/M2 | DIASTOLIC BLOOD PRESSURE: 80 MMHG | HEIGHT: 60 IN
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
## 2019-09-16 DIAGNOSIS — S09.93XA FACIAL TRAUMA, INITIAL ENCOUNTER: ICD-10-CM

## 2019-09-16 PROCEDURE — 99999 PR PBB SHADOW E&M-EST. PATIENT-LVL V: ICD-10-PCS | Mod: PBBFAC,,, | Performed by: NURSE PRACTITIONER

## 2019-09-16 PROCEDURE — 99212 PR OFFICE/OUTPT VISIT, EST, LEVL II, 10-19 MIN: ICD-10-PCS | Mod: S$GLB,,, | Performed by: NURSE PRACTITIONER

## 2019-09-16 PROCEDURE — 3008F BODY MASS INDEX DOCD: CPT | Mod: CPTII,S$GLB,, | Performed by: NURSE PRACTITIONER

## 2019-09-16 PROCEDURE — 99999 PR PBB SHADOW E&M-EST. PATIENT-LVL V: CPT | Mod: PBBFAC,,, | Performed by: NURSE PRACTITIONER

## 2019-09-16 PROCEDURE — 3079F DIAST BP 80-89 MM HG: CPT | Mod: CPTII,S$GLB,, | Performed by: NURSE PRACTITIONER

## 2019-09-16 PROCEDURE — 3079F PR MOST RECENT DIASTOLIC BLOOD PRESSURE 80-89 MM HG: ICD-10-PCS | Mod: CPTII,S$GLB,, | Performed by: NURSE PRACTITIONER

## 2019-09-16 PROCEDURE — 99212 OFFICE O/P EST SF 10 MIN: CPT | Mod: S$GLB,,, | Performed by: NURSE PRACTITIONER

## 2019-09-16 PROCEDURE — 3074F PR MOST RECENT SYSTOLIC BLOOD PRESSURE < 130 MM HG: ICD-10-PCS | Mod: CPTII,S$GLB,, | Performed by: NURSE PRACTITIONER

## 2019-09-16 PROCEDURE — 3074F SYST BP LT 130 MM HG: CPT | Mod: CPTII,S$GLB,, | Performed by: NURSE PRACTITIONER

## 2019-09-16 PROCEDURE — 3008F PR BODY MASS INDEX (BMI) DOCUMENTED: ICD-10-PCS | Mod: CPTII,S$GLB,, | Performed by: NURSE PRACTITIONER

## 2019-09-16 RX ORDER — ALENDRONATE SODIUM 70 MG/1
TABLET ORAL
Qty: 4 TABLET | Refills: 11 | Status: SHIPPED | OUTPATIENT
Start: 2019-09-16 | End: 2019-09-25 | Stop reason: SDUPTHER

## 2019-09-16 NOTE — TELEPHONE ENCOUNTER
----- Message from Kyara Gomez sent at 9/16/2019 10:41 AM CDT -----  No. [phone]    Patient returned your call.

## 2019-09-16 NOTE — PROGRESS NOTES
Subjective:       Patient ID: Carina Montaño is a 64 y.o. female.    Chief Complaint: Fall (9/14)    65yo female who presents to after hours clinic with complaints of fall. She reports tripping over a parking bump and falling forward onto her knees. She scraped her left knee and hit her face and feels she hit it on a car bumper rather than the ground. She wears glasses and feels this was the major cause of her trauma since she broke the lenses. She denies any trauma to her head. She denies any LOC, dizziness, headaches. She rested for the weekend and reports soreness to the bridge of her nose that is improving. She has been taking Tylenol OTC for pain. She decided to come for check since her tenderness and bruising to the bridge of her nose was persisting     Review of Systems   Constitutional: Negative for activity change, appetite change, diaphoresis, fatigue, fever and unexpected weight change.   Respiratory: Negative for apnea, cough, choking, chest tightness and wheezing.    Cardiovascular: Negative for chest pain, palpitations and leg swelling.   Gastrointestinal: Negative for diarrhea, nausea and vomiting.   Neurological: Negative for dizziness.       Objective:      Physical Exam   Constitutional: She is oriented to person, place, and time. She appears well-developed and well-nourished. No distress.   Cardiovascular: Normal rate and normal heart sounds.   Pulmonary/Chest: Effort normal.   Abdominal: Soft. Bowel sounds are normal. She exhibits no distension. There is no tenderness.   Neurological: She is alert and oriented to person, place, and time.   Skin:   Abrasion to left knee with ecchymosis noted to bridge of nose with no orbital ecchymosis       Assessment:       1. Fall, initial encounter    2. Facial trauma, initial encounter        Plan:       -Xray of facial bones and xray of left knee to rule out fracture-low suspicion but will xray for completeness and reassurance  -Continue Tylenol prn  pain   -RTC with PCP as previously recommended

## 2019-09-16 NOTE — TELEPHONE ENCOUNTER
----- Message from Francine Pizarro sent at 9/16/2019  8:34 AM CDT -----  Contact: Self [phone] or [phone]  Patient is requesting to be seen today due to a fall she had and would like to get checked by the doctor because she hit her face.

## 2019-09-16 NOTE — TELEPHONE ENCOUNTER
Spoke with she had a fall and wanted  to be seen on today, offered an appt during after hours, pt accepted

## 2019-09-17 ENCOUNTER — TELEPHONE (OUTPATIENT)
Dept: FAMILY MEDICINE | Facility: CLINIC | Age: 64
End: 2019-09-17

## 2019-09-17 ENCOUNTER — HOSPITAL ENCOUNTER (OUTPATIENT)
Dept: RADIOLOGY | Facility: HOSPITAL | Age: 64
Discharge: HOME | End: 2019-09-17
Attending: NURSE PRACTITIONER
Payer: COMMERCIAL

## 2019-09-17 DIAGNOSIS — S09.93XA FACIAL TRAUMA, INITIAL ENCOUNTER: ICD-10-CM

## 2019-09-17 DIAGNOSIS — I10 ESSENTIAL HYPERTENSION: ICD-10-CM
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
## 2019-09-17 PROCEDURE — 73562 XR KNEE 3 VIEW LEFT: ICD-10-PCS | Mod: 26,LT,, | Performed by: RADIOLOGY

## 2019-09-17 PROCEDURE — 70150 X-RAY EXAM OF FACIAL BONES: CPT | Mod: TC,FY,PO

## 2019-09-17 PROCEDURE — 73562 X-RAY EXAM OF KNEE 3: CPT | Mod: TC,FY,PO,LT

## 2019-09-17 PROCEDURE — 73562 X-RAY EXAM OF KNEE 3: CPT | Mod: 26,LT,, | Performed by: RADIOLOGY

## 2019-09-17 PROCEDURE — 70150 XR FACIAL BONES 3 OR MORE VIEW: ICD-10-PCS | Mod: 26,,, | Performed by: RADIOLOGY

## 2019-09-17 PROCEDURE — 70150 X-RAY EXAM OF FACIAL BONES: CPT | Mod: 26,,, | Performed by: RADIOLOGY

## 2019-09-17 RX ORDER — LOSARTAN POTASSIUM 100 MG/1
TABLET ORAL
Qty: 90 TABLET | Refills: 3 | Status: SHIPPED | OUTPATIENT
Start: 2019-09-17 | End: 2020-09-10 | Stop reason: SDUPTHER

## 2019-09-17 NOTE — TELEPHONE ENCOUNTER
----- Message from MEMO Smith, ANP sent at 9/17/2019 11:38 AM CDT -----  Xrays normal with no evidence of fracture. Continue with Tylenol prn and bruising will resolve. Follow up with PCP as scheduled    Thanks   Maria E

## 2019-09-18 NOTE — TELEPHONE ENCOUNTER
----- Message from Lissy Devine sent at 9/17/2019  4:29 PM CDT -----  Contact: [phone]  Patient states she is returning your call. Please advise.

## 2019-09-18 NOTE — TELEPHONE ENCOUNTER
----- Message from Nikky Cihlds sent at 9/18/2019 12:02 PM CDT -----  Contact: Carina Danyel  No [phone]    Patient is calling regarding test results since she fell.

## 2019-09-25 ENCOUNTER — OFFICE VISIT (OUTPATIENT)
Dept: INTERNAL MEDICINE | Facility: CLINIC | Age: 64
End: 2019-09-25
Payer: COMMERCIAL

## 2019-09-25 VITALS
OXYGEN SATURATION: 98 % | TEMPERATURE: 98 F | DIASTOLIC BLOOD PRESSURE: 70 MMHG | HEART RATE: 77 BPM | WEIGHT: 177.25 LBS | BODY MASS INDEX: 34.8 KG/M2 | SYSTOLIC BLOOD PRESSURE: 128 MMHG | HEIGHT: 60 IN

## 2019-09-25 DIAGNOSIS — E55.9 VITAMIN D DEFICIENCY: ICD-10-CM

## 2019-09-25 DIAGNOSIS — E78.00 HYPERCHOLESTEREMIA: ICD-10-CM

## 2019-09-25 DIAGNOSIS — J30.9 ALLERGIC RHINITIS, UNSPECIFIED SEASONALITY, UNSPECIFIED TRIGGER: ICD-10-CM

## 2019-09-25 DIAGNOSIS — J45.50 SEVERE PERSISTENT ASTHMA WITHOUT COMPLICATION: ICD-10-CM

## 2019-09-25 DIAGNOSIS — R73.03 PREDIABETES: ICD-10-CM

## 2019-09-25 DIAGNOSIS — R93.7 ABNORMAL BONE DENSITY SCREENING: ICD-10-CM

## 2019-09-25 DIAGNOSIS — J45.30 MILD PERSISTENT ASTHMA WITHOUT COMPLICATION: ICD-10-CM

## 2019-09-25 DIAGNOSIS — Z00.00 ANNUAL PHYSICAL EXAM: Primary | ICD-10-CM

## 2019-09-25 DIAGNOSIS — E66.9 OBESITY (BMI 30-39.9): ICD-10-CM

## 2019-09-25 PROCEDURE — 3074F SYST BP LT 130 MM HG: CPT | Mod: CPTII,S$GLB,, | Performed by: INTERNAL MEDICINE

## 2019-09-25 PROCEDURE — 3078F PR MOST RECENT DIASTOLIC BLOOD PRESSURE < 80 MM HG: ICD-10-PCS | Mod: CPTII,S$GLB,, | Performed by: INTERNAL MEDICINE

## 2019-09-25 PROCEDURE — 99396 PREV VISIT EST AGE 40-64: CPT | Mod: S$GLB,,, | Performed by: INTERNAL MEDICINE

## 2019-09-25 PROCEDURE — 3078F DIAST BP <80 MM HG: CPT | Mod: CPTII,S$GLB,, | Performed by: INTERNAL MEDICINE

## 2019-09-25 PROCEDURE — 99396 PR PREVENTIVE VISIT,EST,40-64: ICD-10-PCS | Mod: S$GLB,,, | Performed by: INTERNAL MEDICINE

## 2019-09-25 PROCEDURE — 3074F PR MOST RECENT SYSTOLIC BLOOD PRESSURE < 130 MM HG: ICD-10-PCS | Mod: CPTII,S$GLB,, | Performed by: INTERNAL MEDICINE

## 2019-09-25 PROCEDURE — 99999 PR PBB SHADOW E&M-EST. PATIENT-LVL III: ICD-10-PCS | Mod: PBBFAC,,, | Performed by: INTERNAL MEDICINE

## 2019-09-25 PROCEDURE — 99999 PR PBB SHADOW E&M-EST. PATIENT-LVL III: CPT | Mod: PBBFAC,,, | Performed by: INTERNAL MEDICINE

## 2019-09-25 RX ORDER — MONTELUKAST SODIUM 10 MG/1
10 TABLET ORAL NIGHTLY
Qty: 90 TABLET | Refills: 3 | Status: SHIPPED | OUTPATIENT
Start: 2019-09-25 | End: 2020-09-10 | Stop reason: SDUPTHER

## 2019-09-25 RX ORDER — METFORMIN HYDROCHLORIDE 850 MG/1
TABLET ORAL
Qty: 180 TABLET | Refills: 3 | Status: SHIPPED | OUTPATIENT
Start: 2019-09-25 | End: 2020-09-10 | Stop reason: SDUPTHER

## 2019-09-25 RX ORDER — ALBUTEROL SULFATE 90 UG/1
2 AEROSOL, METERED RESPIRATORY (INHALATION) EVERY 4 HOURS PRN
Qty: 18 G | Refills: 2 | Status: SHIPPED | OUTPATIENT
Start: 2019-09-25 | End: 2020-09-10

## 2019-09-25 RX ORDER — SIMVASTATIN 20 MG/1
20 TABLET, FILM COATED ORAL DAILY
Qty: 90 TABLET | Refills: 2 | Status: SHIPPED | OUTPATIENT
Start: 2019-09-25 | End: 2020-08-05

## 2019-09-25 RX ORDER — ALENDRONATE SODIUM 70 MG/1
TABLET ORAL
Qty: 4 TABLET | Refills: 11 | Status: SHIPPED | OUTPATIENT
Start: 2019-09-25 | End: 2020-09-10 | Stop reason: SDUPTHER

## 2019-09-25 RX ORDER — AZELASTINE 1 MG/ML
1 SPRAY, METERED NASAL 2 TIMES DAILY
Qty: 10 ML | Refills: 5 | Status: SHIPPED | OUTPATIENT
Start: 2019-09-25 | End: 2021-09-22 | Stop reason: SDUPTHER

## 2019-09-25 RX ORDER — BUDESONIDE AND FORMOTEROL FUMARATE DIHYDRATE 160; 4.5 UG/1; UG/1
2 AEROSOL RESPIRATORY (INHALATION) EVERY 12 HOURS
Qty: 10.2 INHALER | Refills: 6 | Status: SHIPPED | OUTPATIENT
Start: 2019-09-25 | End: 2020-11-06

## 2019-09-25 RX ORDER — ASPIRIN 325 MG
50000 TABLET, DELAYED RELEASE (ENTERIC COATED) ORAL WEEKLY
Qty: 12 CAPSULE | Refills: 3 | COMMUNITY
Start: 2019-09-25 | End: 2020-05-06 | Stop reason: SDUPTHER

## 2019-09-25 NOTE — PATIENT INSTRUCTIONS
Understanding Carbohydrates, Fats, and Protein  Food is a source of fuel and nourishment for your body. Its also a source of pleasure. Having diabetes doesnt mean you have to eat special foods or give up desserts. Instead, your dietitian can show you how to plan meals to suit your body. To start, learn how different foods affect blood sugar.  Carbohydrates  Carbohydrates are the main source of fuel for the body. Carbohydrates raise blood sugar. Many people think carbohydrates are only found in pasta or bread. But carbohydrates are actually in many kinds of foods:  · Sugars occur naturally in foods such as fruit, milk, honey, and molasses. Sugars can also be added to many foods, from cereals and yogurt to candy and desserts. Sugars raise blood sugar.  · Starches are found in bread, cereals, pasta, and dried beans. Theyre also found in corn, peas, potatoes, yam, acorn squash, and butternut squash. Starches also raise blood sugar.   · Fiber is found in foods such as vegetables, fruits, beans, and whole grains. Unlike other carbs, fiber isnt digested or absorbed. So it doesnt raise blood sugar. In fact, fiber can help keep blood sugar from rising too fast. It also helps keep blood cholesterol at a healthy level.  Did you know?  Even though carbohydrates raise blood sugar, its best to have some in every meal. They are an important part of a healthy diet.   Fat  Fat is an energy source that can be stored until needed. Fat does not raise blood sugar. However, it can raise blood cholesterol, increasing the risk of heart disease. Fat is also high in calories, which can cause weight gain. Not all types of fat are the same.  More Healthy:  · Monounsaturated fats are mostly found in vegetable oils, such as olive, canola, and peanut oils. They are also found in avocados and some nuts. Monounsaturated fats are healthy for your heart. Thats because they lower LDL (unhealthy) cholesterol.  · Polyunsaturated fats are mostly  found in vegetable oils, such as corn, safflower, and soybean oils. They are also found in some seeds, nuts, and fish. Polyunsaturated fats lower LDL (unhealthy) cholesterol. So, choosing them instead of saturated fats is healthy for your heart. Certain unsaturated fats can help lower triglycerides.   Less Healthy:  · Saturated fats are found in animal products, such as meat, poultry, whole milk, lard, and butter. Saturated fats raise LDL cholesterol and are not healthy for your heart.  · Hydrogenated oils and trans fats are formed when vegetable oils are processed into solid fats. They are found in many processed foods. Hydrogenated oils and trans fats raise LDL cholesterol and lower HDL (healthy) cholesterol. They are not healthy for your heart.  Protein  Protein helps the body build and repair muscle and other tissue. Protein has little or no effect on blood sugar. However, many foods that contain protein also contain saturated fat. By choosing low-fat protein sources, you can get the benefits of protein without the extra fat:  · Plant protein is found in dry beans and peas, nuts, and soy products, such as tofu and soymilk. These sources tend to be cholesterol-free and low in saturated fat.  · Animal protein is found in fish, poultry, meat, cheese, milk, and eggs. These contain cholesterol and can be high in saturated fat. Aim for lean, lower-fat choices.  Date Last Reviewed: 3/1/2016  © 4573-7284 [employer]. [address] [place] [address], [place] [address]. All rights reserved. This information is not intended as a substitute for professional medical care. Always follow your healthcare professional's instructions.

## 2019-09-25 NOTE — PROGRESS NOTES
Subjective:       Patient ID: Carina Montñao is a 64 y.o. female.    Chief Complaint: Annual Exam    HPI Mrs. Montaño is a 64 year old female with allergies, asthma, arthritis, GERD, HTN, HLD and history of breast cancer who presents for annual exam. She reports recently falling and being seen in an urgent care visit with [facility] for this. Xrays were performed but no fractures seen. Still with some pain but overall improving.  Does pap smears with OB-GYN Dr Balbuena  Has repeat 6 month mammogram scheduled for January due to abnormality in the left breast.   Per chart review, patient has new diagnosis of pre-diabetes and is due for colonoscopy in 2020.  No acute complaints or concerns today    Review of Systems   All other systems reviewed and are negative.      Objective:      Physical Exam   Constitutional: She is oriented to person, place, and time. She appears well-developed and well-nourished. No distress.   HENT:   Head: Normocephalic and atraumatic.   Right Ear: External ear normal.   Left Ear: External ear normal.   Nose: Nose normal.   Mouth/Throat: Oropharynx is clear and moist.   Eyes: Pupils are equal, round, and reactive to light. Conjunctivae and EOM are normal. Right eye exhibits no discharge. Left eye exhibits no discharge. No scleral icterus.   Neck: Neck supple. No thyromegaly present.   Cardiovascular: Normal rate, regular rhythm, normal heart sounds and intact distal pulses. Exam reveals no gallop and no friction rub.   No murmur heard.  Pulmonary/Chest: Effort normal and breath sounds normal. No respiratory distress. She has no wheezes. She has no rales.   Abdominal: Soft. Bowel sounds are normal. She exhibits no distension and no mass. There is no tenderness. There is no rebound and no guarding.   Musculoskeletal: She exhibits no edema, tenderness or deformity.   Lymphadenopathy:     She has no cervical adenopathy.   Neurological: She is alert and oriented to person, place, and time.   Skin:  Skin is warm and dry. No rash noted. She is not diaphoretic. No erythema.   Psychiatric: She has a normal mood and affect. Her behavior is normal. Judgment and thought content normal.   Vitals reviewed.      Assessment:       1. Annual physical exam    2. Vitamin D deficiency    3. Hypercholesteremia    4. Mild persistent asthma without complication    5. Abnormal bone density screening    6. Severe persistent asthma without complication    7. Prediabetes    8. Allergic rhinitis, unspecified seasonality, unspecified trigger    9. Obesity (BMI 30-39.9)        Plan:     Labs for annual exam reviewed with the patient. Discussed the diagnosis of pre-diabetes. Discussed the importance of dietary improvement (limiting starches and sugars) and increasing exercise (at least 150 minutes of cardiovascular exercise per week). Patient also presented with the option of starting metformin and she would like to start this medication.  Repeat A1c and vitamin D in 6 months.   All other conditions as below are stable, well controlled. Continuing current medications  States that she is UTD with shingles vaccine. Will try to get records from OB-GYN for pap smears. Mammogram will be done in January, per patient report. Colonoscopy due in 2020.    Carina was seen today for annual exam.    Diagnoses and all orders for this visit:    Annual physical exam    Vitamin D deficiency  -     cholecalciferol, vitamin D3, 50,000 unit capsule; Take 1 capsule (50,000 Units total) by mouth once a week.  -     Vitamin D; Future    Hypercholesteremia  -     simvastatin (ZOCOR) 20 MG tablet; Take 1 tablet (20 mg total) by mouth once daily.    Mild persistent asthma without complication  -     montelukast (SINGULAIR) 10 mg tablet; Take 1 tablet (10 mg total) by mouth every evening.  -     budesonide-formoterol 160-4.5 mcg (SYMBICORT) 160-4.5 mcg/actuation HFAA; Inhale 2 puffs into the lungs every 12 (twelve) hours.    Abnormal bone density screening  -      alendronate (FOSAMAX) 70 MG tablet; TAKE 1 TAB BY MOUTH EVERY WEEK WITH GLASS OF WATER/EMPTY STOMACH (DONT LIE DOWN OR EAT FOR 30 MIN)    Severe persistent asthma without complication  -     albuterol (PROVENTIL HFA) 90 mcg/actuation inhaler; Inhale 2 puffs into the lungs every 4 (four) hours as needed for Wheezing (cough). Rescue    Prediabetes  -     metFORMIN (GLUCOPHAGE) 850 MG tablet; Take one tablet PO daily for 1 month and then increase to one tablet PO BID thereafter  -     Hemoglobin A1c; Future    Allergic rhinitis, unspecified seasonality, unspecified trigger  -     azelastine (ASTELIN) 137 mcg (0.1 %) nasal spray; 1 spray (137 mcg total) by Nasal route 2 (two) times daily. for 14 days    Obesity (BMI 30-39.9)      RTC 6 months

## 2019-11-13 ENCOUNTER — OFFICE VISIT (OUTPATIENT)
Dept: FAMILY MEDICINE | Facility: CLINIC | Age: 64
End: 2019-11-13
Payer: COMMERCIAL

## 2019-11-13 ENCOUNTER — LAB VISIT (OUTPATIENT)
Dept: LAB | Facility: HOSPITAL | Age: 64
End: 2019-11-13
Attending: FAMILY MEDICINE
Payer: COMMERCIAL

## 2019-11-13 VITALS
BODY MASS INDEX: 35.53 KG/M2 | OXYGEN SATURATION: 99 % | SYSTOLIC BLOOD PRESSURE: 144 MMHG | DIASTOLIC BLOOD PRESSURE: 78 MMHG | WEIGHT: 181 LBS | HEIGHT: 60 IN | HEART RATE: 68 BPM

## 2019-11-13 DIAGNOSIS — M25.50 ARTHRALGIA, UNSPECIFIED JOINT: Primary | ICD-10-CM

## 2019-11-13 DIAGNOSIS — M25.50 ARTHRALGIA, UNSPECIFIED JOINT: ICD-10-CM

## 2019-11-13 DIAGNOSIS — R94.31 NONSPECIFIC ABNORMAL ELECTROCARDIOGRAM (ECG) (EKG): ICD-10-CM

## 2019-11-13 DIAGNOSIS — R07.89 ATYPICAL CHEST PAIN: ICD-10-CM

## 2019-11-13 LAB
CRP SERPL-MCNC: 0.6 MG/L (ref 0–8.2)
ERYTHROCYTE [SEDIMENTATION RATE] IN BLOOD BY WESTERGREN METHOD: 6 MM/HR (ref 0–36)
RHEUMATOID FACT SERPL-ACNC: <10 IU/ML (ref 0–15)
URATE SERPL-MCNC: 3.5 MG/DL (ref 2.4–5.7)

## 2019-11-13 PROCEDURE — 86038 ANTINUCLEAR ANTIBODIES: CPT

## 2019-11-13 PROCEDURE — 96372 PR INJECTION,THERAP/PROPH/DIAG2ST, IM OR SUBCUT: ICD-10-PCS | Mod: S$GLB,,, | Performed by: FAMILY MEDICINE

## 2019-11-13 PROCEDURE — 93010 EKG 12-LEAD: ICD-10-PCS | Mod: S$GLB,,, | Performed by: INTERNAL MEDICINE

## 2019-11-13 PROCEDURE — 3078F DIAST BP <80 MM HG: CPT | Mod: CPTII,S$GLB,, | Performed by: FAMILY MEDICINE

## 2019-11-13 PROCEDURE — 99999 PR PBB SHADOW E&M-EST. PATIENT-LVL III: CPT | Mod: PBBFAC,,, | Performed by: FAMILY MEDICINE

## 2019-11-13 PROCEDURE — 3077F PR MOST RECENT SYSTOLIC BLOOD PRESSURE >= 140 MM HG: ICD-10-PCS | Mod: CPTII,S$GLB,, | Performed by: FAMILY MEDICINE

## 2019-11-13 PROCEDURE — 93005 ELECTROCARDIOGRAM TRACING: CPT | Mod: S$GLB,,, | Performed by: FAMILY MEDICINE

## 2019-11-13 PROCEDURE — 93005 EKG 12-LEAD: ICD-10-PCS | Mod: S$GLB,,, | Performed by: FAMILY MEDICINE

## 2019-11-13 PROCEDURE — 3008F PR BODY MASS INDEX (BMI) DOCUMENTED: ICD-10-PCS | Mod: CPTII,S$GLB,, | Performed by: FAMILY MEDICINE

## 2019-11-13 PROCEDURE — 93010 ELECTROCARDIOGRAM REPORT: CPT | Mod: S$GLB,,, | Performed by: INTERNAL MEDICINE

## 2019-11-13 PROCEDURE — 3078F PR MOST RECENT DIASTOLIC BLOOD PRESSURE < 80 MM HG: ICD-10-PCS | Mod: CPTII,S$GLB,, | Performed by: FAMILY MEDICINE

## 2019-11-13 PROCEDURE — 84550 ASSAY OF BLOOD/URIC ACID: CPT

## 2019-11-13 PROCEDURE — 36415 COLL VENOUS BLD VENIPUNCTURE: CPT | Mod: PO

## 2019-11-13 PROCEDURE — 3077F SYST BP >= 140 MM HG: CPT | Mod: CPTII,S$GLB,, | Performed by: FAMILY MEDICINE

## 2019-11-13 PROCEDURE — 85652 RBC SED RATE AUTOMATED: CPT

## 2019-11-13 PROCEDURE — 96372 THER/PROPH/DIAG INJ SC/IM: CPT | Mod: S$GLB,,, | Performed by: FAMILY MEDICINE

## 2019-11-13 PROCEDURE — 99999 PR PBB SHADOW E&M-EST. PATIENT-LVL III: ICD-10-PCS | Mod: PBBFAC,,, | Performed by: FAMILY MEDICINE

## 2019-11-13 PROCEDURE — 86431 RHEUMATOID FACTOR QUANT: CPT

## 2019-11-13 PROCEDURE — 3008F BODY MASS INDEX DOCD: CPT | Mod: CPTII,S$GLB,, | Performed by: FAMILY MEDICINE

## 2019-11-13 PROCEDURE — 99214 PR OFFICE/OUTPT VISIT, EST, LEVL IV, 30-39 MIN: ICD-10-PCS | Mod: 25,S$GLB,, | Performed by: FAMILY MEDICINE

## 2019-11-13 PROCEDURE — 86140 C-REACTIVE PROTEIN: CPT

## 2019-11-13 PROCEDURE — 99214 OFFICE O/P EST MOD 30 MIN: CPT | Mod: 25,S$GLB,, | Performed by: FAMILY MEDICINE

## 2019-11-13 RX ORDER — KETOROLAC TROMETHAMINE 30 MG/ML
30 INJECTION, SOLUTION INTRAMUSCULAR; INTRAVENOUS
Status: COMPLETED | OUTPATIENT
Start: 2019-11-13 | End: 2019-11-13

## 2019-11-13 RX ORDER — CELECOXIB 200 MG/1
200 CAPSULE ORAL 2 TIMES DAILY
Qty: 60 CAPSULE | Refills: 0 | Status: SHIPPED | OUTPATIENT
Start: 2019-11-13 | End: 2019-12-13

## 2019-11-13 RX ORDER — CYCLOBENZAPRINE HCL 5 MG
5 TABLET ORAL NIGHTLY
Qty: 30 TABLET | Refills: 0 | Status: SHIPPED | OUTPATIENT
Start: 2019-11-13 | End: 2019-12-13

## 2019-11-13 RX ADMIN — KETOROLAC TROMETHAMINE 30 MG: 30 INJECTION, SOLUTION INTRAMUSCULAR; INTRAVENOUS at 10:11

## 2019-11-13 NOTE — PROGRESS NOTES
Subjective:       Patient ID: Carina Montaño is a 64 y.o. female.    Chief Complaint: Back Pain (x 3 days) and Elbow Pain (x 3 days)    64 years old female came to the clinic with multiple joints pain for the last with after the change of weather.  The pain is 6 of 10 of intensity on and off aggravated with palpation and better with rest.  Pain localizing both arms chest and upper back.  No palpitations, orthopnea or PND.    Review of Systems   Constitutional: Negative.    HENT: Negative.    Eyes: Negative.    Respiratory: Negative.    Cardiovascular: Positive for chest pain. Negative for palpitations and leg swelling.   Gastrointestinal: Negative.    Genitourinary: Negative.    Musculoskeletal: Positive for arthralgias.   Skin: Negative.    Neurological: Negative.    Psychiatric/Behavioral: Negative.        Objective:      Physical Exam   Constitutional: She is oriented to person, place, and time. She appears well-developed and well-nourished. No distress.   HENT:   Head: Normocephalic and atraumatic.   Right Ear: External ear normal.   Left Ear: External ear normal.   Nose: Nose normal.   Mouth/Throat: Oropharynx is clear and moist. No oropharyngeal exudate.   Eyes: Pupils are equal, round, and reactive to light. Conjunctivae and EOM are normal. Right eye exhibits no discharge. Left eye exhibits no discharge. No scleral icterus.   Neck: Normal range of motion. Neck supple. No JVD present. No tracheal deviation present. No thyromegaly present.   Cardiovascular: Normal rate, regular rhythm, normal heart sounds and intact distal pulses. Exam reveals no gallop and no friction rub.   No murmur heard.  Pulmonary/Chest: Effort normal and breath sounds normal. No stridor. No respiratory distress. She has no wheezes. She has no rales. She exhibits no tenderness.   Abdominal: Soft. Bowel sounds are normal. She exhibits no distension and no mass. There is no tenderness. There is no rebound and no guarding.    Musculoskeletal: Normal range of motion. She exhibits no edema or tenderness.   Lymphadenopathy:     She has no cervical adenopathy.   Neurological: She is alert and oriented to person, place, and time. She has normal reflexes. No cranial nerve deficit. She exhibits normal muscle tone. Coordination normal.   Skin: Skin is warm and dry. No rash noted. She is not diaphoretic. No erythema. No pallor.   Psychiatric: She has a normal mood and affect. Her behavior is normal. Judgment and thought content normal.       Assessment:       1. Arthralgia, unspecified joint    2. Atypical chest pain        Plan:          Carina was seen today for back pain and elbow pain.    Diagnoses and all orders for this visit:    Arthralgia, unspecified joint  -     ketorolac injection 30 mg  -     Sedimentation rate; Future  -     R[facility] Screen w/Reflex; Future  -     Rheumatoid factor; Future  -     Uric acid; Future  -     C-reactive protein; Future  -     celecoxib (CELEBREX) 200 MG capsule; Take 1 capsule (200 mg total) by mouth 2 (two) times daily.  -     cyclobenzaprine (FLEXERIL) 5 MG tablet; Take 1 tablet (5 mg total) by mouth nightly.    Atypical chest pain  -     IN OFFICE EKG 12-LEAD (to Muse)    Nonspecific abnormal electrocardiogram (ECG) (EKG)     EKG is stable in comparison with previous reports .

## 2019-11-14 LAB — ANA SER QL IF: NORMAL

## 2019-11-18 NOTE — TELEPHONE ENCOUNTER
Left message returning call.   Include Location In Plan?: Yes Hide Include Location In Plan Question?: No Detail Level: Simple

## 2020-02-12 ENCOUNTER — OFFICE VISIT (OUTPATIENT)
Dept: URGENT CARE | Facility: CLINIC | Age: 65
End: 2020-02-12
Payer: OTHER GOVERNMENT

## 2020-02-12 VITALS
HEART RATE: 77 BPM | DIASTOLIC BLOOD PRESSURE: 84 MMHG | TEMPERATURE: 98 F | BODY MASS INDEX: 35.53 KG/M2 | RESPIRATION RATE: 16 BRPM | HEIGHT: 60 IN | WEIGHT: 181 LBS | SYSTOLIC BLOOD PRESSURE: 130 MMHG | OXYGEN SATURATION: 99 %

## 2020-02-12 DIAGNOSIS — M25.562 ACUTE PAIN OF LEFT KNEE: ICD-10-CM

## 2020-02-12 DIAGNOSIS — S80.212A ABRASION, LEFT KNEE, INITIAL ENCOUNTER: ICD-10-CM

## 2020-02-12 DIAGNOSIS — S80.02XA CONTUSION OF LEFT KNEE, INITIAL ENCOUNTER: Primary | ICD-10-CM
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
## 2020-02-12 PROCEDURE — 73562 XR KNEE 3 VIEW LEFT: ICD-10-PCS | Mod: FY,LT,S$GLB, | Performed by: RADIOLOGY

## 2020-02-12 PROCEDURE — 73562 X-RAY EXAM OF KNEE 3: CPT | Mod: FY,LT,S$GLB, | Performed by: RADIOLOGY

## 2020-02-12 PROCEDURE — 99214 OFFICE O/P EST MOD 30 MIN: CPT | Mod: S$GLB,,, | Performed by: PHYSICIAN ASSISTANT

## 2020-02-12 PROCEDURE — 99214 PR OFFICE/OUTPT VISIT, EST, LEVL IV, 30-39 MIN: ICD-10-PCS | Mod: S$GLB,,, | Performed by: PHYSICIAN ASSISTANT

## 2020-02-12 NOTE — PROGRESS NOTES
Subjective:       Patient ID: Carina Montaño is a 64 y.o. female.    Chief Complaint: Knee Injury (LT KNEE)    Pt works for as a . Pt states she was at work today sitting in a meeting and when she stood up to walk out of the room she tripped over another  Chair in front of her landing on her hands and LT KNEE. Pt states her knee is in a lot of pain.   Unable to fully bend or extend. IJ    Knee Injury   This is a new problem. The current episode started today. The problem occurs constantly. The problem has been unchanged. Associated symptoms include arthralgias and myalgias. Pertinent negatives include no abdominal pain, chest pain, chills, congestion, coughing, fatigue, fever, headaches, joint swelling, nausea, neck pain, rash, sore throat, vertigo, vomiting or weakness. The symptoms are aggravated by bending and walking. She has tried ice and immobilization for the symptoms. The treatment provided no relief.       Constitution: Negative for chills, fatigue and fever.   HENT: Negative for congestion and sore throat.    Neck: Negative for neck pain and painful lymph nodes.   Cardiovascular: Negative for chest pain and leg swelling.   Eyes: Negative for double vision and blurred vision.   Respiratory: Negative for cough and shortness of breath.    Gastrointestinal: Negative for abdominal pain, nausea, vomiting and diarrhea.   Genitourinary: Negative for dysuria, frequency, urgency and history of kidney stones.   Musculoskeletal: Positive for trauma, joint pain and muscle ache. Negative for joint swelling and muscle cramps.   Skin: Negative for color change, pale, rash and bruising.   Allergic/Immunologic: Negative for seasonal allergies.   Neurological: Negative for dizziness, history of vertigo, light-headedness, passing out and headaches.   Hematologic/Lymphatic: Negative for swollen lymph nodes.   Psychiatric/Behavioral: Negative for nervous/anxious, sleep disturbance and depression. The patient  is not nervous/anxious.         Objective:      Physical Exam   Constitutional: She appears well-developed and well-nourished. She is active. No distress.   HENT:   Head: Normocephalic and atraumatic.   Right Ear: Hearing and external ear normal.   Left Ear: Hearing and external ear normal.   Nose: Nose normal. No nasal deformity. No epistaxis.   Mouth/Throat: Oropharynx is clear and moist and mucous membranes are normal.   Eyes: Conjunctivae and lids are normal. No scleral icterus.   Neck: Trachea normal and normal range of motion.   Cardiovascular: Intact distal pulses and normal pulses.   Pulmonary/Chest: Effort normal. No stridor. No respiratory distress.   Musculoskeletal:        Left knee: She exhibits bony tenderness. She exhibits normal range of motion, no swelling, no effusion, no LCL laxity, normal meniscus (Dinorah negative) and no MCL laxity. Tenderness found. LCL tenderness noted.        Legs:  Neurological: She is alert. She has normal strength. She is not disoriented. No sensory deficit. GCS eye subscore is 4. GCS verbal subscore is 5. GCS motor subscore is 6.   Skin: Skin is warm, dry and intact. Capillary refill takes less than 2 seconds. She is not diaphoretic.   Psychiatric: She has a normal mood and affect. Her speech is normal and behavior is normal. She is attentive.   Nursing note and vitals reviewed.      X-ray Knee 3 View Left    Result Date: 2/12/2020  EXAMINATION: XR KNEE 3 VIEW LEFT CLINICAL HISTORY: fall onto knee; Pain in left knee TECHNIQUE: AP, lateral, and Merchant views of the left knee were performed. COMPARISON: Left knee series 09/17/2019 FINDINGS: Bones are well mineralized. Overall alignment is within normal limits.  No displaced fracture, dislocation or destructive osseous process.  No large suprapatellar joint effusion.  Joint spaces appear relatively maintained.  No subcutaneous emphysema or radiodense retained foreign body.     No acute displaced fracture-dislocation  identified. Electronically signed by: John Fontaine MD Date:    02/12/2020 Time:    17:02    Assessment:       1. Contusion of left knee, initial encounter    2. Acute pain of left knee    3. Abrasion, left knee, initial encounter    4. Work related injury        Plan:                Restrictions: Regular Duty  Follow up in about 1 week (around 2/19/2020).      Patient Instructions     Lower Extremity Contusion  You have a contusion (bruise) of a lower extremity (leg, knee, ankle, foot, or toe). Symptoms include pain, swelling, and skin discoloration. No bones are broken. This injury may take from a few days to a few weeks to heal.  During that time, the bruise may change from reddish in color, to purple-blue, to green-yellow, to yellow-brown.  Home care  · Unless another medicine was prescribed, you can take acetaminophen, ibuprofen, or naproxen to control pain. (If you have chronic liver or kidney disease or ever had a stomach ulcer or gastrointestinal bleeding, talk with your doctor before using these medicines.)  · Elevate the injured area to reduce pain and swelling. As much as possible, sit or lie down with the injured area raised about the level of your heart. This is especially important during the first 48 hours.  · Ice the injured area to help reduce pain and swelling. Wrap a cold source (ice pack or ice cubes in a plastic bag) in a thin towel. Apply to the bruised area for 20 minutes every 1 to 2 hours the first day. Continue this 3 to 4 times a day until the pain and swelling goes away.  · If crutches have been advised, do not bear full weight on the injured leg until you can do so without pain. You may return to sports when you are able to put full weight and impact on the injured leg without pain.  Follow up  Follow up with your healthcare provider or our staff as advised. Call if you are not improving within the next 1 to 2 weeks.  When to seek medical advice   Call your healthcare provider right away  if any of these occur:  · Increased pain or swelling  · Foot or toes become cold, blue, numb or tingly  · Signs of infection: Warmth, drainage, or increased redness or pain around the injury  · Inability to move the injured area   · Frequent bruising for unknown reasons  Date Last Reviewed: 2/1/2017  © 3621-0052 [employer]. [address] [place] [id number]. All rights reserved. This information is not intended as a substitute for professional medical care. Always follow your healthcare professional's instructions.

## 2020-02-12 NOTE — PATIENT INSTRUCTIONS
Lower Extremity Contusion  You have a contusion (bruise) of a lower extremity (leg, knee, ankle, foot, or toe). Symptoms include pain, swelling, and skin discoloration. No bones are broken. This injury may take from a few days to a few weeks to heal.  During that time, the bruise may change from reddish in color, to purple-blue, to green-yellow, to yellow-brown.  Home care  · Unless another medicine was prescribed, you can take acetaminophen, ibuprofen, or naproxen to control pain. (If you have chronic liver or kidney disease or ever had a stomach ulcer or gastrointestinal bleeding, talk with your doctor before using these medicines.)  · Elevate the injured area to reduce pain and swelling. As much as possible, sit or lie down with the injured area raised about the level of your heart. This is especially important during the first 48 hours.  · Ice the injured area to help reduce pain and swelling. Wrap a cold source (ice pack or ice cubes in a plastic bag) in a thin towel. Apply to the bruised area for 20 minutes every 1 to 2 hours the first day. Continue this 3 to 4 times a day until the pain and swelling goes away.  · If crutches have been advised, do not bear full weight on the injured leg until you can do so without pain. You may return to sports when you are able to put full weight and impact on the injured leg without pain.  Follow up  Follow up with your healthcare provider or our staff as advised. Call if you are not improving within the next 1 to 2 weeks.  When to seek medical advice   Call your healthcare provider right away if any of these occur:  · Increased pain or swelling  · Foot or toes become cold, blue, numb or tingly  · Signs of infection: Warmth, drainage, or increased redness or pain around the injury  · Inability to move the injured area   · Frequent bruising for unknown reasons  Date Last Reviewed: 2/1/2017  © 2515-4338 [employer]. [address] [place] [address], [place] [address].  All rights reserved. This information is not intended as a substitute for professional medical care. Always follow your healthcare professional's instructions.

## 2020-02-12 NOTE — LETTER
[facility]  [address]  [place] [address]  Phone: [phone]  Fax: [phone]  [facility]: [email or web address]    Pt Name: Carina Montaño  Injury Date: 02/12/2020   Employee ID: [id number] Date of First Treatment: 02/12/2020   Company: [employer]       Appointment Time: 04:05 PM Arrived: 4:15 pm   Provider: Garett Ortega PA-C Time Out: 5:15 pm     Office Treatment:   EXAM  X-RAYS  REGULAR DUTY       1. Contusion of left knee, initial encounter    2. Acute pain of left knee    3. Abrasion, left knee, initial encounter    4. Work related injury             Restrictions: Regular Duty     Return Appointment: 02/19/2020 at 9:00 AM  BRANDY

## 2020-03-12 ENCOUNTER — PATIENT OUTREACH (OUTPATIENT)
Dept: ADMINISTRATIVE | Facility: HOSPITAL | Age: 65
End: 2020-03-12

## 2020-03-12 NOTE — LETTER
March 12, 2020    Carina Montaño  [address] [facility] [place] [address]             [facility]  [address]  [facility] [address]  Phone: [phone] Dear Carina Montaño    [facility] is committed to your overall health.  To help you get the most out of each of your visits, we will review your information to make sure you are up to date on all of your recommended tests and/or procedures.      A review of your chart shows you may be due for   Health Maintenance Due   Topic    Colonoscopy     .     If you have had any of the above done at another facility, please bring the records or information with you so that your record at [facility] will be complete.  If you would like to schedule any of these, please contact me.    If you are currently taking medication, please bring it with you to your appointment for review.    Also, if you have any type of Advanced Directives, please bring them with you to your office visit so we may scan them into your chart.    Sincerely,    Ashley Wiggins LPN   Clinical Care Coordinator   [facility]

## 2020-05-04 ENCOUNTER — PATIENT MESSAGE (OUTPATIENT)
Dept: INTERNAL MEDICINE | Facility: CLINIC | Age: 65
End: 2020-05-04

## 2020-05-04 ENCOUNTER — LAB VISIT (OUTPATIENT)
Dept: LAB | Facility: HOSPITAL | Age: 65
End: 2020-05-04
Attending: INTERNAL MEDICINE
Payer: COMMERCIAL

## 2020-05-04 DIAGNOSIS — R73.03 PREDIABETES: ICD-10-CM

## 2020-05-04 DIAGNOSIS — E55.9 VITAMIN D DEFICIENCY: ICD-10-CM

## 2020-05-04 LAB
25(OH)D3+25(OH)D2 SERPL-MCNC: 29 NG/ML (ref 30–96)
ESTIMATED AVG GLUCOSE: 111 MG/DL (ref 68–131)
HBA1C MFR BLD HPLC: 5.5 % (ref 4–5.6)

## 2020-05-04 PROCEDURE — 82306 VITAMIN D 25 HYDROXY: CPT

## 2020-05-04 PROCEDURE — 83036 HEMOGLOBIN GLYCOSYLATED A1C: CPT

## 2020-05-04 PROCEDURE — 36415 COLL VENOUS BLD VENIPUNCTURE: CPT | Mod: PO

## 2020-05-06 DIAGNOSIS — E55.9 VITAMIN D DEFICIENCY: ICD-10-CM

## 2020-05-06 RX ORDER — ASPIRIN 325 MG
50000 TABLET, DELAYED RELEASE (ENTERIC COATED) ORAL WEEKLY
Qty: 12 CAPSULE | Refills: 3 | COMMUNITY
Start: 2020-05-06 | End: 2020-11-06 | Stop reason: SDUPTHER

## 2020-06-09 ENCOUNTER — OFFICE VISIT (OUTPATIENT)
Dept: INTERNAL MEDICINE | Facility: CLINIC | Age: 65
End: 2020-06-09
Payer: COMMERCIAL

## 2020-06-09 VITALS
DIASTOLIC BLOOD PRESSURE: 72 MMHG | SYSTOLIC BLOOD PRESSURE: 120 MMHG | BODY MASS INDEX: 34.63 KG/M2 | WEIGHT: 176.38 LBS | TEMPERATURE: 98 F | HEIGHT: 60 IN | HEART RATE: 100 BPM | OXYGEN SATURATION: 98 %

## 2020-06-09 DIAGNOSIS — Z12.11 ENCOUNTER FOR SCREENING COLONOSCOPY: ICD-10-CM

## 2020-06-09 DIAGNOSIS — J30.2 SEASONAL ALLERGIES: ICD-10-CM

## 2020-06-09 DIAGNOSIS — Z00.00 ANNUAL PHYSICAL EXAM: ICD-10-CM

## 2020-06-09 DIAGNOSIS — I10 ESSENTIAL HYPERTENSION: ICD-10-CM

## 2020-06-09 DIAGNOSIS — I49.3 PVC (PREMATURE VENTRICULAR CONTRACTION): Primary | ICD-10-CM

## 2020-06-09 DIAGNOSIS — J30.1 SEASONAL ALLERGIC RHINITIS DUE TO POLLEN: ICD-10-CM

## 2020-06-09 DIAGNOSIS — J45.40 MODERATE PERSISTENT ASTHMA WITHOUT COMPLICATION: ICD-10-CM

## 2020-06-09 DIAGNOSIS — E78.5 HYPERLIPIDEMIA, UNSPECIFIED HYPERLIPIDEMIA TYPE: ICD-10-CM

## 2020-06-09 PROCEDURE — 99999 PR PBB SHADOW E&M-EST. PATIENT-LVL III: CPT | Mod: PBBFAC,,, | Performed by: INTERNAL MEDICINE

## 2020-06-09 PROCEDURE — 3078F DIAST BP <80 MM HG: CPT | Mod: CPTII,S$GLB,, | Performed by: INTERNAL MEDICINE

## 2020-06-09 PROCEDURE — 99214 PR OFFICE/OUTPT VISIT, EST, LEVL IV, 30-39 MIN: ICD-10-PCS | Mod: S$GLB,,, | Performed by: INTERNAL MEDICINE

## 2020-06-09 PROCEDURE — 3008F PR BODY MASS INDEX (BMI) DOCUMENTED: ICD-10-PCS | Mod: CPTII,S$GLB,, | Performed by: INTERNAL MEDICINE

## 2020-06-09 PROCEDURE — 99999 PR PBB SHADOW E&M-EST. PATIENT-LVL III: ICD-10-PCS | Mod: PBBFAC,,, | Performed by: INTERNAL MEDICINE

## 2020-06-09 PROCEDURE — 3078F PR MOST RECENT DIASTOLIC BLOOD PRESSURE < 80 MM HG: ICD-10-PCS | Mod: CPTII,S$GLB,, | Performed by: INTERNAL MEDICINE

## 2020-06-09 PROCEDURE — 99214 OFFICE O/P EST MOD 30 MIN: CPT | Mod: S$GLB,,, | Performed by: INTERNAL MEDICINE

## 2020-06-09 PROCEDURE — 3074F SYST BP LT 130 MM HG: CPT | Mod: CPTII,S$GLB,, | Performed by: INTERNAL MEDICINE

## 2020-06-09 PROCEDURE — 3008F BODY MASS INDEX DOCD: CPT | Mod: CPTII,S$GLB,, | Performed by: INTERNAL MEDICINE

## 2020-06-09 PROCEDURE — 3074F PR MOST RECENT SYSTOLIC BLOOD PRESSURE < 130 MM HG: ICD-10-PCS | Mod: CPTII,S$GLB,, | Performed by: INTERNAL MEDICINE

## 2020-06-09 RX ORDER — OLOPATADINE HYDROCHLORIDE 1 MG/ML
1 SOLUTION/ DROPS OPHTHALMIC 2 TIMES DAILY
Qty: 5 ML | Refills: 0 | Status: SHIPPED | OUTPATIENT
Start: 2020-06-09 | End: 2021-09-22

## 2020-06-09 NOTE — PROGRESS NOTES
Patient ID: Carina Montaño is a 64 y.o. female.    Chief Complaint: Follow-up (6 month )    HPI Carina is a 64 y.o. female with asthma, hypertension, hyperlipidemia, GERD, and history of breast cancer who presents for routine follow-up of medical conditions.  She plans to have a surgery for tendinitis.  She will do preop workup later today.  States that they do not need anything from me regarding her preop evaluation.  Reports that her allergies and asthma are doing okay.  She has not had any recent asthma exacerbations.  She is compliant with her inhaler (as well as all other prescribed medications). No recent allergy problems.  Blood pressure is well controlled in clinic today at 120/72.  She follows with Dr. Balbuena for Pap smears.  We reviewed her recent health maintenance.  She is due for colonoscopy.  No acute complaints or concerns today.    Review of Systems   All other systems reviewed and are negative.      Objective:     Vitals:    06/09/20 0914   BP: 120/72   Pulse: 100   Temp: 97.9 °F (36.6 °C)        Physical Exam   Constitutional: She is oriented to person, place, and time. She appears well-developed and well-nourished. No distress.   HENT:   Head: Normocephalic and atraumatic.   Right Ear: External ear normal.   Left Ear: External ear normal.   Nose: Nose normal.   Eyes: Conjunctivae and EOM are normal. Right eye exhibits no discharge. Left eye exhibits no discharge. No scleral icterus.   Cardiovascular: Normal rate, regular rhythm, normal heart sounds and intact distal pulses. Exam reveals no gallop and no friction rub.   No murmur heard.  Pulmonary/Chest: Effort normal and breath sounds normal. No stridor. No respiratory distress. She has no wheezes. She has no rales.   Neurological: She is alert and oriented to person, place, and time.   Skin: Skin is warm and dry. She is not diaphoretic.   Psychiatric: She has a normal mood and affect. Her behavior is normal. Judgment and thought  content normal.   Vitals reviewed.      Assessment:       1. Encounter for screening colonoscopy    2. Hyperlipidemia, unspecified hyperlipidemia type Well controlled   3. Essential hypertension Well controlled   4. Moderate persistent asthma without complication Well controlled   5. Seasonal allergic rhinitis due to pollen Well controlled   6. Annual physical exam        Plan:         Encounter for screening colonoscopy  -     Case request GI: COLONOSCOPY    Hyperlipidemia, unspecified hyperlipidemia type  Comments:  Continue current medication    Essential hypertension  Comments:  Continue current medication    Moderate persistent asthma without complication  Comments:  Continue current medication    Seasonal allergic rhinitis due to pollen  Comments:  Continue current medication    Annual physical exam  -     CBC auto differential; Future; Expected date: 09/09/2020  -     Comprehensive metabolic panel; Future; Expected date: 09/09/2020  -     Hemoglobin A1C; Future; Expected date: 09/09/2020  -     Lipid Panel; Future; Expected date: 09/09/2020  -     TSH; Future; Expected date: 09/09/2020      RTC 3 months for annual exam      Warning signs discussed, patient to call with any further issues or worsening of symptoms.       Parts of the above note were dictated using a voice dictation software. Please excuse any grammatical or typographical errors.

## 2020-06-10 ENCOUNTER — TELEPHONE (OUTPATIENT)
Dept: INTERNAL MEDICINE | Facility: CLINIC | Age: 65
End: 2020-06-10

## 2020-06-10 NOTE — TELEPHONE ENCOUNTER
----- Message from Adelaide De Leon MD sent at 6/9/2020  4:11 PM CDT -----  Needs repeat EKG and cardiology appt. Thanks

## 2020-06-11 ENCOUNTER — TELEPHONE (OUTPATIENT)
Dept: INTERNAL MEDICINE | Facility: CLINIC | Age: 65
End: 2020-06-11

## 2020-06-11 NOTE — TELEPHONE ENCOUNTER
----- Message from Adri Solano sent at 6/11/2020  8:31 AM CDT -----  Contact: [phone]  [phone] is requesting a callback regarding the Status for Clearance for the pt's Surgery tomorrow. Please call   Additional Progress Note...

## 2020-06-11 NOTE — TELEPHONE ENCOUNTER
Spoke with PATRICIA at [facility]. Explained I tried to schedule appt for EKG and Cardiologist, patient reported she already has appt scheduled.

## 2020-06-18 ENCOUNTER — TELEPHONE (OUTPATIENT)
Dept: GASTROENTEROLOGY | Facility: CLINIC | Age: 65
End: 2020-06-18

## 2020-07-27 ENCOUNTER — LAB VISIT (OUTPATIENT)
Dept: PRIMARY CARE CLINIC | Facility: OTHER | Age: 65
End: 2020-07-27
Attending: INTERNAL MEDICINE
Payer: COMMERCIAL

## 2020-07-27 DIAGNOSIS — Z11.59 SCREENING FOR VIRAL DISEASE: ICD-10-CM

## 2020-07-27 PROCEDURE — U0003 INFECTIOUS AGENT DETECTION BY NUCLEIC ACID (DNA OR RNA); SEVERE ACUTE RESPIRATORY SYNDROME CORONAVIRUS 2 (SARS-COV-2) (CORONAVIRUS DISEASE [COVID-19]), AMPLIFIED PROBE TECHNIQUE, MAKING USE OF HIGH THROUGHPUT TECHNOLOGIES AS DESCRIBED BY CMS-2020-01-R: HCPCS

## 2020-07-30 LAB — SARS-COV-2 RNA RESP QL NAA+PROBE: NEGATIVE

## 2020-08-19 ENCOUNTER — TELEPHONE (OUTPATIENT)
Dept: GASTROENTEROLOGY | Facility: CLINIC | Age: 65
End: 2020-08-19

## 2020-09-08 ENCOUNTER — LAB VISIT (OUTPATIENT)
Dept: LAB | Facility: HOSPITAL | Age: 65
End: 2020-09-08
Attending: INTERNAL MEDICINE
Payer: MEDICARE

## 2020-09-08 DIAGNOSIS — Z00.00 ANNUAL PHYSICAL EXAM: ICD-10-CM

## 2020-09-08 LAB
ALBUMIN SERPL BCP-MCNC: 4 G/DL (ref 3.5–5.2)
ALP SERPL-CCNC: 69 U/L (ref 55–135)
ALT SERPL W/O P-5'-P-CCNC: 38 U/L (ref 10–44)
ANION GAP SERPL CALC-SCNC: 10 MMOL/L (ref 8–16)
AST SERPL-CCNC: 27 U/L (ref 10–40)
BASOPHILS # BLD AUTO: 0.03 K/UL (ref 0–0.2)
BASOPHILS NFR BLD: 0.4 % (ref 0–1.9)
BILIRUB SERPL-MCNC: 0.5 MG/DL (ref 0.1–1)
BUN SERPL-MCNC: 12 MG/DL (ref 8–23)
CALCIUM SERPL-MCNC: 8.9 MG/DL (ref 8.7–10.5)
CHLORIDE SERPL-SCNC: 109 MMOL/L (ref 95–110)
CHOLEST SERPL-MCNC: 149 MG/DL (ref 120–199)
CHOLEST/HDLC SERPL: 3.4 {RATIO} (ref 2–5)
CO2 SERPL-SCNC: 25 MMOL/L (ref 23–29)
CREAT SERPL-MCNC: 0.8 MG/DL (ref 0.5–1.4)
DIFFERENTIAL METHOD: ABNORMAL
EOSINOPHIL # BLD AUTO: 0.5 K/UL (ref 0–0.5)
EOSINOPHIL NFR BLD: 6 % (ref 0–8)
ERYTHROCYTE [DISTWIDTH] IN BLOOD BY AUTOMATED COUNT: 13.3 % (ref 11.5–14.5)
EST. GFR  (AFRICAN AMERICAN): >60 ML/MIN/1.73 M^2
EST. GFR  (NON AFRICAN AMERICAN): >60 ML/MIN/1.73 M^2
ESTIMATED AVG GLUCOSE: 108 MG/DL (ref 68–131)
GLUCOSE SERPL-MCNC: 111 MG/DL (ref 70–110)
HBA1C MFR BLD HPLC: 5.4 % (ref 4–5.6)
HCT VFR BLD AUTO: 43.1 % (ref 37–48.5)
HDLC SERPL-MCNC: 44 MG/DL (ref 40–75)
HDLC SERPL: 29.5 % (ref 20–50)
HGB BLD-MCNC: 13.4 G/DL (ref 12–16)
IMM GRANULOCYTES # BLD AUTO: 0.03 K/UL (ref 0–0.04)
IMM GRANULOCYTES NFR BLD AUTO: 0.4 % (ref 0–0.5)
LDLC SERPL CALC-MCNC: 90.2 MG/DL (ref 63–159)
LYMPHOCYTES # BLD AUTO: 1.3 K/UL (ref 1–4.8)
LYMPHOCYTES NFR BLD: 16.4 % (ref 18–48)
MCH RBC QN AUTO: 29.9 PG (ref 27–31)
MCHC RBC AUTO-ENTMCNC: 31.1 G/DL (ref 32–36)
MCV RBC AUTO: 96 FL (ref 82–98)
MONOCYTES # BLD AUTO: 0.5 K/UL (ref 0.3–1)
MONOCYTES NFR BLD: 5.7 % (ref 4–15)
NEUTROPHILS # BLD AUTO: 5.8 K/UL (ref 1.8–7.7)
NEUTROPHILS NFR BLD: 71.1 % (ref 38–73)
NONHDLC SERPL-MCNC: 105 MG/DL
NRBC BLD-RTO: 0 /100 WBC
PLATELET # BLD AUTO: 226 K/UL (ref 150–350)
PMV BLD AUTO: 10.7 FL (ref 9.2–12.9)
POTASSIUM SERPL-SCNC: 4.5 MMOL/L (ref 3.5–5.1)
PROT SERPL-MCNC: 7 G/DL (ref 6–8.4)
RBC # BLD AUTO: 4.48 M/UL (ref 4–5.4)
SODIUM SERPL-SCNC: 144 MMOL/L (ref 136–145)
TRIGL SERPL-MCNC: 74 MG/DL (ref 30–150)
TSH SERPL DL<=0.005 MIU/L-ACNC: 2.72 UIU/ML (ref 0.4–4)
WBC # BLD AUTO: 8.11 K/UL (ref 3.9–12.7)

## 2020-09-08 PROCEDURE — 80061 LIPID PANEL: CPT

## 2020-09-08 PROCEDURE — 83036 HEMOGLOBIN GLYCOSYLATED A1C: CPT

## 2020-09-08 PROCEDURE — 80053 COMPREHEN METABOLIC PANEL: CPT

## 2020-09-08 PROCEDURE — 36415 COLL VENOUS BLD VENIPUNCTURE: CPT | Mod: PO

## 2020-09-08 PROCEDURE — 85025 COMPLETE CBC W/AUTO DIFF WBC: CPT

## 2020-09-08 PROCEDURE — 84443 ASSAY THYROID STIM HORMONE: CPT

## 2020-09-10 ENCOUNTER — OFFICE VISIT (OUTPATIENT)
Dept: INTERNAL MEDICINE | Facility: CLINIC | Age: 65
End: 2020-09-10
Payer: COMMERCIAL

## 2020-09-10 VITALS
DIASTOLIC BLOOD PRESSURE: 70 MMHG | OXYGEN SATURATION: 95 % | HEART RATE: 89 BPM | WEIGHT: 173.06 LBS | SYSTOLIC BLOOD PRESSURE: 124 MMHG | TEMPERATURE: 98 F | HEIGHT: 60 IN | BODY MASS INDEX: 33.98 KG/M2

## 2020-09-10 DIAGNOSIS — Z23 NEED FOR VACCINATION AGAINST STREPTOCOCCUS PNEUMONIAE: ICD-10-CM

## 2020-09-10 DIAGNOSIS — E78.00 HYPERCHOLESTEREMIA: ICD-10-CM

## 2020-09-10 DIAGNOSIS — Z00.00 ANNUAL PHYSICAL EXAM: Primary | ICD-10-CM

## 2020-09-10 DIAGNOSIS — R73.03 PREDIABETES: ICD-10-CM

## 2020-09-10 DIAGNOSIS — J45.51 SEVERE PERSISTENT ASTHMA WITH ACUTE EXACERBATION: ICD-10-CM

## 2020-09-10 DIAGNOSIS — E55.9 VITAMIN D DEFICIENCY: ICD-10-CM

## 2020-09-10 DIAGNOSIS — E66.9 OBESITY (BMI 30-39.9): ICD-10-CM

## 2020-09-10 DIAGNOSIS — Z12.11 ENCOUNTER FOR SCREENING COLONOSCOPY: ICD-10-CM

## 2020-09-10 DIAGNOSIS — I10 ESSENTIAL HYPERTENSION: ICD-10-CM

## 2020-09-10 DIAGNOSIS — R93.7 ABNORMAL BONE DENSITY SCREENING: ICD-10-CM

## 2020-09-10 DIAGNOSIS — K21.9 GASTROESOPHAGEAL REFLUX DISEASE, ESOPHAGITIS PRESENCE NOT SPECIFIED: ICD-10-CM

## 2020-09-10 PROCEDURE — 3074F SYST BP LT 130 MM HG: CPT | Mod: CPTII,S$GLB,, | Performed by: INTERNAL MEDICINE

## 2020-09-10 PROCEDURE — 90670 PNEUMOCOCCAL CONJUGATE VACCINE 13-VALENT LESS THAN 5YO & GREATER THAN: ICD-10-PCS | Mod: S$GLB,,, | Performed by: INTERNAL MEDICINE

## 2020-09-10 PROCEDURE — 3078F PR MOST RECENT DIASTOLIC BLOOD PRESSURE < 80 MM HG: ICD-10-PCS | Mod: CPTII,S$GLB,, | Performed by: INTERNAL MEDICINE

## 2020-09-10 PROCEDURE — 3078F DIAST BP <80 MM HG: CPT | Mod: CPTII,S$GLB,, | Performed by: INTERNAL MEDICINE

## 2020-09-10 PROCEDURE — 99999 PR PBB SHADOW E&M-EST. PATIENT-LVL IV: ICD-10-PCS | Mod: PBBFAC,,, | Performed by: INTERNAL MEDICINE

## 2020-09-10 PROCEDURE — 99397 PER PM REEVAL EST PAT 65+ YR: CPT | Mod: 25,S$GLB,, | Performed by: INTERNAL MEDICINE

## 2020-09-10 PROCEDURE — 90471 IMMUNIZATION ADMIN: CPT | Mod: S$GLB,,, | Performed by: INTERNAL MEDICINE

## 2020-09-10 PROCEDURE — 90670 PCV13 VACCINE IM: CPT | Mod: S$GLB,,, | Performed by: INTERNAL MEDICINE

## 2020-09-10 PROCEDURE — 3074F PR MOST RECENT SYSTOLIC BLOOD PRESSURE < 130 MM HG: ICD-10-PCS | Mod: CPTII,S$GLB,, | Performed by: INTERNAL MEDICINE

## 2020-09-10 PROCEDURE — 90471 PNEUMOCOCCAL CONJUGATE VACCINE 13-VALENT LESS THAN 5YO & GREATER THAN: ICD-10-PCS | Mod: S$GLB,,, | Performed by: INTERNAL MEDICINE

## 2020-09-10 PROCEDURE — 99397 PR PREVENTIVE VISIT,EST,65 & OVER: ICD-10-PCS | Mod: 25,S$GLB,, | Performed by: INTERNAL MEDICINE

## 2020-09-10 PROCEDURE — 99999 PR PBB SHADOW E&M-EST. PATIENT-LVL IV: CPT | Mod: PBBFAC,,, | Performed by: INTERNAL MEDICINE

## 2020-09-10 RX ORDER — PANTOPRAZOLE SODIUM 40 MG/1
40 TABLET, DELAYED RELEASE ORAL DAILY
Qty: 30 TABLET | Refills: 11 | Status: SHIPPED | OUTPATIENT
Start: 2020-09-10 | End: 2021-09-22 | Stop reason: SDUPTHER

## 2020-09-10 RX ORDER — LOSARTAN POTASSIUM 100 MG/1
100 TABLET ORAL DAILY
Qty: 90 TABLET | Refills: 3 | Status: SHIPPED | OUTPATIENT
Start: 2020-09-10 | End: 2021-09-15 | Stop reason: SDUPTHER

## 2020-09-10 RX ORDER — METFORMIN HYDROCHLORIDE 850 MG/1
TABLET ORAL
Qty: 180 TABLET | Refills: 3 | Status: SHIPPED | OUTPATIENT
Start: 2020-09-10 | End: 2021-03-17 | Stop reason: SDUPTHER

## 2020-09-10 RX ORDER — ALBUTEROL SULFATE 90 UG/1
2 AEROSOL, METERED RESPIRATORY (INHALATION) EVERY 6 HOURS PRN
Qty: 18 G | Refills: 2 | Status: SHIPPED | OUTPATIENT
Start: 2020-09-10 | End: 2021-09-22 | Stop reason: SDUPTHER

## 2020-09-10 RX ORDER — MONTELUKAST SODIUM 10 MG/1
10 TABLET ORAL NIGHTLY
Qty: 90 TABLET | Refills: 3 | Status: SHIPPED | OUTPATIENT
Start: 2020-09-10 | End: 2021-09-22 | Stop reason: SDUPTHER

## 2020-09-10 RX ORDER — ALENDRONATE SODIUM 70 MG/1
TABLET ORAL
Qty: 4 TABLET | Refills: 11 | Status: SHIPPED | OUTPATIENT
Start: 2020-09-10 | End: 2021-09-22 | Stop reason: SDUPTHER

## 2020-09-10 RX ORDER — SIMVASTATIN 20 MG/1
20 TABLET, FILM COATED ORAL DAILY
Qty: 90 TABLET | Refills: 2 | Status: SHIPPED | OUTPATIENT
Start: 2020-09-10 | End: 2021-08-16

## 2020-09-10 NOTE — PROGRESS NOTES
Patient ID: Carina Montaño is a 65 y.o. female.    Chief Complaint: Follow-up (6 mos), Hyperlipidemia, and Hypertension    HPI Carina is a 65 y.o. female with HTN, HLD, prediabetes, GERD, asthma and history of breast cancer who presents for annual exam. Has no acute complaints or concerns today.   She recently did her mammogram today.  She plans to do a Pap smear tomorrow.  She would like to see Cecy Beal  in Endocrinology to assist with prediabetes.    Review of Systems   All other systems reviewed and are negative.        Objective:     Vitals:    09/10/20 1107   BP: 124/70   Pulse: 89   Temp: 97.8 °F (36.6 °C)        Physical Exam  Vitals signs reviewed.   Constitutional:       General: She is not in acute distress.     Appearance: Normal appearance. She is well-developed. She is obese. She is not ill-appearing, toxic-appearing or diaphoretic.   HENT:      Head: Normocephalic and atraumatic.      Right Ear: External ear normal.      Left Ear: External ear normal.      Nose: Nose normal.   Eyes:      General: No scleral icterus.        Right eye: No discharge.         Left eye: No discharge.      Extraocular Movements: Extraocular movements intact.      Conjunctiva/sclera: Conjunctivae normal.   Neck:      Musculoskeletal: Neck supple.      Thyroid: No thyromegaly.      Trachea: No tracheal deviation.   Cardiovascular:      Rate and Rhythm: Normal rate and regular rhythm.      Heart sounds: Normal heart sounds. No murmur. No friction rub. No gallop.    Pulmonary:      Effort: Pulmonary effort is normal. No respiratory distress.      Breath sounds: Normal breath sounds. No stridor. No wheezing, rhonchi or rales.   Chest:      Chest wall: No tenderness.   Abdominal:      General: Bowel sounds are normal. There is no distension.      Palpations: Abdomen is soft. There is no mass.      Tenderness: There is no abdominal tenderness. There is no guarding or rebound.      Hernia: No hernia is present.    Musculoskeletal:         General: No tenderness or deformity.   Lymphadenopathy:      Cervical: No cervical adenopathy.   Skin:     General: Skin is warm and dry.      Findings: No erythema or rash.   Neurological:      General: No focal deficit present.      Mental Status: She is alert and oriented to person, place, and time. Mental status is at baseline.   Psychiatric:         Mood and Affect: Mood normal.         Behavior: Behavior normal.         Thought Content: Thought content normal.         Judgment: Judgment normal.         Assessment:       1. Annual physical exam    2. Abnormal bone density screening Chronic   3. Essential hypertension Well controlled   4. Prediabetes Well controlled   5. Hypercholesteremia Well controlled   6. Severe persistent asthma with acute exacerbation Well controlled   7. Gastroesophageal reflux disease, esophagitis presence not specified Well controlled   8. Encounter for screening colonoscopy    9. Need for vaccination against Streptococcus pneumoniae    10. Vitamin D deficiency    11. Obesity (BMI 30-39.9) Active       Plan:         Annual physical exam  -     DXA Bone Density Spine And Hip; Future; Expected date: 09/10/2020    Abnormal bone density screening  -     alendronate (FOSAMAX) 70 MG tablet; TAKE 1 TAB BY MOUTH EVERY WEEK WITH GLASS OF WATER/EMPTY STOMACH (DONT LIE DOWN OR EAT FOR 30 MIN)  Dispense: 4 tablet; Refill: 11    Essential hypertension  Comments:  Cont current meds  Orders:  -     losartan (COZAAR) 100 MG tablet; Take 1 tablet (100 mg total) by mouth once daily.  Dispense: 90 tablet; Refill: 3  -     Comprehensive metabolic panel; Future; Expected date: 03/10/2021    Prediabetes  Comments:  Cont current meds  Orders:  -     metFORMIN (GLUCOPHAGE) 850 MG tablet; Take one tablet PO daily for 1 month and then increase to one tablet PO BID thereafter  Dispense: 180 tablet; Refill: 3  -     Ambulatory referral/consult to Endocrinology; Future; Expected date:  09/17/2020  -     Hemoglobin A1C; Future; Expected date: 03/09/2021    Hypercholesteremia  Comments:  Cont current meds  Orders:  -     simvastatin (ZOCOR) 20 MG tablet; Take 1 tablet (20 mg total) by mouth once daily.  Dispense: 90 tablet; Refill: 2    Severe persistent asthma with acute exacerbation  Comments:  Cont current meds  Orders:  -     montelukast (SINGULAIR) 10 mg tablet; Take 1 tablet (10 mg total) by mouth every evening.  Dispense: 90 tablet; Refill: 3  -     albuterol (PROVENTIL/VENTOLIN HFA) 90 mcg/actuation inhaler; Inhale 2 puffs into the lungs every 6 (six) hours as needed for Wheezing or Shortness of Breath (or cough).  Dispense: 18 g; Refill: 2    Gastroesophageal reflux disease, esophagitis presence not specified  Comments:  Cont current meds  Orders:  -     pantoprazole (PROTONIX) 40 MG tablet; Take 1 tablet (40 mg total) by mouth once daily.  Dispense: 30 tablet; Refill: 11    Encounter for screening colonoscopy  -     Case request GI: COLONOSCOPY  Patient requests Dr. Vidal    Need for vaccination against Streptococcus pneumoniae  -     (In Office Administered) Pneumococcal Conjugate Vaccine (13 Valent) (IM)    Vitamin D deficiency  -     Vitamin D; Future; Expected date: 03/10/2021    Obesity (BMI 30-39.9)        RTC 6 months         Warning signs discussed, patient to call with any further issues or worsening of symptoms.       Parts of the above note were dictated using a voice dictation software. Please excuse any grammatical or typographical errors.

## 2020-09-11 ENCOUNTER — TELEPHONE (OUTPATIENT)
Dept: ADMINISTRATIVE | Facility: OTHER | Age: 65
End: 2020-09-11

## 2020-09-14 ENCOUNTER — TELEPHONE (OUTPATIENT)
Dept: INTERNAL MEDICINE | Facility: CLINIC | Age: 65
End: 2020-09-14

## 2020-09-14 NOTE — TELEPHONE ENCOUNTER
----- Message from Nikky [facility] sent at 9/14/2020  2:04 PM CDT -----  Good afternoon,  The patient has a new referral from Dr. Adelaide De Leon, the diagnosis is Prediabetes. Patient states her  is an endocrinology patient of this provider and she would like to establish with her as well. Can an appointment be scheduled?    Thank you

## 2020-09-16 PROBLEM — R73.03 PRE-DIABETES: Status: ACTIVE | Noted: 2020-09-16

## 2020-09-16 NOTE — PROGRESS NOTES
CHIEF COMPLAINT: pre -diabetes, 2019 5.7%  Since then improved   is a patient of mine  +mother, brother-dm type 2      HPI: Mrs. Carina Montaño is a 65 y.o. female who was diagnosed with pre-diabetes 2019.   Lab Results   Component Value Date    HGBA1C 5.4 09/08/2020     Doing much better  Recently retired- previously dealing with stressors w/ work, etc, now pandemic    Drinking half and half tea, crystal light w/ water     Breakfast-raisin bran, or yogurt w/ fruits , juice glass- oj, low fat milk    Lunch-skips, fruit (1) mid day   Dinner- varies---- baked chicken or fish, vegetables, starch -alt cauliflower rice    Exercise: daily  Elliptical/ bike- not so much, ROM/stretches      Bone scan-oct 8, 2020  Colonoscopy pending   Mammogram-done   Flu -oct 2020    PREVIOUS DIABETES MEDICATIONS TRIED  none    CURRENT DIABETIC MEDS: none     Diabetes Management Status    Statin: Taking  ACE/ARB: Taking    Screening or Prevention Patient's value Goal Complete/Controlled?   HgA1C Testing and Control   Lab Results   Component Value Date    HGBA1C 5.4 09/08/2020      Annually/Less than 8% Yes   Lipid profile : 09/08/2020 Annually Yes   LDL control Lab Results   Component Value Date    LDLCALC 90.2 09/08/2020    Annually/Less than 100 mg/dl  Yes   Nephropathy screening No results found for: LABMICR  Lab Results   Component Value Date    PROTEINUA 2+ (A) 03/28/2019    Annually No   Blood pressure BP Readings from Last 1 Encounters:   09/10/20 124/70    Less than 140/90 Yes   Dilated retinal exam Most Recent Eye Exam Date: Not Found Annually Yes   Foot exam   Most Recent Foot Exam Date: Not Found Annually Yes     REVIEW OF SYSTEMS  General: no weakness, fatigue, + weight changes 3# loss over the past year  Eyes: no double or blurred vision, eye pain, or redness  Cardiovascular: no chest pain, palpitations, edema, or murmurs.   Respiratory: no cough or dyspnea.   GI: no heartburn, nausea, or changes in bowel patterns;  good appetite.   Skin: no rashes, dryness, itching, or reactions at insulin injection sites.  Neuro: no numbness, tingling, tremors, or vertigo.   Endocrine: no polyuria, polydipsia, polyphagia, heat or cold intolerance.     Vital Signs  /80 (BP Location: Right arm, Patient Position: Sitting, BP Method: Large (Manual))   Pulse 79   Ht 5' (1.524 m)   Wt 78.8 kg (173 lb 11.6 oz)   SpO2 97%   BMI 33.93 kg/m²     Hemoglobin A1C   Date Value Ref Range Status   09/08/2020 5.4 4.0 - 5.6 % Final     Comment:     ADA Screening Guidelines:  5.7-6.4%  Consistent with prediabetes  >or=6.5%  Consistent with diabetes  High levels of fetal hemoglobin interfere with the HbA1C  assay. Heterozygous hemoglobin variants (HbS, HgC, etc)do  not significantly interfere with this assay.   However, presence of multiple variants may affect accuracy.     05/04/2020 5.5 4.0 - 5.6 % Final     Comment:     ADA Screening Guidelines:  5.7-6.4%  Consistent with prediabetes  >or=6.5%  Consistent with diabetes  High levels of fetal hemoglobin interfere with the HbA1C  assay. Heterozygous hemoglobin variants (HbS, HgC, etc)do  not significantly interfere with this assay.   However, presence of multiple variants may affect accuracy.     08/12/2019 5.7 (H) 4.0 - 5.6 % Final     Comment:     ADA Screening Guidelines:  5.7-6.4%  Consistent with prediabetes  >or=6.5%  Consistent with diabetes  High levels of fetal hemoglobin interfere with the HbA1C  assay. Heterozygous hemoglobin variants (HbS, HgC, etc)do  not significantly interfere with this assay.   However, presence of multiple variants may affect accuracy.          Chemistry        Component Value Date/Time     09/08/2020 0827    K 4.5 09/08/2020 0827     09/08/2020 0827    CO2 25 09/08/2020 0827    BUN 12 09/08/2020 0827    CREATININE 0.8 09/08/2020 0827     (H) 09/08/2020 0827        Component Value Date/Time    CALCIUM 8.9 09/08/2020 0827    ALKPHOS 69 09/08/2020  0827    AST 27 09/08/2020 0827    ALT 38 09/08/2020 0827    BILITOT 0.5 09/08/2020 0827    ESTGFRAFRICA >60.0 09/08/2020 0827    EGFRNONAA >60.0 09/08/2020 0827           Lab Results   Component Value Date    TSH 2.722 09/08/2020      Lab Results   Component Value Date    CHOL 149 09/08/2020    CHOL 171 08/12/2019    CHOL 151 08/14/2018     Lab Results   Component Value Date    HDL 44 09/08/2020    HDL 52 08/12/2019    HDL 39 (L) 08/14/2018     Lab Results   Component Value Date    LDLCALC 90.2 09/08/2020    LDLCALC 108.2 08/12/2019    LDLCALC 85.0 08/14/2018     Lab Results   Component Value Date    TRIG 74 09/08/2020    TRIG 54 08/12/2019    TRIG 135 08/14/2018     Lab Results   Component Value Date    CHOLHDL 29.5 09/08/2020    CHOLHDL 30.4 08/12/2019    CHOLHDL 25.8 08/14/2018         PHYSICAL EXAMINATION  Constitutional: Appears well, no distress Reviewed vitals above.  Eyes: conjunctivae & lids intact; PERRLA, EOMs intact; optic discs   Neck: Supple, trachea midline.   Respiratory: No wheezes, even and unlabored, upon palpation wnl, percussion wnl  Cardiovascular: RRR; no edema or varicosities  Lymph: no lymphadenopathy palpated  Skin: warm and dry; no injection site reactions, no acanthosis nigracans observed.  Neuro:patient alert and cooperative, normal affect; steady gait.  Psychiatric: judgement & insight intact, orientation of time, place & person intact, memory; mood & affect wnl     Diabetes Foot Exam:   Visual Inspection:  Normal -  Bilateral and Dry Skin -  Bilateral    Pedal Pulses:   Right: Present  Left: Present        Assessment/Plan  1. Pre-diabetes  Hemoglobin A1C    POCT Glucose, Hand-Held Device   2. NAFLD (nonalcoholic fatty liver disease)     3. Obesity (BMI 30-39.9)     4. Hypercholesteremia     5. History of cancer of left breast     6. Essential hypertension       1. F/u in 1 year, prn   a1c prior  Carb book by [employer] given  Eat fit maria esther  Www.diabetes.org -food hub  More plant based eating  -whole grains, more fiber 3 gm or more for bar options/breakfast-oats  Discussed dietary habits  Went over food labels/carb allowance 15-30 gm per meal   Snack options placed on avs   Support group info given via zoom 2nd thursdays  a1c goal less than 5.7% to avoid pre-diabetes  Goal lose 10% of body weight   poct glucose 88 mg/dl  Goal  mg/dl   Postprandial less than 120 mg/dl   Exercise 5x a week at least 30 mins     2. Optimize bg will help with condition   3. Body mass index is 33.93 kg/m². may increase insulin resistance  4.   Lab Results   Component Value Date    LDLCALC 90.2 09/08/2020     At goal   5. F/u with breast center  6. Continue med(s) controlled     FOLLOW UP  No follow-ups on file.     Time: 30 mins

## 2020-09-17 ENCOUNTER — OFFICE VISIT (OUTPATIENT)
Dept: INTERNAL MEDICINE | Facility: CLINIC | Age: 65
End: 2020-09-17
Payer: COMMERCIAL

## 2020-09-17 VITALS
HEART RATE: 79 BPM | DIASTOLIC BLOOD PRESSURE: 80 MMHG | OXYGEN SATURATION: 97 % | WEIGHT: 173.75 LBS | SYSTOLIC BLOOD PRESSURE: 120 MMHG | BODY MASS INDEX: 34.11 KG/M2 | HEIGHT: 60 IN

## 2020-09-17 DIAGNOSIS — E66.9 OBESITY (BMI 30-39.9): ICD-10-CM

## 2020-09-17 DIAGNOSIS — K76.0 NAFLD (NONALCOHOLIC FATTY LIVER DISEASE): ICD-10-CM

## 2020-09-17 DIAGNOSIS — R73.03 PRE-DIABETES: Primary | ICD-10-CM

## 2020-09-17 DIAGNOSIS — I10 ESSENTIAL HYPERTENSION: Chronic | ICD-10-CM

## 2020-09-17 DIAGNOSIS — Z85.3 HISTORY OF CANCER OF LEFT BREAST: ICD-10-CM

## 2020-09-17 DIAGNOSIS — E78.00 HYPERCHOLESTEREMIA: ICD-10-CM

## 2020-09-17 LAB — GLUCOSE SERPL-MCNC: 88 MG/DL (ref 70–110)

## 2020-09-17 PROCEDURE — 3074F SYST BP LT 130 MM HG: CPT | Mod: CPTII,S$GLB,, | Performed by: NURSE PRACTITIONER

## 2020-09-17 PROCEDURE — 99213 OFFICE O/P EST LOW 20 MIN: CPT | Mod: S$GLB,,, | Performed by: NURSE PRACTITIONER

## 2020-09-17 PROCEDURE — 3008F PR BODY MASS INDEX (BMI) DOCUMENTED: ICD-10-PCS | Mod: CPTII,S$GLB,, | Performed by: NURSE PRACTITIONER

## 2020-09-17 PROCEDURE — 82962 POCT GLUCOSE, HAND-HELD DEVICE: ICD-10-PCS | Mod: S$GLB,,, | Performed by: NURSE PRACTITIONER

## 2020-09-17 PROCEDURE — 3288F FALL RISK ASSESSMENT DOCD: CPT | Mod: CPTII,S$GLB,, | Performed by: NURSE PRACTITIONER

## 2020-09-17 PROCEDURE — 99999 PR PBB SHADOW E&M-EST. PATIENT-LVL IV: ICD-10-PCS | Mod: PBBFAC,,, | Performed by: NURSE PRACTITIONER

## 2020-09-17 PROCEDURE — 3008F BODY MASS INDEX DOCD: CPT | Mod: CPTII,S$GLB,, | Performed by: NURSE PRACTITIONER

## 2020-09-17 PROCEDURE — 3074F PR MOST RECENT SYSTOLIC BLOOD PRESSURE < 130 MM HG: ICD-10-PCS | Mod: CPTII,S$GLB,, | Performed by: NURSE PRACTITIONER

## 2020-09-17 PROCEDURE — 1100F PTFALLS ASSESS-DOCD GE2>/YR: CPT | Mod: CPTII,S$GLB,, | Performed by: NURSE PRACTITIONER

## 2020-09-17 PROCEDURE — 99999 PR PBB SHADOW E&M-EST. PATIENT-LVL IV: CPT | Mod: PBBFAC,,, | Performed by: NURSE PRACTITIONER

## 2020-09-17 PROCEDURE — 3288F PR FALLS RISK ASSESSMENT DOCUMENTED: ICD-10-PCS | Mod: CPTII,S$GLB,, | Performed by: NURSE PRACTITIONER

## 2020-09-17 PROCEDURE — 1100F PR PT FALLS ASSESS DOC 2+ FALLS/FALL W/INJURY/YR: ICD-10-PCS | Mod: CPTII,S$GLB,, | Performed by: NURSE PRACTITIONER

## 2020-09-17 PROCEDURE — 3079F DIAST BP 80-89 MM HG: CPT | Mod: CPTII,S$GLB,, | Performed by: NURSE PRACTITIONER

## 2020-09-17 PROCEDURE — 3079F PR MOST RECENT DIASTOLIC BLOOD PRESSURE 80-89 MM HG: ICD-10-PCS | Mod: CPTII,S$GLB,, | Performed by: NURSE PRACTITIONER

## 2020-09-17 PROCEDURE — 82962 GLUCOSE BLOOD TEST: CPT | Mod: S$GLB,,, | Performed by: NURSE PRACTITIONER

## 2020-09-17 PROCEDURE — 99213 PR OFFICE/OUTPT VISIT, EST, LEVL III, 20-29 MIN: ICD-10-PCS | Mod: S$GLB,,, | Performed by: NURSE PRACTITIONER

## 2020-09-17 NOTE — PATIENT INSTRUCTIONS
[email or web address] food hub  [email or web address]   Eat fit maria esther  ---[facility] /free -grocery list/ recipes     Meals 15-30 grams per meal   Snack options less than 15 grams     Eat more berries, watch tropical fruit portions      Snacks can be an important part of a balanced, healthy meal plan. They allow you to eat more frequently, feeling full and satisfied throughout the day. Also, they allow you to spread carbohydrates evenly, which may stabilize blood sugars.  Plus, snacks are enjoyable!     The amount of carbohydrate needed at snacks varies. Generally, about 15-30 grams of carbohydrate per snack is recommended.  Below you will find some tasty treats.       0-5 gm carb   Crystal Light   Vitamin Water Zero   Herbal tea, unsweetened   2 tsp peanut butter on celery   1./2 cup sugar-free jell-o   1 sugar-free popsicle   ¼ cup blueberries   8oz [place] unsweetened almond milk   5 baby carrots & celery sticks, cucumbers, bell peppers dipped in ¼ cup salsa, 2Tbsp light ranch dressing or 2Tbsp plain Greek yogurt   10 Goldfish crackers   ½ oz low-fat cheese or string cheese   1 closed handful of nuts, unsalted   1 Tbsp of sunflower seeds, unsalted   1 cup Smart Pop popcorn   1 whole grain brown rice cake        15 gm carb   1 small piece of fruit or ½ banana or 1/2 cup lite canned fruit   3 moira cracker squares   3 cups Smart Pop popcorn, top spray butter, [place] lite salt or cinnamon and Truvia   5 Vanilla Wafers   ½ cup low fat, no added sugar ice cream or frozen yogurt (Blue bell, Blue Bunny, Weight Watchers, Skinny Cow)   ½ turkey, ham, or chicken sandwich   ½ c fruit with ½ c Cottage cheese   4-6 unsalted wheat crackers with 1 oz low fat cheese or 1 tbsp peanut butter    30-45 goldfish crackers (depending on flavor)    7-8 Adventism mini brown rice cakes (caramel, apple cinnamon, chocolate)    12 Adventism mini brown rice cakes (cheddar, bbq, ranch)    1/3 cup hummus dip with raw  veg   1/2 whole wheat rob, 1Tbsp hummus   Mini Pizza (1/2 whole wheat English muffin, low-fat  cheese, tomato sauce)   100 calorie snack pack (Oreo, Chips Ahoy, Ritz Mix, Baked Cheetos)   4-6 oz. light or Greek Style yogurt (Chobani, Yoplait, Okios, [place]oneyfield)   ½ cup sugar-free pudding     6 in. wheat tortilla or rob oven toasted chips (topped with spray butter flavoring, cinnamon, Truvia OR spray butter, garlic powder, chili powder)    18 BBQ Popchips (available at Target, Whole Foods, Fresh Market)                   Managing Diabetes: The A1C Test       Healthy red blood cells have some glucose stuck to them. A high A1C means that unhealthy amounts of glucose are stuck to the cells.   What is the A1C test?  Using your meter helps you track your blood sugar every day. But your glucose meter tells you the value at the time of testing only. You also need to know if your treatment plan is keeping you healthy over time. The hemoglobin A1C (or glycated hemoglobin) test can help. This test measures your average blood sugar level over a few months. A higher A1C result means that you have a higher risk of developing complications.  The A1C test  The A1C is a blood test done by your healthcare provider. You will likely have an A1C test every 3 to 6 months.  Your blood glucose goal  A1C has been shown as a percentage. But it can also be shown as a number representing the estimated Average Glucose (eAG). Unlike the A1C percentage, eAG is a number similar to the numbers listed on your daily glucose monitor. Both A1C and eAG measure the amount of glucose stuck to a protein called hemoglobin in red blood cells. Your healthcare provider will help you figure out what your ideal A1C or eAG should be. Your target number will depend on your age, general health, and other factors. If your current number is too high, your treatment plan may need changes, such as different medicines.  Sample results  Most people aim for an  A1c lower than 7%. Thats an eAG less than 154 mg/dL. Or, your healthcare provider may want you to aim for an A1C of 6%. Thats an eAG of 126 mg/dL.     Glucose calculator  Visit [email or web address] for a chart that helps convert your A1C percentages into eAG numbers.   Date Last Reviewed: 6/1/2016  © 3987-0763 [employer]. [address] [place] [address], [place] [address]. All rights reserved. This information is not intended as a substitute for professional medical care. Always follow your healthcare professional's instructions.        Exercise: Adding Intensity  You have been exercising for 30 minutes most days of the week. Now you can move on to the next stage: increasing the intensity. This means doing your activity in one or more of these ways:  · Longer. Exercise for 30 minutes or more without a break.  · Faster. Hike, run, or skate fast enough to raise your heart rate moderately--as if you had walked fast to catch a bus.  · More often. Do your activity 4 to 6 times a week instead of 1 to 3 times.    Not just gym class  Be creative. You can reach your health and fitness goals in many ways. Try some of these activities:  · Team sports, like basketball or soccer  · Social or recreational activities, like hiking or dancing  · Individual exercise, like cycling, swimming, or skating  · Group fitness classes, like aerobic classes or weight training  Safety first  Whatever activity you choose, think about safety:  · Wear the right safety gear and shoes for your activity.  · Drink plenty of water during and after workouts.  · Wear light-colored clothing if youre out when its dark.  · Make time to warm up before you exercise and cool down after.  · Carry ID (identification) with you if youre out alone. And be sure someone knows where youre going.  · If youre on foot, travel against traffic (except on blind corners). If youre on a bike, go with traffic. Obey the rules of the  road.   Tips for sticking with it  · Find a workout partner or sports club. If you know someone is expecting you, youll be less likely to skip your workout.  · Pack a workout bag with everything you need. Then its ready when you are.  · Choose a few different activities so youll stay interested. Make it fun!  What will help you to stick with it?  1.   2.   3.   Date Last Reviewed: 8/13/2015 © 2000-2017 [employer]. [address] [place] [address], [place] [address]. All rights reserved. This information is not intended as a substitute for professional medical care. Always follow your healthcare professional's instructions.        Hypoglycemia (Low Blood Sugar)     Fast-acting sugar includes a cup of nonfat milk.     Too little sugar (glucose) in your blood is called hypoglycemia or low blood sugar. Low blood sugar usually means anything lower than 70 mg/dL. Talk with your healthcare provider about your target range and what level is too low for you. Diabetes itself doesnt cause low blood sugar. But some of the treatments for diabetes, such as pills or insulin, may raise your risk for it. Low blood sugar may cause you to pass out or have a seizure. So always treat low blood sugar right away, but don't overeat.  Special note: Always carry a source of fast-acting sugar and a snack in case of hypoglycemia.   What you may notice  If you have low blood sugar, you may have one or more of these symptoms:  · Shakiness or dizziness  · Cold, clammy skin or sweating  · Feelings of hunger  · Headache  · Nervousness  · A hard, fast heartbeat  · Weakness  · Confusion or irritability  · Blurred vision  · Having nightmares or waking up confused or sweating  · Numbness or tingling in the lips or tongue  What you should do  Here are tips to follow if you have hypoglycemia:   · First check your blood sugar. If it is too low (out of your target range), eat or drink 15 to 20 grams of fast-acting sugar. This may be 3 to 4  glucose tablets, 4 ounces (half a cup) of fruit juice or regular (nondiet) soda, 8 ounces (1 cup) of fat-free milk, or 1 tablespoon of honey. Dont take more than this, or your blood sugar may go too high.  · Wait 15 minutes. Then recheck your blood sugar if you can.  · If your blood sugar is still too low, repeat the steps above and check your blood sugar again. If your blood sugar still has not returned to your target range, contact your healthcare provider or seek emergency care.  · Once your blood sugar returns to target range, eat a snack or meal.  Preventing low blood sugar  Things you can do include the following:   · If your condition needs a strict treatment plan, eat your meals and snacks at the same times each day. Dont skip meals!  · If your treatment plan lets you change when you eat and what you eat, learn how to change the time and dose of your rapid-acting insulin to match this.   · Ask your healthcare provider if it is safe for you to drink alcohol. Never drink on an empty stomach.  · Take your medicine at the prescribed times.  · Always carry a source of fast-acting sugar and a snack when youre away from home.  Other things to do  Additional tips include the following:  · Carry a medical ID card, a compact USB drive, or wear a medical alert bracelet or necklace. It should say that you have diabetes. It should also say what to do if you pass out or have a seizure.  · Make sure your family, friends, and coworkers know the signs of low blood sugar. Tell them what to do if your blood sugar falls very low and you cant treat yourself.  · Keep a glucagon emergency kit handy. Be sure your family, friends, and coworkers know how and when to use it. Check it regularly and replace the glucagon before it expires.  · Talk with your health care team about other things you can do to prevent low blood sugar.     If you have unexplained hypoglycemia or hypoglycemia several times, call your healthcare provider.    Date Last Reviewed: 5/1/2016 © 2000-2017 [employer]. [address] [place] [address], [place] [address]. All rights reserved. This information is not intended as a substitute for professional medical care. Always follow your healthcare professional's instructions.        Understanding Carbohydrates, Fats, and Protein  Food is a source of fuel and nourishment for your body. Its also a source of pleasure. Having diabetes doesnt mean you have to eat special foods or give up desserts. Instead, your dietitian can show you how to plan meals to suit your body. To start, learn how different foods affect blood sugar.  Carbohydrates  Carbohydrates are the main source of fuel for the body. Carbohydrates raise blood sugar. Many people think carbohydrates are only found in pasta or bread. But carbohydrates are actually in many kinds of foods:  · Sugars occur naturally in foods such as fruit, milk, honey, and molasses. Sugars can also be added to many foods, from cereals and yogurt to candy and desserts. Sugars raise blood sugar.  · Starches are found in bread, cereals, pasta, and dried beans. Theyre also found in corn, peas, potatoes, yam, acorn squash, and butternut squash. Starches also raise blood sugar.   · Fiber is found in foods such as vegetables, fruits, beans, and whole grains. Unlike other carbs, fiber isnt digested or absorbed. So it doesnt raise blood sugar. In fact, fiber can help keep blood sugar from rising too fast. It also helps keep blood cholesterol at a healthy level.  Did you know?  Even though carbohydrates raise blood sugar, its best to have some in every meal. They are an important part of a healthy diet.   Fat  Fat is an energy source that can be stored until needed. Fat does not raise blood sugar. However, it can raise blood cholesterol, increasing the risk of heart disease. Fat is also high in calories, which can cause weight gain. Not all types of fat are the same.  More  Healthy:  · Monounsaturated fats are mostly found in vegetable oils, such as olive, canola, and peanut oils. They are also found in avocados and some nuts. Monounsaturated fats are healthy for your heart. Thats because they lower LDL (unhealthy) cholesterol.  · Polyunsaturated fats are mostly found in vegetable oils, such as corn, safflower, and soybean oils. They are also found in some seeds, nuts, and fish. Polyunsaturated fats lower LDL (unhealthy) cholesterol. So, choosing them instead of saturated fats is healthy for your heart. Certain unsaturated fats can help lower triglycerides.   Less Healthy:  · Saturated fats are found in animal products, such as meat, poultry, whole milk, lard, and butter. Saturated fats raise LDL cholesterol and are not healthy for your heart.  · Hydrogenated oils and trans fats are formed when vegetable oils are processed into solid fats. They are found in many processed foods. Hydrogenated oils and trans fats raise LDL cholesterol and lower HDL (healthy) cholesterol. They are not healthy for your heart.  Protein  Protein helps the body build and repair muscle and other tissue. Protein has little or no effect on blood sugar. However, many foods that contain protein also contain saturated fat. By choosing low-fat protein sources, you can get the benefits of protein without the extra fat:  · Plant protein is found in dry beans and peas, nuts, and soy products, such as tofu and soymilk. These sources tend to be cholesterol-free and low in saturated fat.  · Animal protein is found in fish, poultry, meat, cheese, milk, and eggs. These contain cholesterol and can be high in saturated fat. Aim for lean, lower-fat choices.  Date Last Reviewed: 3/1/2016  © 6653-2376 [employer]. [address] [place] [address]. All rights reserved. This information is not intended as a substitute for professional medical care. Always follow your healthcare professional's  instructions.        Prediabetes  You have been diagnosed with prediabetes. This means that the level of sugar (glucose) in your blood is too high. If you have prediabetes, you are at risk for developing type 2 diabetes. Type 2 diabetes is diagnosed when the level of glucose in the blood reaches a certain high level. With prediabetes, it hasnt reached this point yet, but it is higher than normal. It is vital to make lifestyle changes to lower your blood sugar, improve your health, and prevent diabetes. This sheet will tell you more.      Why worry about prediabetes?  Prediabetes is a disease where the bodys cells have trouble using glucose in the blood for energy. As a result, too much glucose stays in the blood and can affect how your heart and blood vessels work. Without changes in diet and lifestyle, the problem can get worse. Once you have type 2 diabetes, it is chronic (ongoing) and needs to be managed for the rest of your life. Diabetes can harm the body and your health by damaging organs, such as your eyes and kidneys. It makes you more likely to have heart disease. And it can damage nerves and blood vessels.  Who is a risk for prediabetes?  The exact cause of prediabetes is not clear. But certain risk factors make a person more likely to have it. These include:  · A family history of type 2 diabetes  · Being overweight  · Being over age 45  · Have hypertension or elevated cholesterol   · Having had gestational diabetes  · Not being physically active  · Being ,  American, , , , or   Diagnosing prediabetes  Prediabetes may have no symptoms or you may have some of the symptoms of diabetes. The diagnosis is made with a blood test. You may have one or more of these blood tests:   · Fasting glucose test. Blood is taken and tested after you have fasted (not eaten) for at least 8 hours. A normal test result is 99 milligrams per deciliter  (mg/dL) or lower. Prediabetes is 100 mg/dL to 125 mg/dL. Diabetes is 126 mg/dL or higher.  · Glucose tolerance test. Your blood sugar is measured before and after you drink a very sugary liquid. A normal test result is 139 milligrams per deciliter (mg/dL) or lower. Prediabetes is 140 mg/dL to 199 mg/dL. Diabetes is 200 mg/dL or higher.  · Hemoglobin A1c (HbA1c). Your HbA1c is normal if it is below 5.7%. Prediabetes is 5.7% to 6.4%. Diabetes is 6.5% or higher.   Treating prediabetes  The best way to treat prediabetes is to lose at least 5% to 7% of your current weight and be more physically active by getting at least 150 minutes a week of physical activity. When sitting for long periods of time, get up for short sessions of light activity every 30 minutes. These changes help the bodys cells use blood sugar better. Even a small amount of weight loss can help. Work with your healthcare provider to make a plan to eat well and be more active. Keep in mind that small changes can add up. Other changes in your lifestyle (or even taking certain medicines, such as metformin) may make you less likely to develop diabetes. Your healthcare provider can talk with you about these.  Follow-up  If it is untreated, prediabetes can turn into diabetes. This is a serious health condition. Take steps to stop this from happening. Follow the treatment plan you have been given. You may have your blood glucose tested again in about 12 to 18 months.  Symptoms of diabetes  Let your healthcare provider know if you have any of the following:  · Always feel very tired  · Feel very thirsty or hungry much of the time  · Have to urinate often  · Lose weight for no reason  · Feel numbness or tingling in your fingers or toes  · Have cuts or bruises that dont seem to heal  · Have blurry vision   Date Last Reviewed: 5/1/2016  © 7702-2370 [employer]. [address] [place] [address], [place] [address]. All rights reserved. This information is not  intended as a substitute for professional medical care. Always follow your healthcare professional's instructions.        Glucose Tolerance  Does this test have other names?  Oral glucose tolerance test, OGTT  What is this test?  An oral glucose tolerance test (OGTT) is used to screen for diabetes or prediabetes. To start the test, you have a blood glucose test done. Then you will drink a liquid rich in glucose, or sugar. For the next two to three hours, your healthcare provider will draw your blood to check your blood glucose levels and determine your risk for diabetes, prediabetes, or gestational diabetes.  In rare instances, your urine is checked during a glucose tolerance test instead of blood. Urine testing is not as accurate as blood testing and is used only when blood testing can't be done.  Why do I need this test?  If you have symptoms of or risk factors for diabetes, your healthcare provider may order an OGTT. Symptoms of diabetes include increased thirst, increased urination, unexplained weight loss, blurred vision, tiredness, and sores that don't heal. Risk factors for diabetes include overweight or obese, physical inactivity, high blood pressure, high cholesterol, or a family history of diabetes. The U.S. Preventive Services Task Force recommends that adults ages 40 to 70 who are obese or overweight have their blood glucose checked at least every 3 years as long as their results are normal. All adults should be tested for diabetes every 3 years beginning at age 45, no matter what their weight.   The test is a useful first step in diagnosing prediabetes, diabetes, or gestational diabetes.  What other tests might I have along with this test?  Other tests that are used to diagnose diabetes or monitor blood glucose include blood glucose testing and an A1C blood test. Because heart health is so closely tied with diabetes, regular checks of blood pressure, cholesterol, and triglycerides are important,  too.  What do my test results mean?  A result for a lab test may be affected by many things, including the method the laboratory uses to do the test. If your test results are different from the normal value, you may not have a problem. To learn what the results mean for you, talk with your healthcare provider.  A normal glucose level is less than 140 milligrams per deciliter (mg/dL) two hours after you drink the glucose-infused fluid. If your level is between 140 and 199 mg/dL, you might have impaired glucose tolerance or prediabetes. If it's 200 mg/dL or above, you might have diabetes, and more testing may be needed.  If you have an abnormal blood glucose, your healthcare provider may recommend behavioral counseling to help you eat better and get more exercise.  How is this test done?  At a medical facility or lab, a healthcare provider will get you ready for the test by putting an IV called a heparin lock into a vein in your arm. This allows the staff to take multiple blood samples without repeatedly sticking you with a needle.  Next, the first blood sample will be drawn and your blood glucose level will be checked. Then you will be asked to drink a sweet liquid containing glucose, which is about 75 grams of sugar dissolved in water.  After that, your blood will be drawn every 30 to 60 minutes for the next two to three hours. Each sample will be checked to measure levels of glucose. Sometimes insulin and growth hormone levels will also be checked.  Does this test pose any risks?  Taking a blood sample with a needle carries risks that include bleeding, infection, bruising, or feeling dizzy. When the needle pricks your arm, you may feel a slight stinging sensation or pain. Afterward, the site may be slightly sore.  After drinking the sweet liquid used for this test, you may feel nauseous or get a stomachache or headache. These side effects should go away during the test.  What might affect my test results?  A number  of factors, mainly diet and exercise, can affect blood glucose levels. Carefully follow your healthcare provider's instructions on how to prepare for a glucose tolerance test.  How do I get ready for this test?  You will be instructed to not eat or drink anything except water from midnight the night before the test until the time of the test. You should also avoid smoking, chewing gum, and exercise other than light walking the day before and the morning of the test.  Be sure your healthcare provider knows about all medicines, herbs, vitamins, and supplements you are taking. This includes medicines that don't need a prescription and any illicit drugs you may use.  © 4595-0628 [employer]. [address] [place] [address], [place] [address]. All rights reserved. This information is not intended as a substitute for professional medical care. Always follow your healthcare professional's instructions.

## 2020-10-07 ENCOUNTER — IMMUNIZATION (OUTPATIENT)
Dept: FAMILY MEDICINE | Facility: CLINIC | Age: 65
End: 2020-10-07
Payer: COMMERCIAL

## 2020-10-07 PROCEDURE — 90694 VACC AIIV4 NO PRSRV 0.5ML IM: CPT | Mod: PBBFAC,PO

## 2020-10-07 PROCEDURE — G0008 ADMIN INFLUENZA VIRUS VAC: HCPCS | Mod: PBBFAC,PO

## 2020-10-08 ENCOUNTER — HOSPITAL ENCOUNTER (OUTPATIENT)
Dept: RADIOLOGY | Facility: HOSPITAL | Age: 65
Discharge: HOME | End: 2020-10-08
Attending: INTERNAL MEDICINE
Payer: COMMERCIAL

## 2020-10-08 DIAGNOSIS — Z00.00 ANNUAL PHYSICAL EXAM: ICD-10-CM

## 2020-10-08 PROCEDURE — 77080 DXA BONE DENSITY AXIAL: CPT | Mod: 26,,, | Performed by: RADIOLOGY

## 2020-10-08 PROCEDURE — 77080 DEXA BONE DENSITY SPINE HIP: ICD-10-PCS | Mod: 26,,, | Performed by: RADIOLOGY

## 2020-10-08 PROCEDURE — 77080 DXA BONE DENSITY AXIAL: CPT | Mod: TC

## 2020-11-18 ENCOUNTER — TELEPHONE (OUTPATIENT)
Dept: FAMILY MEDICINE | Facility: CLINIC | Age: 65
End: 2020-11-18

## 2020-11-18 NOTE — TELEPHONE ENCOUNTER
Spoke to pt and informed her to call [facility] and provide them with the Lot number so they can verify of she is in fact taking the metformin that causes cancer. Pt verbalized an understanding.

## 2020-11-18 NOTE — TELEPHONE ENCOUNTER
----- Message from Orquidea Pimentel sent at 11/18/2020  8:05 AM CST -----  Contact: [phone]/Self  Type: Requesting to speak with nurse    Who Called: Pt  Regarding: get off : Take one tablet PO daily for 1 month and then increase to one tablet PO BID thereafter, pt saw it causes cancer on TV    Would the patient rather a call back or a response via [facility]? Call back  Best Call Back Number: [phone]  Additional Information:

## 2020-12-11 ENCOUNTER — PATIENT MESSAGE (OUTPATIENT)
Dept: OTHER | Facility: OTHER | Age: 65
End: 2020-12-11

## 2020-12-15 ENCOUNTER — PATIENT OUTREACH (OUTPATIENT)
Dept: ADMINISTRATIVE | Facility: HOSPITAL | Age: 65
End: 2020-12-15

## 2020-12-15 DIAGNOSIS — R92.8 ABNORMAL MAMMOGRAM OF BOTH BREASTS: Primary | ICD-10-CM

## 2020-12-17 ENCOUNTER — TELEPHONE (OUTPATIENT)
Dept: INTERNAL MEDICINE | Facility: CLINIC | Age: 65
End: 2020-12-17

## 2020-12-17 NOTE — TELEPHONE ENCOUNTER
----- Message from Adelaide De Leon MD sent at 12/15/2020  1:10 PM CST -----  Please schedule patient for bilateral breast ultrasound to be performed in 6 months.  This is for routine screening, given her recent abnormal mammogram.  Thank you.

## 2020-12-17 NOTE — TELEPHONE ENCOUNTER
I spoke to the patient and she stated she will have it done next year because her  is in the hospital.

## 2021-03-08 ENCOUNTER — LAB VISIT (OUTPATIENT)
Dept: LAB | Facility: HOSPITAL | Age: 66
End: 2021-03-08
Attending: INTERNAL MEDICINE
Payer: COMMERCIAL

## 2021-03-08 DIAGNOSIS — E55.9 VITAMIN D DEFICIENCY: ICD-10-CM

## 2021-03-08 DIAGNOSIS — I10 ESSENTIAL HYPERTENSION: ICD-10-CM

## 2021-03-08 DIAGNOSIS — R73.03 PREDIABETES: ICD-10-CM

## 2021-03-08 LAB
25(OH)D3+25(OH)D2 SERPL-MCNC: 40 NG/ML (ref 30–96)
ALBUMIN SERPL BCP-MCNC: 4.2 G/DL (ref 3.5–5.2)
ALP SERPL-CCNC: 61 U/L (ref 55–135)
ALT SERPL W/O P-5'-P-CCNC: 29 U/L (ref 10–44)
ANION GAP SERPL CALC-SCNC: 10 MMOL/L (ref 8–16)
AST SERPL-CCNC: 23 U/L (ref 10–40)
BILIRUB SERPL-MCNC: 0.8 MG/DL (ref 0.1–1)
BUN SERPL-MCNC: 12 MG/DL (ref 8–23)
CALCIUM SERPL-MCNC: 9.4 MG/DL (ref 8.7–10.5)
CHLORIDE SERPL-SCNC: 103 MMOL/L (ref 95–110)
CO2 SERPL-SCNC: 28 MMOL/L (ref 23–29)
CREAT SERPL-MCNC: 0.8 MG/DL (ref 0.5–1.4)
EST. GFR  (AFRICAN AMERICAN): >60 ML/MIN/1.73 M^2
EST. GFR  (NON AFRICAN AMERICAN): >60 ML/MIN/1.73 M^2
ESTIMATED AVG GLUCOSE: 103 MG/DL (ref 68–131)
GLUCOSE SERPL-MCNC: 105 MG/DL (ref 70–110)
HBA1C MFR BLD: 5.2 % (ref 4–5.6)
POTASSIUM SERPL-SCNC: 4.4 MMOL/L (ref 3.5–5.1)
PROT SERPL-MCNC: 7.3 G/DL (ref 6–8.4)
SODIUM SERPL-SCNC: 141 MMOL/L (ref 136–145)

## 2021-03-08 PROCEDURE — 82306 VITAMIN D 25 HYDROXY: CPT | Performed by: INTERNAL MEDICINE

## 2021-03-08 PROCEDURE — 36415 COLL VENOUS BLD VENIPUNCTURE: CPT | Mod: PO | Performed by: INTERNAL MEDICINE

## 2021-03-08 PROCEDURE — 83036 HEMOGLOBIN GLYCOSYLATED A1C: CPT | Performed by: INTERNAL MEDICINE

## 2021-03-08 PROCEDURE — 80053 COMPREHEN METABOLIC PANEL: CPT | Performed by: INTERNAL MEDICINE

## 2021-03-10 ENCOUNTER — OFFICE VISIT (OUTPATIENT)
Dept: INTERNAL MEDICINE | Facility: CLINIC | Age: 66
End: 2021-03-10
Payer: MEDICARE

## 2021-03-10 VITALS
BODY MASS INDEX: 32.47 KG/M2 | SYSTOLIC BLOOD PRESSURE: 122 MMHG | TEMPERATURE: 98 F | DIASTOLIC BLOOD PRESSURE: 72 MMHG | OXYGEN SATURATION: 98 % | WEIGHT: 165.38 LBS | HEIGHT: 60 IN | HEART RATE: 89 BPM

## 2021-03-10 DIAGNOSIS — I10 ESSENTIAL HYPERTENSION: ICD-10-CM

## 2021-03-10 DIAGNOSIS — Z85.3 HISTORY OF BREAST CANCER: ICD-10-CM

## 2021-03-10 DIAGNOSIS — E78.00 HYPERCHOLESTEREMIA: ICD-10-CM

## 2021-03-10 DIAGNOSIS — M85.80 OSTEOPENIA, UNSPECIFIED LOCATION: ICD-10-CM

## 2021-03-10 DIAGNOSIS — R73.03 PREDIABETES: ICD-10-CM

## 2021-03-10 DIAGNOSIS — Z00.00 ANNUAL PHYSICAL EXAM: ICD-10-CM

## 2021-03-10 DIAGNOSIS — Z12.11 ENCOUNTER FOR SCREENING COLONOSCOPY: ICD-10-CM

## 2021-03-10 DIAGNOSIS — J45.40 MODERATE PERSISTENT ASTHMA WITHOUT COMPLICATION: ICD-10-CM

## 2021-03-10 PROCEDURE — 99214 OFFICE O/P EST MOD 30 MIN: CPT | Mod: S$PBB,,, | Performed by: INTERNAL MEDICINE

## 2021-03-10 PROCEDURE — 99999 PR PBB SHADOW E&M-EST. PATIENT-LVL V: CPT | Mod: PBBFAC,,, | Performed by: INTERNAL MEDICINE

## 2021-03-10 PROCEDURE — 99999 PR PBB SHADOW E&M-EST. PATIENT-LVL V: ICD-10-PCS | Mod: PBBFAC,,, | Performed by: INTERNAL MEDICINE

## 2021-03-10 PROCEDURE — 99215 OFFICE O/P EST HI 40 MIN: CPT | Mod: PBBFAC,PO | Performed by: INTERNAL MEDICINE

## 2021-03-10 PROCEDURE — 99214 PR OFFICE/OUTPT VISIT, EST, LEVL IV, 30-39 MIN: ICD-10-PCS | Mod: S$PBB,,, | Performed by: INTERNAL MEDICINE

## 2021-03-10 RX ORDER — CYCLOBENZAPRINE HCL 10 MG
TABLET ORAL
COMMUNITY
End: 2021-09-22

## 2021-03-10 RX ORDER — MELOXICAM 15 MG/1
TABLET ORAL
COMMUNITY
End: 2021-09-22

## 2021-03-10 RX ORDER — NAPROXEN 500 MG/1
TABLET ORAL
COMMUNITY
End: 2021-09-22

## 2021-03-17 ENCOUNTER — OFFICE VISIT (OUTPATIENT)
Dept: INTERNAL MEDICINE | Facility: CLINIC | Age: 66
End: 2021-03-17
Payer: MEDICARE

## 2021-03-17 VITALS
BODY MASS INDEX: 32.12 KG/M2 | HEART RATE: 103 BPM | OXYGEN SATURATION: 98 % | DIASTOLIC BLOOD PRESSURE: 72 MMHG | WEIGHT: 164.44 LBS | SYSTOLIC BLOOD PRESSURE: 120 MMHG

## 2021-03-17 DIAGNOSIS — I10 ESSENTIAL HYPERTENSION: Chronic | ICD-10-CM

## 2021-03-17 DIAGNOSIS — E78.00 HYPERCHOLESTEREMIA: ICD-10-CM

## 2021-03-17 DIAGNOSIS — E66.9 OBESITY (BMI 30-39.9): ICD-10-CM

## 2021-03-17 DIAGNOSIS — R73.03 PRE-DIABETES: Primary | ICD-10-CM

## 2021-03-17 DIAGNOSIS — R73.03 PREDIABETES: ICD-10-CM

## 2021-03-17 DIAGNOSIS — K76.0 NAFLD (NONALCOHOLIC FATTY LIVER DISEASE): ICD-10-CM

## 2021-03-17 DIAGNOSIS — J45.40 MODERATE PERSISTENT ASTHMA WITHOUT COMPLICATION: ICD-10-CM

## 2021-03-17 PROCEDURE — 99213 PR OFFICE/OUTPT VISIT, EST, LEVL III, 20-29 MIN: ICD-10-PCS | Mod: S$PBB,,, | Performed by: NURSE PRACTITIONER

## 2021-03-17 PROCEDURE — 99213 OFFICE O/P EST LOW 20 MIN: CPT | Mod: S$PBB,,, | Performed by: NURSE PRACTITIONER

## 2021-03-17 PROCEDURE — 99214 OFFICE O/P EST MOD 30 MIN: CPT | Mod: PBBFAC | Performed by: NURSE PRACTITIONER

## 2021-03-17 PROCEDURE — 99999 PR PBB SHADOW E&M-EST. PATIENT-LVL IV: ICD-10-PCS | Mod: PBBFAC,,, | Performed by: NURSE PRACTITIONER

## 2021-03-17 PROCEDURE — 99999 PR PBB SHADOW E&M-EST. PATIENT-LVL IV: CPT | Mod: PBBFAC,,, | Performed by: NURSE PRACTITIONER

## 2021-03-17 RX ORDER — METFORMIN HYDROCHLORIDE 850 MG/1
TABLET ORAL
Qty: 180 TABLET | Refills: 3 | Status: SHIPPED | OUTPATIENT
Start: 2021-03-17 | End: 2022-02-15

## 2021-04-12 DIAGNOSIS — J45.30 MILD PERSISTENT ASTHMA WITHOUT COMPLICATION: ICD-10-CM

## 2021-04-12 RX ORDER — BUDESONIDE AND FORMOTEROL FUMARATE DIHYDRATE 160; 4.5 UG/1; UG/1
2 AEROSOL RESPIRATORY (INHALATION) EVERY 12 HOURS
Qty: 10.2 INHALER | Refills: 6 | Status: SHIPPED | OUTPATIENT
Start: 2021-04-12 | End: 2021-09-22 | Stop reason: SDUPTHER

## 2021-04-16 ENCOUNTER — TELEPHONE (OUTPATIENT)
Dept: GASTROENTEROLOGY | Facility: CLINIC | Age: 66
End: 2021-04-16

## 2021-04-16 DIAGNOSIS — Z01.818 PRE-PROCEDURAL EXAMINATION: ICD-10-CM

## 2021-04-16 RX ORDER — SODIUM, POTASSIUM,MAG SULFATES 17.5-3.13G
1 SOLUTION, RECONSTITUTED, ORAL ORAL DAILY
Qty: 1 KIT | Refills: 0 | Status: SHIPPED | OUTPATIENT
Start: 2021-04-16 | End: 2021-04-18

## 2021-05-06 ENCOUNTER — TELEPHONE (OUTPATIENT)
Dept: ENDOSCOPY | Facility: HOSPITAL | Age: 66
End: 2021-05-06

## 2021-05-08 ENCOUNTER — LAB VISIT (OUTPATIENT)
Dept: INTERNAL MEDICINE | Facility: CLINIC | Age: 66
End: 2021-05-08
Payer: MEDICARE

## 2021-05-08 DIAGNOSIS — Z01.818 PRE-PROCEDURAL EXAMINATION: ICD-10-CM

## 2021-05-08 PROCEDURE — U0003 INFECTIOUS AGENT DETECTION BY NUCLEIC ACID (DNA OR RNA); SEVERE ACUTE RESPIRATORY SYNDROME CORONAVIRUS 2 (SARS-COV-2) (CORONAVIRUS DISEASE [COVID-19]), AMPLIFIED PROBE TECHNIQUE, MAKING USE OF HIGH THROUGHPUT TECHNOLOGIES AS DESCRIBED BY CMS-2020-01-R: HCPCS | Performed by: INTERNAL MEDICINE

## 2021-05-08 PROCEDURE — U0005 INFEC AGEN DETEC AMPLI PROBE: HCPCS | Performed by: INTERNAL MEDICINE

## 2021-05-09 LAB — SARS-COV-2 RNA RESP QL NAA+PROBE: NOT DETECTED

## 2021-05-11 ENCOUNTER — ANESTHESIA (OUTPATIENT)
Dept: ENDOSCOPY | Facility: HOSPITAL | Age: 66
End: 2021-05-11
Payer: MEDICARE

## 2021-05-11 ENCOUNTER — HOSPITAL ENCOUNTER (OUTPATIENT)
Facility: HOSPITAL | Age: 66
Discharge: HOME | End: 2021-05-11
Attending: INTERNAL MEDICINE | Admitting: INTERNAL MEDICINE
Payer: MEDICARE

## 2021-05-11 ENCOUNTER — ANESTHESIA EVENT (OUTPATIENT)
Dept: ENDOSCOPY | Facility: HOSPITAL | Age: 66
End: 2021-05-11
Payer: MEDICARE

## 2021-05-11 VITALS
WEIGHT: 156 LBS | HEART RATE: 79 BPM | HEIGHT: 61 IN | TEMPERATURE: 98 F | SYSTOLIC BLOOD PRESSURE: 126 MMHG | DIASTOLIC BLOOD PRESSURE: 68 MMHG | OXYGEN SATURATION: 100 % | RESPIRATION RATE: 18 BRPM | BODY MASS INDEX: 29.45 KG/M2

## 2021-05-11 DIAGNOSIS — Z12.11 SCREENING FOR MALIGNANT NEOPLASM OF COLON: ICD-10-CM

## 2021-05-11 LAB — POCT GLUCOSE: 118 MG/DL (ref 70–110)

## 2021-05-11 PROCEDURE — 25000003 PHARM REV CODE 250: Performed by: NURSE ANESTHETIST, CERTIFIED REGISTERED

## 2021-05-11 PROCEDURE — 25000003 PHARM REV CODE 250: Performed by: INTERNAL MEDICINE

## 2021-05-11 PROCEDURE — 88305 TISSUE EXAM BY PATHOLOGIST: CPT | Mod: 26,,, | Performed by: PATHOLOGY

## 2021-05-11 PROCEDURE — 63600175 PHARM REV CODE 636 W HCPCS: Performed by: NURSE ANESTHETIST, CERTIFIED REGISTERED

## 2021-05-11 PROCEDURE — 45380 COLONOSCOPY AND BIOPSY: CPT | Mod: PT,,, | Performed by: INTERNAL MEDICINE

## 2021-05-11 PROCEDURE — 45380 PR COLONOSCOPY,BIOPSY: ICD-10-PCS | Mod: PT,,, | Performed by: INTERNAL MEDICINE

## 2021-05-11 PROCEDURE — 82962 GLUCOSE BLOOD TEST: CPT | Performed by: INTERNAL MEDICINE

## 2021-05-11 PROCEDURE — 37000009 HC ANESTHESIA EA ADD 15 MINS: Performed by: INTERNAL MEDICINE

## 2021-05-11 PROCEDURE — 27201012 HC FORCEPS, HOT/COLD, DISP: Performed by: INTERNAL MEDICINE

## 2021-05-11 PROCEDURE — 88305 TISSUE EXAM BY PATHOLOGIST: ICD-10-PCS | Mod: 26,,, | Performed by: PATHOLOGY

## 2021-05-11 PROCEDURE — 45380 COLONOSCOPY AND BIOPSY: CPT | Performed by: INTERNAL MEDICINE

## 2021-05-11 PROCEDURE — 88305 TISSUE EXAM BY PATHOLOGIST: CPT | Performed by: PATHOLOGY

## 2021-05-11 PROCEDURE — 37000008 HC ANESTHESIA 1ST 15 MINUTES: Performed by: INTERNAL MEDICINE

## 2021-05-11 RX ORDER — PROPOFOL 10 MG/ML
INJECTION, EMULSION INTRAVENOUS CONTINUOUS PRN
Status: DISCONTINUED | OUTPATIENT
Start: 2021-05-11 | End: 2021-05-11

## 2021-05-11 RX ORDER — SODIUM CHLORIDE 9 MG/ML
INJECTION, SOLUTION INTRAVENOUS CONTINUOUS
Status: DISCONTINUED | OUTPATIENT
Start: 2021-05-11 | End: 2021-05-11 | Stop reason: HOSPADM

## 2021-05-11 RX ORDER — LIDOCAINE HCL/PF 100 MG/5ML
SYRINGE (ML) INTRAVENOUS
Status: DISCONTINUED | OUTPATIENT
Start: 2021-05-11 | End: 2021-05-11

## 2021-05-11 RX ORDER — PHENYLEPHRINE HYDROCHLORIDE 10 MG/ML
INJECTION INTRAVENOUS
Status: DISCONTINUED | OUTPATIENT
Start: 2021-05-11 | End: 2021-05-11

## 2021-05-11 RX ORDER — SODIUM CHLORIDE 0.9 % (FLUSH) 0.9 %
10 SYRINGE (ML) INJECTION
Status: DISCONTINUED | OUTPATIENT
Start: 2021-05-11 | End: 2021-05-11 | Stop reason: HOSPADM

## 2021-05-11 RX ORDER — PROPOFOL 10 MG/ML
INJECTION, EMULSION INTRAVENOUS
Status: DISCONTINUED | OUTPATIENT
Start: 2021-05-11 | End: 2021-05-11

## 2021-05-11 RX ADMIN — PHENYLEPHRINE HYDROCHLORIDE 100 MCG: 10 INJECTION INTRAVENOUS at 09:05

## 2021-05-11 RX ADMIN — LIDOCAINE HYDROCHLORIDE 50 MG: 20 INJECTION, SOLUTION INTRAVENOUS at 09:05

## 2021-05-11 RX ADMIN — GLYCOPYRROLATE 0.2 MG: 0.2 INJECTION, SOLUTION INTRAMUSCULAR; INTRAVITREAL at 09:05

## 2021-05-11 RX ADMIN — SODIUM CHLORIDE: 0.9 INJECTION, SOLUTION INTRAVENOUS at 08:05

## 2021-05-11 RX ADMIN — PROPOFOL 80 MG: 10 INJECTION, EMULSION INTRAVENOUS at 09:05

## 2021-05-11 RX ADMIN — PROPOFOL 150 MCG/KG/MIN: 10 INJECTION, EMULSION INTRAVENOUS at 09:05

## 2021-05-16 LAB
FINAL PATHOLOGIC DIAGNOSIS: ABNORMAL
GROSS: ABNORMAL
Lab: ABNORMAL

## 2021-06-04 ENCOUNTER — OFFICE VISIT (OUTPATIENT)
Dept: INTERNAL MEDICINE | Facility: CLINIC | Age: 66
End: 2021-06-04
Payer: MEDICARE

## 2021-06-04 VITALS
OXYGEN SATURATION: 98 % | HEART RATE: 94 BPM | BODY MASS INDEX: 31.8 KG/M2 | DIASTOLIC BLOOD PRESSURE: 72 MMHG | HEIGHT: 61 IN | SYSTOLIC BLOOD PRESSURE: 110 MMHG | WEIGHT: 168.44 LBS

## 2021-06-04 DIAGNOSIS — R42 VERTIGO: ICD-10-CM

## 2021-06-04 DIAGNOSIS — R21 RASH: ICD-10-CM

## 2021-06-04 DIAGNOSIS — H65.90 SEROUS OTITIS MEDIA, UNSPECIFIED CHRONICITY, UNSPECIFIED LATERALITY: ICD-10-CM

## 2021-06-04 PROCEDURE — 99999 PR PBB SHADOW E&M-EST. PATIENT-LVL IV: ICD-10-PCS | Mod: PBBFAC,,, | Performed by: INTERNAL MEDICINE

## 2021-06-04 PROCEDURE — 99214 OFFICE O/P EST MOD 30 MIN: CPT | Mod: S$PBB,,, | Performed by: INTERNAL MEDICINE

## 2021-06-04 PROCEDURE — 99214 PR OFFICE/OUTPT VISIT, EST, LEVL IV, 30-39 MIN: ICD-10-PCS | Mod: S$PBB,,, | Performed by: INTERNAL MEDICINE

## 2021-06-04 PROCEDURE — 99999 PR PBB SHADOW E&M-EST. PATIENT-LVL IV: CPT | Mod: PBBFAC,,, | Performed by: INTERNAL MEDICINE

## 2021-06-04 PROCEDURE — 99214 OFFICE O/P EST MOD 30 MIN: CPT | Mod: PBBFAC,PO | Performed by: INTERNAL MEDICINE

## 2021-06-04 RX ORDER — TRIAMCINOLONE ACETONIDE 1 MG/ML
LOTION TOPICAL 2 TIMES DAILY
Qty: 60 ML | Refills: 1 | Status: SHIPPED | OUTPATIENT
Start: 2021-06-04

## 2021-06-04 RX ORDER — METHYLPREDNISOLONE 4 MG/1
TABLET ORAL
Qty: 21 TABLET | Refills: 0 | Status: SHIPPED | OUTPATIENT
Start: 2021-06-04 | End: 2021-06-25

## 2021-09-08 ENCOUNTER — LAB VISIT (OUTPATIENT)
Dept: LAB | Facility: HOSPITAL | Age: 66
End: 2021-09-08
Attending: INTERNAL MEDICINE
Payer: MEDICARE

## 2021-09-08 DIAGNOSIS — Z00.00 ANNUAL PHYSICAL EXAM: ICD-10-CM

## 2021-09-08 LAB
ALBUMIN SERPL BCP-MCNC: 4.2 G/DL (ref 3.5–5.2)
ALP SERPL-CCNC: 65 U/L (ref 55–135)
ALT SERPL W/O P-5'-P-CCNC: 30 U/L (ref 10–44)
ANION GAP SERPL CALC-SCNC: 11 MMOL/L (ref 8–16)
AST SERPL-CCNC: 27 U/L (ref 10–40)
BASOPHILS # BLD AUTO: 0.03 K/UL (ref 0–0.2)
BASOPHILS NFR BLD: 0.5 % (ref 0–1.9)
BILIRUB SERPL-MCNC: 0.8 MG/DL (ref 0.1–1)
BUN SERPL-MCNC: 13 MG/DL (ref 8–23)
CALCIUM SERPL-MCNC: 9.8 MG/DL (ref 8.7–10.5)
CHLORIDE SERPL-SCNC: 107 MMOL/L (ref 95–110)
CHOLEST SERPL-MCNC: 135 MG/DL (ref 120–199)
CHOLEST/HDLC SERPL: 3.5 {RATIO} (ref 2–5)
CO2 SERPL-SCNC: 24 MMOL/L (ref 23–29)
CREAT SERPL-MCNC: 0.7 MG/DL (ref 0.5–1.4)
DIFFERENTIAL METHOD: ABNORMAL
EOSINOPHIL # BLD AUTO: 0.3 K/UL (ref 0–0.5)
EOSINOPHIL NFR BLD: 4.9 % (ref 0–8)
ERYTHROCYTE [DISTWIDTH] IN BLOOD BY AUTOMATED COUNT: 13.2 % (ref 11.5–14.5)
EST. GFR  (AFRICAN AMERICAN): >60 ML/MIN/1.73 M^2
EST. GFR  (NON AFRICAN AMERICAN): >60 ML/MIN/1.73 M^2
ESTIMATED AVG GLUCOSE: 100 MG/DL (ref 68–131)
GLUCOSE SERPL-MCNC: 104 MG/DL (ref 70–110)
HBA1C MFR BLD: 5.1 % (ref 4–5.6)
HCT VFR BLD AUTO: 41.1 % (ref 37–48.5)
HDLC SERPL-MCNC: 39 MG/DL (ref 40–75)
HDLC SERPL: 28.9 % (ref 20–50)
HGB BLD-MCNC: 13.9 G/DL (ref 12–16)
IMM GRANULOCYTES # BLD AUTO: 0.02 K/UL (ref 0–0.04)
IMM GRANULOCYTES NFR BLD AUTO: 0.3 % (ref 0–0.5)
LDLC SERPL CALC-MCNC: 80.2 MG/DL (ref 63–159)
LYMPHOCYTES # BLD AUTO: 1.3 K/UL (ref 1–4.8)
LYMPHOCYTES NFR BLD: 21.5 % (ref 18–48)
MCH RBC QN AUTO: 31.1 PG (ref 27–31)
MCHC RBC AUTO-ENTMCNC: 33.8 G/DL (ref 32–36)
MCV RBC AUTO: 92 FL (ref 82–98)
MONOCYTES # BLD AUTO: 0.3 K/UL (ref 0.3–1)
MONOCYTES NFR BLD: 5.3 % (ref 4–15)
NEUTROPHILS # BLD AUTO: 4.1 K/UL (ref 1.8–7.7)
NEUTROPHILS NFR BLD: 67.5 % (ref 38–73)
NONHDLC SERPL-MCNC: 96 MG/DL
NRBC BLD-RTO: 0 /100 WBC
PLATELET # BLD AUTO: 240 K/UL (ref 150–450)
PMV BLD AUTO: 9.9 FL (ref 9.2–12.9)
POTASSIUM SERPL-SCNC: 4.1 MMOL/L (ref 3.5–5.1)
PROT SERPL-MCNC: 7.5 G/DL (ref 6–8.4)
RBC # BLD AUTO: 4.47 M/UL (ref 4–5.4)
SODIUM SERPL-SCNC: 142 MMOL/L (ref 136–145)
TRIGL SERPL-MCNC: 79 MG/DL (ref 30–150)
TSH SERPL DL<=0.005 MIU/L-ACNC: 2.89 UIU/ML (ref 0.4–4)
WBC # BLD AUTO: 6.09 K/UL (ref 3.9–12.7)

## 2021-09-08 PROCEDURE — 80053 COMPREHEN METABOLIC PANEL: CPT | Performed by: INTERNAL MEDICINE

## 2021-09-08 PROCEDURE — 85025 COMPLETE CBC W/AUTO DIFF WBC: CPT | Performed by: INTERNAL MEDICINE

## 2021-09-08 PROCEDURE — 87389 HIV-1 AG W/HIV-1&-2 AB AG IA: CPT | Performed by: INTERNAL MEDICINE

## 2021-09-08 PROCEDURE — 83036 HEMOGLOBIN GLYCOSYLATED A1C: CPT | Performed by: INTERNAL MEDICINE

## 2021-09-08 PROCEDURE — 80061 LIPID PANEL: CPT | Performed by: INTERNAL MEDICINE

## 2021-09-08 PROCEDURE — 84443 ASSAY THYROID STIM HORMONE: CPT | Performed by: INTERNAL MEDICINE

## 2021-09-08 PROCEDURE — 36415 COLL VENOUS BLD VENIPUNCTURE: CPT | Performed by: INTERNAL MEDICINE

## 2021-09-09 LAB — HIV 1+2 AB+HIV1 P24 AG SERPL QL IA: NEGATIVE

## 2021-09-15 DIAGNOSIS — I10 ESSENTIAL HYPERTENSION: ICD-10-CM

## 2021-09-15 RX ORDER — LOSARTAN POTASSIUM 100 MG/1
100 TABLET ORAL DAILY
Qty: 90 TABLET | Refills: 1 | Status: SHIPPED | OUTPATIENT
Start: 2021-09-15 | End: 2021-09-16 | Stop reason: SDUPTHER

## 2021-09-16 DIAGNOSIS — I10 ESSENTIAL HYPERTENSION: ICD-10-CM

## 2021-09-16 RX ORDER — LOSARTAN POTASSIUM 100 MG/1
100 TABLET ORAL DAILY
Qty: 90 TABLET | Refills: 1 | Status: SHIPPED | OUTPATIENT
Start: 2021-09-16 | End: 2022-03-23 | Stop reason: SDUPTHER

## 2021-09-22 ENCOUNTER — OFFICE VISIT (OUTPATIENT)
Dept: INTERNAL MEDICINE | Facility: CLINIC | Age: 66
End: 2021-09-22
Payer: MEDICARE

## 2021-09-22 ENCOUNTER — PATIENT OUTREACH (OUTPATIENT)
Dept: ADMINISTRATIVE | Facility: HOSPITAL | Age: 66
End: 2021-09-22

## 2021-09-22 VITALS
HEART RATE: 87 BPM | RESPIRATION RATE: 16 BRPM | DIASTOLIC BLOOD PRESSURE: 84 MMHG | SYSTOLIC BLOOD PRESSURE: 126 MMHG | WEIGHT: 174.38 LBS | OXYGEN SATURATION: 98 % | BODY MASS INDEX: 32.92 KG/M2 | HEIGHT: 61 IN

## 2021-09-22 DIAGNOSIS — J30.9 ALLERGIC RHINITIS, UNSPECIFIED SEASONALITY, UNSPECIFIED TRIGGER: ICD-10-CM

## 2021-09-22 DIAGNOSIS — E55.9 VITAMIN D DEFICIENCY: ICD-10-CM

## 2021-09-22 DIAGNOSIS — E66.9 OBESITY (BMI 30-39.9): ICD-10-CM

## 2021-09-22 DIAGNOSIS — J45.51 SEVERE PERSISTENT ASTHMA WITH ACUTE EXACERBATION: ICD-10-CM

## 2021-09-22 DIAGNOSIS — M85.80 OSTEOPENIA, UNSPECIFIED LOCATION: ICD-10-CM

## 2021-09-22 DIAGNOSIS — I10 ESSENTIAL HYPERTENSION: ICD-10-CM

## 2021-09-22 DIAGNOSIS — K21.9 GASTROESOPHAGEAL REFLUX DISEASE WITHOUT ESOPHAGITIS: ICD-10-CM

## 2021-09-22 DIAGNOSIS — Z00.00 ANNUAL PHYSICAL EXAM: ICD-10-CM

## 2021-09-22 PROCEDURE — 99999 PR PBB SHADOW E&M-EST. PATIENT-LVL III: CPT | Mod: PBBFAC,,, | Performed by: INTERNAL MEDICINE

## 2021-09-22 PROCEDURE — 99213 OFFICE O/P EST LOW 20 MIN: CPT | Mod: PBBFAC,PO | Performed by: INTERNAL MEDICINE

## 2021-09-22 PROCEDURE — 99397 PR PREVENTIVE VISIT,EST,65 & OVER: ICD-10-PCS | Mod: S$PBB,,, | Performed by: INTERNAL MEDICINE

## 2021-09-22 PROCEDURE — 99397 PER PM REEVAL EST PAT 65+ YR: CPT | Mod: S$PBB,,, | Performed by: INTERNAL MEDICINE

## 2021-09-22 PROCEDURE — 99999 PR PBB SHADOW E&M-EST. PATIENT-LVL III: ICD-10-PCS | Mod: PBBFAC,,, | Performed by: INTERNAL MEDICINE

## 2021-09-22 RX ORDER — AZELASTINE 1 MG/ML
1 SPRAY, METERED NASAL 2 TIMES DAILY
Qty: 10 ML | Refills: 11 | Status: SHIPPED | OUTPATIENT
Start: 2021-09-22 | End: 2022-09-26 | Stop reason: SDUPTHER

## 2021-09-22 RX ORDER — PANTOPRAZOLE SODIUM 40 MG/1
40 TABLET, DELAYED RELEASE ORAL DAILY
Qty: 30 TABLET | Refills: 11 | Status: SHIPPED | OUTPATIENT
Start: 2021-09-22 | End: 2022-09-26 | Stop reason: SDUPTHER

## 2021-09-22 RX ORDER — MONTELUKAST SODIUM 10 MG/1
10 TABLET ORAL NIGHTLY
Qty: 90 TABLET | Refills: 3 | Status: SHIPPED | OUTPATIENT
Start: 2021-09-22 | End: 2022-09-26 | Stop reason: SDUPTHER

## 2021-09-22 RX ORDER — ALENDRONATE SODIUM 70 MG/1
TABLET ORAL
Qty: 4 TABLET | Refills: 11 | Status: SHIPPED | OUTPATIENT
Start: 2021-09-22 | End: 2022-09-26 | Stop reason: SDUPTHER

## 2021-09-22 RX ORDER — ALBUTEROL SULFATE 90 UG/1
2 AEROSOL, METERED RESPIRATORY (INHALATION) EVERY 6 HOURS PRN
Qty: 18 G | Refills: 2 | Status: SHIPPED | OUTPATIENT
Start: 2021-09-22 | End: 2022-03-28 | Stop reason: SDUPTHER

## 2021-09-22 RX ORDER — FLUTICASONE PROPIONATE 50 MCG
SPRAY, SUSPENSION (ML) NASAL
Qty: 16 ML | Refills: 11 | Status: SHIPPED | OUTPATIENT
Start: 2021-09-22 | End: 2022-09-26 | Stop reason: SDUPTHER

## 2021-09-22 RX ORDER — BUDESONIDE AND FORMOTEROL FUMARATE DIHYDRATE 160; 4.5 UG/1; UG/1
2 AEROSOL RESPIRATORY (INHALATION) EVERY 12 HOURS
Qty: 10.2 INHALER | Refills: 11 | Status: SHIPPED | OUTPATIENT
Start: 2021-09-22 | End: 2022-09-26 | Stop reason: SDUPTHER

## 2021-09-22 RX ORDER — ASPIRIN 325 MG
50000 TABLET, DELAYED RELEASE (ENTERIC COATED) ORAL WEEKLY
Qty: 12 CAPSULE | Refills: 3 | COMMUNITY
Start: 2021-09-22 | End: 2023-09-26

## 2021-10-09 ENCOUNTER — IMMUNIZATION (OUTPATIENT)
Dept: INTERNAL MEDICINE | Facility: CLINIC | Age: 66
End: 2021-10-09
Payer: MEDICARE

## 2021-10-09 PROCEDURE — 90694 VACC AIIV4 NO PRSRV 0.5ML IM: CPT | Mod: PBBFAC,PO

## 2021-10-09 PROCEDURE — G0008 ADMIN INFLUENZA VIRUS VAC: HCPCS | Mod: PBBFAC,PO

## 2022-03-10 ENCOUNTER — PES CALL (OUTPATIENT)
Dept: ADMINISTRATIVE | Facility: CLINIC | Age: 67
End: 2022-03-10
Payer: MEDICARE

## 2022-03-11 ENCOUNTER — TELEPHONE (OUTPATIENT)
Dept: INTERNAL MEDICINE | Facility: CLINIC | Age: 67
End: 2022-03-11
Payer: MEDICARE

## 2022-03-11 NOTE — TELEPHONE ENCOUNTER
----- Message from Rona Groves MA sent at 3/10/2022  5:25 PM CST -----  Contact: HAJA SANTO [[id number]] [phone]    ----- Message -----  From: Shari Zamarripa  Sent: 3/10/2022   5:24 PM CST  To: Moises Bryson Staff    Type:  Return Call    Who Called: HAJA SANTO [[id number]]    Who Left Message for Patient: N/A    Does the patient know what this is regarding?: No    Would the patient rather a call back or a response via [facility]? Phone call    Best Call Back Number: [phone]    Additional Information:

## 2022-03-11 NOTE — TELEPHONE ENCOUNTER
----- Message from Aleida Ogden sent at 3/11/2022 10:01 AM CST -----  Regarding: Return Call  Contact: [phone]  Patient Carina returning call from yesterday. Patient would also like to know if shes able to have a bone scan done. Please call patient at [phone]      Thank You

## 2022-03-15 DIAGNOSIS — M81.0 OSTEOPOROSIS, UNSPECIFIED OSTEOPOROSIS TYPE, UNSPECIFIED PATHOLOGICAL FRACTURE PRESENCE: Primary | ICD-10-CM

## 2022-03-15 NOTE — TELEPHONE ENCOUNTER
Last DXA scan was Oct 2020, not due for repeat until this fall but the order is in  Patient  notified that she needs to do her dexa in October. Patient voices understanding.

## 2022-03-16 ENCOUNTER — LAB VISIT (OUTPATIENT)
Dept: LAB | Facility: HOSPITAL | Age: 67
End: 2022-03-16
Attending: INTERNAL MEDICINE
Payer: MEDICARE

## 2022-03-16 DIAGNOSIS — I10 ESSENTIAL HYPERTENSION: ICD-10-CM

## 2022-03-16 LAB
ALBUMIN SERPL BCP-MCNC: 4.1 G/DL (ref 3.5–5.2)
ALP SERPL-CCNC: 74 U/L (ref 55–135)
ALT SERPL W/O P-5'-P-CCNC: 27 U/L (ref 10–44)
ANION GAP SERPL CALC-SCNC: 11 MMOL/L (ref 8–16)
AST SERPL-CCNC: 22 U/L (ref 10–40)
BILIRUB SERPL-MCNC: 0.7 MG/DL (ref 0.1–1)
BUN SERPL-MCNC: 12 MG/DL (ref 8–23)
CALCIUM SERPL-MCNC: 9.8 MG/DL (ref 8.7–10.5)
CHLORIDE SERPL-SCNC: 105 MMOL/L (ref 95–110)
CO2 SERPL-SCNC: 28 MMOL/L (ref 23–29)
CREAT SERPL-MCNC: 0.7 MG/DL (ref 0.5–1.4)
EST. GFR  (AFRICAN AMERICAN): >60 ML/MIN/1.73 M^2
EST. GFR  (NON AFRICAN AMERICAN): >60 ML/MIN/1.73 M^2
GLUCOSE SERPL-MCNC: 122 MG/DL (ref 70–110)
POTASSIUM SERPL-SCNC: 4.6 MMOL/L (ref 3.5–5.1)
PROT SERPL-MCNC: 7.3 G/DL (ref 6–8.4)
SODIUM SERPL-SCNC: 144 MMOL/L (ref 136–145)

## 2022-03-16 PROCEDURE — 36415 COLL VENOUS BLD VENIPUNCTURE: CPT | Mod: PO | Performed by: INTERNAL MEDICINE

## 2022-03-16 PROCEDURE — 80053 COMPREHEN METABOLIC PANEL: CPT | Performed by: INTERNAL MEDICINE

## 2022-03-23 ENCOUNTER — OFFICE VISIT (OUTPATIENT)
Dept: INTERNAL MEDICINE | Facility: CLINIC | Age: 67
End: 2022-03-23
Payer: MEDICARE

## 2022-03-23 ENCOUNTER — HOSPITAL ENCOUNTER (OUTPATIENT)
Dept: RADIOLOGY | Facility: HOSPITAL | Age: 67
Discharge: HOME | End: 2022-03-23
Attending: INTERNAL MEDICINE
Payer: MEDICARE

## 2022-03-23 VITALS
DIASTOLIC BLOOD PRESSURE: 76 MMHG | SYSTOLIC BLOOD PRESSURE: 120 MMHG | OXYGEN SATURATION: 97 % | WEIGHT: 171.31 LBS | BODY MASS INDEX: 32.34 KG/M2 | HEIGHT: 61 IN | HEART RATE: 80 BPM

## 2022-03-23 DIAGNOSIS — M25.552 HIP PAIN, CHRONIC, LEFT: Primary | ICD-10-CM

## 2022-03-23 DIAGNOSIS — I10 ESSENTIAL HYPERTENSION: ICD-10-CM

## 2022-03-23 DIAGNOSIS — G89.29 HIP PAIN, CHRONIC, LEFT: Primary | ICD-10-CM

## 2022-03-23 DIAGNOSIS — G89.29 HIP PAIN, CHRONIC, LEFT: ICD-10-CM

## 2022-03-23 DIAGNOSIS — M25.552 HIP PAIN, CHRONIC, LEFT: ICD-10-CM

## 2022-03-23 DIAGNOSIS — E78.5 HYPERLIPIDEMIA, UNSPECIFIED HYPERLIPIDEMIA TYPE: ICD-10-CM

## 2022-03-23 DIAGNOSIS — R73.9 HYPERGLYCEMIA: ICD-10-CM

## 2022-03-23 DIAGNOSIS — C50.919 MALIGNANT NEOPLASM OF FEMALE BREAST, UNSPECIFIED ESTROGEN RECEPTOR STATUS, UNSPECIFIED LATERALITY, UNSPECIFIED SITE OF BREAST: ICD-10-CM

## 2022-03-23 DIAGNOSIS — J45.40 MODERATE PERSISTENT ASTHMA WITHOUT COMPLICATION: ICD-10-CM

## 2022-03-23 PROCEDURE — 73502 X-RAY EXAM HIP UNI 2-3 VIEWS: CPT | Mod: 26,LT,, | Performed by: RADIOLOGY

## 2022-03-23 PROCEDURE — 99214 PR OFFICE/OUTPT VISIT, EST, LEVL IV, 30-39 MIN: ICD-10-PCS | Mod: S$PBB,,, | Performed by: INTERNAL MEDICINE

## 2022-03-23 PROCEDURE — 99999 PR PBB SHADOW E&M-EST. PATIENT-LVL IV: ICD-10-PCS | Mod: PBBFAC,,, | Performed by: INTERNAL MEDICINE

## 2022-03-23 PROCEDURE — 73502 XR HIP WITH PELVIS WHEN PERFORMED, 2 OR 3 VIEWS LEFT: ICD-10-PCS | Mod: 26,LT,, | Performed by: RADIOLOGY

## 2022-03-23 PROCEDURE — 99999 PR PBB SHADOW E&M-EST. PATIENT-LVL IV: CPT | Mod: PBBFAC,,, | Performed by: INTERNAL MEDICINE

## 2022-03-23 PROCEDURE — 99214 OFFICE O/P EST MOD 30 MIN: CPT | Mod: S$PBB,,, | Performed by: INTERNAL MEDICINE

## 2022-03-23 PROCEDURE — 73502 X-RAY EXAM HIP UNI 2-3 VIEWS: CPT | Mod: TC,FY,PO,LT

## 2022-03-23 PROCEDURE — 99214 OFFICE O/P EST MOD 30 MIN: CPT | Mod: PBBFAC,PO | Performed by: INTERNAL MEDICINE

## 2022-03-23 RX ORDER — LOSARTAN POTASSIUM 100 MG/1
100 TABLET ORAL DAILY
Qty: 90 TABLET | Refills: 1 | Status: SHIPPED | OUTPATIENT
Start: 2022-03-23 | End: 2022-09-26 | Stop reason: SDUPTHER

## 2022-03-23 NOTE — PROGRESS NOTES
Patient ID: Carina Montaño is a 66 y.o. female.    Chief Complaint: Follow-up and Hypertension    HPI  Carina is a 66 y.o. female with hypertension, vitamin-D deficiency, GERD, severe persistent asthma, allergic rhinitis, prediabetes, and osteopenia continues to of breast cancer who presents for routine follow-up of medical conditions.  Reviewed recent CMP lab results with her in clinic today.      She complains of pain located left hip. Onset may have been 3-4 years ago after a fall. Pain is intermittent in duration.  It is not constant.  It is worse with walking.  Is relieved by rest.  Per chart review had left hip x-ray years ago which revealed mild arthritis.  Not currently using anything for relief of pain.  Nothing relieving pain at this time.    Follows with  for history of breast cancer and saw this doctor on Monday.    Denies any recent issues with asthma.  Specifically denies any recent episodes of coughing, wheezing, or shortness of breath.  Compliant with asthma medication.    Requests to have A1c checked.      Review of Systems   Musculoskeletal: Positive for arthralgias.   All other systems reviewed and are negative.      Objective:     Vitals:    03/23/22 1114   BP: 120/76   Pulse: 80        Physical Exam  Vitals reviewed.   Constitutional:       General: She is not in acute distress.     Appearance: Normal appearance. She is well-developed. She is obese. She is not ill-appearing, toxic-appearing or diaphoretic.   HENT:      Head: Normocephalic and atraumatic.      Right Ear: External ear normal.      Left Ear: External ear normal.      Nose: Nose normal.   Eyes:      General: No scleral icterus.        Right eye: No discharge.         Left eye: No discharge.      Extraocular Movements: Extraocular movements intact.      Conjunctiva/sclera: Conjunctivae normal.   Cardiovascular:      Rate and Rhythm: Normal rate and regular rhythm.      Heart sounds: Normal heart sounds. No murmur  heard.    No friction rub. No gallop.   Pulmonary:      Effort: Pulmonary effort is normal. No respiratory distress.      Breath sounds: Normal breath sounds. No stridor. No wheezing, rhonchi or rales.   Skin:     General: Skin is warm and dry.   Neurological:      General: No focal deficit present.      Mental Status: She is alert and oriented to person, place, and time. Mental status is at baseline.   Psychiatric:         Mood and Affect: Mood normal.         Behavior: Behavior normal.         Thought Content: Thought content normal.         Judgment: Judgment normal.         Assessment:       1. Hip pain, chronic, left Chronic   2. Hyperglycemia Active   3. Essential hypertension Well controlled   4. Moderate persistent asthma without complication Well controlled   5. Hyperlipidemia, unspecified hyperlipidemia type Chronic   6. Malignant neoplasm of female breast, unspecified estrogen receptor status, unspecified laterality, unspecified site of breast Inactive       Plan:         Hip pain, chronic, left  Comments:  If xray WNL or with just mild arthritis, will refer to PT. Use heating pad, stretching, massage and tylenol PRN pain  Orders:  -     X-Ray Hip 2 or 3 views Left (with Pelvis when performed); Future; Expected date: 03/23/2022    Hyperglycemia  -     Hemoglobin A1C; Future; Expected date: 03/23/2022  -     Hemoglobin A1C; Future; Expected date: 09/01/2022    Essential hypertension  Comments:  Cont current meds  Orders:  -     losartan (COZAAR) 100 MG tablet; Take 1 tablet (100 mg total) by mouth once daily.  Dispense: 90 tablet; Refill: 1  -     Comprehensive Metabolic Panel; Future; Expected date: 09/01/2022    Moderate persistent asthma without complication  Comments:  Continue current medications  Orders:  -     NEBULIZER FOR HOME USE  -     NEBULIZER KIT (SUPPLIES) FOR HOME USE  -     CBC Auto Differential; Future; Expected date: 09/01/2022    Hyperlipidemia, unspecified hyperlipidemia  type  Comments:  Continue current medication  Orders:  -     Lipid Panel; Future; Expected date: 09/01/2022    Malignant neoplasm of female breast, unspecified estrogen receptor status, unspecified laterality, unspecified site of breast  Comments:  Monitored by Dr. Darnell    Other orders  -     Cancel: TSH; Future; Expected date: 09/01/2022        RTC 6 months for annual exam    Warning signs discussed, patient to call with any further issues or worsening of symptoms.       Parts of the above note were dictated using a voice dictation software. Please excuse any grammatical or typographical errors.

## 2022-03-28 DIAGNOSIS — M25.552 CHRONIC LEFT HIP PAIN: Primary | ICD-10-CM

## 2022-03-28 DIAGNOSIS — G89.29 CHRONIC LEFT HIP PAIN: Primary | ICD-10-CM

## 2022-03-28 DIAGNOSIS — J45.51 SEVERE PERSISTENT ASTHMA WITH ACUTE EXACERBATION: ICD-10-CM

## 2022-03-28 NOTE — TELEPHONE ENCOUNTER
No new care gaps identified.  Powered by [employer] by [facility] [facility]. Reference number: [id number].   3/28/2022 4:42:06 PM CDT

## 2022-03-28 NOTE — TELEPHONE ENCOUNTER
----- Message from Adri Solano sent at 3/28/2022  9:27 AM CDT -----  Contact: [phone]  Type:  Needs Medical Advice    Who Called: Pt  Reason for call: regarding pt needs the Solution for her Breathing Machine and regarding her Results from her X ray and Blood work  Pharmacy name and phone #:  [facility] in [place]  Would the patient rather a call back or a response via [facility]?  Call back  Best Call Back Number: [phone]

## 2022-03-28 NOTE — TELEPHONE ENCOUNTER
No new care gaps identified.  Powered by [employer]. Reference number: [id number].   3/28/2022 5:07:32 PM CDT

## 2022-03-28 NOTE — TELEPHONE ENCOUNTER
----- Message from Skyla iLn sent at 3/28/2022  5:04 PM CDT -----  Contact: pt  Type:  Test Results    Who Called: pt  Name of Test (Lab/Mammo/Etc): xray   Date of Test: 3/23  Ordering Provider:   Where the test was performed: [facility]  Would the patient rather a call back or a response via [facility]? call  Best Call Back Number: [phone] (M)   Additional Information:

## 2022-03-29 ENCOUNTER — TELEPHONE (OUTPATIENT)
Dept: INTERNAL MEDICINE | Facility: CLINIC | Age: 67
End: 2022-03-29
Payer: MEDICARE

## 2022-03-29 RX ORDER — ALBUTEROL SULFATE 0.83 MG/ML
SOLUTION RESPIRATORY (INHALATION)
Qty: 75 ML | Refills: 5 | Status: SHIPPED | OUTPATIENT
Start: 2022-03-29 | End: 2023-09-26 | Stop reason: SDUPTHER

## 2022-03-29 RX ORDER — ALBUTEROL SULFATE 0.83 MG/ML
SOLUTION RESPIRATORY (INHALATION)
Qty: 75 ML | Refills: 5 | OUTPATIENT
Start: 2022-03-29

## 2022-03-29 RX ORDER — ALBUTEROL SULFATE 90 UG/1
2 AEROSOL, METERED RESPIRATORY (INHALATION) EVERY 6 HOURS PRN
Qty: 18 G | Refills: 2 | Status: SHIPPED | OUTPATIENT
Start: 2022-03-29 | End: 2023-01-17

## 2022-03-29 NOTE — TELEPHONE ENCOUNTER
----- Message from Earl Sanchez sent at 3/29/2022  3:27 PM CDT -----  Type:  Needs Medical Advice    Who Called: self  Reason:lab results   Would the patient rather a call back or a response via [facility]? call  Best Call Back Number: [phone]  Additional Information: none

## 2022-04-06 ENCOUNTER — CLINICAL SUPPORT (OUTPATIENT)
Dept: REHABILITATION | Facility: HOSPITAL | Age: 67
End: 2022-04-06
Attending: INTERNAL MEDICINE
Payer: MEDICARE

## 2022-04-06 DIAGNOSIS — M25.552 CHRONIC LEFT HIP PAIN: ICD-10-CM

## 2022-04-06 DIAGNOSIS — M25.652 DECREASED RANGE OF LEFT HIP MOVEMENT: ICD-10-CM

## 2022-04-06 DIAGNOSIS — G89.29 CHRONIC HIP PAIN, LEFT: ICD-10-CM

## 2022-04-06 DIAGNOSIS — R26.89 IMPAIRED GAIT AND MOBILITY: ICD-10-CM

## 2022-04-06 DIAGNOSIS — R52 PAIN AGGRAVATED BY WALKING: ICD-10-CM

## 2022-04-06 DIAGNOSIS — G89.29 CHRONIC LEFT HIP PAIN: ICD-10-CM

## 2022-04-06 DIAGNOSIS — M25.552 CHRONIC HIP PAIN, LEFT: ICD-10-CM

## 2022-04-06 PROCEDURE — 97161 PT EVAL LOW COMPLEX 20 MIN: CPT | Mod: PN

## 2022-04-06 NOTE — PLAN OF CARE
[facility]  Physical Therapy Initial Evaluation    Name: Carina Montaño  Clinic Number: [id number]    Therapy Diagnosis:   Encounter Diagnoses   Name Primary?    Chronic left hip pain     Chronic hip pain, left     Pain aggravated by walking     Decreased range of left hip movement     Impaired gait and mobility      Physician: Adelaide De Leon MD    Physician Orders: PT Eval and Treat   Medical Diagnosis: M25.552,G89.29 (ICD-10-CM) - Chronic left hip pain  Evaluation Date: 4/6/2022  Authorization Period Expiration: 12/31/20222  Plan of Care Certification Period: 4/6/2022 to 05/30/2022  Visit # / Visits authorized: 1/ 50 FOTO: 1/5    Time In: 1010  Time Out: 1050  Total Billable Time: 40 minutes (1 LCE)  Precautions: Fall, osteoporosis    Subjective   Carina reports to OPPT today with primary complaint of long-standing left hip pain.  She thinks that the initial onset was 3-4 years ago following a fall but voices multiple falls since.  She doesn't want surgery and is seeking conservative treatment at this time.  Hip pain hurts along the side of her hip but does not extend distally.  Aggravated by walking mainly; this limits her ambulation distance.  Alleviated by sitting, rest.  Denies morning pain or stiffness.  Denies lumbar pain.  Does voice history of left knee surgery.  Denies lower extremity paresthesias.  When asked why she falls, attributes to walking in flip-flops.        Past Medical History:   Diagnosis Date    Allergy     Arthritis     Asthma     Cancer     breast    GERD (gastroesophageal reflux disease)     Hyperlipidemia     Hypertension     Prediabetes      Carina Montaño  has a past surgical history that includes Knee surgery; Shoulder surgery; Abdominal surgery; Esophagogastroduodenoscopy (N/A, 5/17/2019); and Colonoscopy (N/A, 5/11/2021).    Carina has a current medication list which includes the following prescription(s): albuterol, albuterol,  "alendronate, azelastine, budesonide-formoterol 160-4.5 mcg, cholecalciferol (vitamin d3), fluticasone propionate, losartan, metformin, montelukast, multivit,calc,mins/iron/folic, pantoprazole, simvastatin, and triamcinolone acetonide 0.1%.    Review of patient's allergies indicates:   Allergen Reactions    Grass pollen-perennial rye, standard     Lipitor [atorvastatin]     Strawberries [strawberry]       Imagin2022 Hip radiographs: Findings: "Mild DJD slightly more on the left side with squaring of the femoral head..  No fracture or dislocation.  No bone destruction identified."    Prior Therapy: none  Social History: Lives at home with her family.   Occupation: retired.   Prior Level of Function: limited to some community walking distances due to dysnpea  Current Level of Function: progressive loss of tolerance to walking due to pain and dyspnea.     Pain:  Current 3/10, worst 7/10, best 0/10   Location: left lateral hip   Description: Dull    Pts goals: 1. To improve her walking tolerance.                       2. To avoid surgery.     Objective     Palpation: TrP TFL, gluteus medi/minimus tendon but not over greater trochanter.     Posture: slight pelvic obliquity noted. Stands in toe out position.      Gross movement analysis:  -Gait: antalgic patterning with quickened loading/stance phase on the LLE.   -Forward bending: good lumbopelvic rhythm.   -Squat: quad-dominant preferred pattern.   - Single leg assessment: Good single-leg tolerance bilateral without pain.     Lumbar Range of Motion:    Degrees   Flexion 75%   Extension 75%   Left Side Bending 50%   Right Side Bending 50%   Left rotation   WNL   Right Rotation   WNL      Range of Motion:   LE Right Left   Hip flexion 110 with external rotation bias 100; tight   Hip abduction WNL 30   Hip ER WNL 30   Hip internal rotation  WNL 20   Hip extension WNL WNL   Knee WNL WNL   Ankle DF WNL WNL     Lower Extremity Strength   Right Left   Hip flexion: " 4/5 4/5   Hip extension: 4/5 4/5   Hip abduction: 4-/5 4-/5   Hip ER:  4/5 4/5   Hip internal rotation: 4/5 4-/5   Knee extension: 4/5 4/5   Knee flexion: 4/5 4/5   Ankle dorsiflexion: 4/5 4/5       Special Tests:  - JAMIE:  Restricted on the left  - FADIR:  Positive left  - Scouring:  Mildly positive left   - FAIR:   Negative   - Straight leg raise neural tension test: negative bilateral     Hip OA cluster 1:  - hip pain Y, hip IR <15 degrees N, hip flexion <115 degrees Y  f hip IR ROM > 15, use cluster 2    Hip OA cluster 2:  (LR+= 3.4, LR-= 1.9)  - painful hip with internal rotation N  - >49 y/o Y  - morning hip stiffness <60 minutes N    Sutlive Hip OA cluster:  Pain with squatting    negative  Pain < hip internal rotation   negative  Scouring maneuver     yes  Active hip flexion causing lateral pain  yes  Active hip extension causing pain  yes      Joint Mobility: restricted left hip mobility as compared to right.     Sensation: Intact light touch sensation to BLE through L2-S2 dermatomal distribution        CMS Impairment/Limitation/Restriction for FOTO Lumbar Survey    Therapist reviewed FOTO scores for Carina Montaño on 4/6/2022.   FOTO documents entered into [employer] - see Media section.    Staff did not capture       TREATMENT   Not today.   Next visit:  Hip traction/mobs, hip external rotation/IR strengthening, general thigh strengthening activities.     Home Exercises and Patient Education Provided  Education provided re:   - PT diagnosis and recommended treatment course.     Written Home Exercises Provided: not today.       Assessment   Carina is a 66 y.o. female referred to outpatient Physical Therapy with a medical diagnosis of 1) Chronic left hip pain. Pt presents with 3-4 year history of worsening left lateral hip pain mainly aggravated by community ambulation tasks.  Clinical examination today reveals decreased hip range of motion in at least 2 planes as compared to right hip, generalized BLE  weakness, antalgic gait pattern, impaired balance, and decreased aerobic capacity. Does not meet all the Darshan clinical or Sutlive clinical Hip OA CPR.  Recent radiographs showing hip OA changes on the left. No fractures. Differential diagnosis to include hip OA. PT treatment based category: hip pain with mobility deficits.     Pt prognosis is Good.   Pt will benefit from skilled outpatient Physical Therapy to address the deficits stated above and in the chart below, provide pt/family education, and to maximize pt's level of independence.     Plan of care discussed with patient: Yes  Pt's spiritual, cultural and educational needs considered and patient is agreeable to the plan of care and goals as stated below:     Anticipated Barriers for therapy: co-morbidities    Medical Necessity is demonstrated by the following  History  Co-morbidities and personal factors that may impact the plan of care Co-morbidities:   GERD, hyperglycemia, hypertension, arthritis    Personal Factors:   age  coping style  lifestyle     moderate   Examination  Body Structures and Functions, activity limitations and participation restrictions that may impact the plan of care Body Regions:   lower extremities    Body Systems:    gross symmetry  ROM  strength  balance  gait  transfers  transitions  motor control    Participation Restrictions:   none    Activity limitations:   Learning and applying knowledge  no deficits    General Tasks and Commands  undertaking multiple tasks    Communication  no deficits    Mobility  lifting and carrying objects  walking    Self care  no deficits    Domestic Life  shopping  cooking  doing house work (cleaning house, washing dishes, laundry)    Interactions/Relationships  no deficits    Life Areas  no deficits    Community and Social Life  community life  recreation and leisure         moderate   Clinical Presentation stable and uncomplicated low   Decision Making/ Complexity Score: low     Goals:  Short Term  Goals: 3 weeks:  1.  Patient will demonstrate ability to complete 6MWT without increased left hip pain.   2.  Patient will demonstrate 1/2 grade improvement in left hip strength testing throughout for improved tolerance to household ambulation tasks.   3.  Patient will report > 9 point improvement in LEFS.     Long Term Goals: 8 weeks   1. Patient will demonstrate ability to ambulate > 10 minutes consecutive for exercise without increased left hip pain.   2. Patient will ability perform all functional level surface transfers without left hip pain or limitations.     Plan   Certification Period/Plan of care expiration: 4/6/2022 to 05/30/2022    Outpatient Physical Therapy 2 times weekly for 7 weeks to include the following interventions: Cervical/Lumbar Traction, Electrical Stimulation IFC, Gait Training, Manual Therapy, Moist Heat/ Ice, Neuromuscular Re-ed, Orthotic Management and Training, Patient Education, Self Care, Therapeutic Activities and Therapeutic Exercise, IASTM, Dry Needling.     John Ochoa, PT, DPT, OCS

## 2022-04-13 ENCOUNTER — PES CALL (OUTPATIENT)
Dept: ADMINISTRATIVE | Facility: CLINIC | Age: 67
End: 2022-04-13
Payer: MEDICARE

## 2022-04-22 ENCOUNTER — CLINICAL SUPPORT (OUTPATIENT)
Dept: REHABILITATION | Facility: HOSPITAL | Age: 67
End: 2022-04-22
Payer: MEDICARE

## 2022-04-22 DIAGNOSIS — M25.552 CHRONIC HIP PAIN, LEFT: Primary | ICD-10-CM

## 2022-04-22 DIAGNOSIS — G89.29 CHRONIC HIP PAIN, LEFT: Primary | ICD-10-CM

## 2022-04-22 DIAGNOSIS — R26.89 IMPAIRED GAIT AND MOBILITY: ICD-10-CM

## 2022-04-22 DIAGNOSIS — R52 PAIN AGGRAVATED BY WALKING: ICD-10-CM

## 2022-04-22 DIAGNOSIS — M25.652 DECREASED RANGE OF LEFT HIP MOVEMENT: ICD-10-CM

## 2022-04-22 PROCEDURE — 97140 MANUAL THERAPY 1/> REGIONS: CPT | Mod: PN

## 2022-04-22 PROCEDURE — 97110 THERAPEUTIC EXERCISES: CPT | Mod: PN

## 2022-04-22 NOTE — PROGRESS NOTES
"[facility]   Physical Therapy Treatment Note     Name: Carina Montaño  Clinic Number: [id number]    Therapy Diagnosis:   Encounter Diagnoses   Name Primary?    Chronic hip pain, left Yes    Pain aggravated by walking     Decreased range of left hip movement     Impaired gait and mobility      Physician: Adelaide De Leon MD    Visit Date: 4/22/2022    Physician Orders: PT Eval and Treat   Medical Diagnosis: M25.552,G89.29 (ICD-10-CM) - Chronic left hip pain  Evaluation Date: 4/6/2022  Authorization Period Expiration: 12/31/20222  Plan of Care Certification Period: 4/6/2022 to 05/30/2022  Visit # / Visits authorized: 1/ 50   FOTO: 1/5    PTA Visit #: 0/5     Precautions: Fall, osteoporosis    Time In: 9:00 am  Time Out: 9:55 am  Total Billable Time: 55 minutes (1 MT, 3 TE)    SUBJECTIVE     Pt reports: her dad passed away so she couldn't come to therapy last week and wasn't able to perform home exercise program. Her hip feels about the same.   She was not compliant with home exercise program.  Response to previous treatment: initial eval only   Functional change: none voiced     Pain: 5/10  Location: left hip      OBJECTIVE     Objective Measures updated at progress report unless specified.     Treatment     Carina received the treatments listed below:      therapeutic exercises to develop strength, endurance, ROM, flexibility, posture and core stabilization for 40 minutes including:  NuStep for tissue extensibility   5 minutes level 1   Straight leg raise w/ quad set   2x10 left   Hook lying clam shells    red theraband 2x10   Bridges      red theraband 2x10  Seated hip ER/IR    10x each    Standing hip abduction    10x bilateral   Standing hip extension    10x bilateral   Standing posterior hip mobilization  Red [place] band, 10x5"  Standing lateral hip mobilization  Red ws[place]e band 10x5"     manual therapy techniques: Joint mobilizations and Manual traction were applied to the: " "left hip for 15 minutes, including:  Hip distraction w/ belt 5 minutes, alternating 30" holds and oscillations   Passive range of motion in all planes to patient tolerance       Patient Education and Home Exercises     Home Exercises Provided and Patient Education Provided     Education provided:   - Correct and safe performance of home exercise program     Written Home Exercises Provided: yes. Exercises were reviewed and Carina was able to demonstrate them prior to the end of the session.  Carina demonstrated good  understanding of the education provided. See EMR under Patient Instructions for exercises provided during therapy sessions    ASSESSMENT     Carina is a 66 y.o. female referred to outpatient Physical Therapy with a medical diagnosis of chronic left hip pain. Pt presented to PT with reports of 5/10 hip pain and was agreeable to treatment. Performed manual hip distraction and instructed pt in performance of self-mobilizations to perform at home. Also introduced hip strengthening exercises both on and off mat. Pt tolerated all interventions well with no reports of hip pain, only sensations of stretching and muscle fatigue. She was provided home exercise program consisting of self mobilizations and strengthening for hip and pt demonstrated understanding of performance. It was emphasized that exercises be performed safely and in pain free range.      Carina Is progressing well towards her goals.   Pt prognosis is Good.     Pt will continue to benefit from skilled outpatient physical therapy to address the deficits listed in the problem list box on initial evaluation, provide pt/family education and to maximize pt's level of independence in the home and community environment.     Pt's spiritual, cultural and educational needs considered and pt agreeable to plan of care and goals.     Anticipated barriers to physical therapy: co-morbidities    Goals:   Short Term Goals: 3 weeks:  1.  Patient will " demonstrate ability to complete 6MWT without increased left hip pain.   2.  Patient will demonstrate 1/2 grade improvement in left hip strength testing throughout for improved tolerance to household ambulation tasks.   3.  Patient will report > 9 point improvement in LEFS.      Long Term Goals: 8 weeks   1. Patient will demonstrate ability to ambulate > 10 minutes consecutive for exercise without increased left hip pain.   2. Patient will ability perform all functional level surface transfers without left hip pain or limitations.        PLAN     Assess response to provided home exercise program. Continue with hip mobilizations and strengthening     Chandrakant Marmolejo, PT, DPT

## 2022-04-26 ENCOUNTER — CLINICAL SUPPORT (OUTPATIENT)
Dept: REHABILITATION | Facility: HOSPITAL | Age: 67
End: 2022-04-26
Payer: MEDICARE

## 2022-04-26 DIAGNOSIS — G89.29 CHRONIC HIP PAIN, LEFT: Primary | ICD-10-CM

## 2022-04-26 DIAGNOSIS — M25.552 CHRONIC HIP PAIN, LEFT: Primary | ICD-10-CM

## 2022-04-26 DIAGNOSIS — R26.89 IMPAIRED GAIT AND MOBILITY: ICD-10-CM

## 2022-04-26 DIAGNOSIS — M25.652 DECREASED RANGE OF LEFT HIP MOVEMENT: ICD-10-CM

## 2022-04-26 DIAGNOSIS — R52 PAIN AGGRAVATED BY WALKING: ICD-10-CM

## 2022-04-26 PROCEDURE — 97140 MANUAL THERAPY 1/> REGIONS: CPT | Mod: PN,CQ

## 2022-04-26 PROCEDURE — 97110 THERAPEUTIC EXERCISES: CPT | Mod: PN,CQ

## 2022-04-26 NOTE — PROGRESS NOTES
"[facility]   Physical Therapy Treatment Note     Name: Carina Montaño  Clinic Number: [id number]    Therapy Diagnosis:   Encounter Diagnoses   Name Primary?    Chronic hip pain, left Yes    Pain aggravated by walking     Decreased range of left hip movement     Impaired gait and mobility      Physician: Adelaide De Leon MD    Visit Date: 4/26/2022    Physician Orders: PT Eval and Treat   Medical Diagnosis: M25.552,G89.29 (ICD-10-CM) - Chronic left hip pain  Evaluation Date: 4/6/2022  Authorization Period Expiration: 12/31/20222  Plan of Care Certification Period: 4/6/2022 to 05/30/2022  Visit # / Visits authorized: 3/ 50   FOTO: 3/5    PTA Visit #: 1/5     Precautions: Fall, osteoporosis    Time In: 10:05 am  Time Out: 11:00 am  Total Billable Time: 55 minutes (1 MT, 3 TE)    SUBJECTIVE     Pt reports: Hip pain is feeling much better than is was last week.   She was not compliant with home exercise program.  Response to previous treatment: Felt better  Functional change: none voiced     Pain: 2/10  Location: left hip      OBJECTIVE     Objective Measures updated at progress report unless specified.     Treatment     Carina received the treatments listed below:      therapeutic exercises to develop strength, endurance, ROM, flexibility, posture and core stabilization for 40 minutes including:  NuStep for tissue extensibility   5 minutes level 1   Straight leg raise w/ quad set   2x10 left   Hook lying clam shells    red theraband 2x10   Bridges      red theraband 2x10  Seated hip ER/IR    10x each    Standing hip abduction    10x bilateral   Standing hip extension    10x bilateral   Standing posterior hip mobilization  Red ws[place]e band, 10x5"  Standing lateral hip mobilization  Red ws[place]e band 10x5"   Sit to stands      x10  Lateral walking with yellow band  10 ft x2 trials     manual therapy techniques: Joint mobilizations and Manual traction were applied to the: left hip for 15 " "minutes, including:  Hip distraction w/ belt 5 minutes, alternating 30" holds and oscillations   Passive range of motion in all planes to patient tolerance       Patient Education and Home Exercises     Home Exercises Provided and Patient Education Provided     Education provided:   - Correct and safe performance of home exercise program     Written Home Exercises Provided: Patient instructed to cont prior HEP. Exercises were reviewed and Carina was able to demonstrate them prior to the end of the session.  Carina demonstrated good  understanding of the education provided. See EMR under Patient Instructions for exercises provided during therapy sessions    ASSESSMENT     Carina is a 66 y.o. female referred to outpatient Physical Therapy with a medical diagnosis of chronic left hip pain. Pt presented to PT with reports of 5/10 hip pain and was agreeable to treatment. Good response to manual hip distraction performed today stating it "felt better" afterward. Continued with ongoing hip strengthening exercises with good tolerance and no exacerbation of pain reported post session. Progressed standing therex with moderate fatigue requiring sitting rest breaks for recovery. Patient reports compliance to home exercise program but continues to require moderate verbal cuing for correct performance and form of therex. Monitor patient response to session and continue to progress as appropriate.       Carina Is progressing well towards her goals.   Pt prognosis is Good.     Pt will continue to benefit from skilled outpatient physical therapy to address the deficits listed in the problem list box on initial evaluation, provide pt/family education and to maximize pt's level of independence in the home and community environment.     Pt's spiritual, cultural and educational needs considered and pt agreeable to plan of care and goals.     Anticipated barriers to physical therapy: co-morbidities    Goals:   Short Term Goals: 3 " weeks:  1.  Patient will demonstrate ability to complete 6MWT without increased left hip pain.   2.  Patient will demonstrate 1/2 grade improvement in left hip strength testing throughout for improved tolerance to household ambulation tasks.   3.  Patient will report > 9 point improvement in LEFS.      Long Term Goals: 8 weeks   1. Patient will demonstrate ability to ambulate > 10 minutes consecutive for exercise without increased left hip pain.   2. Patient will ability perform all functional level surface transfers without left hip pain or limitations.        PLAN      Continue with hip mobilizations and strengthening     Jeanette Hunt PTA

## 2022-04-29 ENCOUNTER — CLINICAL SUPPORT (OUTPATIENT)
Dept: REHABILITATION | Facility: HOSPITAL | Age: 67
End: 2022-04-29
Payer: MEDICARE

## 2022-04-29 DIAGNOSIS — G89.29 CHRONIC HIP PAIN, LEFT: Primary | ICD-10-CM

## 2022-04-29 DIAGNOSIS — R52 PAIN AGGRAVATED BY WALKING: ICD-10-CM

## 2022-04-29 DIAGNOSIS — M25.552 CHRONIC HIP PAIN, LEFT: Primary | ICD-10-CM

## 2022-04-29 DIAGNOSIS — R26.89 IMPAIRED GAIT AND MOBILITY: ICD-10-CM

## 2022-04-29 DIAGNOSIS — M25.652 DECREASED RANGE OF LEFT HIP MOVEMENT: ICD-10-CM

## 2022-04-29 PROCEDURE — 97110 THERAPEUTIC EXERCISES: CPT | Mod: PN,CQ

## 2022-04-29 PROCEDURE — 97140 MANUAL THERAPY 1/> REGIONS: CPT | Mod: PN,CQ

## 2022-04-29 NOTE — PROGRESS NOTES
"[facility]   Physical Therapy Treatment Note     Name: Carina Montaño  Clinic Number: [id number]    Therapy Diagnosis:   No diagnosis found.  Physician: Adelaide De Leon MD    Visit Date: 4/29/2022    Physician Orders: PT Eval and Treat   Medical Diagnosis: M25.552,G89.29 (ICD-10-CM) - Chronic left hip pain  Evaluation Date: 4/6/2022  Authorization Period Expiration: 12/31/20222  Plan of Care Certification Period: 4/6/2022 to 05/30/2022  Visit # / Visits authorized: 3/ 50   FOTO: 3/5    PTA Visit #: 1/5     Precautions: Fall, osteoporosis    Time In: 10:05 am  Time Out: 11:00 am  Total Billable Time: 55 minutes (1 MT, 3 TE)    SUBJECTIVE     Pt reports: Hip pain is feeling much better than is was last week.   She was not compliant with home exercise program.  Response to previous treatment: Felt better  Functional change: none voiced     Pain: 2/10  Location: left hip      OBJECTIVE     Objective Measures updated at progress report unless specified.     Treatment     Carina received the treatments listed below:      therapeutic exercises to develop strength, endurance, ROM, flexibility, posture and core stabilization for 40 minutes including:  NuStep for tissue extensibility   5 minutes level 1   Straight leg raise w/ quad set   2x10 left   Hook lying clam shells    red theraband 2x10   Bridges      red theraband 2x10  Seated hip ER/IR    10x each    Standing hip abduction    10x bilateral   Standing hip extension    10x bilateral   Standing posterior hip mobilization  Red ws[place]e band, 10x5"  Standing lateral hip mobilization  Red wsak[place]e band 10x5"   Sit to stands      x10  Lateral walking with yellow band  10 ft x2 trials     manual therapy techniques: Joint mobilizations and Manual traction were applied to the: left hip for 15 minutes, including:  Hip distraction w/ belt 5 minutes, alternating 30" holds and oscillations   Passive range of motion in all planes to patient tolerance " "      Patient Education and Home Exercises     Home Exercises Provided and Patient Education Provided     Education provided:   - Correct and safe performance of home exercise program     Written Home Exercises Provided: Patient instructed to cont prior HEP. Exercises were reviewed and Carina was able to demonstrate them prior to the end of the session.  Carina demonstrated good  understanding of the education provided. See EMR under Patient Instructions for exercises provided during therapy sessions    ASSESSMENT     Carina is a 66 y.o. female referred to outpatient Physical Therapy with a medical diagnosis of chronic left hip pain. Pt presented to PT with reports of 5/10 hip pain and was agreeable to treatment. Good response to manual hip distraction performed today stating it "felt better" afterward. Continued with ongoing hip strengthening exercises with good tolerance and no exacerbation of pain reported post session. Progressed standing therex with moderate fatigue requiring sitting rest breaks for recovery. Patient reports compliance to home exercise program but continues to require moderate verbal cuing for correct performance and form of therex. Monitor patient response to session and continue to progress as appropriate.       Carina Is progressing well towards her goals.   Pt prognosis is Good.     Pt will continue to benefit from skilled outpatient physical therapy to address the deficits listed in the problem list box on initial evaluation, provide pt/family education and to maximize pt's level of independence in the home and community environment.     Pt's spiritual, cultural and educational needs considered and pt agreeable to plan of care and goals.     Anticipated barriers to physical therapy: co-morbidities    Goals:   Short Term Goals: 3 weeks:  1.  Patient will demonstrate ability to complete 6MWT without increased left hip pain.   2.  Patient will demonstrate 1/2 grade improvement in left " hip strength testing throughout for improved tolerance to household ambulation tasks.   3.  Patient will report > 9 point improvement in LEFS.      Long Term Goals: 8 weeks   1. Patient will demonstrate ability to ambulate > 10 minutes consecutive for exercise without increased left hip pain.   2. Patient will ability perform all functional level surface transfers without left hip pain or limitations.        PLAN      Continue with hip mobilizations and strengthening     Jeanette Hunt, PTA

## 2022-04-29 NOTE — PROGRESS NOTES
"[facility]   Physical Therapy Treatment Note     Name: Carina Montaño  Clinic Number: [id number]    Therapy Diagnosis:   Encounter Diagnoses   Name Primary?    Chronic hip pain, left Yes    Pain aggravated by walking     Decreased range of left hip movement     Impaired gait and mobility      Physician: Adelaide De Leon MD    Visit Date: 4/29/2022    Physician Orders: PT Eval and Treat   Medical Diagnosis: M25.552,G89.29 (ICD-10-CM) - Chronic left hip pain  Evaluation Date: 4/6/2022  Authorization Period Expiration: 12/31/20222  Plan of Care Certification Period: 4/6/2022 to 05/30/2022  Visit # / Visits authorized: 4/ 50   FOTO: 4/5    PTA Visit #: 2/5     Precautions: Fall, osteoporosis    Time In: 10:05 am  Time Out: 10:50 am  Total Billable Time: 45 minutes (1 MT, 2 TE)    SUBJECTIVE     Pt reports: Started a line dancing class on Wednesday and thinnks it gave her extra sorenessHip pain is feeling much better than is was last week.   She was not compliant with home exercise program.  Response to previous treatment: Felt better  Functional change: none voiced     Pain: 5/10  Location: left hip      OBJECTIVE     Objective Measures updated at progress report unless specified.     Treatment     Carina received the treatments listed below:      therapeutic exercises to develop strength, endurance, ROM, flexibility, posture and core stabilization for 30 minutes including:  NuStep for tissue extensibility   5 minutes level 1   Straight leg raise w/ quad set   2x10 left   Hook lying clam shells    red theraband 2x12   Bridges      red theraband 2x12  Seated hip ER/IR    2x10 each    Standing hip abduction    10x bilateral   Standing hip extension    10x bilateral   Standing posterior hip mobilization  Red ws[place] band, 10x5" w/MT  Standing lateral hip mobilization  Red ws[place]e band 10x5" w/MT  Sit to stands      2x10  Lateral walking with yellow band  10ft x4 trials     manual " "therapy techniques: Joint mobilizations and Manual traction were applied to the: left hip for 15 minutes, including:  Hip distraction w/ belt 5 minutes, alternating 30" holds and oscillations   Passive range of motion in all planes to patient tolerance       Patient Education and Home Exercises     Home Exercises Provided and Patient Education Provided     Education provided:   - Correct and safe performance of home exercise program     Written Home Exercises Provided: Patient instructed to cont prior HEP. Exercises were reviewed and Carina was able to demonstrate them prior to the end of the session.  Carina demonstrated good  understanding of the education provided. See EMR under Patient Instructions for exercises provided during therapy sessions    ASSESSMENT     Carina is a 66 y.o. female referred to outpatient Physical Therapy with a medical diagnosis of chronic left hip pain. Pt presented to PT with reports of 5/10 hip pain and was agreeable to treatment. Good response to manual hip distraction performed today with manual therapy, stating it "felt better" afterward. Continued with ongoing hip strengthening exercises with good tolerance and no exacerbation of pain reported post session. Progressed reps as bolded with moderate fatigue requiring one sitting rest break.  Patient reports compliance to home exercise program but continues to require moderate verbal cuing for correct performance and form of therex.  States"good now - zero"  Rates "0/10" after treatment.   Monitor patient response to session and continue to progress as appropriate.       Carina Is progressing well towards her goals.   Pt prognosis is Good.     Pt will continue to benefit from skilled outpatient physical therapy to address the deficits listed in the problem list box on initial evaluation, provide pt/family education and to maximize pt's level of independence in the home and community environment.     Pt's spiritual, cultural and " educational needs considered and pt agreeable to plan of care and goals.     Anticipated barriers to physical therapy: co-morbidities    Goals:   Short Term Goals: 3 weeks:  1.  Patient will demonstrate ability to complete 6MWT without increased left hip pain.   2.  Patient will demonstrate 1/2 grade improvement in left hip strength testing throughout for improved tolerance to household ambulation tasks.   3.  Patient will report > 9 point improvement in LEFS.      Long Term Goals: 8 weeks   1. Patient will demonstrate ability to ambulate > 10 minutes consecutive for exercise without increased left hip pain.   2. Patient will ability perform all functional level surface transfers without left hip pain or limitations.        PLAN      Continue with hip mobilizations and strengthening     Suze Tamez, PTA

## 2022-05-03 ENCOUNTER — CLINICAL SUPPORT (OUTPATIENT)
Dept: REHABILITATION | Facility: HOSPITAL | Age: 67
End: 2022-05-03
Payer: MEDICARE

## 2022-05-03 DIAGNOSIS — R52 PAIN AGGRAVATED BY WALKING: ICD-10-CM

## 2022-05-03 DIAGNOSIS — M25.552 CHRONIC HIP PAIN, LEFT: Primary | ICD-10-CM

## 2022-05-03 DIAGNOSIS — M25.652 DECREASED RANGE OF LEFT HIP MOVEMENT: ICD-10-CM

## 2022-05-03 DIAGNOSIS — R26.89 IMPAIRED GAIT AND MOBILITY: ICD-10-CM

## 2022-05-03 DIAGNOSIS — G89.29 CHRONIC HIP PAIN, LEFT: Primary | ICD-10-CM

## 2022-05-03 PROCEDURE — 97140 MANUAL THERAPY 1/> REGIONS: CPT | Mod: PN,CQ

## 2022-05-03 PROCEDURE — 97110 THERAPEUTIC EXERCISES: CPT | Mod: PN,CQ

## 2022-05-03 NOTE — PROGRESS NOTES
"[facility]   Physical Therapy Treatment Note     Name: Carina Montaño  Clinic Number: [id number]    Therapy Diagnosis:   Encounter Diagnoses   Name Primary?    Chronic hip pain, left Yes    Pain aggravated by walking     Decreased range of left hip movement     Impaired gait and mobility      Physician: Adelaide De Leon MD    Visit Date: 5/3/2022    Physician Orders: PT Eval and Treat   Medical Diagnosis: M25.552,G89.29 (ICD-10-CM) - Chronic left hip pain  Evaluation Date: 4/6/2022  Authorization Period Expiration: 12/31/20222  Plan of Care Certification Period: 4/6/2022 to 05/30/2022  Visit # / Visits authorized: 4/ 50   FOTO: 4/5    PTA Visit #: 2/5     Precautions: Fall, osteoporosis    Time In: 10:05 am  Time Out: 10:50 am  Total Billable Time: 45 minutes (1 MT, 2 TE)    SUBJECTIVE     Pt reports: she had decreased pain after last visit and a good weekend.  Continued low pain complaints upon presenting to clinic. States her daughter had a baby last Friday and she has been busy.  She was partially compliant with home exercise program.  Response to previous treatment: Felt better  Functional change: none voiced     Pain: 1/10  Location: left hip      OBJECTIVE     Objective Measures updated at progress report unless specified.     Treatment     Carina received the treatments listed below:      therapeutic exercises to develop strength, endurance, ROM, flexibility, posture and core stabilization for 33 minutes including:  NuStep for tissue extensibility   5 minutes level 1.5   Straight leg raise w/ quad set   3x10 bilateral   Hook lying clam shells    red theraband 3x10  Bridges      red theraband 3x10  Seated hip ER/IR    2x10 each    Standing hip abduction    2x10 bilateral   Standing hip extension    2x10 bilateral   Standing posterior hip mobilization  Red ws[place]e band, 10x5" w/MT  Standing lateral hip mobilization  Red ws[place]e band 10x5" w/MT  Sit to stands      1x10 + 1x10 " "with 4# weight  Lateral walking with red theraband   2x20 ft trials   Leg Press Sled at 5    3.5 2x10 DL     manual therapy techniques: Joint mobilizations and Manual traction were applied to the: left hip for 15 minutes, including:  Hip distraction w/ belt 5 minutes, alternating 30" holds and oscillations   Passive range of motion in all planes to patient tolerance  STM/MFR left hip flexors and ITband       Patient Education and Home Exercises     Home Exercises Provided and Patient Education Provided     Education provided:   - Correct and safe performance of home exercise program     Written Home Exercises Provided: Patient instructed to cont prior HEP. Exercises were reviewed and Carina was able to demonstrate them prior to the end of the session.  Carina demonstrated good  understanding of the education provided. See EMR under Patient Instructions for exercises provided during therapy sessions    ASSESSMENT     Carina is a 66 y.o. female referred to outpatient Physical Therapy with a medical diagnosis of chronic left hip pain. Pt presented to PT with reports of minimal hip pain and was agreeable to treatment. Good response to manual hip distraction again performed today with manual therapy, stating it "felt better" afterward. Continued with ongoing hip strengthening exercises with good tolerance and no exacerbation of pain reported post session. Progressed reps and resistance as well as added Leg Press today as bolded with moderate fatigue requiring one sitting rest break.  Minimal verbal cuing for correct performance and form of therex.  States"good now - zero"  Rates "0/10" after treatment.   Monitor patient response to session and continue to progress as appropriate.       Carina Is progressing well towards her goals.   Pt prognosis is Good.     Pt will continue to benefit from skilled outpatient physical therapy to address the deficits listed in the problem list box on initial evaluation, provide " pt/family education and to maximize pt's level of independence in the home and community environment.     Pt's spiritual, cultural and educational needs considered and pt agreeable to plan of care and goals.     Anticipated barriers to physical therapy: co-morbidities    Goals:   Short Term Goals: 3 weeks:  1.  Patient will demonstrate ability to complete 6MWT without increased left hip pain.   2.  Patient will demonstrate 1/2 grade improvement in left hip strength testing throughout for improved tolerance to household ambulation tasks.   3.  Patient will report > 9 point improvement in LEFS.      Long Term Goals: 8 weeks   1. Patient will demonstrate ability to ambulate > 10 minutes consecutive for exercise without increased left hip pain.   2. Patient will ability perform all functional level surface transfers without left hip pain or limitations.        PLAN      Continue with hip mobilizations and strengthening     Suze Tamez PTA

## 2022-05-05 ENCOUNTER — CLINICAL SUPPORT (OUTPATIENT)
Dept: REHABILITATION | Facility: HOSPITAL | Age: 67
End: 2022-05-05
Payer: MEDICARE

## 2022-05-05 DIAGNOSIS — G89.29 CHRONIC HIP PAIN, LEFT: Primary | ICD-10-CM

## 2022-05-05 DIAGNOSIS — M25.552 CHRONIC HIP PAIN, LEFT: Primary | ICD-10-CM

## 2022-05-05 DIAGNOSIS — M25.652 DECREASED RANGE OF LEFT HIP MOVEMENT: ICD-10-CM

## 2022-05-05 DIAGNOSIS — R26.89 IMPAIRED GAIT AND MOBILITY: ICD-10-CM

## 2022-05-05 DIAGNOSIS — R52 PAIN AGGRAVATED BY WALKING: ICD-10-CM

## 2022-05-05 PROCEDURE — 97140 MANUAL THERAPY 1/> REGIONS: CPT | Mod: PN,CQ

## 2022-05-05 PROCEDURE — 97110 THERAPEUTIC EXERCISES: CPT | Mod: PN,CQ

## 2022-05-05 NOTE — PROGRESS NOTES
"[facility]   Physical Therapy Treatment Note     Name: Carina Montaño  Clinic Number: [id number]    Therapy Diagnosis:   Encounter Diagnoses   Name Primary?    Chronic hip pain, left Yes    Pain aggravated by walking     Decreased range of left hip movement     Impaired gait and mobility      Physician: Adelaide De Leon MD    Visit Date: 5/5/2022    Physician Orders: PT Eval and Treat   Medical Diagnosis: M25.552,G89.29 (ICD-10-CM) - Chronic left hip pain  Evaluation Date: 4/6/2022  Authorization Period Expiration: 12/31/20222  Plan of Care Certification Period: 4/6/2022 to 05/30/2022  Visit # / Visits authorized: 6/ 50   FOTO: 6/5    PTA Visit #: 4/5     Precautions: Fall, osteoporosis    Time In: 10:00 am  Time Out: 10:53 am  Total Billable Time: 53 minutes (1 MT, 3 TE)    SUBJECTIVE     Pt reports: Minimal complaints of hip pain since last treatment session.   She was partially compliant with home exercise program.  Response to previous treatment: Felt better  Functional change: none voiced     Pain: 2/10  Location: left hip      OBJECTIVE     Objective Measures updated at progress report unless specified.     Treatment     Carina received the treatments listed below:      therapeutic exercises to develop strength, endurance, ROM, flexibility, posture and core stabilization for 38 minutes including:  NuStep for tissue extensibility   5 minutes level 1.5   Straight leg raise w/ quad set   3x10 bilateral   Hook lying clam shells    red theraband 3x10  Bridges      red theraband 3x10  Seated hip ER/IR    2x10 each    Standing hip abduction    2x10 bilateral with red band  Standing hip extension    2x10 bilateral with red band  Standing posterior hip mobilization  Red wsak[place]e band, 10x5" w/MT  Standing lateral hip mobilization  Red wsak[place]e band 10x5" w/MT  Sit to stands      1x10 + 1x10 with 4# weight  Lateral walking with red theraband   2x20 ft trials   Leg Press Sled at " "5    3.5 2x10 DL   Step ups 4"     2x10 L forward; 2x10 L lateral      manual therapy techniques: Joint mobilizations and Manual traction were applied to the: left hip for 15 minutes, including:  Hip distraction w/ belt 5 minutes, alternating 30" holds and oscillations   Passive range of motion in all planes to patient tolerance  STM/MFR left hip flexors and ITband       Patient Education and Home Exercises     Home Exercises Provided and Patient Education Provided     Education provided:   - Correct and safe performance of home exercise program     Written Home Exercises Provided: Patient instructed to cont prior HEP. Exercises were reviewed and Carina was able to demonstrate them prior to the end of the session.  Carina demonstrated good  understanding of the education provided. See EMR under Patient Instructions for exercises provided during therapy sessions    ASSESSMENT     Carina is a 66 y.o. female referred to outpatient Physical Therapy with a medical diagnosis of chronic left hip pain. Pt presented to PT with reports of minimal hip pain and was agreeable to treatment. Continued with ongoing hip strengthening exercises with good tolerance and no exacerbation of pain reported post session. Mild to moderate fatigue throughout session and occasional cuing required for proper form. Monitor patient response to session and continue to progress as appropriate.       Carina Is progressing well towards her goals.   Pt prognosis is Good.     Pt will continue to benefit from skilled outpatient physical therapy to address the deficits listed in the problem list box on initial evaluation, provide pt/family education and to maximize pt's level of independence in the home and community environment.     Pt's spiritual, cultural and educational needs considered and pt agreeable to plan of care and goals.     Anticipated barriers to physical therapy: co-morbidities    Goals:   Short Term Goals: 3 weeks:  1.  Patient " will demonstrate ability to complete 6MWT without increased left hip pain.   2.  Patient will demonstrate 1/2 grade improvement in left hip strength testing throughout for improved tolerance to household ambulation tasks.   3.  Patient will report > 9 point improvement in LEFS.      Long Term Goals: 8 weeks   1. Patient will demonstrate ability to ambulate > 10 minutes consecutive for exercise without increased left hip pain.   2. Patient will ability perform all functional level surface transfers without left hip pain or limitations.        PLAN      Continue with hip mobilizations and strengthening     Jeanette Hunt PTA

## 2022-05-12 ENCOUNTER — CLINICAL SUPPORT (OUTPATIENT)
Dept: REHABILITATION | Facility: HOSPITAL | Age: 67
End: 2022-05-12
Payer: MEDICARE

## 2022-05-12 DIAGNOSIS — M25.552 CHRONIC HIP PAIN, LEFT: Primary | ICD-10-CM

## 2022-05-12 DIAGNOSIS — R26.89 IMPAIRED GAIT AND MOBILITY: ICD-10-CM

## 2022-05-12 DIAGNOSIS — G89.29 CHRONIC HIP PAIN, LEFT: Primary | ICD-10-CM

## 2022-05-12 DIAGNOSIS — R52 PAIN AGGRAVATED BY WALKING: ICD-10-CM

## 2022-05-12 DIAGNOSIS — M25.652 DECREASED RANGE OF LEFT HIP MOVEMENT: ICD-10-CM

## 2022-05-12 PROCEDURE — 97110 THERAPEUTIC EXERCISES: CPT | Mod: PN

## 2022-05-12 PROCEDURE — 97140 MANUAL THERAPY 1/> REGIONS: CPT | Mod: PN

## 2022-05-12 NOTE — PROGRESS NOTES
"[facility]   Physical Therapy Treatment Note     Name: Carina Montaño  Clinic Number: [id number]    Therapy Diagnosis:   Encounter Diagnoses   Name Primary?    Chronic hip pain, left Yes    Pain aggravated by walking     Decreased range of left hip movement     Impaired gait and mobility      Physician: Adelaide De Leon MD    Visit Date: 5/12/2022    Physician Orders: PT Eval and Treat   Medical Diagnosis: M25.552,G89.29 (ICD-10-CM) - Chronic left hip pain  Evaluation Date: 4/6/2022  Authorization Period Expiration: 12/31/20222  Plan of Care Certification Period: 4/6/2022 to 05/30/2022  Visit # / Visits authorized: 7/ 50   FOTO: 7/5 NEXT    PTA Visit #: 0/5     Precautions: Fall, osteoporosis    Time In: 9:00 am  Time Out: 9:54 am  Total Billable Time: 54 minutes (1 MT, 3 TE)    SUBJECTIVE     Pt reports: low levels of pain today, no new complaints    She was partially compliant with home exercise program.  Response to previous treatment: Felt better  Functional change: none voiced     Pain: 3/10  Location: left hip      OBJECTIVE     Objective Measures updated at progress report unless specified.     Treatment     Carina received the treatments listed below:      therapeutic exercises to develop strength, endurance, ROM, flexibility, posture and core stabilization for 38 minutes including:  NuStep for tissue extensibility   5 minutes level 2.0   Straight leg raise w/ quad set   3x10 bilateral 1# weight   Hook lying clam shells    red theraband 3x10  Bridges      red theraband 3x10  Seated hip ER/IR    2x10 each    Standing hip abduction    2x10 bilateral with red band  Standing hip extension    2x10 bilateral with red band  Standing posterior hip mobilization  Red wsakoue band, 10x10" w/MT  Standing lateral hip mobilization  Red wsakoue band 10x10" w/MT  Sit to stands      2x10 with 4# weight  Lateral walking with red theraband   2x20 ft trials   Leg Press Sled at 5    3.5 2x10 " "DL   Step ups 6"     2x10 L forward  Lateral heel taps 4"    2x10 left       manual therapy techniques: Joint mobilizations and Manual traction were applied to the: left hip for 8 minutes, including:  Hip distraction w/ belt 5 minutes, alternating 30" holds and oscillations   Passive range of motion in all planes to patient tolerance  STM/MFR left hip flexors and ITband       Patient Education and Home Exercises     Home Exercises Provided and Patient Education Provided     Education provided:   - Correct and safe performance of home exercise program     Written Home Exercises Provided: Patient instructed to cont prior HEP. Exercises were reviewed and Carina was able to demonstrate them prior to the end of the session.  Carina demonstrated good  understanding of the education provided. See EMR under Patient Instructions for exercises provided during therapy sessions    ASSESSMENT     Carina is a 66 y.o. female referred to outpatient Physical Therapy with a medical diagnosis of chronic left hip pain. Pt presented to PT with reports of minimal hip pain and was agreeable to treatment. Continued with ongoing hip strengthening exercises with good tolerance and no exacerbation of pain reported throughout session. Continues to exhibit greatest mobility limitations with left hip internal/external rotation. Responds well to manual therapy and performance of hip mobilization exercises. Pt reported appropriate amount of muscular fatigue upon completion of treatment, as well as improvement in hip pain.     Carina Is progressing well towards her goals.   Pt prognosis is Good.     Pt will continue to benefit from skilled outpatient physical therapy to address the deficits listed in the problem list box on initial evaluation, provide pt/family education and to maximize pt's level of independence in the home and community environment.     Pt's spiritual, cultural and educational needs considered and pt agreeable to plan of " care and goals.     Anticipated barriers to physical therapy: co-morbidities    Goals:   Short Term Goals: 3 weeks:  1.  Patient will demonstrate ability to complete 6MWT without increased left hip pain.   2.  Patient will demonstrate 1/2 grade improvement in left hip strength testing throughout for improved tolerance to household ambulation tasks.   3.  Patient will report > 9 point improvement in LEFS.      Long Term Goals: 8 weeks   1. Patient will demonstrate ability to ambulate > 10 minutes consecutive for exercise without increased left hip pain.   2. Patient will ability perform all functional level surface transfers without left hip pain or limitations.        PLAN      Continue with hip mobilizations and strengthening     Chandrakant Marmolejo PT, DPT

## 2022-05-24 ENCOUNTER — DOCUMENTATION ONLY (OUTPATIENT)
Dept: REHABILITATION | Facility: HOSPITAL | Age: 67
End: 2022-05-24
Payer: MEDICARE

## 2022-05-24 NOTE — PROGRESS NOTES
Face to face meeting completed with [facility] PT regarding current status and progress of   Carina Montaño .     Jeanette Hunt, PTA

## 2022-05-26 ENCOUNTER — CLINICAL SUPPORT (OUTPATIENT)
Dept: REHABILITATION | Facility: HOSPITAL | Age: 67
End: 2022-05-26
Payer: MEDICARE

## 2022-05-26 DIAGNOSIS — R26.89 IMPAIRED GAIT AND MOBILITY: ICD-10-CM

## 2022-05-26 DIAGNOSIS — G89.29 CHRONIC HIP PAIN, LEFT: Primary | ICD-10-CM

## 2022-05-26 DIAGNOSIS — R52 PAIN AGGRAVATED BY WALKING: ICD-10-CM

## 2022-05-26 DIAGNOSIS — E78.00 HYPERCHOLESTEREMIA: ICD-10-CM

## 2022-05-26 DIAGNOSIS — M25.652 DECREASED RANGE OF LEFT HIP MOVEMENT: ICD-10-CM

## 2022-05-26 DIAGNOSIS — M25.552 CHRONIC HIP PAIN, LEFT: Primary | ICD-10-CM

## 2022-05-26 PROCEDURE — 97750 PHYSICAL PERFORMANCE TEST: CPT | Mod: PN

## 2022-05-26 RX ORDER — SIMVASTATIN 20 MG/1
TABLET, FILM COATED ORAL
Qty: 90 TABLET | Refills: 1 | Status: SHIPPED | OUTPATIENT
Start: 2022-05-26 | End: 2022-11-22

## 2022-05-26 NOTE — TELEPHONE ENCOUNTER
No new care gaps identified.  [facility] Embedded Care Gaps. Reference number: [id number]. 5/26/2022   12:15:46 AM CAROT

## 2022-05-26 NOTE — TELEPHONE ENCOUNTER
Refill Routing Note   Medication(s) are not appropriate for processing by [facility] for the following reason(s):      - Allergy/Contraindication (DRUG CLASS MATCH with ATORVASTATIN (Class: STATINS-HMG-COA REDUCTASE INHIBITORS).)    [facility] action(s):  Defer          Medication reconciliation completed: No     Appointments  past 12m or future 3m with PCP    Date Provider   Last Visit   3/23/2022 Adelaide De Leon MD   Next Visit   9/26/2022 Adelaide De Leon MD   ED visits in past 90 days: 0        Note composed:9:14 AM 05/26/2022

## 2022-05-26 NOTE — PROGRESS NOTES
[facility]   Physical Therapy Treatment Note     Name: Carina Montaño  Clinic Number: [id number]    Therapy Diagnosis:   Encounter Diagnoses   Name Primary?    Chronic hip pain, left Yes    Pain aggravated by walking     Decreased range of left hip movement     Impaired gait and mobility      Physician: Adelaide De Leon MD    Visit Date: 2022    Physician Orders: PT Eval and Treat   Medical Diagnosis: M25.552,G89.29 (ICD-10-CM) - Chronic left hip pain  Evaluation Date: 2022  Authorization Period Expiration:   Plan of Care Certification Period: 2022 to 2022  Visit # / Visits authorized:    FOTO:  NEXT    PTA Visit #: 0     Precautions: Fall, osteoporosis    Time In: 1:00  Time Out: 1:30  Total Billable Time: 30 minutes (2 Physical performance testing)    SUBJECTIVE     Pt reports: feeling better overall. Gets stiff in AM, works itself out. Doing exercises at home. Has been helping daughter with .  She was partially compliant with home exercise program.  Response to previous treatment: Felt better  Functional change: none voiced     Pain: 0/10  Location: left hip      OBJECTIVE     Objective Measures updated at progress report unless specified.   30 mins    22:  Hip OA Rule:    1. Squatting as aggravating factor N  2. (+) Scour Test for groin or lateral hip pain /N  3. Active hip flexion causing lateral hip pain N  4. Passive internal rotation < 25 degrees N  5. Active hip extension causing hip pain N  0/5     Variables Sensitivity Specificity +LR -LR  3  0.71  0.86  5.20 0.33   4  0.48  0.98  24.30 0.53   5  0.14  0.98  7.30 0.87     Gross movement analysis:  -Gait: antalgic patterning with quickened loading/stance phase on the LLE.   -Forward bending: good lumbopelvic rhythm.   -Squat: quad-dominant preferred pattern.   - Single leg assessment: Good single-leg tolerance bilateral without pain.        Range of Motion:   LE Right  "Left 5/26/22- update   Hip flexion 110 with external rotation bias 100; tight 105 (left), 110 (right)   Hip abduction WNL 30 -   Hip ER WNL 30 -   Hip internal rotation  WNL 20 20 (right), 25 (left)- no pain   Hip extension WNL WNL 10 degrees left; 15 degrees   Knee WNL WNL -   Ankle DF WNL WNL -      Lower Extremity Strength    Right Left Right 5/26 Left 5/26   Hip flexion: 4/5 4/5 4+/5 5/5   Hip extension: 4/5 4/5 5/5 5/5   Hip abduction: 4-/5 4-/5 5/5 5/5   Hip ER:  4/5 4/5 5/5 5/5   Hip internal rotation: 4/5 4-/5 5/5 5/5   Knee extension: 4/5 4/5 5/5 5/5   Knee flexion: 4/5 4/5 5/5 5/5   Ankle dorsiflexion: 4/5 4/5 5/5 5/5          Treatment     Carina received the treatments listed below:      therapeutic exercises to develop strength, endurance, ROM, flexibility, posture and core stabilization for 00 minutes including:  NuStep for tissue extensibility   5 minutes level 2.0   Straight leg raise w/ quad set   3x10 bilateral 1# weight   Hook lying clam shells    red theraband 3x10  Bridges      red theraband 3x10  Seated hip ER/IR    2x10 each    Standing hip abduction    2x10 bilateral with red band  Standing hip extension    2x10 bilateral with red band  Standing posterior hip mobilization  Red wsakoue band, 10x10" w/MT  Standing lateral hip mobilization  Red wsakoue band 10x10" w/MT  Sit to stands      2x10 with 4# weight  Lateral walking with red theraband   2x20 ft trials   Leg Press Sled at 5    3.5 2x10 DL   Step ups 6"     2x10 L forward  Lateral heel taps 4"    2x10 left       manual therapy techniques: Joint mobilizations and Manual traction were applied to the: left hip for 8 minutes, including:  Hip distraction w/ belt 5 minutes, alternating 30" holds and oscillations   Passive range of motion in all planes to patient tolerance  STM/MFR left hip flexors and ITband       Patient Education and Home Exercises     Home Exercises Provided and Patient Education Provided     Education provided:   - " Correct and safe performance of home exercise program     Written Home Exercises Provided: Patient instructed to cont prior HEP. Exercises were reviewed and Carina was able to demonstrate them prior to the end of the session.  Carina demonstrated good  understanding of the education provided. See EMR under Patient Instructions for exercises provided during therapy sessions    ASSESSMENT     Carina is a 66 y.o. female referred to outpatient Physical Therapy with a medical diagnosis of chronic left hip pain. Pt has improved her left hip range of motion by 5 degrees with flexion and internal rotation, improved her strength globally and presents with no pain. Her hip OA CPR was 3/5 positive on eval, 0/5 positive today, denoting improvement in overall pain symptoms. Pt is appropriate for discharge and will continue home exercises at home.     Carina Is progressing well towards her goals.   Pt prognosis is Good.     Pt will continue to benefit from skilled outpatient physical therapy to address the deficits listed in the problem list box on initial evaluation, provide pt/family education and to maximize pt's level of independence in the home and community environment.     Pt's spiritual, cultural and educational needs considered and pt agreeable to plan of care and goals.     Anticipated barriers to physical therapy: co-morbidities    Goals:   Short Term Goals: 3 weeks:  1.  Patient will demonstrate ability to complete 6MWT without increased left hip pain. Met, 5/26  2.  Patient will demonstrate 1/2 grade improvement in left hip strength testing throughout for improved tolerance to household ambulation tasks. Met, 5/26   3.  Patient will report > 9 point improvement in LEFS.  Unable to test- closed FOTO episode by primary PT     Long Term Goals: 8 weeks   1. Patient will demonstrate ability to ambulate > 10 minutes consecutive for exercise without increased left hip pain. Met, 5/26  2. Patient will ability perform  all functional level surface transfers without left hip pain or limitations.  Met, 5/26       PLAN   Discharge today. Continue home exercise program for symptom management.    Romi Mitchell, PT, DPT  5/26/2022

## 2022-08-29 ENCOUNTER — PES CALL (OUTPATIENT)
Dept: ADMINISTRATIVE | Facility: OTHER | Age: 67
End: 2022-08-29
Payer: MEDICARE

## 2022-09-20 ENCOUNTER — LAB VISIT (OUTPATIENT)
Dept: LAB | Facility: HOSPITAL | Age: 67
End: 2022-09-20
Attending: INTERNAL MEDICINE
Payer: MEDICARE

## 2022-09-20 DIAGNOSIS — J45.40 MODERATE PERSISTENT ASTHMA WITHOUT COMPLICATION: ICD-10-CM

## 2022-09-20 DIAGNOSIS — R73.9 HYPERGLYCEMIA: ICD-10-CM

## 2022-09-20 DIAGNOSIS — I10 ESSENTIAL HYPERTENSION: ICD-10-CM

## 2022-09-20 DIAGNOSIS — E78.5 HYPERLIPIDEMIA, UNSPECIFIED HYPERLIPIDEMIA TYPE: ICD-10-CM

## 2022-09-20 LAB
ALBUMIN SERPL BCP-MCNC: 4.1 G/DL (ref 3.5–5.2)
ALP SERPL-CCNC: 68 U/L (ref 55–135)
ALT SERPL W/O P-5'-P-CCNC: 28 U/L (ref 10–44)
ANION GAP SERPL CALC-SCNC: 8 MMOL/L (ref 8–16)
AST SERPL-CCNC: 23 U/L (ref 10–40)
BASOPHILS # BLD AUTO: 0.02 K/UL (ref 0–0.2)
BASOPHILS NFR BLD: 0.3 % (ref 0–1.9)
BILIRUB SERPL-MCNC: 0.6 MG/DL (ref 0.1–1)
BUN SERPL-MCNC: 10 MG/DL (ref 8–23)
CALCIUM SERPL-MCNC: 9.3 MG/DL (ref 8.7–10.5)
CHLORIDE SERPL-SCNC: 107 MMOL/L (ref 95–110)
CHOLEST SERPL-MCNC: 151 MG/DL (ref 120–199)
CHOLEST/HDLC SERPL: 3.4 {RATIO} (ref 2–5)
CO2 SERPL-SCNC: 25 MMOL/L (ref 23–29)
CREAT SERPL-MCNC: 0.7 MG/DL (ref 0.5–1.4)
DIFFERENTIAL METHOD: ABNORMAL
EOSINOPHIL # BLD AUTO: 0.6 K/UL (ref 0–0.5)
EOSINOPHIL NFR BLD: 8.1 % (ref 0–8)
ERYTHROCYTE [DISTWIDTH] IN BLOOD BY AUTOMATED COUNT: 13.4 % (ref 11.5–14.5)
EST. GFR  (NO RACE VARIABLE): >60 ML/MIN/1.73 M^2
ESTIMATED AVG GLUCOSE: 103 MG/DL (ref 68–131)
GLUCOSE SERPL-MCNC: 106 MG/DL (ref 70–110)
HBA1C MFR BLD: 5.2 % (ref 4–5.6)
HCT VFR BLD AUTO: 42.6 % (ref 37–48.5)
HDLC SERPL-MCNC: 44 MG/DL (ref 40–75)
HDLC SERPL: 29.1 % (ref 20–50)
HGB BLD-MCNC: 13.9 G/DL (ref 12–16)
IMM GRANULOCYTES # BLD AUTO: 0.03 K/UL (ref 0–0.04)
IMM GRANULOCYTES NFR BLD AUTO: 0.4 % (ref 0–0.5)
LDLC SERPL CALC-MCNC: 91.6 MG/DL (ref 63–159)
LYMPHOCYTES # BLD AUTO: 1.6 K/UL (ref 1–4.8)
LYMPHOCYTES NFR BLD: 22.7 % (ref 18–48)
MCH RBC QN AUTO: 30.6 PG (ref 27–31)
MCHC RBC AUTO-ENTMCNC: 32.6 G/DL (ref 32–36)
MCV RBC AUTO: 94 FL (ref 82–98)
MONOCYTES # BLD AUTO: 0.4 K/UL (ref 0.3–1)
MONOCYTES NFR BLD: 5.7 % (ref 4–15)
NEUTROPHILS # BLD AUTO: 4.3 K/UL (ref 1.8–7.7)
NEUTROPHILS NFR BLD: 62.8 % (ref 38–73)
NONHDLC SERPL-MCNC: 107 MG/DL
NRBC BLD-RTO: 0 /100 WBC
PLATELET # BLD AUTO: 273 K/UL (ref 150–450)
PMV BLD AUTO: 10.5 FL (ref 9.2–12.9)
POTASSIUM SERPL-SCNC: 4.5 MMOL/L (ref 3.5–5.1)
PROT SERPL-MCNC: 6.8 G/DL (ref 6–8.4)
RBC # BLD AUTO: 4.54 M/UL (ref 4–5.4)
SODIUM SERPL-SCNC: 140 MMOL/L (ref 136–145)
TRIGL SERPL-MCNC: 77 MG/DL (ref 30–150)
WBC # BLD AUTO: 6.82 K/UL (ref 3.9–12.7)

## 2022-09-20 PROCEDURE — 80061 LIPID PANEL: CPT | Performed by: INTERNAL MEDICINE

## 2022-09-20 PROCEDURE — 36415 COLL VENOUS BLD VENIPUNCTURE: CPT | Mod: PO | Performed by: INTERNAL MEDICINE

## 2022-09-20 PROCEDURE — 85025 COMPLETE CBC W/AUTO DIFF WBC: CPT | Performed by: INTERNAL MEDICINE

## 2022-09-20 PROCEDURE — 83036 HEMOGLOBIN GLYCOSYLATED A1C: CPT | Performed by: INTERNAL MEDICINE

## 2022-09-20 PROCEDURE — 80053 COMPREHEN METABOLIC PANEL: CPT | Performed by: INTERNAL MEDICINE

## 2022-09-26 ENCOUNTER — OFFICE VISIT (OUTPATIENT)
Dept: INTERNAL MEDICINE | Facility: CLINIC | Age: 67
End: 2022-09-26
Payer: MEDICARE

## 2022-09-26 ENCOUNTER — TELEPHONE (OUTPATIENT)
Dept: ORTHOPEDICS | Facility: CLINIC | Age: 67
End: 2022-09-26
Payer: MEDICARE

## 2022-09-26 VITALS
OXYGEN SATURATION: 97 % | WEIGHT: 169.31 LBS | HEART RATE: 97 BPM | BODY MASS INDEX: 31.97 KG/M2 | SYSTOLIC BLOOD PRESSURE: 108 MMHG | HEIGHT: 61 IN | DIASTOLIC BLOOD PRESSURE: 64 MMHG

## 2022-09-26 DIAGNOSIS — J30.9 ALLERGIC RHINITIS, UNSPECIFIED SEASONALITY, UNSPECIFIED TRIGGER: ICD-10-CM

## 2022-09-26 DIAGNOSIS — M25.552 CHRONIC LEFT HIP PAIN: ICD-10-CM

## 2022-09-26 DIAGNOSIS — Z23 NEED FOR INFLUENZA VACCINATION: ICD-10-CM

## 2022-09-26 DIAGNOSIS — G89.29 CHRONIC LEFT HIP PAIN: ICD-10-CM

## 2022-09-26 DIAGNOSIS — Z00.00 ANNUAL PHYSICAL EXAM: ICD-10-CM

## 2022-09-26 DIAGNOSIS — M85.80 OSTEOPENIA, UNSPECIFIED LOCATION: ICD-10-CM

## 2022-09-26 DIAGNOSIS — M25.552 LEFT HIP PAIN: Primary | ICD-10-CM

## 2022-09-26 DIAGNOSIS — K21.9 GASTROESOPHAGEAL REFLUX DISEASE WITHOUT ESOPHAGITIS: ICD-10-CM

## 2022-09-26 DIAGNOSIS — J45.51 SEVERE PERSISTENT ASTHMA WITH ACUTE EXACERBATION: ICD-10-CM

## 2022-09-26 DIAGNOSIS — J32.9 CHRONIC SINUSITIS, UNSPECIFIED LOCATION: ICD-10-CM

## 2022-09-26 DIAGNOSIS — I10 ESSENTIAL HYPERTENSION: ICD-10-CM

## 2022-09-26 PROCEDURE — 99999 PR PBB SHADOW E&M-EST. PATIENT-LVL IV: ICD-10-PCS | Mod: PBBFAC,,, | Performed by: INTERNAL MEDICINE

## 2022-09-26 PROCEDURE — 99214 OFFICE O/P EST MOD 30 MIN: CPT | Mod: PBBFAC,PO,25 | Performed by: INTERNAL MEDICINE

## 2022-09-26 PROCEDURE — 99999 PR PBB SHADOW E&M-EST. PATIENT-LVL IV: CPT | Mod: PBBFAC,,, | Performed by: INTERNAL MEDICINE

## 2022-09-26 PROCEDURE — 99397 PER PM REEVAL EST PAT 65+ YR: CPT | Mod: S$PBB,GZ,, | Performed by: INTERNAL MEDICINE

## 2022-09-26 PROCEDURE — G0008 ADMIN INFLUENZA VIRUS VAC: HCPCS | Mod: PBBFAC,PO

## 2022-09-26 PROCEDURE — 99397 PR PREVENTIVE VISIT,EST,65 & OVER: ICD-10-PCS | Mod: S$PBB,GZ,, | Performed by: INTERNAL MEDICINE

## 2022-09-26 RX ORDER — CICLOPIROX 1 G/100ML
SHAMPOO TOPICAL
COMMUNITY
Start: 2022-04-22

## 2022-09-26 RX ORDER — PANTOPRAZOLE SODIUM 40 MG/1
40 TABLET, DELAYED RELEASE ORAL DAILY
Qty: 30 TABLET | Refills: 11 | Status: SHIPPED | OUTPATIENT
Start: 2022-09-26 | End: 2023-09-11

## 2022-09-26 RX ORDER — FLUTICASONE PROPIONATE 50 MCG
SPRAY, SUSPENSION (ML) NASAL
Qty: 16 ML | Refills: 11 | Status: SHIPPED | OUTPATIENT
Start: 2022-09-26 | End: 2023-10-02

## 2022-09-26 RX ORDER — MONTELUKAST SODIUM 10 MG/1
10 TABLET ORAL NIGHTLY
Qty: 90 TABLET | Refills: 3 | Status: SHIPPED | OUTPATIENT
Start: 2022-09-26 | End: 2023-08-11

## 2022-09-26 RX ORDER — AZELASTINE 1 MG/ML
1 SPRAY, METERED NASAL 2 TIMES DAILY
Qty: 10 ML | Refills: 11 | Status: SHIPPED | OUTPATIENT
Start: 2022-09-26 | End: 2024-01-03

## 2022-09-26 RX ORDER — BUDESONIDE AND FORMOTEROL FUMARATE DIHYDRATE 160; 4.5 UG/1; UG/1
2 AEROSOL RESPIRATORY (INHALATION) EVERY 12 HOURS
Qty: 10.2 G | Refills: 11 | Status: SHIPPED | OUTPATIENT
Start: 2022-09-26 | End: 2023-09-26 | Stop reason: SDUPTHER

## 2022-09-26 RX ORDER — LOSARTAN POTASSIUM 100 MG/1
100 TABLET ORAL DAILY
Qty: 90 TABLET | Refills: 1 | Status: SHIPPED | OUTPATIENT
Start: 2022-09-26 | End: 2023-05-08

## 2022-09-26 RX ORDER — ALENDRONATE SODIUM 70 MG/1
TABLET ORAL
Qty: 4 TABLET | Refills: 11 | Status: SHIPPED | OUTPATIENT
Start: 2022-09-26 | End: 2023-03-28 | Stop reason: SDUPTHER

## 2022-09-26 NOTE — PROGRESS NOTES
Patient ID: Carina Montaño is a 67 y.o. female.    Chief Complaint: Annual Exam    HPI Carina is a 67 y.o. female with hypertension, vitamin-D deficiency, GERD, severe persistent asthma, allergic rhinitis, prediabetes, and osteopenia continues to of breast cancer who presents for annual exam.  She continues to complain of left-sided hip pain. This is a chronic problem induration.  She also complains of occasional pain across the right side forehead which is associated with symptoms of nasal congestion. No further acute complaints today.  She is doing well in regards to her asthma.  Reviewed lab results from 09/20/2022 with her today.    Health Maintenance Topics with due status: Not Due       Topic Last Completion Date    TETANUS VACCINE 01/01/2015    Pneumococcal Vaccines (Age 65+) 09/10/2020    DEXA Scan 10/08/2020    Colorectal Cancer Screening 05/11/2021    Mammogram 09/13/2022    Lipid Panel 09/20/2022      Review of Systems   HENT:          See HPI   Musculoskeletal:  Positive for arthralgias.   Psychiatric/Behavioral:  Decreased concentration: see HPI.    All other systems reviewed and are negative.      Objective:     Vitals:    09/26/22 0837   BP: 108/64   Pulse: 97        Physical Exam  Vitals reviewed.   Constitutional:       General: She is not in acute distress.     Appearance: Normal appearance. She is well-developed. She is not ill-appearing, toxic-appearing or diaphoretic.   HENT:      Head: Normocephalic and atraumatic.      Right Ear: Tympanic membrane, ear canal and external ear normal. There is no impacted cerumen.      Left Ear: Tympanic membrane, ear canal and external ear normal. There is no impacted cerumen.      Nose: Nose normal.   Eyes:      General: No scleral icterus.        Right eye: No discharge.         Left eye: No discharge.      Extraocular Movements: Extraocular movements intact.      Conjunctiva/sclera: Conjunctivae normal.   Neck:      Thyroid: No thyromegaly.      Trachea:  No tracheal deviation.   Cardiovascular:      Rate and Rhythm: Normal rate and regular rhythm.      Pulses: Normal pulses.      Heart sounds: Normal heart sounds. No murmur heard.    No friction rub. No gallop.   Pulmonary:      Effort: Pulmonary effort is normal. No respiratory distress.      Breath sounds: Normal breath sounds. No stridor. No wheezing, rhonchi or rales.   Chest:      Chest wall: No tenderness.   Abdominal:      General: Bowel sounds are normal. There is no distension.      Palpations: Abdomen is soft. There is no mass.      Tenderness: There is no abdominal tenderness. There is no guarding or rebound.      Hernia: No hernia is present.   Musculoskeletal:         General: No tenderness or deformity.      Cervical back: Neck supple.      Right lower leg: No edema.      Left lower leg: No edema.   Lymphadenopathy:      Cervical: No cervical adenopathy.   Skin:     General: Skin is warm and dry.      Findings: No erythema or rash.   Neurological:      General: No focal deficit present.      Mental Status: She is alert and oriented to person, place, and time. Mental status is at baseline.   Psychiatric:         Mood and Affect: Mood normal.         Behavior: Behavior normal.         Thought Content: Thought content normal.         Judgment: Judgment normal.       Assessment:       1. Annual physical exam    2. Need for influenza vaccination    3. Chronic sinusitis, unspecified location Active   4. Gastroesophageal reflux disease without esophagitis Well controlled   5. Severe persistent asthma with acute exacerbation Well controlled   6. Essential hypertension Well controlled   7. Allergic rhinitis, unspecified seasonality, unspecified trigger Well controlled   8. Osteopenia, unspecified location Chronic   9. Chronic left hip pain Chronic         Plan:         Annual physical exam    Need for influenza vaccination  -     Influenza (FLUAD) - Quadrivalent (Adjuvanted) *Preferred* (65+) (PF)    Chronic  sinusitis, unspecified location  Comments:  suspect this as cause for congestion and frontal sinus pain  Orders:  -     Ambulatory referral/consult to ENT; Future; Expected date: 10/03/2022    Gastroesophageal reflux disease without esophagitis  Comments:  Continue current medication  Orders:  -     pantoprazole (PROTONIX) 40 MG tablet; Take 1 tablet (40 mg total) by mouth once daily.  Dispense: 30 tablet; Refill: 11    Severe persistent asthma with acute exacerbation  Comments:  Cont current meds  Orders:  -     budesonide-formoterol 160-4.5 mcg (SYMBICORT) 160-4.5 mcg/actuation HFAA; Inhale 2 puffs into the lungs every 12 (twelve) hours.  Dispense: 10.2 g; Refill: 11  -     montelukast (SINGULAIR) 10 mg tablet; Take 1 tablet (10 mg total) by mouth every evening.  Dispense: 90 tablet; Refill: 3    Essential hypertension  Comments:  Cont current meds  Orders:  -     losartan (COZAAR) 100 MG tablet; Take 1 tablet (100 mg total) by mouth once daily.  Dispense: 90 tablet; Refill: 1  -     Comprehensive Metabolic Panel; Future; Expected date: 03/26/2023    Allergic rhinitis, unspecified seasonality, unspecified trigger  Comments:  Continue current medication  Orders:  -     azelastine (ASTELIN) 137 mcg (0.1 %) nasal spray; 1 spray (137 mcg total) by Nasal route 2 (two) times daily. for 14 days  Dispense: 10 mL; Refill: 11  -     fluticasone propionate (FLONASE) 50 mcg/actuation nasal spray; 2 SPRAYS (100 MCG TOTAL) BY EACH NARE ROUTE ONCE DAILY.  Dispense: 16 mL; Refill: 11    Osteopenia, unspecified location  Comments:  Continue current medication  Orders:  -     alendronate (FOSAMAX) 70 MG tablet; TAKE 1 TAB BY MOUTH EVERY WEEK WITH GLASS OF WATER/EMPTY STOMACH (DONT LIE DOWN OR EAT FOR 30 MIN)  Dispense: 4 tablet; Refill: 11    Chronic left hip pain  -     Ambulatory referral/consult to Orthopedics; Future; Expected date: 10/03/2022      RTC 6 months     Warning signs discussed, patient to call with any further  issues or worsening of symptoms.       Parts of the above note were dictated using a voice dictation software. Please excuse any grammatical or typographical errors.

## 2022-09-29 ENCOUNTER — PATIENT OUTREACH (OUTPATIENT)
Dept: ADMINISTRATIVE | Facility: HOSPITAL | Age: 67
End: 2022-09-29
Payer: MEDICARE

## 2022-09-29 NOTE — PROGRESS NOTES
09/29/2022 Gap report audit performed. Care Everywhere updates requested and reviewed  Overdue HM topic chart audit and/or requested. LINKS triggered and reconciled. Media reviewed  HM Updated MAMMOGRAM 2022    Health Maintenance Due   Topic Date Due    COVID-19 Vaccine (5 - Booster for Moderna series) 07/27/2022

## 2022-09-30 ENCOUNTER — OFFICE VISIT (OUTPATIENT)
Dept: ORTHOPEDICS | Facility: CLINIC | Age: 67
End: 2022-09-30
Payer: MEDICARE

## 2022-09-30 ENCOUNTER — HOSPITAL ENCOUNTER (OUTPATIENT)
Dept: RADIOLOGY | Facility: HOSPITAL | Age: 67
Discharge: HOME | End: 2022-09-30
Attending: ORTHOPAEDIC SURGERY
Payer: MEDICARE

## 2022-09-30 VITALS — HEIGHT: 61 IN | BODY MASS INDEX: 31.97 KG/M2 | WEIGHT: 169.31 LBS

## 2022-09-30 DIAGNOSIS — M25.552 CHRONIC LEFT HIP PAIN: ICD-10-CM

## 2022-09-30 DIAGNOSIS — G89.29 CHRONIC LEFT HIP PAIN: ICD-10-CM

## 2022-09-30 DIAGNOSIS — M25.552 LEFT HIP PAIN: ICD-10-CM

## 2022-09-30 PROCEDURE — 99214 OFFICE O/P EST MOD 30 MIN: CPT | Mod: PBBFAC,PN | Performed by: ORTHOPAEDIC SURGERY

## 2022-09-30 PROCEDURE — 99204 OFFICE O/P NEW MOD 45 MIN: CPT | Mod: S$PBB,,, | Performed by: ORTHOPAEDIC SURGERY

## 2022-09-30 PROCEDURE — 99999 PR PBB SHADOW E&M-EST. PATIENT-LVL IV: CPT | Mod: PBBFAC,,, | Performed by: ORTHOPAEDIC SURGERY

## 2022-09-30 PROCEDURE — 73502 X-RAY EXAM HIP UNI 2-3 VIEWS: CPT | Mod: 26,LT,, | Performed by: RADIOLOGY

## 2022-09-30 PROCEDURE — 99999 PR PBB SHADOW E&M-EST. PATIENT-LVL IV: ICD-10-PCS | Mod: PBBFAC,,, | Performed by: ORTHOPAEDIC SURGERY

## 2022-09-30 PROCEDURE — 73502 XR HIP WITH PELVIS WHEN PERFORMED, 2 OR 3 VIEWS LEFT: ICD-10-PCS | Mod: 26,LT,, | Performed by: RADIOLOGY

## 2022-09-30 PROCEDURE — 99204 PR OFFICE/OUTPT VISIT, NEW, LEVL IV, 45-59 MIN: ICD-10-PCS | Mod: S$PBB,,, | Performed by: ORTHOPAEDIC SURGERY

## 2022-09-30 PROCEDURE — 73502 X-RAY EXAM HIP UNI 2-3 VIEWS: CPT | Mod: TC,FY,LT

## 2022-09-30 NOTE — PROGRESS NOTES
Subjective:      Patient ID: Carina Montaño is a 67 y.o. female.    Chief Complaint: Pain of the Left Hip    HPI    Patient presents to clinic with left hip pain x several months. Patient states the pain began gradually and is localized laterals aspect of the hip. She denies any LAISHA or traumatic event.  She states pain is only present when walking at a fast pace.  She states she suffered a left hip injury during a fall several years ago, the pain subsided following formal physical therapy.  She is here today mostly to ensure she did not re-injure her left hip and her x-rays are normal.  She rates the pain as 0/10 at rest, that increases with walking at a fast pace.  She denies any mechanical symptoms to include locking and catching or instability.  Is affecting ADLs and limiting desired level of activity. Denies numbness, tingling, radiation, and inability to bear weight.     No previous surgeries or trauma on left hip        Review of Systems   Constitutional: Negative.   HENT: Negative.     Eyes: Negative.    Cardiovascular: Negative.    Respiratory: Negative.     Endocrine: Negative.    Hematologic/Lymphatic: Negative.    Skin: Negative.    Musculoskeletal:  Positive for joint pain. Negative for arthritis, falls, joint swelling, muscle weakness, neck pain and stiffness.   Neurological: Negative.    Psychiatric/Behavioral: Negative.     Allergic/Immunologic: Negative.        Objective:            General    Nursing note and vitals reviewed.  Constitutional: She is oriented to person, place, and time. She appears well-developed and well-nourished. No distress.   HENT:   Head: Normocephalic and atraumatic.   Nose: Nose normal.   Eyes: EOM are normal.   Cardiovascular:  Intact distal pulses.            Pulmonary/Chest: Effort normal. No respiratory distress.   Neurological: She is alert and oriented to person, place, and time.   Psychiatric: She has a normal mood and affect. Her behavior is normal. Judgment and  thought content normal.           Right Knee Exam     Inspection   Alignment:  normal  Effusion: absent    Left Knee Exam     Inspection   Alignment:  normal  Effusion: absent    Right Hip Exam     Inspection   Scars: absent  Swelling: absent  Bruising: absent  No deformity of hip.  Quadriceps Atrophy:  Negative  Erythema: absent    Range of Motion   Abduction:  45   Adduction:  30   Extension:  20   Flexion:  120   External rotation:  80   Internal rotation:  30     Tests   Pain w/ forced internal rotation (JAMIE): absent  Pain w/ forced external rotation (FADIR): absent  Cadence: negative  Trendelenburg Test: negative  Circumduction test: negative  Stinchfield test: negative  Log Roll: negative    Other   Sensation: normal  Left Hip Exam     Inspection   Scars: absent  Swelling: absent  No deformity of hip.  Quadriceps Atrophy:  negative  Erythema: absent  Bruising: absent    Range of Motion   Abduction:  45   Adduction:  30   Extension:  20   Flexion:  120   External rotation:  80   Internal rotation: 30     Tests   Pain w/ forced internal rotation (JAMIE): absent  Pain w/ forced external rotation (FADIR): absent  Cadence: negative  Circumduction test: negative  Stinchfield test: negative  Log Roll: negative    Other   Sensation: normal          Muscle Strength   Right Lower Extremity   Hip Abduction: 5/5   Hip Adduction: 5/5   Hip Flexion: 5/5   Ankle Dorsiflexion:  5/5   Left Lower Extremity   Hip Abduction: 5/5   Hip Adduction: 5/5   Hip Flexion: 5/5   Ankle Dorsiflexion:  5/5     Vascular Exam     Right Pulses  Dorsalis Pedis:      2+  Posterior Tibial:      2+        Left Pulses  Dorsalis Pedis:      2+  Posterior Tibial:      2+        Capillary Refill  Right Hand: normal capillary refill        Edema  Right Upper Leg: absent  Left Upper Leg: absent    Radiographs bilateral hip    My interpretation:    Mild joint space narrowing of the femoral acetabular joints with some osteophyte formation     No signs of  severe DJD fractures, or any other bony abnormalities        Assessment:       Encounter Diagnosis   Name Primary?    Chronic left hip pain           Plan:       Carina was seen today for pain.    Diagnoses and all orders for this visit:    Chronic left hip pain  -     Ambulatory referral/consult to Orthopedics    1. I made the decision to order new imaging of the extremity or extremities evaluated. I independently reviewed and interpreted the radiographs and/or MRIs today. These images were shown to the patient where I then discussed my findings in detail.    2. After showing the patient her x-rays, explained her the physical exam was mostly unremarkable, however she would like to start formal physical therapy to strengthen her hips and hopefully relieve her pain we can.  At this time, patient would prefer to continue doing exercises on her own.    3. RTC to see Alin lima.  Patient will reach out to our clinic in the future if she changes her mind and would like to attend formal PT.      All of the patient's questions were answered. Patient was advised to call the clinic or contact me through the patient portal for any questions or concerns.       Medical Dictation software was used during the dictation of portions or the entirety of this medical record.  Phonetic or grammatic errors may exist due to the use of this software. For clarification, refer to the author of the document.

## 2022-10-10 ENCOUNTER — HOSPITAL ENCOUNTER (OUTPATIENT)
Dept: RADIOLOGY | Facility: HOSPITAL | Age: 67
Discharge: HOME | End: 2022-10-10
Attending: INTERNAL MEDICINE
Payer: MEDICARE

## 2022-10-10 DIAGNOSIS — M81.0 OSTEOPOROSIS, UNSPECIFIED OSTEOPOROSIS TYPE, UNSPECIFIED PATHOLOGICAL FRACTURE PRESENCE: ICD-10-CM

## 2022-10-10 PROCEDURE — 77080 DXA BONE DENSITY AXIAL: CPT | Mod: TC

## 2022-10-10 PROCEDURE — 77080 DEXA BONE DENSITY SPINE HIP: ICD-10-PCS | Mod: 26,,, | Performed by: RADIOLOGY

## 2022-10-10 PROCEDURE — 77080 DXA BONE DENSITY AXIAL: CPT | Mod: 26,,, | Performed by: RADIOLOGY

## 2022-10-12 ENCOUNTER — TELEPHONE (OUTPATIENT)
Dept: OTOLARYNGOLOGY | Facility: CLINIC | Age: 67
End: 2022-10-12
Payer: MEDICARE

## 2022-10-12 ENCOUNTER — PATIENT MESSAGE (OUTPATIENT)
Dept: OTOLARYNGOLOGY | Facility: CLINIC | Age: 67
End: 2022-10-12
Payer: MEDICARE

## 2022-10-12 NOTE — TELEPHONE ENCOUNTER
Attempted to reach patient in regards to appointment coming up with Dr. Mustafa. Patient will need to be rescheduled due to provider being out of the office on upcoming scheduled appointment. Left a message asking patient to return my call at [phone] or respond to message on portal.

## 2022-11-21 DIAGNOSIS — E78.00 HYPERCHOLESTEREMIA: ICD-10-CM

## 2022-11-21 NOTE — TELEPHONE ENCOUNTER
No new care gaps identified.  [facility] Embedded Care Gaps. Reference number: [id number]. 11/21/2022   12:18:54 AM CST

## 2022-11-22 RX ORDER — SIMVASTATIN 20 MG/1
TABLET, FILM COATED ORAL
Qty: 90 TABLET | Refills: 3 | Status: SHIPPED | OUTPATIENT
Start: 2022-11-22 | End: 2023-11-30

## 2022-11-22 NOTE — TELEPHONE ENCOUNTER
Refill Decision Note   Carina Montaño  is requesting a refill authorization.  Brief Assessment and Rationale for Refill:  Approve     Medication Therapy Plan:       Medication Reconciliation Completed: No   Comments:     No Care Gaps recommended.     Note composed:12:38 PM 11/22/2022

## 2022-12-08 DIAGNOSIS — I50.32 CHRONIC DIASTOLIC (CONGESTIVE) HEART FAILURE: ICD-10-CM

## 2022-12-08 DIAGNOSIS — I35.8 AORTIC VALVE SCLEROSIS: ICD-10-CM

## 2023-01-13 ENCOUNTER — OFFICE VISIT (OUTPATIENT)
Dept: FAMILY MEDICINE | Facility: CLINIC | Age: 68
End: 2023-01-13
Payer: MEDICARE

## 2023-01-13 VITALS
HEIGHT: 61 IN | WEIGHT: 168 LBS | BODY MASS INDEX: 31.72 KG/M2 | DIASTOLIC BLOOD PRESSURE: 60 MMHG | HEART RATE: 97 BPM | OXYGEN SATURATION: 98 % | SYSTOLIC BLOOD PRESSURE: 122 MMHG

## 2023-01-13 DIAGNOSIS — R07.89 ATYPICAL CHEST PAIN: Primary | ICD-10-CM

## 2023-01-13 DIAGNOSIS — M79.601 RIGHT ARM PAIN: ICD-10-CM

## 2023-01-13 PROCEDURE — 99999 PR PBB SHADOW E&M-EST. PATIENT-LVL IV: ICD-10-PCS | Mod: PBBFAC,,, | Performed by: NURSE PRACTITIONER

## 2023-01-13 PROCEDURE — 93010 ELECTROCARDIOGRAM REPORT: CPT | Mod: S$PBB,,, | Performed by: INTERNAL MEDICINE

## 2023-01-13 PROCEDURE — 99213 OFFICE O/P EST LOW 20 MIN: CPT | Mod: S$PBB,,, | Performed by: NURSE PRACTITIONER

## 2023-01-13 PROCEDURE — 99999 PR PBB SHADOW E&M-EST. PATIENT-LVL IV: CPT | Mod: PBBFAC,,, | Performed by: NURSE PRACTITIONER

## 2023-01-13 PROCEDURE — 93005 ELECTROCARDIOGRAM TRACING: CPT | Mod: PBBFAC,PO | Performed by: INTERNAL MEDICINE

## 2023-01-13 PROCEDURE — 93010 EKG 12-LEAD: ICD-10-PCS | Mod: S$PBB,,, | Performed by: INTERNAL MEDICINE

## 2023-01-13 PROCEDURE — 99214 OFFICE O/P EST MOD 30 MIN: CPT | Mod: PBBFAC,PO | Performed by: NURSE PRACTITIONER

## 2023-01-13 PROCEDURE — 99213 PR OFFICE/OUTPT VISIT, EST, LEVL III, 20-29 MIN: ICD-10-PCS | Mod: S$PBB,,, | Performed by: NURSE PRACTITIONER

## 2023-01-13 NOTE — PROGRESS NOTES
"Subjective:       Patient ID: Carina Montaño is a 67 y.o. female.    Chief Complaint: Pain    This is a pleasant 66 yo female patient of Dr. De Leon who is new to me. She presents today with c/o right arm pain. PMH includes    Patient Active Problem List:     Hypokalemia     Essential hypertension     Hypercholesteremia     Obesity (BMI 30-39.9)     History of cancer of left breast     Low bone mass     Gastroesophageal reflux disease     Prolonged QT interval     Allergic sinusitis     NAFLD (nonalcoholic fatty liver disease)     GERD (gastroesophageal reflux disease)     Fall     Facial trauma, initial encounter     Pre-diabetes     Moderate persistent asthma without complication     Screening for malignant neoplasm of colon     Malignant neoplasm of female breast, unspecified estrogen receptor status, unspecified laterality, unspecified site of breast     Chronic hip pain, left     Pain aggravated by walking     Decreased range of left hip movement     Impaired gait and mobility     Chronic diastolic (congestive) heart failure     Aortic valve sclerosis    VSS today. Reports she started experiencing right arm pain 2 days ago that radiates to her entire chest. Also feeling numbness/tingling to RUE. Has mostly resolved now. Felt similar symptoms several week ago that resolved. Denies SOB/dyspnea/palpitations with episodes. Denies RUE weakness. Admits she had been lifting heavy bags recently. Says this is similar to a "pinched nerve" she had in the past. Did not try taking any pain relievers as it resolved on its own.    Review of Systems   Respiratory:  Negative for chest tightness and shortness of breath.    Cardiovascular:  Positive for chest pain. Negative for palpitations and leg swelling.   Musculoskeletal:  Negative for neck pain and neck stiffness.        Right arm pain       Objective:      Physical Exam  Vitals reviewed.   Constitutional:       General: She is not in acute distress.     Appearance: " Normal appearance. She is well-developed and well-groomed. She is obese.   HENT:      Head: Normocephalic.      Right Ear: External ear normal.      Left Ear: External ear normal.      Nose: Nose normal.   Eyes:      General:         Right eye: No discharge.         Left eye: No discharge.   Cardiovascular:      Rate and Rhythm: Normal rate and regular rhythm.      Pulses:           Carotid pulses are 2+ on the right side and 2+ on the left side.       Radial pulses are 2+ on the right side and 2+ on the left side.        Dorsalis pedis pulses are 2+ on the right side and 2+ on the left side.   Pulmonary:      Effort: Pulmonary effort is normal. No respiratory distress.      Breath sounds: Normal breath sounds. No wheezing or rhonchi.   Chest:      Chest wall: No tenderness.   Abdominal:      General: There is no distension.   Musculoskeletal:         General: No swelling or tenderness.      Right upper arm: No tenderness.      Left upper arm: No tenderness.      Comments: BUE strength equal   Skin:     General: Skin is warm and dry.      Coloration: Skin is not pale.   Neurological:      Mental Status: She is alert and oriented to person, place, and time.      Coordination: Coordination normal.      Gait: Gait normal.   Psychiatric:         Attention and Perception: Attention normal.         Mood and Affect: Mood and affect normal.         Speech: Speech normal.         Behavior: Behavior normal. Behavior is cooperative.         Thought Content: Thought content normal.       Assessment:       Problem List Items Addressed This Visit    None  Visit Diagnoses       Atypical chest pain    -  Primary    Relevant Orders    IN OFFICE EKG 12-LEAD (to Muse)    Right arm pain                  Plan:       Carina was seen today for pain.    Diagnoses and all orders for this visit:    Atypical chest pain  -     IN OFFICE EKG 12-LEAD (to Muse)    Right arm pain        - EKG ordered today: appears stable   - Given that symptoms  have resolved, reassurance provided to patient  - Avoid heavy lifting or strenuous activity  - Warning signs discussed  - RTC in about 8 weeks for follow-up with PCP, or sooner if needed          I spent a total of 30 minutes on the day of the visit.  This includes face to face time and non-face to face time preparing to see the patient (eg, review of tests), obtaining and/or reviewing separately obtained history, documenting clinical information in the electronic or other health record, independently interpreting results and communicating results to the patient/family/caregiver, or care coordinator.

## 2023-01-24 ENCOUNTER — OFFICE VISIT (OUTPATIENT)
Dept: OTOLARYNGOLOGY | Facility: CLINIC | Age: 68
End: 2023-01-24
Payer: MEDICARE

## 2023-01-24 VITALS
HEART RATE: 85 BPM | SYSTOLIC BLOOD PRESSURE: 124 MMHG | BODY MASS INDEX: 31.59 KG/M2 | DIASTOLIC BLOOD PRESSURE: 78 MMHG | HEIGHT: 61 IN | WEIGHT: 167.31 LBS

## 2023-01-24 DIAGNOSIS — J32.8 OTHER CHRONIC SINUSITIS: ICD-10-CM

## 2023-01-24 DIAGNOSIS — H91.93 BILATERAL HEARING LOSS, UNSPECIFIED HEARING LOSS TYPE: Chronic | ICD-10-CM

## 2023-01-24 DIAGNOSIS — J34.3 HYPERTROPHY OF INFERIOR NASAL TURBINATE: Primary | ICD-10-CM

## 2023-01-24 DIAGNOSIS — J30.9 CHRONIC ALLERGIC RHINITIS: Chronic | ICD-10-CM

## 2023-01-24 PROCEDURE — 99999 PR PBB SHADOW E&M-EST. PATIENT-LVL IV: CPT | Mod: PBBFAC,,, | Performed by: OTOLARYNGOLOGY

## 2023-01-24 PROCEDURE — 99214 OFFICE O/P EST MOD 30 MIN: CPT | Mod: PBBFAC,PN,25 | Performed by: OTOLARYNGOLOGY

## 2023-01-24 PROCEDURE — 31231 NASAL/SINUS ENDOSCOPY: ICD-10-PCS | Mod: S$PBB,,, | Performed by: OTOLARYNGOLOGY

## 2023-01-24 PROCEDURE — 31231 NASAL ENDOSCOPY DX: CPT | Mod: PBBFAC,PN | Performed by: OTOLARYNGOLOGY

## 2023-01-24 PROCEDURE — 99204 PR OFFICE/OUTPT VISIT, NEW, LEVL IV, 45-59 MIN: ICD-10-PCS | Mod: 25,S$PBB,, | Performed by: OTOLARYNGOLOGY

## 2023-01-24 PROCEDURE — 99999 PR PBB SHADOW E&M-EST. PATIENT-LVL IV: ICD-10-PCS | Mod: PBBFAC,,, | Performed by: OTOLARYNGOLOGY

## 2023-01-24 PROCEDURE — 99204 OFFICE O/P NEW MOD 45 MIN: CPT | Mod: 25,S$PBB,, | Performed by: OTOLARYNGOLOGY

## 2023-01-24 RX ORDER — METHYLPREDNISOLONE 4 MG/1
TABLET ORAL
Qty: 1 EACH | Refills: 0 | Status: SHIPPED | OUTPATIENT
Start: 2023-01-24 | End: 2023-03-08

## 2023-01-24 RX ORDER — AMOXICILLIN AND CLAVULANATE POTASSIUM 875; 125 MG/1; MG/1
1 TABLET, FILM COATED ORAL 2 TIMES DAILY
Qty: 42 TABLET | Refills: 0 | Status: SHIPPED | OUTPATIENT
Start: 2023-01-24 | End: 2023-02-14

## 2023-01-24 NOTE — PROCEDURES
Nasal/sinus endoscopy    Date/Time: 1/24/2023 9:20 AM  Performed by: Angela Jacobs MD  Authorized by: Angela Jacobs MD     Consent Done?:  Yes (Verbal)  Anesthesia:     Local anesthetic:  Lidocaine 2% and Yanick-Synephrine 1/2%  Nose:     Procedure Performed:  Nasal Endoscopy  External:      No external nasal deformity  Intranasal:      Mucosa no polyps     Mucosa ulcers not present     No mucosa lesions present     Turbinates not enlarged     No septum gross deformity  Nasopharynx:      Posterior choanae patent     Eustachian tube patent     Bilateral middle turbinate edema with narrowing of the middle meatus; + narrowing of sphenoethmoid recess + mucopurulence left middle meatus and left sphenoethmoidal recess

## 2023-01-24 NOTE — PROGRESS NOTES
Chief Complaint   Patient presents with    Other     Sneezing, runny nose, cough   .    HPI:     Carina Montaño is a 67 y.o. female who presents for evaluation of a several month history of nasal congestion, postnasal drip, facial pressure and pain.  She notes associated cough and sneezing. She does feel that her symptoms worsen with weather changes.  She describes difficulty breathing at night.  She does use sinus rinses or nasal sprays. She has been on Flonase and Astelin. She is also on Claritin and montelukast.  She feels that this is somewhat helpful.  She admits to midface pain and pressure.  She admits to rhinorrhea and postnasal drip. There is not maxillary tooth pain. She admits to frontal headaches.  She has had sinus or nasal surgery. She is unsure of exact procedure.  There is no history of sinonasal trauma. She notes that she has had hearing loss since childhood. She has had hearing tests in the past. She did not tolerate hearing aids well. She felt they were too loud. She thinks last hearing test was 10 years ago.           Past Medical History:   Diagnosis Date    Allergy     Arthritis     Asthma     Cancer     breast    Chronic diastolic (congestive) heart failure 12/8/2022    GERD (gastroesophageal reflux disease)     Hyperlipidemia     Hypertension     Prediabetes      Social History     Socioeconomic History    Marital status:     Number of children: 1   Occupational History    Occupation:     Tobacco Use    Smoking status: Never    Smokeless tobacco: Never   Substance and Sexual Activity    Alcohol use: Yes     Comment: social    Drug use: No    Sexual activity: Yes     Social Determinants of Health     Financial Resource Strain: Low Risk     Difficulty of Paying Living Expenses: Not very hard   Food Insecurity: No Food Insecurity    Worried About Running Out of Food in the Last Year: Never true    Ran Out of Food in the Last Year: Never true   Transportation  Needs: No Transportation Needs    Lack of Transportation (Medical): No    Lack of Transportation (Non-Medical): No   Physical Activity: Inactive    Days of Exercise per Week: 0 days    Minutes of Exercise per Session: 0 min   Stress: No Stress Concern Present    Feeling of Stress : Not at all   Social Connections: Unknown    Frequency of Communication with Friends and Family: Twice a week    Frequency of Social Gatherings with Friends and Family: Once a week    Active Member of Clubs or Organizations: Yes    Attends Club or Organization Meetings: 1 to 4 times per year    Marital Status:    Housing Stability: Low Risk     Unable to Pay for Housing in the Last Year: No    Number of Places Lived in the Last Year: 1    Unstable Housing in the Last Year: No     Past Surgical History:   Procedure Laterality Date    ABDOMINAL SURGERY      COLONOSCOPY N/A 5/11/2021    Procedure: COLONOSCOPY;  Surgeon: Ema Vidal MD;  Location: [facility];  Service: Endoscopy;  Laterality: N/A;  COVID TEST 5/8 SUPREP    ESOPHAGOGASTRODUODENOSCOPY N/A 5/17/2019    Procedure: ESOPHAGOGASTRODUODENOSCOPY (EGD);  Surgeon: Ema Vidal MD;  Location: [facility];  Service: Endoscopy;  Laterality: N/A;    KNEE SURGERY      SHOULDER SURGERY       Family History   Problem Relation Age of Onset    Hypertension Mother     Cancer Mother         Pancreatic    Hypertension Father            Review of Systems  General: negative for chills, fever or weight loss  Psychological: negative for mood changes or depression  Ophthalmic: negative for blurry vision, photophobia or eye pain  ENT: see HPI  Respiratory: no cough, shortness of breath, or wheezing  Cardiovascular: no chest pain or dyspnea on exertion  Gastrointestinal: no abdominal pain, change in bowel habits, or black/ bloody stools  Musculoskeletal: negative for gait disturbance or muscular weakness  Neurological: no syncope or seizures; no ataxia  Dermatological: negative for puritis,   rash and jaundice  Hematologic/lymphatic: no easy bruising, no new lumps or bumps      Physical Exam:    Vitals:    01/24/23 0937   BP: 124/78   Pulse: 85       Constitutional: Well appearing / communicating without difficutly.  NAD.  Eyes: EOM I Bilaterally  Head/Face: Normocephalic.  Negative paranasal sinus pressure/tenderness.  Salivary glands WNL.  House Brackmann I Bilaterally.    Right Ear: Auricle normal appearance. External Auditory Canal within normal limits,TM w/o masses/lesions/perforations. TM mobility noted.   Left Ear: Auricle normal appearance. External Auditory Canal WNL,TM w/o masses/lesions/perforations. TM mobility noted.  Nose: No gross nasal septal deviation. Inferior Turbinates 3+ bilaterally. No septal perforation. No masses/lesions. External nasal skin appears normal without masses/lesions.  Oral Cavity: Gingiva/lips within normal limits.  Dentition/gingiva healthy appearing. Mucus membranes moist. Floor of mouth soft, no masses palpated. Oral Tongue mobile. Hard Palate appears normal.    Oropharynx: Base of tongue appears normal. No masses/lesions noted. Tonsillar fossa/pharyngeal wall without lesions. Posterior oropharynx WNL.  Soft palate without masses. Midline uvula.   Neck/Lymphatic: No LAD I-VI bilaterally.  No thyromegaly.  No masses noted on exam.    Mirror laryngoscopy/nasopharyngoscopy: Active gag reflex.  Unable to perform.      See separate procedure note for nasal endoscopy      Assessment:    ICD-10-CM ICD-9-CM    1. Hypertrophy of inferior nasal turbinate  J34.3 478.0       2. Other chronic sinusitis Active J32.8 473.8 Ambulatory referral/consult to ENT      Nasal/sinus endoscopy      3. Chronic allergic rhinitis  J30.9 477.9       4. Bilateral hearing loss, unspecified hearing loss type  H91.93 389.9         The primary encounter diagnosis was Hypertrophy of inferior nasal turbinate. Diagnoses of Other chronic sinusitis, Chronic allergic rhinitis, and Bilateral hearing loss,  unspecified hearing loss type were also pertinent to this visit.      Plan:  Orders Placed This Encounter   Procedures    Nasal/sinus endoscopy     Start nasal saline rinses b.i.d.  Continue Flonase 2 sprays per nostril daily  Continue Astelin 1 spray per nostril b.i.d.  Start Augmentin 875 mg p.o. b.i.d. for 21  Start Medrol Dosepak  Obtain audiogram on follow-up visit to evaluate hearing loss  Follow-up in approximately 4-6 weeks     Angela Jacobs MD

## 2023-02-03 ENCOUNTER — OFFICE VISIT (OUTPATIENT)
Dept: FAMILY MEDICINE | Facility: CLINIC | Age: 68
End: 2023-02-03
Payer: MEDICARE

## 2023-02-03 VITALS
HEIGHT: 61 IN | SYSTOLIC BLOOD PRESSURE: 110 MMHG | OXYGEN SATURATION: 98 % | BODY MASS INDEX: 31.63 KG/M2 | WEIGHT: 167.56 LBS | DIASTOLIC BLOOD PRESSURE: 80 MMHG | HEART RATE: 91 BPM

## 2023-02-03 DIAGNOSIS — R05.8 POST-VIRAL COUGH SYNDROME: Primary | ICD-10-CM

## 2023-02-03 PROCEDURE — 99999 PR PBB SHADOW E&M-EST. PATIENT-LVL V: ICD-10-PCS | Mod: PBBFAC,,,

## 2023-02-03 PROCEDURE — 99214 OFFICE O/P EST MOD 30 MIN: CPT | Mod: S$PBB,,,

## 2023-02-03 PROCEDURE — 99214 PR OFFICE/OUTPT VISIT, EST, LEVL IV, 30-39 MIN: ICD-10-PCS | Mod: S$PBB,,,

## 2023-02-03 PROCEDURE — 99999 PR PBB SHADOW E&M-EST. PATIENT-LVL V: CPT | Mod: PBBFAC,,,

## 2023-02-03 PROCEDURE — 99215 OFFICE O/P EST HI 40 MIN: CPT | Mod: PBBFAC,PO

## 2023-02-03 RX ORDER — BENZONATATE 100 MG/1
100 CAPSULE ORAL 3 TIMES DAILY PRN
Qty: 30 CAPSULE | Refills: 0 | Status: SHIPPED | OUTPATIENT
Start: 2023-02-03 | End: 2023-02-13

## 2023-02-03 NOTE — PROGRESS NOTES
Subjective:         Chief Complaint: Cough and Nasal Congestion (Symptoms x 1 week)     Carina Montaño is a 67 y.o. female, patient of Adelaide De Leon MD unknown to me, presents today with complaints of Cough and Nasal Congestion (Symptoms x 1 week)    Cough  This is a new problem. The current episode started in the past 7 days. The problem has been unchanged. The problem occurs every few minutes. The cough is Productive of sputum. Associated symptoms include postnasal drip. Pertinent negatives include no chest pain, chills, ear congestion, ear pain, fever, headaches, heartburn, hemoptysis, myalgias, nasal congestion, rash, rhinorrhea, sore throat, shortness of breath, sweats, weight loss or wheezing. The symptoms are aggravated by lying down. She has tried OTC cough suppressant for the symptoms. The treatment provided moderate relief. There is no history of asthma or pneumonia.     Past Medical History:   Diagnosis Date    Allergy     Arthritis     Asthma     Cancer     breast    Chronic diastolic (congestive) heart failure 12/8/2022    GERD (gastroesophageal reflux disease)     Hyperlipidemia     Hypertension     Prediabetes        Past Surgical History:   Procedure Laterality Date    ABDOMINAL SURGERY      COLONOSCOPY N/A 5/11/2021    Procedure: COLONOSCOPY;  Surgeon: Ema Vidal MD;  Location: [facility];  Service: Endoscopy;  Laterality: N/A;  COVID TEST 5/8 SUPREP    ESOPHAGOGASTRODUODENOSCOPY N/A 5/17/2019    Procedure: ESOPHAGOGASTRODUODENOSCOPY (EGD);  Surgeon: Ema Vidal MD;  Location: [facility];  Service: Endoscopy;  Laterality: N/A;    KNEE SURGERY      SHOULDER SURGERY         Family History   Problem Relation Age of Onset    Hypertension Mother     Cancer Mother         Pancreatic    Hypertension Father        Social History     Socioeconomic History    Marital status:     Number of children: 1   Occupational History    Occupation:     Tobacco Use     Smoking status: Never    Smokeless tobacco: Never   Substance and Sexual Activity    Alcohol use: Yes     Comment: social    Drug use: No    Sexual activity: Yes     Social Determinants of Health     Financial Resource Strain: Low Risk     Difficulty of Paying Living Expenses: Not very hard   Food Insecurity: No Food Insecurity    Worried About Running Out of Food in the Last Year: Never true    Ran Out of Food in the Last Year: Never true   Transportation Needs: No Transportation Needs    Lack of Transportation (Medical): No    Lack of Transportation (Non-Medical): No   Physical Activity: Inactive    Days of Exercise per Week: 0 days    Minutes of Exercise per Session: 0 min   Stress: No Stress Concern Present    Feeling of Stress : Not at all   Social Connections: Unknown    Frequency of Communication with Friends and Family: Twice a week    Frequency of Social Gatherings with Friends and Family: Once a week    Active Member of Clubs or Organizations: Yes    Attends Club or Organization Meetings: 1 to 4 times per year    Marital Status:    Housing Stability: Low Risk     Unable to Pay for Housing in the Last Year: No    Number of Places Lived in the Last Year: 1    Unstable Housing in the Last Year: No       Review of Systems   Constitutional:  Negative for appetite change, chills, fever, unexpected weight change and weight loss.   HENT:  Positive for postnasal drip. Negative for ear pain, rhinorrhea and sore throat.    Eyes:  Negative for visual disturbance.   Respiratory:  Positive for cough. Negative for hemoptysis, chest tightness, shortness of breath and wheezing.    Cardiovascular:  Negative for chest pain and palpitations.   Gastrointestinal:  Negative for abdominal pain, diarrhea, heartburn and vomiting.   Genitourinary: Negative.    Musculoskeletal: Negative.  Negative for myalgias.   Skin:  Negative for color change and rash.   Neurological:  Negative for dizziness, weakness and headaches.  "  Hematological:  Negative for adenopathy.   All other systems reviewed and are negative.      Objective:     Vitals:    02/03/23 1431   BP: 110/80   BP Location: Right arm   Patient Position: Sitting   BP Method: Medium (Manual)   Pulse: 91   SpO2: 98%   Weight: 76 kg (167 lb 8.8 oz)   Height: 5' 1" (1.549 m)          Physical Exam  Vitals reviewed.   Constitutional:       General: She is not in acute distress.     Appearance: Normal appearance. She is well-developed and well-groomed. She is not ill-appearing or toxic-appearing.   HENT:      Right Ear: Tympanic membrane normal.      Left Ear: Tympanic membrane normal.      Nose:      Right Sinus: No maxillary sinus tenderness or frontal sinus tenderness.      Left Sinus: No maxillary sinus tenderness or frontal sinus tenderness.      Mouth/Throat:      Pharynx: Uvula midline. Posterior oropharyngeal erythema present. No oropharyngeal exudate.   Eyes:      Pupils: Pupils are equal, round, and reactive to light.   Cardiovascular:      Rate and Rhythm: Normal rate and regular rhythm.      Heart sounds: Normal heart sounds.   Pulmonary:      Effort: Pulmonary effort is normal.      Breath sounds: Normal breath sounds.   Abdominal:      General: Bowel sounds are normal.      Palpations: Abdomen is soft.   Musculoskeletal:         General: Normal range of motion.      Cervical back: Normal range of motion.      Right lower leg: No edema.      Left lower leg: No edema.   Skin:     General: Skin is warm and dry.      Capillary Refill: Capillary refill takes less than 2 seconds.   Neurological:      General: No focal deficit present.      Mental Status: She is alert and oriented to person, place, and time.   Psychiatric:         Attention and Perception: Attention normal.         Mood and Affect: Mood normal.         Speech: Speech normal.         Behavior: Behavior is cooperative.             Assessment:         ICD-10-CM ICD-9-CM   1. Post-viral cough syndrome  R05.8 " 786.2       Plan:       Post-viral cough syndrome  -     benzonatate (TESSALON) 100 MG capsule; Take 1 capsule (100 mg total) by mouth 3 (three) times daily as needed for Cough.  Dispense: 30 capsule; Refill: 0    -Drink plenty of fluids and get plenty of rest.  -Use a cool mist humidifier. This can help a stuffy nose and make it easier to breathe.  -Over the counter antihistamine medication (allegra/Claritin/Zyrtec) of your choice as directed.  -Over the counter decongestant like Mucinex D or Sudafed. You buy this behind the pharmacy counter  -Sore throat recommendations: Warm fluids, warm salt water gargles, throat lozenges, tea, honey, soup, rest, hydration.  -Over the counter flonase: one spray each nostril twice daily OR two sprays each nostril once daily.       If symptoms worsen, go to ER.  If symptoms do not improve, return to clinic.   Keep appointments with all specialists.   Follow up if symptoms worsen or fail to improve.     Patient's Medications   New Prescriptions    BENZONATATE (TESSALON) 100 MG CAPSULE    Take 1 capsule (100 mg total) by mouth 3 (three) times daily as needed for Cough.   Previous Medications    ALBUTEROL (PROVENTIL) 2.5 MG /3 ML (0.083 %) NEBULIZER SOLUTION    USE 1 VIAL IN NEBULIZER 4 TIMES PER DAY PRN COUGH, WHEEZING OR SHORTNESS OF BREATH    ALBUTEROL (PROVENTIL/VENTOLIN HFA) 90 MCG/ACTUATION INHALER    INHALE 2 PUFFS INTO THE LUNGS EVERY 6 (SIX) HOURS AS NEEDED FOR WHEEZING OR SHORTNESS OF BREATH (OR COUGH).    ALENDRONATE (FOSAMAX) 70 MG TABLET    TAKE 1 TAB BY MOUTH EVERY WEEK WITH GLASS OF WATER/EMPTY STOMACH (DONT LIE DOWN OR EAT FOR 30 MIN)    AMOXICILLIN-CLAVULANATE 875-125MG (AUGMENTIN) 875-125 MG PER TABLET    Take 1 tablet by mouth 2 (two) times daily. for 21 days    AZELASTINE (ASTELIN) 137 MCG (0.1 %) NASAL SPRAY    1 spray (137 mcg total) by Nasal route 2 (two) times daily. for 14 days    BUDESONIDE-FORMOTEROL 160-4.5 MCG (SYMBICORT) 160-4.5 MCG/ACTUATION HFAA     Inhale 2 puffs into the lungs every 12 (twelve) hours.    CHOLECALCIFEROL, VITAMIN D3, 1,250 MCG (50,000 UNIT) CAPSULE    Take 1 capsule (50,000 Units total) by mouth once a week.    CICLOPIROX 1 % SHAMPOO    APPLY 1 APPLICATION ON THE SKIN 2 TIMES WEEKLY    FLUTICASONE PROPIONATE (FLONASE) 50 MCG/ACTUATION NASAL SPRAY    2 SPRAYS (100 MCG TOTAL) BY EACH NARE ROUTE ONCE DAILY.    LOSARTAN (COZAAR) 100 MG TABLET    Take 1 tablet (100 mg total) by mouth once daily.    METFORMIN (GLUCOPHAGE) 850 MG TABLET    TAKE ONE TABLET BY MOUTH DAILY FOR 1 MONTH AND THEN INCREASE TO ONE TABLET BY MOUTH TWICE DAILY THEREAFTER    METHYLPREDNISOLONE (MEDROL DOSEPACK) 4 MG TABLET    use as directed    MONTELUKAST (SINGULAIR) 10 MG TABLET    Take 1 tablet (10 mg total) by mouth every evening.    MULTIVIT,CALC,MINS/IRON/FOLIC (ONE-A-DAY WOMENS FORMULA ORAL)    Take 2 tablets by mouth once daily.    PANTOPRAZOLE (PROTONIX) 40 MG TABLET    Take 1 tablet (40 mg total) by mouth once daily.    SIMVASTATIN (ZOCOR) 20 MG TABLET    TAKE 1 TABLET BY MOUTH EVERY DAY    TRIAMCINOLONE ACETONIDE 0.1% (KENALOG) 0.1 % LOTN    Apply topically 2 (two) times daily. Apply to affected areas of itching. Do not apply to face or genitals.   Modified Medications    No medications on file   Discontinued Medications    No medications on file       Edith Maurer NP

## 2023-02-03 NOTE — PATIENT INSTRUCTIONS
Patient Instructions   PLEASE READ YOUR DISCHARGE INSTRUCTIONS ENTIRELY AS IT CONTAINS IMPORTANT INFORMATION.     Cough can last 2-6 weeks in some cases.    Please drink plenty of fluids.     Please get plenty of rest.     If the air in your home feels dry, use a cool mist humidifier. This can help a stuffy nose and make it easier to breathe.    You can also use saline nose drops to relieve stuffiness.    Please take an over the counter antihistamine medication (allegra/Claritin/Zyrtec) of your choice as directed.     Try an over the counter decongestant like Mucinex D or Sudafed. You buy this behind the pharmacy counter     If you do have Hypertension or palpitations, it is safe to take Coricidin HBP for relief of sinus symptoms.     If not allergic, please take over the counter Tylenol (Acetaminophen) and/or Motrin (Ibuprofen) as directed for control of pain and/or fever.     Sore throat recommendations: Warm fluids, warm salt water gargles, throat lozenges, tea, honey, soup, rest, hydration.     Use over the counter flonase: one spray each nostril twice daily OR two sprays each nostril once daily.      If you  smoke, please stop smoking.     Please return here or go to the Emergency Department for any concerns or worsening of condition.

## 2023-02-06 ENCOUNTER — PES CALL (OUTPATIENT)
Dept: ADMINISTRATIVE | Facility: CLINIC | Age: 68
End: 2023-02-06
Payer: MEDICARE

## 2023-03-08 ENCOUNTER — CLINICAL SUPPORT (OUTPATIENT)
Dept: OTOLARYNGOLOGY | Facility: CLINIC | Age: 68
End: 2023-03-08
Payer: MEDICARE

## 2023-03-08 ENCOUNTER — OFFICE VISIT (OUTPATIENT)
Dept: OTOLARYNGOLOGY | Facility: CLINIC | Age: 68
End: 2023-03-08
Payer: MEDICARE

## 2023-03-08 VITALS
TEMPERATURE: 98 F | WEIGHT: 161.19 LBS | BODY MASS INDEX: 30.43 KG/M2 | SYSTOLIC BLOOD PRESSURE: 132 MMHG | HEIGHT: 61 IN | HEART RATE: 88 BPM | DIASTOLIC BLOOD PRESSURE: 79 MMHG

## 2023-03-08 DIAGNOSIS — J30.9 CHRONIC ALLERGIC RHINITIS: Chronic | ICD-10-CM

## 2023-03-08 DIAGNOSIS — H90.3 SENSORINEURAL HEARING LOSS, BILATERAL: Primary | ICD-10-CM

## 2023-03-08 DIAGNOSIS — J32.8 OTHER CHRONIC SINUSITIS: Primary | Chronic | ICD-10-CM

## 2023-03-08 DIAGNOSIS — J34.3 HYPERTROPHY OF INFERIOR NASAL TURBINATE: Chronic | ICD-10-CM

## 2023-03-08 PROCEDURE — 99215 OFFICE O/P EST HI 40 MIN: CPT | Mod: PBBFAC,27,PN,25 | Performed by: OTOLARYNGOLOGY

## 2023-03-08 PROCEDURE — 99211 OFF/OP EST MAY X REQ PHY/QHP: CPT | Mod: PBBFAC,PN,25 | Performed by: AUDIOLOGIST

## 2023-03-08 PROCEDURE — 99999 PR PBB SHADOW E&M-EST. PATIENT-LVL V: CPT | Mod: PBBFAC,,, | Performed by: OTOLARYNGOLOGY

## 2023-03-08 PROCEDURE — 99999 PR PBB SHADOW E&M-EST. PATIENT-LVL I: CPT | Mod: PBBFAC,,, | Performed by: AUDIOLOGIST

## 2023-03-08 PROCEDURE — 92557 COMPREHENSIVE HEARING TEST: CPT | Mod: PBBFAC,PN | Performed by: AUDIOLOGIST

## 2023-03-08 PROCEDURE — 99214 OFFICE O/P EST MOD 30 MIN: CPT | Mod: 25,S$PBB,, | Performed by: OTOLARYNGOLOGY

## 2023-03-08 PROCEDURE — 99999 PR PBB SHADOW E&M-EST. PATIENT-LVL V: ICD-10-PCS | Mod: PBBFAC,,, | Performed by: OTOLARYNGOLOGY

## 2023-03-08 PROCEDURE — 31231 NASAL ENDOSCOPY DX: CPT | Mod: PBBFAC,PN | Performed by: OTOLARYNGOLOGY

## 2023-03-08 PROCEDURE — 96372 THER/PROPH/DIAG INJ SC/IM: CPT | Mod: PBBFAC,PN

## 2023-03-08 PROCEDURE — 99999 PR PBB SHADOW E&M-EST. PATIENT-LVL I: ICD-10-PCS | Mod: PBBFAC,,, | Performed by: AUDIOLOGIST

## 2023-03-08 PROCEDURE — 99214 PR OFFICE/OUTPT VISIT, EST, LEVL IV, 30-39 MIN: ICD-10-PCS | Mod: 25,S$PBB,, | Performed by: OTOLARYNGOLOGY

## 2023-03-08 PROCEDURE — 92567 TYMPANOMETRY: CPT | Mod: PBBFAC,PN | Performed by: AUDIOLOGIST

## 2023-03-08 PROCEDURE — 31231 NASAL/SINUS ENDOSCOPY: ICD-10-PCS | Mod: S$PBB,,, | Performed by: OTOLARYNGOLOGY

## 2023-03-08 RX ORDER — LEVOFLOXACIN 500 MG/1
500 TABLET, FILM COATED ORAL DAILY
Qty: 14 TABLET | Refills: 0 | Status: SHIPPED | OUTPATIENT
Start: 2023-03-08 | End: 2023-03-22

## 2023-03-08 RX ORDER — METHYLPREDNISOLONE ACETATE 40 MG/ML
40 INJECTION, SUSPENSION INTRA-ARTICULAR; INTRALESIONAL; INTRAMUSCULAR; SOFT TISSUE
Status: COMPLETED | OUTPATIENT
Start: 2023-03-08 | End: 2023-03-08

## 2023-03-08 RX ADMIN — METHYLPREDNISOLONE ACETATE 40 MG: 40 INJECTION, SUSPENSION INTRA-ARTICULAR; INTRALESIONAL; INTRAMUSCULAR; INTRASYNOVIAL; SOFT TISSUE at 12:03

## 2023-03-08 NOTE — PROCEDURES
Nasal/sinus endoscopy    Date/Time: 3/8/2023 11:20 AM  Performed by: Angela Jacobs MD  Authorized by: Angela Jacobs MD     Consent Done?:  Yes (Verbal)  Anesthesia:     Local anesthetic:  Lidocaine 2% and Yanick-Synephrine 1/2%  Nose:     Procedure Performed:  Nasal Endoscopy  External:      No external nasal deformity  Intranasal:      Mucosa no polyps     Mucosa ulcers not present     No mucosa lesions present     Turbinates not enlarged     No septum gross deformity  Nasopharynx:      Posterior choanae patent     Eustachian tube patent     Bilateral middle turbinate edema with narrowing of the middle meatus; + narrowing of sphenoethmoid recess; + continued purulence left sphenoethmoidal recess

## 2023-03-08 NOTE — PROGRESS NOTES
Chief Complaint   Patient presents with    Sinus Problem    Nasal Congestion    Follow-up   .    HPI:     Carina Montaño is a 67 y.o. female who presents for evaluation of a several month history of nasal congestion, postnasal drip, facial pressure and pain.  She notes associated cough and sneezing. She does feel that her symptoms worsen with weather changes.  She describes difficulty breathing at night.  She does use sinus rinses or nasal sprays. She has been on Flonase and Astelin. She is also on Claritin and montelukast.  She feels that this is somewhat helpful.  She admits to midface pain and pressure.  She admits to rhinorrhea and postnasal drip. There is not maxillary tooth pain. She admits to frontal headaches.  She has had sinus or nasal surgery. She is unsure of exact procedure.  There is no history of sinonasal trauma. She notes that she has had hearing loss since childhood. She has had hearing tests in the past. She did not tolerate hearing aids well. She felt they were too loud. She thinks last hearing test was 10 years ago.     Interval HPI 3/8/2023:  Follow up visit. Reports continued nasal congestion, rhinorrhea, post-nasal drip.   Notes productive cough.  She has bilateral facial pain/pressure and pain in the forehead region.  She did complete Augmentin 21 days, medrol dose marcus.She has been using nasal rinses, Flonase, and astelin without relief.        Past Medical History:   Diagnosis Date    Allergy     Arthritis     Asthma     Cancer     breast    Chronic diastolic (congestive) heart failure 12/8/2022    GERD (gastroesophageal reflux disease)     Hyperlipidemia     Hypertension     Prediabetes      Social History     Socioeconomic History    Marital status:     Number of children: 1   Occupational History    Occupation:     Tobacco Use    Smoking status: Never    Smokeless tobacco: Never   Substance and Sexual Activity    Alcohol use: Yes     Comment: social     Drug use: No    Sexual activity: Yes     Social Determinants of Health     Financial Resource Strain: Low Risk     Difficulty of Paying Living Expenses: Not very hard   Food Insecurity: No Food Insecurity    Worried About Running Out of Food in the Last Year: Never true    Ran Out of Food in the Last Year: Never true   Transportation Needs: No Transportation Needs    Lack of Transportation (Medical): No    Lack of Transportation (Non-Medical): No   Physical Activity: Inactive    Days of Exercise per Week: 0 days    Minutes of Exercise per Session: 0 min   Stress: No Stress Concern Present    Feeling of Stress : Not at all   Social Connections: Unknown    Frequency of Communication with Friends and Family: Twice a week    Frequency of Social Gatherings with Friends and Family: Once a week    Active Member of Clubs or Organizations: Yes    Attends Club or Organization Meetings: 1 to 4 times per year    Marital Status:    Housing Stability: Low Risk     Unable to Pay for Housing in the Last Year: No    Number of Places Lived in the Last Year: 1    Unstable Housing in the Last Year: No     Past Surgical History:   Procedure Laterality Date    ABDOMINAL SURGERY      COLONOSCOPY N/A 5/11/2021    Procedure: COLONOSCOPY;  Surgeon: Ema Vidal MD;  Location: [facility];  Service: Endoscopy;  Laterality: N/A;  COVID TEST 5/8 SUPREP    ESOPHAGOGASTRODUODENOSCOPY N/A 5/17/2019    Procedure: ESOPHAGOGASTRODUODENOSCOPY (EGD);  Surgeon: Ema Vidal MD;  Location: [facility];  Service: Endoscopy;  Laterality: N/A;    KNEE SURGERY      SHOULDER SURGERY       Family History   Problem Relation Age of Onset    Hypertension Mother     Cancer Mother         Pancreatic    Hypertension Father            Review of Systems  General: negative for chills, fever or weight loss  Psychological: negative for mood changes or depression  Ophthalmic: negative for blurry vision, photophobia or eye pain  ENT: see HPI  Respiratory: no  cough, shortness of breath, or wheezing  Cardiovascular: no chest pain or dyspnea on exertion  Gastrointestinal: no abdominal pain, change in bowel habits, or black/ bloody stools  Musculoskeletal: negative for gait disturbance or muscular weakness  Neurological: no syncope or seizures; no ataxia  Dermatological: negative for puritis,  rash and jaundice  Hematologic/lymphatic: no easy bruising, no new lumps or bumps      Physical Exam:    Vitals:    03/08/23 1127   BP: 132/79   Pulse: 88   Temp: 97.6 °F (36.4 °C)         Constitutional: Well appearing / communicating without difficutly.  NAD.  Eyes: EOM I Bilaterally  Head/Face: Normocephalic.  Negative paranasal sinus pressure/tenderness.  Salivary glands WNL.  House Brackmann I Bilaterally.    Right Ear: Auricle normal appearance. External Auditory Canal within normal limits,TM w/o masses/lesions/perforations. TM mobility noted.   Left Ear: Auricle normal appearance. External Auditory Canal WNL,TM w/o masses/lesions/perforations. TM mobility noted.  Nose: No gross nasal septal deviation. Inferior Turbinates 3+ bilaterally. No septal perforation. No masses/lesions. External nasal skin appears normal without masses/lesions.  Oral Cavity: Gingiva/lips within normal limits.  Dentition/gingiva healthy appearing. Mucus membranes moist. Floor of mouth soft, no masses palpated. Oral Tongue mobile. Hard Palate appears normal.    Oropharynx: Base of tongue appears normal. No masses/lesions noted. Tonsillar fossa/pharyngeal wall without lesions. Posterior oropharynx WNL.  Soft palate without masses. Midline uvula.   Neck/Lymphatic: No LAD I-VI bilaterally.  No thyromegaly.  No masses noted on exam.    Mirror laryngoscopy/nasopharyngoscopy: Active gag reflex.  Unable to perform.      See separate procedure note for nasal endoscopy    Diagnostic studies:  Tympanometry revealed a Type A tympanogram for both ears.  Audiogram results revealed a mild sloping to profound  sensorineural hearing loss bilaterally.  Speech reception thresholds were obtained at 35dB for both ears and speech discrimination scores were 36% for the right ear and 44% for the left ear.           Assessment:    ICD-10-CM ICD-9-CM    1. Other chronic sinusitis  J32.8 473.8       2. Hypertrophy of inferior nasal turbinate  J34.3 478.0       3. Chronic allergic rhinitis  J30.9 477.9           The primary encounter diagnosis was Other chronic sinusitis. Diagnoses of Hypertrophy of inferior nasal turbinate and Chronic allergic rhinitis were also pertinent to this visit.      Plan:  No orders of the defined types were placed in this encounter.    Start nasal saline rinses b.i.d.  Continue Flonase 2 sprays per nostril daily  Continue Astelin 1 spray per nostril b.i.d.  Start Levaqin 500mg PO daily  Depo medrol injection given today  Obtain immunoCAP testing  Obtain CT scan of the sinuses to further assess intraluminal patency  Follow-up in approximately 4-6 weeks     Angela Jacobs MD

## 2023-03-08 NOTE — PROGRESS NOTES
Carina Montaño was seen today in the clinic for an audiologic evaluation.  Her main complaint was hearing loss and she reported a history of congenital hearing loss bilaterally.     Tympanometry revealed a Type A tympanogram for both ears.  Audiogram results revealed a mild sloping to profound sensorineural hearing loss bilaterally.  Speech reception thresholds were obtained at 35dB for both ears and speech discrimination scores were 36% for the right ear and 44% for the left ear.    Recommendations:  Otologic evaluation  Annual evaluation  Hearing aid consult   Hearing protection in noise

## 2023-03-23 ENCOUNTER — LAB VISIT (OUTPATIENT)
Dept: LAB | Facility: HOSPITAL | Age: 68
End: 2023-03-23
Attending: OTOLARYNGOLOGY
Payer: MEDICARE

## 2023-03-23 ENCOUNTER — LAB VISIT (OUTPATIENT)
Dept: LAB | Facility: HOSPITAL | Age: 68
End: 2023-03-23
Attending: INTERNAL MEDICINE
Payer: COMMERCIAL

## 2023-03-23 DIAGNOSIS — J30.9 CHRONIC ALLERGIC RHINITIS: Chronic | ICD-10-CM

## 2023-03-23 DIAGNOSIS — I10 ESSENTIAL HYPERTENSION: ICD-10-CM

## 2023-03-23 LAB
ALBUMIN SERPL BCP-MCNC: 4 G/DL (ref 3.5–5.2)
ALP SERPL-CCNC: 73 U/L (ref 55–135)
ALT SERPL W/O P-5'-P-CCNC: 22 U/L (ref 10–44)
ANION GAP SERPL CALC-SCNC: 10 MMOL/L (ref 8–16)
AST SERPL-CCNC: 21 U/L (ref 10–40)
BILIRUB SERPL-MCNC: 0.6 MG/DL (ref 0.1–1)
BUN SERPL-MCNC: 12 MG/DL (ref 8–23)
CALCIUM SERPL-MCNC: 9.9 MG/DL (ref 8.7–10.5)
CHLORIDE SERPL-SCNC: 103 MMOL/L (ref 95–110)
CO2 SERPL-SCNC: 29 MMOL/L (ref 23–29)
CREAT SERPL-MCNC: 0.7 MG/DL (ref 0.5–1.4)
EST. GFR  (NO RACE VARIABLE): >60 ML/MIN/1.73 M^2
GLUCOSE SERPL-MCNC: 91 MG/DL (ref 70–110)
POTASSIUM SERPL-SCNC: 4.2 MMOL/L (ref 3.5–5.1)
PROT SERPL-MCNC: 7.2 G/DL (ref 6–8.4)
SODIUM SERPL-SCNC: 142 MMOL/L (ref 136–145)

## 2023-03-23 PROCEDURE — 86003 ALLG SPEC IGE CRUDE XTRC EA: CPT | Mod: 59 | Performed by: OTOLARYNGOLOGY

## 2023-03-23 PROCEDURE — 36415 COLL VENOUS BLD VENIPUNCTURE: CPT | Performed by: OTOLARYNGOLOGY

## 2023-03-23 PROCEDURE — 80053 COMPREHEN METABOLIC PANEL: CPT | Performed by: INTERNAL MEDICINE

## 2023-03-23 PROCEDURE — 86003 ALLG SPEC IGE CRUDE XTRC EA: CPT | Performed by: OTOLARYNGOLOGY

## 2023-03-23 PROCEDURE — 36415 COLL VENOUS BLD VENIPUNCTURE: CPT | Mod: PO | Performed by: INTERNAL MEDICINE

## 2023-03-24 ENCOUNTER — HOSPITAL ENCOUNTER (OUTPATIENT)
Dept: RADIOLOGY | Facility: HOSPITAL | Age: 68
Discharge: HOME | End: 2023-03-24
Attending: OTOLARYNGOLOGY
Payer: MEDICARE

## 2023-03-24 ENCOUNTER — PATIENT MESSAGE (OUTPATIENT)
Dept: OTOLARYNGOLOGY | Facility: CLINIC | Age: 68
End: 2023-03-24
Payer: MEDICARE

## 2023-03-24 DIAGNOSIS — J32.8 OTHER CHRONIC SINUSITIS: ICD-10-CM

## 2023-03-24 PROCEDURE — 70486 CT MAXILLOFACIAL W/O DYE: CPT | Mod: 26,,, | Performed by: RADIOLOGY

## 2023-03-24 PROCEDURE — 70486 CT MAXILLOFACIAL W/O DYE: CPT | Mod: TC

## 2023-03-24 PROCEDURE — 70486 CT MEDTRONIC SINUSES WITHOUT: ICD-10-PCS | Mod: 26,,, | Performed by: RADIOLOGY

## 2023-03-27 LAB — AMER SYCAMORE IGE QN: <0.35 KU/L

## 2023-03-28 ENCOUNTER — OFFICE VISIT (OUTPATIENT)
Dept: INTERNAL MEDICINE | Facility: CLINIC | Age: 68
End: 2023-03-28
Payer: MEDICARE

## 2023-03-28 ENCOUNTER — LAB VISIT (OUTPATIENT)
Dept: LAB | Facility: HOSPITAL | Age: 68
End: 2023-03-28
Attending: INTERNAL MEDICINE
Payer: MEDICARE

## 2023-03-28 VITALS
HEART RATE: 81 BPM | WEIGHT: 163.56 LBS | BODY MASS INDEX: 30.88 KG/M2 | HEIGHT: 61 IN | OXYGEN SATURATION: 98 % | DIASTOLIC BLOOD PRESSURE: 66 MMHG | SYSTOLIC BLOOD PRESSURE: 128 MMHG

## 2023-03-28 DIAGNOSIS — Z00.00 ANNUAL PHYSICAL EXAM: ICD-10-CM

## 2023-03-28 DIAGNOSIS — C50.919 MALIGNANT NEOPLASM OF FEMALE BREAST, UNSPECIFIED ESTROGEN RECEPTOR STATUS, UNSPECIFIED LATERALITY, UNSPECIFIED SITE OF BREAST: ICD-10-CM

## 2023-03-28 DIAGNOSIS — J45.40 MODERATE PERSISTENT ASTHMA WITHOUT COMPLICATION: ICD-10-CM

## 2023-03-28 DIAGNOSIS — I50.32 CHRONIC DIASTOLIC (CONGESTIVE) HEART FAILURE: ICD-10-CM

## 2023-03-28 DIAGNOSIS — M85.80 OSTEOPENIA, UNSPECIFIED LOCATION: ICD-10-CM

## 2023-03-28 DIAGNOSIS — R39.89 SUSPECTED UTI: ICD-10-CM

## 2023-03-28 DIAGNOSIS — I10 ESSENTIAL HYPERTENSION: ICD-10-CM

## 2023-03-28 DIAGNOSIS — M25.552 CHRONIC LEFT HIP PAIN: Primary | ICD-10-CM

## 2023-03-28 DIAGNOSIS — E66.09 CLASS 1 OBESITY DUE TO EXCESS CALORIES WITH SERIOUS COMORBIDITY AND BODY MASS INDEX (BMI) OF 30.0 TO 30.9 IN ADULT: ICD-10-CM

## 2023-03-28 DIAGNOSIS — G89.29 CHRONIC LEFT HIP PAIN: Primary | ICD-10-CM

## 2023-03-28 PROBLEM — E66.811 CLASS 1 OBESITY DUE TO EXCESS CALORIES WITH SERIOUS COMORBIDITY AND BODY MASS INDEX (BMI) OF 30.0 TO 30.9 IN ADULT: Status: ACTIVE | Noted: 2023-03-28

## 2023-03-28 LAB
A ALTERNATA IGE QN: <0.1 KU/L
A FUMIGATUS IGE QN: <0.1 KU/L
ALLERGEN BOXELDER MAPLE TREE IGE: <0.1 KU/L
ALLERGEN MAPLE (BOX ELDER) CLASS: NORMAL
ALLERGEN MULBERRY CLASS: NORMAL
ALLERGEN MULBERRY TREE IGE: <0.1 KU/L
ALLERGEN PIGWEED IGE: <0.1 KU/L
ANION GAP SERPL CALC-SCNC: 6 MMOL/L (ref 8–16)
BALD CYPRESS IGE QN: 0.14 KU/L
BERMUDA GRASS IGE QN: <0.1 KU/L
BUN SERPL-MCNC: 14 MG/DL (ref 8–23)
C HERBARUM IGE QN: <0.1 KU/L
CALCIUM SERPL-MCNC: 9.9 MG/DL (ref 8.7–10.5)
CAT DANDER IGE QN: 0.12 KU/L
CEDAR IGE QN: <0.1 KU/L
CHLORIDE SERPL-SCNC: 103 MMOL/L (ref 95–110)
CO2 SERPL-SCNC: 32 MMOL/L (ref 23–29)
COMMON PIGWEED CLASS: NORMAL
COMMON RAGWEED IGE QN: <0.1 KU/L
CREAT SERPL-MCNC: 0.8 MG/DL (ref 0.5–1.4)
D FARINAE IGE QN: <0.1 KU/L
D PTERONYSS IGE QN: <0.1 KU/L
DEPRECATED A ALTERNATA IGE RAST QL: NORMAL
DEPRECATED A FUMIGATUS IGE RAST QL: NORMAL
DEPRECATED BALD CYPRESS IGE RAST QL: ABNORMAL
DEPRECATED BERMUDA GRASS IGE RAST QL: NORMAL
DEPRECATED C HERBARUM IGE RAST QL: NORMAL
DEPRECATED CAT DANDER IGE RAST QL: ABNORMAL
DEPRECATED CEDAR IGE RAST QL: NORMAL
DEPRECATED COMMON RAGWEED IGE RAST QL: NORMAL
DEPRECATED D FARINAE IGE RAST QL: NORMAL
DEPRECATED D PTERONYSS IGE RAST QL: NORMAL
DEPRECATED DOG DANDER IGE RAST QL: NORMAL
DEPRECATED ELDER IGE RAST QL: NORMAL
DEPRECATED ENGL PLANTAIN IGE RAST QL: NORMAL
DEPRECATED JOHNSON GRASS IGE RAST QL: NORMAL
DEPRECATED KENT BLUE GRASS IGE RAST QL: NORMAL
DEPRECATED M RACEMOSUS IGE RAST QL: NORMAL
DEPRECATED NETTLE IGE RAST QL: NORMAL
DEPRECATED P NOTATUM IGE RAST QL: NORMAL
DEPRECATED PECAN/HICK TREE IGE RAST QL: NORMAL
DEPRECATED ROACH IGE RAST QL: ABNORMAL
DEPRECATED SILVER BIRCH IGE RAST QL: NORMAL
DEPRECATED TIMOTHY IGE RAST QL: NORMAL
DEPRECATED WHITE OAK IGE RAST QL: ABNORMAL
DOG DANDER IGE QN: <0.1 KU/L
ELDER IGE QN: <0.1 KU/L
ELM CEDAR CLASS: NORMAL
ELM CEDAR, IGE: <0.1 KU/L
ENGL PLANTAIN IGE QN: <0.1 KU/L
EST. GFR  (NO RACE VARIABLE): >60 ML/MIN/1.73 M^2
GLUCOSE SERPL-MCNC: 97 MG/DL (ref 70–110)
JOHNSON GRASS IGE QN: <0.1 KU/L
KENT BLUE GRASS IGE QN: <0.1 KU/L
M RACEMOSUS IGE QN: <0.1 KU/L
NETTLE IGE QN: <0.1 KU/L
P NOTATUM IGE QN: <0.1 KU/L
PECAN/HICK TREE IGE QN: <0.1 KU/L
POTASSIUM SERPL-SCNC: 3.9 MMOL/L (ref 3.5–5.1)
ROACH IGE QN: 0.15 KU/L
SILVER BIRCH IGE QN: <0.1 KU/L
SODIUM SERPL-SCNC: 141 MMOL/L (ref 136–145)
TIMOTHY IGE QN: <0.1 KU/L
WHITE OAK IGE QN: 0.11 KU/L

## 2023-03-28 PROCEDURE — 99214 OFFICE O/P EST MOD 30 MIN: CPT | Mod: S$PBB,,, | Performed by: INTERNAL MEDICINE

## 2023-03-28 PROCEDURE — 99214 PR OFFICE/OUTPT VISIT, EST, LEVL IV, 30-39 MIN: ICD-10-PCS | Mod: S$PBB,,, | Performed by: INTERNAL MEDICINE

## 2023-03-28 PROCEDURE — 80048 BASIC METABOLIC PNL TOTAL CA: CPT | Performed by: INTERNAL MEDICINE

## 2023-03-28 PROCEDURE — 99999 PR PBB SHADOW E&M-EST. PATIENT-LVL V: ICD-10-PCS | Mod: PBBFAC,,, | Performed by: INTERNAL MEDICINE

## 2023-03-28 PROCEDURE — 36415 COLL VENOUS BLD VENIPUNCTURE: CPT | Mod: PO | Performed by: INTERNAL MEDICINE

## 2023-03-28 PROCEDURE — 99999 PR PBB SHADOW E&M-EST. PATIENT-LVL V: CPT | Mod: PBBFAC,,, | Performed by: INTERNAL MEDICINE

## 2023-03-28 PROCEDURE — 99215 OFFICE O/P EST HI 40 MIN: CPT | Mod: PBBFAC,PO | Performed by: INTERNAL MEDICINE

## 2023-03-28 RX ORDER — ALENDRONATE SODIUM 70 MG/1
TABLET ORAL
Qty: 4 TABLET | Refills: 11 | Status: SHIPPED | OUTPATIENT
Start: 2023-03-28

## 2023-03-28 NOTE — PROGRESS NOTES
Patient ID: Carina Montaño is a 67 y.o. female.    Chief Complaint: Hypertension and Asthma    HPI: Carina is a 67 y.o. female with  hypertension, vitamin-D deficiency, GERD, severe persistent asthma, allergic rhinitis, prediabetes, and osteopenia who presents for routine follow-up of medical conditions.  She continues to complain of left hip pain.  Reports it is worsened by walking fast.  She saw orthopedics for this pain.  They had recommended physical therapy which she has not yet done.  She is now interested in doing physical therapy.    Patient would like her urine tested for urinary tract infection.  However she denies any symptoms of UTI such as urinary frequency or pain with urination. No further acute complaints or concerns.     Health Maintenance Topics with due status: Not Due       Topic Last Completion Date    TETANUS VACCINE 01/01/2015    Pneumococcal Vaccines (Age 65+) 09/10/2020    Colorectal Cancer Screening 05/11/2021    Mammogram 09/13/2022    Hemoglobin A1c (Prediabetes) 09/20/2022    Lipid Panel 09/20/2022    DEXA Scan 10/10/2022      Review of Systems   Musculoskeletal:  Positive for arthralgias (chronic left hip pain).   All other systems reviewed and are negative.      Objective:     Vitals:    03/28/23 0919   BP: 128/66   Pulse: 81        Physical Exam  Vitals reviewed.   Constitutional:       General: She is not in acute distress.     Appearance: Normal appearance. She is well-developed. She is obese. She is not ill-appearing, toxic-appearing or diaphoretic.   HENT:      Head: Normocephalic and atraumatic.      Right Ear: External ear normal.      Left Ear: External ear normal.      Nose: Nose normal.   Eyes:      General: No scleral icterus.        Right eye: No discharge.         Left eye: No discharge.      Extraocular Movements: Extraocular movements intact.      Conjunctiva/sclera: Conjunctivae normal.   Cardiovascular:      Rate and Rhythm: Normal rate and regular rhythm.       Heart sounds: Normal heart sounds. No murmur heard.    No friction rub. No gallop.   Pulmonary:      Effort: Pulmonary effort is normal. No respiratory distress.      Breath sounds: Normal breath sounds. No stridor. No wheezing, rhonchi or rales.   Skin:     General: Skin is warm and dry.   Neurological:      General: No focal deficit present.      Mental Status: She is alert and oriented to person, place, and time. Mental status is at baseline.   Psychiatric:         Mood and Affect: Mood normal.         Behavior: Behavior normal.         Thought Content: Thought content normal.         Judgment: Judgment normal.       Assessment:       1. Chronic left hip pain Chronic   2. Suspected UTI Active   3. Malignant neoplasm of female breast, unspecified estrogen receptor status, unspecified laterality, unspecified site of breast Inactive   4. Chronic diastolic (congestive) heart failure Well controlled   5. Osteopenia, unspecified location Chronic   6. Essential hypertension Well controlled   7. Moderate persistent asthma without complication Well controlled   8. Class 1 obesity due to excess calories with serious comorbidity and body mass index (BMI) of 30.0 to 30.9 in adult Chronic   9. Annual physical exam          Plan:         Chronic left hip pain  -     Ambulatory referral/consult to Physical/Occupational Therapy; Future; Expected date: 04/04/2023    Suspected UTI  -     Urinalysis, Reflex to Urine Culture Urine, Clean Catch; Future; Expected date: 03/28/2023    Malignant neoplasm of female breast, unspecified estrogen receptor status, unspecified laterality, unspecified site of breast  Comments:  Continue to follow at [facility] for surveillance    Chronic diastolic (congestive) heart failure  Comments:  Continue current medication and continue to follow with cardiology     Osteopenia, unspecified location  Comments:  Continue current medication  Orders:  -     alendronate (FOSAMAX) 70 MG tablet; TAKE 1 TAB BY MOUTH  EVERY WEEK WITH GLASS OF WATER/EMPTY STOMACH (DONT LIE DOWN OR EAT FOR 30 MIN)  Dispense: 4 tablet; Refill: 11    Essential hypertension  Comments:  Continue current medication  Orders:  -     BASIC METABOLIC PANEL; Future; Expected date: 03/28/2023    Moderate persistent asthma without complication  Comments:  Continue current medication    Class 1 obesity due to excess calories with serious comorbidity and body mass index (BMI) of 30.0 to 30.9 in adult    Annual physical exam  -     CBC Auto Differential; Future; Expected date: 08/01/2023  -     Comprehensive Metabolic Panel; Future; Expected date: 08/01/2023  -     Hemoglobin A1C; Future; Expected date: 08/01/2023  -     Lipid Panel; Future; Expected date: 08/01/2023  -     TSH; Future; Expected date: 08/01/2023  -     Vitamin D; Future; Expected date: 08/01/2023        RTC 6 months for annual exam     Warning signs discussed, patient to call with any further issues or worsening of symptoms.       Parts of the above note were dictated using a voice dictation software. Please excuse any grammatical or typographical errors.

## 2023-03-30 DIAGNOSIS — N30.00 ACUTE CYSTITIS WITHOUT HEMATURIA: Primary | ICD-10-CM

## 2023-03-30 RX ORDER — NITROFURANTOIN 25; 75 MG/1; MG/1
100 CAPSULE ORAL 2 TIMES DAILY
Qty: 10 CAPSULE | Refills: 0 | Status: SHIPPED | OUTPATIENT
Start: 2023-03-30 | End: 2023-04-04

## 2023-04-13 ENCOUNTER — NURSE TRIAGE (OUTPATIENT)
Dept: ADMINISTRATIVE | Facility: CLINIC | Age: 68
End: 2023-04-13
Payer: MEDICARE

## 2023-04-13 ENCOUNTER — HOSPITAL ENCOUNTER (EMERGENCY)
Facility: HOSPITAL | Age: 68
Discharge: HOME | End: 2023-04-13
Attending: EMERGENCY MEDICINE
Payer: MEDICARE

## 2023-04-13 VITALS
DIASTOLIC BLOOD PRESSURE: 56 MMHG | WEIGHT: 158 LBS | SYSTOLIC BLOOD PRESSURE: 121 MMHG | BODY MASS INDEX: 29.85 KG/M2 | OXYGEN SATURATION: 98 % | RESPIRATION RATE: 16 BRPM | TEMPERATURE: 99 F | HEART RATE: 83 BPM

## 2023-04-13 DIAGNOSIS — R42 DIZZINESS: ICD-10-CM

## 2023-04-13 DIAGNOSIS — R55 PRE-SYNCOPE: Primary | ICD-10-CM

## 2023-04-13 LAB
ALBUMIN SERPL BCP-MCNC: 4.1 G/DL (ref 3.5–5.2)
ALP SERPL-CCNC: 66 U/L (ref 55–135)
ALT SERPL W/O P-5'-P-CCNC: 26 U/L (ref 10–44)
ANION GAP SERPL CALC-SCNC: 8 MMOL/L (ref 8–16)
AST SERPL-CCNC: 23 U/L (ref 10–40)
BASOPHILS # BLD AUTO: 0.03 K/UL (ref 0–0.2)
BASOPHILS NFR BLD: 0.3 % (ref 0–1.9)
BILIRUB SERPL-MCNC: 0.6 MG/DL (ref 0.1–1)
BUN SERPL-MCNC: 12 MG/DL (ref 8–23)
CALCIUM SERPL-MCNC: 9.4 MG/DL (ref 8.7–10.5)
CHLORIDE SERPL-SCNC: 105 MMOL/L (ref 95–110)
CO2 SERPL-SCNC: 29 MMOL/L (ref 23–29)
CREAT SERPL-MCNC: 0.8 MG/DL (ref 0.5–1.4)
DIFFERENTIAL METHOD: ABNORMAL
EOSINOPHIL # BLD AUTO: 0.3 K/UL (ref 0–0.5)
EOSINOPHIL NFR BLD: 2.5 % (ref 0–8)
ERYTHROCYTE [DISTWIDTH] IN BLOOD BY AUTOMATED COUNT: 13.4 % (ref 11.5–14.5)
EST. GFR  (NO RACE VARIABLE): >60 ML/MIN/1.73 M^2
GLUCOSE SERPL-MCNC: 119 MG/DL (ref 70–110)
HCT VFR BLD AUTO: 42.4 % (ref 37–48.5)
HGB BLD-MCNC: 14.1 G/DL (ref 12–16)
IMM GRANULOCYTES # BLD AUTO: 0.05 K/UL (ref 0–0.04)
IMM GRANULOCYTES NFR BLD AUTO: 0.5 % (ref 0–0.5)
LYMPHOCYTES # BLD AUTO: 1.2 K/UL (ref 1–4.8)
LYMPHOCYTES NFR BLD: 10.6 % (ref 18–48)
MCH RBC QN AUTO: 31 PG (ref 27–31)
MCHC RBC AUTO-ENTMCNC: 33.3 G/DL (ref 32–36)
MCV RBC AUTO: 93 FL (ref 82–98)
MONOCYTES # BLD AUTO: 0.5 K/UL (ref 0.3–1)
MONOCYTES NFR BLD: 4.1 % (ref 4–15)
NEUTROPHILS # BLD AUTO: 8.9 K/UL (ref 1.8–7.7)
NEUTROPHILS NFR BLD: 82 % (ref 38–73)
NRBC BLD-RTO: 0 /100 WBC
PLATELET # BLD AUTO: 227 K/UL (ref 150–450)
PMV BLD AUTO: 9.9 FL (ref 9.2–12.9)
POTASSIUM SERPL-SCNC: 3.8 MMOL/L (ref 3.5–5.1)
PROT SERPL-MCNC: 7.5 G/DL (ref 6–8.4)
RBC # BLD AUTO: 4.55 M/UL (ref 4–5.4)
SODIUM SERPL-SCNC: 142 MMOL/L (ref 136–145)
TROPONIN I SERPL DL<=0.01 NG/ML-MCNC: <0.006 NG/ML (ref 0–0.03)
WBC # BLD AUTO: 10.87 K/UL (ref 3.9–12.7)

## 2023-04-13 PROCEDURE — 80053 COMPREHEN METABOLIC PANEL: CPT | Performed by: PHYSICIAN ASSISTANT

## 2023-04-13 PROCEDURE — 93010 EKG 12-LEAD: ICD-10-PCS | Mod: ,,, | Performed by: INTERNAL MEDICINE

## 2023-04-13 PROCEDURE — 93005 ELECTROCARDIOGRAM TRACING: CPT

## 2023-04-13 PROCEDURE — 99284 EMERGENCY DEPT VISIT MOD MDM: CPT

## 2023-04-13 PROCEDURE — 85025 COMPLETE CBC W/AUTO DIFF WBC: CPT | Performed by: PHYSICIAN ASSISTANT

## 2023-04-13 PROCEDURE — 93010 ELECTROCARDIOGRAM REPORT: CPT | Mod: ,,, | Performed by: INTERNAL MEDICINE

## 2023-04-13 PROCEDURE — 84484 ASSAY OF TROPONIN QUANT: CPT | Performed by: PHYSICIAN ASSISTANT

## 2023-04-13 NOTE — DISCHARGE INSTRUCTIONS
Your EKG and labs are normal. Please follow up with your doctor. If you have chest pain or almost pass out again, come back to the ER.

## 2023-04-13 NOTE — TELEPHONE ENCOUNTER
Passed out at lunch, dizzy and hung her head, she got to bathroom and was not answering questions. Unclear if she actually fainted. Better now, she is talking and was able to get in the car.  She is now in her friends car and they are driving, this happened about an half an hour ago. Patient able to answer questions but states still feels a little weak. Triage done- dispo ED based on event  Advised to go to closest ED. Verb understanding.   Reason for Disposition   Nursing judgment    Additional Information   Negative: Information only call about a Well Adult (no illness or injury)   Negative: Sounds like a life-threatening emergency to the triager    Protocols used: No Protocol Available - Sick Adult-A-[place]

## 2023-04-13 NOTE — ED PROVIDER NOTES
Emergency Department Encounter  Provider Note  Encounter Date: 4/13/2023    Patient Name: Carina Montaño  MRN: [id number]    History of Present Illness   HPI  History of Present Illness:    Chief Complaint:   Chief Complaint   Patient presents with    Loss of Consciousness     Pt had a syncopal episode at [facility] on [address] after eating eggplant pirogues. Pt does not know if she is allergic or not. Pt states that this has never happened to her before. Pt does not believe that she is a diabetic.       67-year-old female presenting with almost passing out after eating at [employer].  Patient states that she was in the bathroom and felt lightheaded and then had a bowel movement, after which she felt better.  Denies any chest pain or shortness of breath.  This has not happened before.  Was in her normal state of health.  Thinks that she probably should not have eaten the fried eggplant.    The following PMH/PSH/SocHx/FamHx has been reviewed by myself:    Past Medical History:   Diagnosis Date    Allergy     Arthritis     Asthma     Cancer     breast    Chronic diastolic (congestive) heart failure 12/8/2022    GERD (gastroesophageal reflux disease)     Hyperlipidemia     Hypertension     Prediabetes      Past Surgical History:   Procedure Laterality Date    ABDOMINAL SURGERY      COLONOSCOPY N/A 5/11/2021    Procedure: COLONOSCOPY;  Surgeon: Ema Vidal MD;  Location: [facility];  Service: Endoscopy;  Laterality: N/A;  COVID TEST 5/8 SUPREP    ESOPHAGOGASTRODUODENOSCOPY N/A 5/17/2019    Procedure: ESOPHAGOGASTRODUODENOSCOPY (EGD);  Surgeon: Ema Vidal MD;  Location: [facility];  Service: Endoscopy;  Laterality: N/A;    KNEE SURGERY      SHOULDER SURGERY       Social History     Tobacco Use    Smoking status: Never    Smokeless tobacco: Never   Substance Use Topics    Alcohol use: Yes     Comment: social    Drug use: No     Family History   Problem Relation Age of Onset    Hypertension Mother     Cancer Mother          Pancreatic    Hypertension Father        Allergies reviewed:  Review of patient's allergies indicates:   Allergen Reactions    Grass pollen-perennial rye, standard     Lipitor [atorvastatin]     Strawberries [strawberry]        Medications reviewed:  Discharge Medication List as of 4/13/2023  5:00 PM        CONTINUE these medications which have NOT CHANGED    Details   albuterol (PROVENTIL) 2.5 mg /3 mL (0.083 %) nebulizer solution USE 1 VIAL IN NEBULIZER 4 TIMES PER DAY PRN COUGH, WHEEZING OR SHORTNESS OF BREATH, Normal      albuterol (PROVENTIL/VENTOLIN HFA) 90 mcg/actuation inhaler INHALE 2 PUFFS INTO THE LUNGS EVERY 6 (SIX) HOURS AS NEEDED FOR WHEEZING OR SHORTNESS OF BREATH (OR COUGH)., Starting Tue 1/17/2023, Normal      alendronate (FOSAMAX) 70 MG tablet TAKE 1 TAB BY MOUTH EVERY WEEK WITH GLASS OF WATER/EMPTY STOMACH (DONT LIE DOWN OR EAT FOR 30 MIN), Normal      azelastine (ASTELIN) 137 mcg (0.1 %) nasal spray 1 spray (137 mcg total) by Nasal route 2 (two) times daily. for 14 days, Starting Mon 9/26/2022, Until Fri 2/3/2023, Normal      budesonide-formoterol 160-4.5 mcg (SYMBICORT) 160-4.5 mcg/actuation HFAA Inhale 2 puffs into the lungs every 12 (twelve) hours., Starting Mon 9/26/2022, Until Tue 9/26/2023, Normal      cholecalciferol, vitamin D3, 1,250 mcg (50,000 unit) capsule Take 1 capsule (50,000 Units total) by mouth once a week., Starting Wed 9/22/2021, OTC      ciclopirox 1 % shampoo APPLY 1 APPLICATION ON THE SKIN 2 TIMES WEEKLY, Historical Med      fluticasone propionate (FLONASE) 50 mcg/actuation nasal spray 2 SPRAYS (100 MCG TOTAL) BY EACH NARE ROUTE ONCE DAILY., Normal      losartan (COZAAR) 100 MG tablet Take 1 tablet (100 mg total) by mouth once daily., Starting Mon 9/26/2022, Normal      metFORMIN (GLUCOPHAGE) 850 MG tablet TAKE ONE TABLET BY MOUTH DAILY FOR 1 MONTH AND THEN INCREASE TO ONE TABLET BY MOUTH TWICE DAILY THEREAFTER, Normal      montelukast (SINGULAIR) 10 mg tablet Take 1  tablet (10 mg total) by mouth every evening., Starting Mon 9/26/2022, Normal      MULTIVIT,CALC,MINS/IRON/FOLIC (ONE-A-DAY WOMENS FORMULA ORAL) Take 2 tablets by mouth once daily., Historical Med      pantoprazole (PROTONIX) 40 MG tablet Take 1 tablet (40 mg total) by mouth once daily., Starting Mon 9/26/2022, Until Tue 9/26/2023, Normal      simvastatin (ZOCOR) 20 MG tablet TAKE 1 TABLET BY MOUTH EVERY DAY, Normal      triamcinolone acetonide 0.1% (KENALOG) 0.1 % Lotn Apply topically 2 (two) times daily. Apply to affected areas of itching. Do not apply to face or genitals., Starting Fri 6/4/2021, Normal             ROS  Review of Systems:    Constitutional:  Negative for fever.   HENT:  Negative for sore throat.    Eyes:  Negative for redness.   Respiratory:  Negative for shortness of breath.    Cardiovascular:  Negative for chest pain.   Gastrointestinal:  Negative for nausea.   Genitourinary:  Negative for dysuria.   Musculoskeletal:  Negative for back pain.   Skin:  Negative for rash.   Neurological:  Negative for weakness.   Hematological:  Does not bruise/bleed easily.   Psychiatric/Behavioral:  The patient is not nervous/anxious.      Physical Exam   Physical Exam    Initial Vitals [04/13/23 1346]   BP Pulse Resp Temp SpO2   118/85 84 14 98.8 °F (37.1 °C) 100 %      MAP       --           Triage vital signs reviewed.    Constitutional: Well-nourished, well-developed. Not in acute distress.  HENT: Normocephalic, atraumatic. Moist mucous membranes.  Eyes: No conjunctival injection.  Resp: Normal respiratory effort, breathing unlabored.  Cardio: Regular rate and rhythm.  GI: Abdomen non-distended.   MSK: Extremities without any deformities noted. No lower extremity edema.  Skin: Warm and dry. No rashes or lesions noted.  Neuro: Awake and alert. Moves all extremities.    ED Course   Procedures    Medical Decision Making    History Acquisition     The history is provided by the patient.     Review of prior  external/non ED notes:  Internal medicine visit 03/28/2023    Differential Diagnoses   Based on available information and initial assessment, the working Differential Diagnosis includes, but is not limited to:  Arrhythmia, aortic dissection, MI/unstable angina, PE, cardiogenic shock, CHF, CVA/TIA, intracranial lesion/mass, seizure, perforated viscous, ruptured AAA, orthostatic hypotension, vasovagal episode, anemia, dehydration, medication reaction, intentional overdose  .    EKG   EKG ordered and independently reviewed by me:   Sinus rhythm, , normal axis, normal intervals, PVCs, T-wave inversion anterior leads, no STEMI    Labs   Lab tests ordered and independently reviewed by me:    Labs Reviewed   CBC W/ AUTO DIFFERENTIAL - Abnormal; Notable for the following components:       Result Value    Gran # (ANC) 8.9 (*)     Immature Grans (Abs) 0.05 (*)     Gran % 82.0 (*)     Lymph % 10.6 (*)     All other components within normal limits   COMPREHENSIVE METABOLIC PANEL - Abnormal; Notable for the following components:    Glucose 119 (*)     All other components within normal limits   TROPONIN I         Imaging   Imaging ordered and independently reviewed by me:   Imaging Results    None              Additional Consideration   Carina Montaño  presents to the emergency Department today with presyncope after eating at [employer]. No chest pain or SOB. Had a BM and felt much better. Feels better now, wants to go home. EKG non ischemic, trop neg. Will dc to fu with PCP.     Additional testing considered but not indicated during the course of this workup: further imaging and labwork, not indicated  Co-morbidities/chronic illness/exacerbation of chronic illness considered which impacted clinical decision making:  Advanced age, hypertension  Procedures done in the ED or plan for the OR: No  Social determinants of care considered during development of treatment plan include:  None  Discussion of management or test  interpretation with external provider: No  DNR discussion: No    The patient's list of active medications and allergies as documented per RN staff has been reviewed.  Medications given in the ED and/or prescribed: Medications - No data to display          ED Course as of 04/14/23 0946   Fri Apr 14, 2023   0945 CBC auto differential(!)  Independently interpreted by me, unremarkable    [CS]   0945 Comprehensive metabolic panel(!)  Independently interpreted by me, unremarkable    [CS]   0945 Troponin I  Independently interpreted by me, unremarkable    [CS]      ED Course User Index  [CS] Lydia Diaz MD       Explanation of disposition:  Discharge    Clinical Impression:     1. Pre-syncope    2. Dizziness         All results from the workup were reviewed with the patient/family in detail. I discussed with the patient and/or the family/caregiver that today's evaluation in the ED does not suggest any emergent or life-threatening medical conditions that would require hospitalization or immediate intervention beyond what was provided in the ED. I believe the patient is safe for discharge.  However, a reassuring evaluation in the ED does not preclude the presence or development of a serious or life-threatening condition. As such, strict return precautions were discussed with the patient expressing understanding of instructions, and all questions answered. The patient/family communicates understanding of all this information and all remaining questions and concerns were addressed at this time.    The patient/family has been provided with verbal and printed direction regarding our final diagnosis(es) as well as instructions regarding use of OTC and/or Rx medications intended to manage the patient's aforementioned conditions including:  ED Prescriptions    None       The patient's condition does not warrant review of the  and prescription of controlled substances.      ED Disposition Condition    Discharge Stable                    Lydia Diaz MD  04/14/23 0946

## 2023-04-13 NOTE — FIRST PROVIDER EVALUATION
Emergency Department TeleTriage Encounter Note      CHIEF COMPLAINT    Chief Complaint   Patient presents with    Loss of Consciousness     Pt had a syncopal episode at [facility] on [address] after eating eggplant pirogues. Pt does not know if she is allergic or not. Pt states that this has never happened to her before. Pt does not believe that she is a diabetic.       VITAL SIGNS   Initial Vitals [04/13/23 1346]   BP Pulse Resp Temp SpO2   118/85 84 14 98.8 °F (37.1 °C) 100 %      MAP       --            ALLERGIES    Review of patient's allergies indicates:   Allergen Reactions    Grass pollen-perennial rye, standard     Lipitor [atorvastatin]     Strawberries [strawberry]        PROVIDER TRIAGE NOTE  68 y/o female presenting with near syncopal episode associated with dizziness today.  She reports that she was sitting and eating, then began sweating and felt dizzy.  She denies chest pain or SOB.    Will  order labs, EKG pending ED Provider evaluation.      Initial orders will be placed and care will be transferred to an alternate provider when patient is roomed for a full evaluation. Any additional orders and the final disposition will be determined by that provider.       ORDERS  Labs Reviewed - No data to display    ED Orders (720h ago, onward)      None              Virtual Visit Note: The provider triage portion of this emergency department evaluation and documentation was performed via [employer]edu, a HIPAA-compliant telemedicine application, in concert with a tele-presenter in the room. A face to face patient evaluation with one of my colleagues will occur once the patient is placed in an emergency department room.      DISCLAIMER: This note was prepared with DreamNotes voice recognition transcription software. Garbled syntax, mangled pronouns, and other bizarre constructions may be attributed to that software system.

## 2023-04-21 ENCOUNTER — CLINICAL SUPPORT (OUTPATIENT)
Dept: REHABILITATION | Facility: HOSPITAL | Age: 68
End: 2023-04-21
Attending: INTERNAL MEDICINE
Payer: MEDICARE

## 2023-04-21 DIAGNOSIS — M25.652 DECREASED RANGE OF LEFT HIP MOVEMENT: ICD-10-CM

## 2023-04-21 DIAGNOSIS — R29.898 DECREASED STRENGTH OF LOWER EXTREMITY: ICD-10-CM

## 2023-04-21 DIAGNOSIS — R26.89 IMPAIRED GAIT AND MOBILITY: ICD-10-CM

## 2023-04-21 DIAGNOSIS — G89.29 CHRONIC LEFT HIP PAIN: Primary | ICD-10-CM

## 2023-04-21 DIAGNOSIS — M25.552 CHRONIC LEFT HIP PAIN: Primary | ICD-10-CM

## 2023-04-21 PROCEDURE — 97161 PT EVAL LOW COMPLEX 20 MIN: CPT | Mod: PN

## 2023-04-21 PROCEDURE — 97110 THERAPEUTIC EXERCISES: CPT | Mod: PN

## 2023-04-24 NOTE — PLAN OF CARE
[facility]  Physical Therapy Initial Evaluation    Name: Carina Montaño  Clinic Number: [id number]    Therapy Diagnosis:   Encounter Diagnoses   Name Primary?    Chronic left hip pain Yes    Decreased strength of lower extremity     Decreased range of left hip movement     Impaired gait and mobility      Physician: Adelaide De Leon MD    Physician Orders: PT Eval and Treat   Medical Diagnosis from Referral: M25.552,G89.29 (ICD-10-CM) - Chronic left hip pain  Evaluation Date: 4/21/2023  Authorization Period Expiration: 3/27/24  Plan of Care Expiration: 6/16/23  Visit # / Visits authorized: 1/ 1  FOTO: 1/10    Time In: 9:02  Time Out: 9:55  Total Billable Time: 53 minutes (low Complexity Evaluation, Therapeutic Exercise - 1)    Precautions: Standard,hypertension, history of breast cancer, falls    Subjective   Date of onset: 4-5 years  History of current condition - Carina reports: she has longstanding left hip pain. She has history of multiple falls. She has greatest difficulty with walking fast. She has some morning stiffness and pain getting out of a chair.  She has some sleep interruption. She gets short of breath with walking to grocery store. She used to walk mall but it has closed. Denies numbness and tingling. She had left TKA several years ago and can't bend knee all the way. She has difficulty getting up from ground. She had an episode of what she called vertigo recently, though stated it was because she was talking about something very stressful.       Medical History:   Past Medical History:   Diagnosis Date    Allergy     Arthritis     Asthma     Cancer     breast    Chronic diastolic (congestive) heart failure 12/8/2022    GERD (gastroesophageal reflux disease)     Hyperlipidemia     Hypertension     Prediabetes        Surgical History:   Carina Montaño  has a past surgical history that includes Knee surgery; Shoulder surgery; Abdominal surgery;  "Esophagogastroduodenoscopy (N/A, 5/17/2019); and Colonoscopy (N/A, 5/11/2021).    Medications:   Carina has a current medication list which includes the following prescription(s): albuterol, albuterol, alendronate, azelastine, budesonide-formoterol 160-4.5 mcg, cholecalciferol (vitamin d3), ciclopirox, fluticasone propionate, losartan, metformin, montelukast, multivit,calc,mins/iron/folic, pantoprazole, simvastatin, and triamcinolone acetonide 0.1%.    Allergies:   Review of patient's allergies indicates:   Allergen Reactions    Grass pollen-perennial rye, standard     Lipitor [atorvastatin]     Strawberries [strawberry]         Imaging, bone scan films: "FINDINGS:  No acute fractures.  Some mild degenerative changes lower lumbar spine.  Intact right and left SI joints.  Mild narrowing of the right hip joint space without obvious acetabular roof spurring and preserved right femoral head contour.  Some generalized narrowing of the left hip joint space with acetabular spurring and femoral head-neck collar osteophytes, similar findings to earlier exam.     Impression: As above"     Prior Therapy: PT for left hip and knee  Social History:  lives with their spouse  Occupation: retired  Prior Level of Function: limited to some community walking distances due to dysnpea  Current Level of Function: progressive loss of tolerance to walking due to pain and dyspnea.     Pain:   Current 0/10, worst 10/10, best 0/10   Location: left hip  Description: Aching  Aggravating Factors: Standing, Bending, Walking, and Getting out of bed/chair  Easing Factors: pain medication    Pts goals: improve walking tolerance     Objective     Posture: slight pelvic obliquity, left toes out   Palpation: TTP left greater trochanter, gluteals   Sensation: intact light touch    Range of Motion/Strength:     Hip Right Left Pain/Dysfunction with Movement   AROM/PROM      flexion  100/110  95/100    extension  limited  Limited     abduction  Within " "normal limits   Within normal limits     adduction  Within normal limits   Within normal limits     Internal rotation  20  20    External rotation  60  45        L/E MMT Right Left Pain/Dysfunction with Movement   Hip Flexion 4+/5 4-/5    Hip Extension 4/5 4/5 Pain at left SIJ; bridge: pushes greater with right lower extremity    Hip Abduction 4/5 4/5    Hip Adduction 4+/5 4+/5    Hip IR 4/5 4/5    Hip ER 4/5 4/5    Knee Flexion 4+/5 4+/5    Knee Extension 4+/5 4+/5    Ankle DF 4+/5 4+/5    Ankle PF 4-/5 4-/5        Joint Mobility:   Hip: mod decreased mobility    Flexibility: full hamstring     Special Tests:  - JAMIE:                     Restricted on the left  - FADIR:                      Positive left  - Scouring:                   Mildly positive left     Gait Analysis:Without AD Assistance ind Deviations: left foot external rotation     2MWT:420 ft with pain onset at ~half way 8/10  (fairly easy intensity     TU.21 seconds   TUG Cutoff Scores:13.5         30 second sit-to-stand test (without U/E support): 10 ( w/ UE support)  30" sit to stand Cutoff Scores:15      Single Leg Stance: R 20 seconds, L 20 seconds       CMS Impairment/Limitation/Restriction for FOTO hip Survey    Therapist reviewed FOTO scores for Carina Montaño on 2023.   FOTO documents entered into "[employer] - see Media section.    Limitation Score: 49%  Category: Mobility         TREATMENT   Treatment Time In: 9:45  Treatment Time Out: 9:55  Total Treatment time separate from Evaluation: 10 minutes    Carina received therapeutic exercises to develop strength, endurance, ROM, flexibility, posture, and core stabilization for 10 minutes including:  Sit to stand   Bridge  Straight leg raise  Clam  Sidelying hip abduction    Home Exercises Provided and Patient Education Provided     Education provided:   Anatomy and Pathology.  Symptom management and plan of care progressions.  Home Exercise Program.    Written Home Exercises Provided: " yes.  Exercises were reviewed and Carina was able to demonstrate them prior to the end of the session.  Carina demonstrated fair  understanding of the education provided.     See EMR under Patient Instructions for exercises provided 4/21/2023.      Assessment   Carina is a 67 y.o. female referred to outpatient Physical Therapy with a medical diagnosis of Chronic left hip pain. Pt presents with 4-5 yr history of left hip pain, aggravated by community ambulation. Pt demonstrates decreased left hip range of motion, decreased lower extremity strength, impaired balance and gait, and decreased functional mobility. These impairments impact pts ability to stand and walk, perform household chores, perform transitions, and lead to increased fall risk with history of multiple falls in the last year.     Pt prognosis is Good.   Pt will benefit from skilled outpatient Physical Therapy to address the deficits stated above and in the chart below, provide pt/family education, and to maximize pt's level of independence.     Plan of care discussed with patient: Yes  Pt's spiritual, cultural and educational needs considered and patient is agreeable to the plan of care and goals as stated below:     Anticipated Barriers for therapy: co-morbidities, chronicity    Medical Necessity is demonstrated by the following  History  Co-morbidities and personal factors that may impact the plan of care Co-morbidities:   GERD, hyperglycemia, hypertension, arthritis    Personal Factors:   age  coping style  lifestyle       low   Examination  Body Structures and Functions, activity limitations and participation restrictions that may impact the plan of care Body Regions:   lower extremities    Body Systems:    gross symmetry  ROM  strength  balance  gait  transfers  transitions  motor control    Participation Restrictions:   Community ambulation    Activity limitations:   Learning and applying knowledge  no deficits    General Tasks and  Commands  undertaking multiple tasks    Communication  no deficits    Mobility  lifting and carrying objects  walking    Self care  no deficits    Domestic Life  shopping  cooking  doing house work (cleaning house, washing dishes, laundry)    Interactions/Relationships  no deficits    Life Areas  no deficits    Community and Social Life  community life  recreation and leisure         moderate   Clinical Presentation stable and uncomplicated low   Decision Making/ Complexity Score: low       Goals:    Short Term Goals (4 Weeks):  1. Pt will be compliant with HEP to supplement PT in restoring pain free function.  2. Pt will improve impaired LE MMTs to >/= 4/5 to improve dynamic hip/knee support for functional tasks.  3. Pt will be able to complete 6 MWT to improve walking tolerance.     Long Term Goals (8 Weeks):  1. Pt will improve FOTO score to </= 32% limited to decrease perceived limitation with mobility.   2. Pt will improve impaired LE MMTs to >/= 4+/5 to improve dynamic hip/knee support for functional tasks.  3. Pt will improve hip flexion ROM to 0-110 deg to improve mobility for functional tasks.  4. Pt will walk on level surfaces without AD or gait deficits to promote community ambulation .   5. Pt will perform 12 sit to  30 seconds to improve functional mobility.      Plan   Plan of care Certification: 4/21/2023 to 6/16/23.    Outpatient Physical Therapy 2 times weekly for 8 weeks to include the following interventions: Electrical Stimulation  , Gait Training, Manual Therapy, Moist Heat/ Ice, Neuromuscular Re-ed, Patient Education, Therapeutic Activities, and Therapeutic Exercise, ASTYM, Kinesiotaping PRN, Functional Dry Needling    PRESTON ROGERS, PT, DPT

## 2023-04-24 NOTE — PROGRESS NOTES
"                            Physical Therapy Daily Treatment Note     Name: Carina Montaño  Clinic Number: [id number]    Therapy Diagnosis:   Encounter Diagnosis   Name Primary?    Decreased strength of lower extremity Yes     Physician: Adelaide De Leon MD    Visit Date: 4/25/2023  Physician Orders: PT Eval and Treat   Medical Diagnosis from Referral: M25.552,G89.29 (ICD-10-CM) - Chronic left hip pain  Evaluation Date: 4/21/2023  Authorization Period Expiration: 3/27/24  Plan of Care Expiration: 6/16/23  Visit # / Visits authorized: 1/16 + eval  FOTO: 2/10    Time In: 7:03 am  Time Out: 7:57 am  Total Billable Time: 54 minutes (30 minutes 1 on 1; 1Te 1 MT    Precautions: Standard    Subjective     Patient reports: Patient reports she did some of her home exercises and is still having some pain in the side of her L hip  She  was partially  compliant with home exercise program.  Response to previous treatment: eval only  Functional change: ongoing    Pain: 3/10  Location: left hip      Objective     Carina received therapeutic exercises to develop strength, endurance, flexibility, and ROM for 28 minutes 1 on 1, remaining supervised including:  Sit to stand 2 x 10  Bridge 2 x 10  Straight leg raise 2 x 10  Clams in sidelying 2 x 10  Clams in supine with red theraband 2 x 10 x 5"  Sidelying hip abduction 2 x 10  Supine isometric hip adduction 2 x 10 x 5"  Standing hip abduction 2 x 10  Standing hip hikes 2 x 10  NUSTEP: 6 minutes L1.5    Carina received the following manual therapy techniques: Joint mobilizations, Manual traction, and Soft tissue Mobilization were applied to the: L hip for 7 minutes, including:  Long Axis Distraction    Carina participated in neuromuscular re-education activities to improve: Balance, Sense, and Proprioception for 5 minutes, including:  Single leg stance 3 x 30" - NEXT  Tandem stance 3 x 30"    Carina participated in dynamic functional therapeutic activities to improve " "functional performance for 0  minutes, including:  Sit to stand     Carina received the following supervised modalities after being cleared for contradictions:     Home Exercises Provided and Patient Education Provided:     Education provided:   - Continue HEP as prescribed    Written home exercises provided: Patient instructed to cont prior HEP.  Exercises were reviewed and Lawrence able to demonstrate them prior to the end of the session.  Carina demonstrated fair  understanding of the education provided.     See electronic medical record under Notes-Patient Instructions for exercises provided prior visit.    Assessment     Per eval: Carina is a 67 y.o. female referred to outpatient Physical Therapy with a medical diagnosis of Chronic left hip pain. Pt presents with 4-5 yr history of left hip pain, aggravated by community ambulation. Pt demonstrates decreased left hip range of motion, decreased lower extremity strength, impaired balance and gait, and decreased functional mobility. These impairments impact pts ability to stand and walk, perform household chores, perform transitions, and lead to increased fall risk with history of multiple falls in the last year.     Patient presented for first follow-up after evaluation. Patient was partially compliant with her HEP and required verbal and visual cueing for performance of all exercises. Patient noted intermittent aching in her L hip with exercises such as clamshells and s/l hip abduction, and noted relief and "good feeling" with long axis distraction. Noted after therapy she felt like she got a good workout but it wasn't too much for her. Educated she would likely feel a little sore and stiff today/tomorrow and to let us know she felt.    Carina Is progressing well towards her goals.   Patient prognosis is Good.   Patient will continue to benefit from skilled outpatient physical therapy to address the deficits listed in the problem list box on initial " evaluation, provide pt/family education and to maximize patient's level of independence in the home and community environment.     Pt's spiritual, cultural and educational needs considered and patient agreeable to plan of care and goals.  Anticipated barriers to physical therapy: comorbidities, chronicity    Goals:     Short Term Goals (4 Weeks):  1. Pt will be compliant with HEP to supplement PT in restoring pain free function.  2. Pt will improve impaired LE MMTs to >/= 4/5 to improve dynamic hip/knee support for functional tasks.  3. Pt will be able to complete 6 MWT to improve walking tolerance.      Long Term Goals (8 Weeks):  1. Pt will improve FOTO score to </= 32% limited to decrease perceived limitation with mobility.   2. Pt will improve impaired LE MMTs to >/= 4+/5 to improve dynamic hip/knee support for functional tasks.  3. Pt will improve hip flexion ROM to 0-110 deg to improve mobility for functional tasks.  4. Pt will walk on level surfaces without AD or gait deficits to promote community ambulation .   5. Pt will perform 12 sit to  30 seconds to improve functional mobility.      Plan     Plan of care Certification: 4/21/2023 to 6/16/23.     Jhon Chavarria, PT, DPT

## 2023-04-25 ENCOUNTER — CLINICAL SUPPORT (OUTPATIENT)
Dept: REHABILITATION | Facility: HOSPITAL | Age: 68
End: 2023-04-25
Payer: MEDICARE

## 2023-04-25 DIAGNOSIS — R29.898 DECREASED STRENGTH OF LOWER EXTREMITY: Primary | ICD-10-CM

## 2023-04-25 PROCEDURE — 97110 THERAPEUTIC EXERCISES: CPT | Mod: PN

## 2023-04-25 PROCEDURE — 97140 MANUAL THERAPY 1/> REGIONS: CPT | Mod: PN

## 2023-04-28 ENCOUNTER — CLINICAL SUPPORT (OUTPATIENT)
Dept: REHABILITATION | Facility: HOSPITAL | Age: 68
End: 2023-04-28
Payer: MEDICARE

## 2023-04-28 DIAGNOSIS — R29.898 DECREASED STRENGTH OF LOWER EXTREMITY: Primary | ICD-10-CM

## 2023-04-28 PROCEDURE — 97110 THERAPEUTIC EXERCISES: CPT | Mod: PN

## 2023-04-28 NOTE — PROGRESS NOTES
"                            Physical Therapy Daily Treatment Note     Name: Carina Montaño  Clinic Number: [id number]    Therapy Diagnosis:   Encounter Diagnosis   Name Primary?    Decreased strength of lower extremity Yes       Physician: Adelaide De Leon MD    Visit Date: 4/28/2023  Physician Orders: PT Eval and Treat   Medical Diagnosis from Referral: M25.552,G89.29 (ICD-10-CM) - Chronic left hip pain  Evaluation Date: 4/21/2023  Authorization Period Expiration: 3/27/24  Plan of Care Expiration: 6/16/23  Visit # / Visits authorized: 2/16 + eval  FOTO: 3/10    Time In: 9:03 am  Time Out: 9:47 am  Total Billable Time: 44 minutes (3TE)     Precautions: Standard    Subjective     Patient reports: Patient reports she helped move some cinderblocks around her yard to get ready for her granddaughters birthday party on Sunday and she was really feeling it in her hip after that. States it feels better this morning. Still has some soreness in her lasteral hip  She  was partially  compliant with home exercise program.  Response to previous treatment: "Felt fine"  Functional change: ongoing    Pain: 3/10  Location: left hip      Objective     Carina received therapeutic exercises to develop strength, endurance, flexibility, and ROM for 44 minutes including:    Sit to stand 2 x 10  Bridge 3 x 10  Straight leg raise 3 x 10  Clams in sidelying 3 x 10  Clams in supine with red theraband 2 x 10 x 5"  Sidelying hip abduction 2 x 10  Supine isometric hip adduction 2 x 10 x 5"  Standing hip abduction 2 x 10  Standing hip hikes 2 x 10  Lateral step ups 4" step 2 x 10 bilateral  NUSTEP: 6 minutes L2    Carina received the following manual therapy techniques: Joint mobilizations, Manual traction, and Soft tissue Mobilization were applied to the: L hip for 7 minutes, including:  Long Axis Distraction    Carina participated in neuromuscular re-education activities to improve: Balance, Sense, and Proprioception for 5 minutes, " "including:  Single leg stance 3 x 30" - NEXT  Tandem stance 3 x 30"    Carina participated in dynamic functional therapeutic activities to improve functional performance for 0  minutes, including:  Sit to stand     Carina received the following supervised modalities after being cleared for contradictions:     Home Exercises Provided and Patient Education Provided:     Education provided:   - Continue HEP as prescribed    Written home exercises provided: Patient instructed to cont prior HEP.  Exercises were reviewed and Carinawas able to demonstrate them prior to the end of the session.  Carina demonstrated fair  understanding of the education provided.     See electronic medical record under Notes-Patient Instructions for exercises provided prior visit.    Assessment     Per eval: Carina is a 67 y.o. female referred to outpatient Physical Therapy with a medical diagnosis of Chronic left hip pain. Pt presents with 4-5 yr history of left hip pain, aggravated by community ambulation. Pt demonstrates decreased left hip range of motion, decreased lower extremity strength, impaired balance and gait, and decreased functional mobility. These impairments impact pts ability to stand and walk, perform household chores, perform transitions, and lead to increased fall risk with history of multiple falls in the last year.     Patient with good tolerance of exercises today with mild increased soreness with some sidelying exercises. Increased repetitions on some exercises and introduced lateral step ups. Continues to require some cueing for exercises and particularly hip hikes.     Carina Is progressing well towards her goals.   Patient prognosis is Good.   Patient will continue to benefit from skilled outpatient physical therapy to address the deficits listed in the problem list box on initial evaluation, provide pt/family education and to maximize patient's level of independence in the home and community environment. "     Pt's spiritual, cultural and educational needs considered and patient agreeable to plan of care and goals.  Anticipated barriers to physical therapy: comorbidities, chronicity    Goals:     Short Term Goals (4 Weeks):  1. Pt will be compliant with HEP to supplement PT in restoring pain free function.  2. Pt will improve impaired LE MMTs to >/= 4/5 to improve dynamic hip/knee support for functional tasks.  3. Pt will be able to complete 6 MWT to improve walking tolerance.      Long Term Goals (8 Weeks):  1. Pt will improve FOTO score to </= 32% limited to decrease perceived limitation with mobility.   2. Pt will improve impaired LE MMTs to >/= 4+/5 to improve dynamic hip/knee support for functional tasks.  3. Pt will improve hip flexion ROM to 0-110 deg to improve mobility for functional tasks.  4. Pt will walk on level surfaces without AD or gait deficits to promote community ambulation .   5. Pt will perform 12 sit to  30 seconds to improve functional mobility.      Plan     Plan of care Certification: 4/21/2023 to 6/16/23.     Jhon Chavarria, PT, DPT

## 2023-05-01 NOTE — PROGRESS NOTES
"                            Physical Therapy Daily Treatment Note     Name: Carina Montaño  Clinic Number: [id number]    Therapy Diagnosis:   Encounter Diagnosis   Name Primary?    Decreased strength of lower extremity Yes         Physician: Adelaide De Leon MD    Visit Date: 5/2/2023  Physician Orders: PT Eval and Treat   Medical Diagnosis from Referral: M25.552,G89.29 (ICD-10-CM) - Chronic left hip pain  Evaluation Date: 4/21/2023  Authorization Period Expiration: 3/27/24  Plan of Care Expiration: 6/16/23  Visit # / Visits authorized: 3/16 + eval  FOTO: 4/6    Time In: 7:05 am  Time Out: 7:58 am  Total Billable Time: 53 minutes (3TE; 1MT)     Precautions: Standard    Subjective     Patient reports: Patient reports that her granddaughters birthday party went great and she's still pretty tired from that. States her hip feels fine, just tired  She  was partially  compliant with home exercise program.  Response to previous treatment: "Felt fine"  Functional change: ongoing    Pain: 3/10  Location: left hip      Objective     Carina received therapeutic exercises to develop strength, endurance, flexibility, and ROM for 38 minutes including:    Sit to stand 2 x 10  Bridge 3 x 10  Straight leg raise 3 x 10  Clams in sidelying with GTB 3 x 10  Clams in supine with green theraband 2 x 10 x 5"  Sidelying hip abduction 2 x 10  Supine isometric hip adduction 2 x 10 x 5"  Standing hip abduction 2 x 10  Standing hip hikes on green pad 2 x 10  Lateral step ups blue step step 2 x 10 bilateral  NUSTEP: 6 minutes L2    Carina received the following manual therapy techniques: Joint mobilizations, Manual traction, and Soft tissue Mobilization were applied to the: L hip for 15 minutes, including:  Long Axis Distraction - attempted but increased pain  STM to L lateral and posterolateral hip with hands and massage gun x 10'    Carina participated in neuromuscular re-education activities to improve: Balance, Sense, and " "Proprioception for 0 minutes, including:  Single leg stance 3 x 30" - able to complete for 30" without LOB    Tandem stance 3 x 30" - able to complete for 30" without LOB    Carina participated in dynamic functional therapeutic activities to improve functional performance for 0  minutes, including:  Sit to stand     Carina received the following supervised modalities after being cleared for contradictions:     Home Exercises Provided and Patient Education Provided:     Education provided:   - Continue HEP as prescribed    Written home exercises provided: Patient instructed to cont prior HEP.  Exercises were reviewed and Lawrence able to demonstrate them prior to the end of the session.  Carina demonstrated fair  understanding of the education provided.     See electronic medical record under Notes-Patient Instructions for exercises provided prior visit.    Assessment     Per eval: Carina is a 67 y.o. female referred to outpatient Physical Therapy with a medical diagnosis of Chronic left hip pain. Pt presents with 4-5 yr history of left hip pain, aggravated by community ambulation. Pt demonstrates decreased left hip range of motion, decreased lower extremity strength, impaired balance and gait, and decreased functional mobility. These impairments impact pts ability to stand and walk, perform household chores, perform transitions, and lead to increased fall risk with history of multiple falls in the last year.     Patient with reduced tolerance to exercises today likely due to fatigue from a busy weekend. Patient reported increased L hip pain following lateral step ups, followed up with [facility] and patient reported her hip felt "good" afterwards. Progressed exercises per bold. Ended with nustep     Carina Is progressing well towards her goals.   Patient prognosis is Good.   Patient will continue to benefit from skilled outpatient physical therapy to address the deficits listed in the problem list box on " initial evaluation, provide pt/family education and to maximize patient's level of independence in the home and community environment.     Pt's spiritual, cultural and educational needs considered and patient agreeable to plan of care and goals.  Anticipated barriers to physical therapy: comorbidities, chronicity    Goals:     Short Term Goals (4 Weeks):  1. Pt will be compliant with HEP to supplement PT in restoring pain free function.  2. Pt will improve impaired LE MMTs to >/= 4/5 to improve dynamic hip/knee support for functional tasks.  3. Pt will be able to complete 6 MWT to improve walking tolerance.      Long Term Goals (8 Weeks):  1. Pt will improve FOTO score to </= 32% limited to decrease perceived limitation with mobility.   2. Pt will improve impaired LE MMTs to >/= 4+/5 to improve dynamic hip/knee support for functional tasks.  3. Pt will improve hip flexion ROM to 0-110 deg to improve mobility for functional tasks.  4. Pt will walk on level surfaces without AD or gait deficits to promote community ambulation .   5. Pt will perform 12 sit to  30 seconds to improve functional mobility.      Plan     Plan of care Certification: 4/21/2023 to 6/16/23.     Jhon Chavarria, PT, DPT

## 2023-05-02 ENCOUNTER — CLINICAL SUPPORT (OUTPATIENT)
Dept: REHABILITATION | Facility: HOSPITAL | Age: 68
End: 2023-05-02
Payer: MEDICARE

## 2023-05-02 DIAGNOSIS — R29.898 DECREASED STRENGTH OF LOWER EXTREMITY: Primary | ICD-10-CM

## 2023-05-02 PROCEDURE — 97140 MANUAL THERAPY 1/> REGIONS: CPT | Mod: PN

## 2023-05-02 PROCEDURE — 97110 THERAPEUTIC EXERCISES: CPT | Mod: PN

## 2023-05-04 NOTE — PROGRESS NOTES
"                            Physical Therapy Daily Treatment Note     Name: Carina Montaño  Clinic Number: [id number]    Therapy Diagnosis:   Encounter Diagnosis   Name Primary?    Decreased strength of lower extremity Yes           Physician: Adelaide De Leon MD    Visit Date: 5/5/2023  Physician Orders: PT Eval and Treat   Medical Diagnosis from Referral: M25.552,G89.29 (ICD-10-CM) - Chronic left hip pain  Evaluation Date: 4/21/2023  Authorization Period Expiration: 3/27/24  Plan of Care Expiration: 6/16/23  Visit # / Visits authorized: 4/16 + eval  FOTO: 5/6 NEXT    Time In: 8:01 am  Time Out: 8:54 am  Total Billable Time: 53 minutes (4TE)     Precautions: Standard    Subjective     Patient reports: Patient reports she still has some fatigue from last weekend and the therapy session, but she's feeling okay.   She  was partially  compliant with home exercise program.  Response to previous treatment: "Felt fine"  Functional change: ongoing    Pain: 0/10  Location: left hip      Objective     Carina received therapeutic exercises to develop strength, endurance, flexibility, and ROM for 53 minutes including:    Sit to stand 2 x 10  Bridge 3 x 10  Straight leg raise 3 x 10  Clams in sidelying with GTB 3 x 10  Clams in supine with green theraband 2 x 10 x 5"  Sidelying hip abduction 2 x 10  Supine isometric hip adduction 2 x 10 x 5"  Standing hip abduction 2 x 10  Standing hip hikes on green pad 2 x 10  Lateral step ups blue step step 2 x 10 bilateral  NUSTEP: 6 minutes L2  Knee [place]ocks 20 x 5"  DOUBLE KNEE TO CHEST with green peanut 15 x 5"  Supine hip adduction isometric with green peanut 20 x 5"    Carina received the following manual therapy techniques: Joint mobilizations, Manual traction, and Soft tissue Mobilization were applied to the: L hip for 0 minutes, including:  Long Axis Distraction - attempted but increased pain  STM to L lateral and posterolateral hip with hands and massage gun NT    Carina " "participated in neuromuscular re-education activities to improve: Balance, Sense, and Proprioception for 0 minutes, including:  Single leg stance 3 x 30" - able to complete for 30" without LOB    Tandem stance 3 x 30" - able to complete for 30" without LOB    Carina participated in dynamic functional therapeutic activities to improve functional performance for 0  minutes, including:  Sit to stand     Carina received the following supervised modalities after being cleared for contradictions:     Home Exercises Provided and Patient Education Provided:     Education provided:   - Continue HEP as prescribed    Written home exercises provided: Patient instructed to cont prior HEP.  Exercises were reviewed and Lawrence able to demonstrate them prior to the end of the session.  Carina demonstrated fair  understanding of the education provided.     See electronic medical record under Notes-Patient Instructions for exercises provided prior visit.    Assessment     Per eval: Carina is a 67 y.o. female referred to outpatient Physical Therapy with a medical diagnosis of Chronic left hip pain. Pt presents with 4-5 yr history of left hip pain, aggravated by community ambulation. Pt demonstrates decreased left hip range of motion, decreased lower extremity strength, impaired balance and gait, and decreased functional mobility. These impairments impact pts ability to stand and walk, perform household chores, perform transitions, and lead to increased fall risk with history of multiple falls in the last year.     Continued to hold on some progressions 2/2 patient fatigue and soreness. Held on sidelying hip abduction 2/2 pt reports of pain in lateral hip with first reps. Added supine hip adduction squeezes and two low back/hip mobility exercises with good tolerance.Patient reported no pain following therapy    Carina Is progressing well towards her goals.   Patient prognosis is Good.   Patient will continue to benefit " from skilled outpatient physical therapy to address the deficits listed in the problem list box on initial evaluation, provide pt/family education and to maximize patient's level of independence in the home and community environment.     Pt's spiritual, cultural and educational needs considered and patient agreeable to plan of care and goals.  Anticipated barriers to physical therapy: comorbidities, chronicity    Goals:     Short Term Goals (4 Weeks):  1. Pt will be compliant with HEP to supplement PT in restoring pain free function. (Met)  2. Pt will improve impaired LE MMTs to >/= 4/5 to improve dynamic hip/knee support for functional tasks. (Progressing, not met)  3. Pt will be able to complete 6 MWT to improve walking tolerance. (Progressing, not met)     Long Term Goals (8 Weeks):  1. Pt will improve FOTO score to </= 32% limited to decrease perceived limitation with mobility. (Progressing, not met)  2. Pt will improve impaired LE MMTs to >/= 4+/5 to improve dynamic hip/knee support for functional tasks. (Progressing, not met)  3. Pt will improve hip flexion ROM to 0-110 deg to improve mobility for functional tasks. (Progressing, not met)  4. Pt will walk on level surfaces without AD or gait deficits to promote community ambulation .  (Progressing, not met)  5. Pt will perform 12 sit to  30 seconds to improve functional mobility.   (Progressing, not met)    Plan     Plan of care Certification: 4/21/2023 to 6/16/23.     Jhon Chavarria, PT, DPT

## 2023-05-05 ENCOUNTER — CLINICAL SUPPORT (OUTPATIENT)
Dept: REHABILITATION | Facility: HOSPITAL | Age: 68
End: 2023-05-05
Payer: MEDICARE

## 2023-05-05 DIAGNOSIS — R29.898 DECREASED STRENGTH OF LOWER EXTREMITY: Primary | ICD-10-CM

## 2023-05-05 DIAGNOSIS — R73.03 PREDIABETES: ICD-10-CM

## 2023-05-05 PROCEDURE — 97110 THERAPEUTIC EXERCISES: CPT | Mod: PN

## 2023-05-05 RX ORDER — METFORMIN HYDROCHLORIDE 850 MG/1
TABLET ORAL
Qty: 180 TABLET | Refills: 3 | Status: SHIPPED | OUTPATIENT
Start: 2023-05-05

## 2023-05-06 DIAGNOSIS — I10 ESSENTIAL HYPERTENSION: ICD-10-CM

## 2023-05-06 NOTE — TELEPHONE ENCOUNTER
No care due was identified.  Health [facility] Embedded Care Due Messages. Reference number: [id number].   5/06/2023 7:16:30 AM CDT

## 2023-05-08 RX ORDER — LOSARTAN POTASSIUM 100 MG/1
TABLET ORAL
Qty: 90 TABLET | Refills: 1 | Status: SHIPPED | OUTPATIENT
Start: 2023-05-08 | End: 2023-11-07

## 2023-05-10 ENCOUNTER — OFFICE VISIT (OUTPATIENT)
Dept: OTOLARYNGOLOGY | Facility: CLINIC | Age: 68
End: 2023-05-10
Payer: MEDICARE

## 2023-05-10 VITALS
TEMPERATURE: 98 F | DIASTOLIC BLOOD PRESSURE: 63 MMHG | HEART RATE: 61 BPM | SYSTOLIC BLOOD PRESSURE: 98 MMHG | WEIGHT: 166.13 LBS | BODY MASS INDEX: 31.39 KG/M2

## 2023-05-10 DIAGNOSIS — H90.3 SENSORINEURAL HEARING LOSS, BILATERAL: Chronic | ICD-10-CM

## 2023-05-10 DIAGNOSIS — J34.3 HYPERTROPHY OF INFERIOR NASAL TURBINATE: Chronic | ICD-10-CM

## 2023-05-10 DIAGNOSIS — J30.9 CHRONIC ALLERGIC RHINITIS: Primary | Chronic | ICD-10-CM

## 2023-05-10 PROCEDURE — 99999 PR PBB SHADOW E&M-EST. PATIENT-LVL III: CPT | Mod: PBBFAC,,, | Performed by: OTOLARYNGOLOGY

## 2023-05-10 PROCEDURE — 99999 PR PBB SHADOW E&M-EST. PATIENT-LVL III: ICD-10-PCS | Mod: PBBFAC,,, | Performed by: OTOLARYNGOLOGY

## 2023-05-10 PROCEDURE — 99214 OFFICE O/P EST MOD 30 MIN: CPT | Mod: S$PBB,,, | Performed by: OTOLARYNGOLOGY

## 2023-05-10 PROCEDURE — 99213 OFFICE O/P EST LOW 20 MIN: CPT | Mod: PBBFAC,PN | Performed by: OTOLARYNGOLOGY

## 2023-05-10 PROCEDURE — 99214 PR OFFICE/OUTPT VISIT, EST, LEVL IV, 30-39 MIN: ICD-10-PCS | Mod: S$PBB,,, | Performed by: OTOLARYNGOLOGY

## 2023-05-10 NOTE — PROGRESS NOTES
Chief Complaint   Patient presents with    Follow-up   .    HPI:     Carina Montaño is a 67 y.o. female who presents for evaluation of a several month history of nasal congestion, postnasal drip, facial pressure and pain.  She notes associated cough and sneezing. She does feel that her symptoms worsen with weather changes.  She describes difficulty breathing at night.  She does use sinus rinses or nasal sprays. She has been on Flonase and Astelin. She is also on Claritin and montelukast.  She feels that this is somewhat helpful.  She admits to midface pain and pressure.  She admits to rhinorrhea and postnasal drip. There is not maxillary tooth pain. She admits to frontal headaches.  She has had sinus or nasal surgery. She is unsure of exact procedure.  There is no history of sinonasal trauma. She notes that she has had hearing loss since childhood. She has had hearing tests in the past. She did not tolerate hearing aids well. She felt they were too loud. She thinks last hearing test was 10 years ago.     Interval HPI 5/10/2023:  Follow up visit. Notes that she still has some nasal congestion. C/o dry eyes today. She has been using nasal saline rinses, Flonase, Astelin, and montelukast. Completed Levaquin prescribed at last visit. Feels this helped.     Past Medical History:   Diagnosis Date    Allergy     Arthritis     Asthma     Cancer     breast    Chronic diastolic (congestive) heart failure 12/8/2022    GERD (gastroesophageal reflux disease)     Hyperlipidemia     Hypertension     Prediabetes      Social History     Socioeconomic History    Marital status:     Number of children: 1   Occupational History    Occupation:     Tobacco Use    Smoking status: Never    Smokeless tobacco: Never   Substance and Sexual Activity    Alcohol use: Yes     Comment: social    Drug use: No    Sexual activity: Yes     Social Determinants of Health     Financial Resource Strain: Low Risk      Difficulty of Paying Living Expenses: Not very hard   Food Insecurity: No Food Insecurity    Worried About Running Out of Food in the Last Year: Never true    Ran Out of Food in the Last Year: Never true   Transportation Needs: No Transportation Needs    Lack of Transportation (Medical): No    Lack of Transportation (Non-Medical): No   Physical Activity: Inactive    Days of Exercise per Week: 0 days    Minutes of Exercise per Session: 0 min   Stress: No Stress Concern Present    Feeling of Stress : Not at all   Social Connections: Unknown    Frequency of Communication with Friends and Family: Twice a week    Frequency of Social Gatherings with Friends and Family: Once a week    Active Member of Clubs or Organizations: Yes    Attends Club or Organization Meetings: 1 to 4 times per year    Marital Status:    Housing Stability: Low Risk     Unable to Pay for Housing in the Last Year: No    Number of Places Lived in the Last Year: 1    Unstable Housing in the Last Year: No     Past Surgical History:   Procedure Laterality Date    ABDOMINAL SURGERY      COLONOSCOPY N/A 5/11/2021    Procedure: COLONOSCOPY;  Surgeon: Ema Vidal MD;  Location: [facility];  Service: Endoscopy;  Laterality: N/A;  COVID TEST 5/8 SUPREP    ESOPHAGOGASTRODUODENOSCOPY N/A 5/17/2019    Procedure: ESOPHAGOGASTRODUODENOSCOPY (EGD);  Surgeon: Ema Vidal MD;  Location: [facility];  Service: Endoscopy;  Laterality: N/A;    KNEE SURGERY      SHOULDER SURGERY       Family History   Problem Relation Age of Onset    Hypertension Mother     Cancer Mother         Pancreatic    Hypertension Father            Review of Systems  General: negative for chills, fever or weight loss  Psychological: negative for mood changes or depression  Ophthalmic: negative for blurry vision, photophobia or eye pain  ENT: see HPI  Respiratory: no cough, shortness of breath, or wheezing  Cardiovascular: no chest pain or dyspnea on exertion  Gastrointestinal: no  abdominal pain, change in bowel habits, or black/ bloody stools  Musculoskeletal: negative for gait disturbance or muscular weakness  Neurological: no syncope or seizures; no ataxia  Dermatological: negative for puritis,  rash and jaundice  Hematologic/lymphatic: no easy bruising, no new lumps or bumps      Physical Exam:    Vitals:    05/10/23 1423   BP: 98/63   Pulse: 61   Temp: 98 °F (36.7 °C)         Constitutional: Well appearing / communicating without difficutly.  NAD.  Eyes: EOM I Bilaterally  Head/Face: Normocephalic.  Negative paranasal sinus pressure/tenderness.  Salivary glands WNL.  House Brackmann I Bilaterally.    Right Ear: Auricle normal appearance. External Auditory Canal within normal limits,TM w/o masses/lesions/perforations. TM mobility noted.   Left Ear: Auricle normal appearance. External Auditory Canal WNL,TM w/o masses/lesions/perforations. TM mobility noted.  Nose: No gross nasal septal deviation. Inferior Turbinates 3+ bilaterally. No septal perforation. No masses/lesions. External nasal skin appears normal without masses/lesions.  Oral Cavity: Gingiva/lips within normal limits.  Dentition/gingiva healthy appearing. Mucus membranes moist. Floor of mouth soft, no masses palpated. Oral Tongue mobile. Hard Palate appears normal.    Oropharynx: Base of tongue appears normal. No masses/lesions noted. Tonsillar fossa/pharyngeal wall without lesions. Posterior oropharynx WNL.  Soft palate without masses. Midline uvula.   Neck/Lymphatic: No LAD I-VI bilaterally.  No thyromegaly.  No masses noted on exam.    Mirror laryngoscopy/nasopharyngoscopy: Active gag reflex.  Unable to perform.          Diagnostic studies:  ImmunoCAP testing 3/23/2023: Class 0/1 to cat dander, cockroach, cypress, and white oak.         CT scan sinuses 3/24/2023: images interpreted by me and discussed with the patient in detail. FINDINGS:  Intracranial structures are unremarkable.  No incidental soft tissue masses are seen.   Frontal sinuses are hypoplastic.  There is mild mucosal thickening at the floor the left maxillary sinus.  Ethmoid, sphenoid, and frontal sinuses are clear.  Ostiomeatal complexes are patent.  Frontal ethmoidal sinus drainage routes are unremarkable.  Nasal passes is are clear.  Mastoid air cells and middle ear cavities are clear.          Tympanometry revealed a Type A tympanogram for both ears.  Audiogram results revealed a mild sloping to profound sensorineural hearing loss bilaterally.  Speech reception thresholds were obtained at 35dB for both ears and speech discrimination scores were 36% for the right ear and 44% for the left ear.           Assessment:    ICD-10-CM ICD-9-CM    1. Chronic allergic rhinitis  J30.9 477.9       2. Hypertrophy of inferior nasal turbinate  J34.3 478.0       3. Sensorineural hearing loss, bilateral  H90.3 389.18             The primary encounter diagnosis was Chronic allergic rhinitis. Diagnoses of Hypertrophy of inferior nasal turbinate and Sensorineural hearing loss, bilateral were also pertinent to this visit.      Plan:  No orders of the defined types were placed in this encounter.    Start nasal saline rinses b.i.d.  Continue Flonase 2 sprays per nostril daily  Continue Astelin 1 spray per nostril b.i.d.  Continue montelukast 10 mg PO daily. She has had dry eyes so will avoid oral antihistamines for now.     Follow-up in approximately  4 months.     Angela Jacobs MD

## 2023-05-12 ENCOUNTER — CLINICAL SUPPORT (OUTPATIENT)
Dept: REHABILITATION | Facility: HOSPITAL | Age: 68
End: 2023-05-12
Attending: INTERNAL MEDICINE
Payer: MEDICARE

## 2023-05-12 DIAGNOSIS — R29.898 DECREASED STRENGTH OF LOWER EXTREMITY: Primary | ICD-10-CM

## 2023-05-12 PROCEDURE — 97140 MANUAL THERAPY 1/> REGIONS: CPT | Mod: PN,CQ

## 2023-05-12 PROCEDURE — 97110 THERAPEUTIC EXERCISES: CPT | Mod: PN,CQ

## 2023-05-12 PROCEDURE — 97112 NEUROMUSCULAR REEDUCATION: CPT | Mod: PN,CQ

## 2023-05-12 NOTE — PROGRESS NOTES
"                            Physical Therapy Daily Treatment Note     Name: Carina Montaño  Clinic Number: [id number]    Therapy Diagnosis:   Encounter Diagnosis   Name Primary?    Decreased strength of lower extremity Yes       Physician: Adelaide De Leon MD    Visit Date: 5/12/2023  Physician Orders: PT Eval and Treat   Medical Diagnosis from Referral: M25.552,G89.29 (ICD-10-CM) - Chronic left hip pain  Evaluation Date: 4/21/2023  Authorization Period Expiration: 3/27/24  Plan of Care Expiration: 6/16/23  Visit # / Visits authorized: 5/16 + eval  FOTO: 6/6 NEXT    Time In: 7:00 am  Time Out: 8:00 am  Total Billable Time: 60 minutes (1 MT, 1NMR, 2TE)     Precautions: Standard    Subjective     Patient reports: Patient reports her leg and foot were both bothering her Tuesday.  That was why she cancelled, but she's feeling okay today.  No complaints of pain upon presenting to clinic. "Tightness"  She  was partially  compliant with home exercise program.  Response to previous treatment: "Felt fine"  Functional change: ongoing    Pain: 0/10 (tightness)  Location: left hip      Objective     Carina received therapeutic exercises to develop strength, endurance, flexibility, and ROM for 43 minutes including:    NUSTEP: 6 minutes L2  Sit to stand 2 x 10  Bridge 3 x 10  Straight leg raise 3 x 10  Clams in sidelying with GTB 3 x 10  Clams in supine with green theraband 2 x 10 x 5"  Sidelying hip abduction 2 x 10  Supine isometric hip adduction 2 x 10 x 5" (with ball)  Standing hip abduction 2 x 10 red theraband  Standing hip extension:  red theraband 2x10  Standing hip hikes on green pad 2 x 10  Lateral step ups blue step step 2 x 10 bilateral NP  Knee [place]ocks 20 x 5"NP  DOUBLE KNEE TO CHEST with green peanut 2x10 x 5"      Carina received the following manual therapy techniques: Joint mobilizations, Manual traction, and Soft tissue Mobilization were applied to the: L hip for 12 minutes, including:  Long Axis Distraction " "- attempted but increased pain  STM to L lateral and posterolateral  hip with hands   Muscle energy technique for right anterior innominate rotation     Carina participated in neuromuscular re-education activities to improve: Balance, Sense, and Proprioception for 5 minutes, including:  Single leg stance 3 x 30" - able to complete for 30" without LOB  intermittent upper extremity support  Tandem stance 3 x 30" - able to complete for 30" without LOB    Carina participated in dynamic functional therapeutic activities to improve functional performance for 0  minutes, including:  Sit to stand     Carina received the following supervised modalities after being cleared for contradictions:     Home Exercises Provided and Patient Education Provided:     Education provided:   - Continue HEP as prescribed    Written home exercises provided: Patient instructed to cont prior HEP.  Exercises were reviewed and Marios able to demonstrate them prior to the end of the session.  Carina demonstrated fair  understanding of the education provided.     See electronic medical record under Notes-Patient Instructions for exercises provided prior visit.    Assessment     Per eval: Carina is a 67 y.o. female referred to outpatient Physical Therapy with a medical diagnosis of Chronic left hip pain. Pt presents with 4-5 yr history of left hip pain, aggravated by community ambulation. Pt demonstrates decreased left hip range of motion, decreased lower extremity strength, impaired balance and gait, and decreased functional mobility. These impairments impact pts ability to stand and walk, perform household chores, perform transitions, and lead to increased fall risk with history of multiple falls in the last year.     Antalgic limp noted upon presenting to clinic. Patient able to increase activity as bolded today. Trial of muscle energy technique to correct innominate rotation.  Patient reports relief with trial of same.  Patient " reported no pain following therapy    Carina Is progressing well towards her goals.   Patient prognosis is Good.   Patient will continue to benefit from skilled outpatient physical therapy to address the deficits listed in the problem list box on initial evaluation, provide pt/family education and to maximize patient's level of independence in the home and community environment.     Pt's spiritual, cultural and educational needs considered and patient agreeable to plan of care and goals.  Anticipated barriers to physical therapy: comorbidities, chronicity    Goals:     Short Term Goals (4 Weeks):  1. Pt will be compliant with HEP to supplement PT in restoring pain free function. (Met)  2. Pt will improve impaired LE MMTs to >/= 4/5 to improve dynamic hip/knee support for functional tasks. (Progressing, not met)  3. Pt will be able to complete 6 MWT to improve walking tolerance. (Progressing, not met)     Long Term Goals (8 Weeks):  1. Pt will improve FOTO score to </= 32% limited to decrease perceived limitation with mobility. (Progressing, not met)  2. Pt will improve impaired LE MMTs to >/= 4+/5 to improve dynamic hip/knee support for functional tasks. (Progressing, not met)  3. Pt will improve hip flexion ROM to 0-110 deg to improve mobility for functional tasks. (Progressing, not met)  4. Pt will walk on level surfaces without AD or gait deficits to promote community ambulation .  (Progressing, not met)  5. Pt will perform 12 sit to  30 seconds to improve functional mobility.   (Progressing, not met)    Plan     Plan of care Certification: 4/21/2023 to 6/16/23.     Suze Tamez, PTA

## 2023-05-16 ENCOUNTER — CLINICAL SUPPORT (OUTPATIENT)
Dept: REHABILITATION | Facility: HOSPITAL | Age: 68
End: 2023-05-16
Payer: MEDICARE

## 2023-05-16 DIAGNOSIS — R29.898 DECREASED STRENGTH OF LOWER EXTREMITY: Primary | ICD-10-CM

## 2023-05-16 PROCEDURE — 97140 MANUAL THERAPY 1/> REGIONS: CPT | Mod: PN,CQ

## 2023-05-16 PROCEDURE — 97110 THERAPEUTIC EXERCISES: CPT | Mod: PN,CQ

## 2023-05-16 NOTE — PROGRESS NOTES
"                            Physical Therapy Daily Treatment Note     Name: Carina Montaño  Clinic Number: [id number]    Therapy Diagnosis:   Encounter Diagnosis   Name Primary?    Decreased strength of lower extremity Yes       Physician: Adelaide De Leon MD    Visit Date: 5/16/2023  Physician Orders: PT Eval and Treat   Medical Diagnosis from Referral: M25.552,G89.29 (ICD-10-CM) - Chronic left hip pain  Evaluation Date: 4/21/2023  Authorization Period Expiration: 3/27/24  Plan of Care Expiration: 6/16/23  Visit # / Visits authorized: 6/16 + eval  FOTO: 7/6 NEXT    Time In: 7:00 am  Time Out: 8:00 am  Total Billable Time: 31 minutes (1 MT, 1TENMR, 2TE)     Precautions: Standard    Subjective     Patient reports: she was good until Sunday evening when she went bike riding.  "I was sore after that and I had to lie down." Better today but still some right hip "tightness"   She  was partially  compliant with home exercise program.  Response to previous treatment: "Felt fine"  Functional change: ongoing    Pain: 0/10 (tightness)  Location: left hip      Objective     Carina received therapeutic exercises to develop strength, endurance, flexibility, and ROM for 18 minutes including:    NUSTEP: 6 minutes L2  Sit to stand 2 x 10  Bridge 3 x 10  Straight leg raise 3 x 10  Supine isometric hip adduction 2 x 10 x 5" (with ball)  Clams in sidelying with GTB 3 x 10  Clams in supine with green theraband 2 x 10 x 5"  Sidelying hip abduction 2 x 10  Supine isometric hip adduction 2 x 10 x 5" (with ball)  Standing hip abduction 2x12 red theraband  Standing hip extension:  red theraband 2x12  Standing hip hikes on green pad 2 x 10NP  Lateral step ups blue step step 2 x 10 bilateral NP  [place]nee [place] 20 x 5"  DOUBLE KNEE TO CHEST with green peanut 2x10 x 5"      Carina received the following manual therapy techniques: Joint mobilizations, Manual traction, and Soft tissue Mobilization were applied to the: L hip for 10 minutes, " "including:  Long [place] Distraction - attempted but increased pain  STM to L lateral and posterolateral  hip with hands   Muscle energy technique for right anterior innominate rotation     Carina participated in neuromuscular re-education activities to improve: Balance, Sense, and Proprioception for 3 minutes, including:  Single leg stance 3 x 30" - able to complete for 30" without LOB  intermittent upper extremity support  Tandem stance 3 x 30" - able to complete for 30" without LOB    Carina participated in dynamic functional therapeutic activities to improve functional performance for 0  minutes, including:  Sit to stand     Carina received the following supervised modalities after being cleared for contradictions:     Home Exercises Provided and Patient Education Provided:     Education provided:   - Continue HEP as prescribed    Written home exercises provided: Patient instructed to cont prior HEP.  Exercises were reviewed and Marios able to demonstrate them prior to the end of the session.  Carina demonstrated fair  understanding of the education provided.     See electronic medical record under Notes-Patient Instructions for exercises provided prior visit.    Assessment     Per eval: Carina is a 67 y.o. female referred to outpatient Physical Therapy with a medical diagnosis of Chronic left hip pain. Pt presents with 4-5 yr history of left hip pain, aggravated by community ambulation. Pt demonstrates decreased left hip range of motion, decreased lower extremity strength, impaired balance and gait, and decreased functional mobility. These impairments impact pts ability to stand and walk, perform household chores, perform transitions, and lead to increased fall risk with history of multiple falls in the last year.     Decreased limp noted today upon presenting to clinic compared to last visit. Patient able to increase activity as bolded today. No innominate rotation noted, but continued ITB " "tightness.  Patient reports relief with manual interventions.  Patient reported no pain following therapy.  "I'm good"    Carina Is progressing well towards her goals.   Patient prognosis is Good.   Patient will continue to benefit from skilled outpatient physical therapy to address the deficits listed in the problem list box on initial evaluation, provide pt/family education and to maximize patient's level of independence in the home and community environment.     Pt's spiritual, cultural and educational needs considered and patient agreeable to plan of care and goals.  Anticipated barriers to physical therapy: comorbidities, chronicity    Goals:     Short Term Goals (4 Weeks):  1. Pt will be compliant with HEP to supplement PT in restoring pain free function. (Met)  2. Pt will improve impaired LE MMTs to >/= 4/5 to improve dynamic hip/knee support for functional tasks. (Progressing, not met)  3. Pt will be able to complete 6 MWT to improve walking tolerance. (Progressing, not met)     Long Term Goals (8 Weeks):  1. Pt will improve FOTO score to </= 32% limited to decrease perceived limitation with mobility. (Progressing, not met)  2. Pt will improve impaired LE MMTs to >/= 4+/5 to improve dynamic hip/knee support for functional tasks. (Progressing, not met)  3. Pt will improve hip flexion ROM to 0-110 deg to improve mobility for functional tasks. (Progressing, not met)  4. Pt will walk on level surfaces without AD or gait deficits to promote community ambulation .  (Progressing, not met)  5. Pt will perform 12 sit to  30 seconds to improve functional mobility.   (Progressing, not met)    Plan     Plan of care Certification: 4/21/2023 to 6/16/23.     Suze Tamez, RALPH            "

## 2023-05-19 ENCOUNTER — CLINICAL SUPPORT (OUTPATIENT)
Dept: REHABILITATION | Facility: HOSPITAL | Age: 68
End: 2023-05-19
Attending: INTERNAL MEDICINE
Payer: MEDICARE

## 2023-05-19 DIAGNOSIS — R29.898 DECREASED STRENGTH OF LOWER EXTREMITY: Primary | ICD-10-CM

## 2023-05-19 PROCEDURE — 97140 MANUAL THERAPY 1/> REGIONS: CPT | Mod: PN,CQ

## 2023-05-19 PROCEDURE — 97110 THERAPEUTIC EXERCISES: CPT | Mod: PN,CQ

## 2023-05-19 NOTE — PROGRESS NOTES
"                            Physical Therapy Daily Treatment Note     Name: Carina Montaño  Clinic Number: [id number]    Therapy Diagnosis:   Encounter Diagnosis   Name Primary?    Decreased strength of lower extremity Yes       Physician: Adelaide De Leon MD    Visit Date: 5/19/2023  Physician Orders: PT Eval and Treat   Medical Diagnosis from Referral: M25.552,G89.29 (ICD-10-CM) - Chronic left hip pain  Evaluation Date: 4/21/2023  Authorization Period Expiration: 3/27/24  Plan of Care Expiration: 6/16/23  Visit # / Visits authorized: 7/16 + eval  FOTO: 8/6 NEXT  PTA: 2/5    Time In: 7:00 am  Time Out: 8:00 am  Total Billable Time: 33 minutes (1 MT,  1TE)     Precautions: Standard    Subjective     Patient reports: she is "good again"  Relief after last visit and no exacerbations.   She  was partially  compliant with home exercise program.  Response to previous treatment: "Good"  Functional change: ongoing    Pain: 0/10 (tightness)  Location: left hip      Objective     Carina received therapeutic exercises to develop strength, endurance, flexibility, and ROM for 20 minutes 1:1 including:    NUSTEP: 6 minutes L2.5  Sit to stand 2 x 10  Bridge 3 x 10  Straight leg raise 3 x 10  Supine isometric hip adduction 2 x 10 x 5" (with ball)  Clams in sidelying with GTB 3 x 10  Clams in supine with green theraband 2 x 10 x 5"  Sidelying hip abduction 2 x 10  Supine isometric hip adduction 2 x 10 x 5" (with ball)  Standing hip abduction 2x12 red theraband  Standing hip extension:  red theraband 2x12  Standing hip hikes on green pad 2 x 10 NP (Resume next)  Lateral step ups blue step step 2 x 10 bilateral   Knee [place] 20 x 5"  DOUBLE KNEE TO CHEST with green peanut 2x10 x 5"      Carina received the following manual therapy techniques: Joint mobilizations, Manual traction, and Soft tissue Mobilization were applied to the: L hip for 10 minutes, including:  Long Axis Distraction - attempted but increased pain  STM to L " "lateral and posterolateral  hip with hands in right sidelying with pillow  Muscle energy technique for right anterior innominate rotation     Carina participated in neuromuscular re-education activities to improve: Balance, Sense, and Proprioception for 3 minutes, including:  Single leg stance 3 x 30" - able to complete for 30" without LOB  intermittent upper extremity support  Tandem stance 3 x 30" - able to complete for 30" without LOB    Carina participated in dynamic functional therapeutic activities to improve functional performance for 0  minutes, including:  Sit to stand     Carina received the following supervised modalities after being cleared for contradictions:     Home Exercises Provided and Patient Education Provided:     Education provided:   - Continue HEP as prescribed    Written home exercises provided: Patient instructed to cont prior HEP.  Exercises were reviewed and Carinajyoti able to demonstrate them prior to the end of the session.  Carina demonstrated fair  understanding of the education provided.     See electronic medical record under Notes-Patient Instructions for exercises provided prior visit.    Assessment     Per eval: Carina is a 67 y.o. female referred to outpatient Physical Therapy with a medical diagnosis of Chronic left hip pain. Pt presents with 4-5 yr history of left hip pain, aggravated by community ambulation. Pt demonstrates decreased left hip range of motion, decreased lower extremity strength, impaired balance and gait, and decreased functional mobility. These impairments impact pts ability to stand and walk, perform household chores, perform transitions, and lead to increased fall risk with history of multiple falls in the last year.     No limp noted today upon presenting to clinic.   Patient able to increase activity as bolded today. No innominate rotation noted, but SI joint/sacral border myofascial restrictions.  Patient reports relief with manual " "interventions.  Patient reported no pain following therapy.  "Great"    Carina Is progressing well towards her goals.   Patient prognosis is Good.   Patient will continue to benefit from skilled outpatient physical therapy to address the deficits listed in the problem list box on initial evaluation, provide pt/family education and to maximize patient's level of independence in the home and community environment.     Pt's spiritual, cultural and educational needs considered and patient agreeable to plan of care and goals.  Anticipated barriers to physical therapy: comorbidities, chronicity    Goals:     Short Term Goals (4 Weeks):  1. Pt will be compliant with HEP to supplement PT in restoring pain free function. (Met)  2. Pt will improve impaired LE MMTs to >/= 4/5 to improve dynamic hip/knee support for functional tasks. (Progressing, not met)  3. Pt will be able to complete 6 MWT to improve walking tolerance. (Progressing, not met)     Long Term Goals (8 Weeks):  1. Pt will improve FOTO score to </= 32% limited to decrease perceived limitation with mobility. (Progressing, not met)  2. Pt will improve impaired LE MMTs to >/= 4+/5 to improve dynamic hip/knee support for functional tasks. (Progressing, not met)  3. Pt will improve hip flexion ROM to 0-110 deg to improve mobility for functional tasks. (Progressing, not met)  4. Pt will walk on level surfaces without AD or gait deficits to promote community ambulation .  (Progressing, not met)  5. Pt will perform 12 sit to  30 seconds to improve functional mobility.   (Progressing, not met)    Plan     Plan of care Certification: 4/21/2023 to 6/16/23.     Suze Tamez PTA              "

## 2023-05-23 ENCOUNTER — CLINICAL SUPPORT (OUTPATIENT)
Dept: REHABILITATION | Facility: HOSPITAL | Age: 68
End: 2023-05-23
Payer: MEDICARE

## 2023-05-23 DIAGNOSIS — R29.898 DECREASED STRENGTH OF LOWER EXTREMITY: Primary | ICD-10-CM

## 2023-05-23 PROCEDURE — 97110 THERAPEUTIC EXERCISES: CPT | Mod: PN,CQ

## 2023-05-23 PROCEDURE — 97112 NEUROMUSCULAR REEDUCATION: CPT | Mod: PN,CQ

## 2023-05-23 NOTE — PROGRESS NOTES
"                            Physical Therapy Daily Treatment Note     Name: Carina Montaño  Clinic Number: [id number]    Therapy Diagnosis:   Encounter Diagnosis   Name Primary?    Decreased strength of lower extremity Yes       Physician: Adelaide De Leon MD    Visit Date: 5/23/2023  Physician Orders: PT Eval and Treat   Medical Diagnosis from Referral: M25.552,G89.29 (ICD-10-CM) - Chronic left hip pain  Evaluation Date: 4/21/2023  Authorization Period Expiration: 3/27/24  Plan of Care Expiration: 6/16/23  Visit # / Visits authorized: 7/16 + eval  FOTO: 4/5  PTA: 4/5    Time In: 7:00 am  Time Out: 7:45 am  Total Billable Time: 43 minutes (1 NMR  2TE)     Precautions: Standard    Subjective     Patient reports: no complaints of pain.   States she is trying to ride her bicycle every other day with her , "but stuff keeps coming up" Further reports doing "much better" overall.  "Almost 100%"  She  was partially  compliant with home exercise program.  Response to previous treatment: "Good"  Functional change: walking more and able to ride her bicycle    Pain: 0/10 (tightness)  Location: left hip      Objective     Carina received therapeutic exercises to develop strength, endurance, flexibility, and ROM for 35 minutes including:    NUSTEP: 6 minutes L2.5  Sit to stand 2 x 10  Bridge 3 x 10  Straight leg raise 3 x 10  Supine isometric hip adduction 2 x 10 x 5" (with ball)  Clams in sidelying with GTB 3 x 10  Clams in supine with green theraband 2 x 10 x 5"  Sidelying hip abduction 2 x 10  Standing hip abduction 3x10 red theraband  Standing hip extension:  red theraband 3x10  Standing hip hikes on green pad 2 x 10 NP (Resume next)  Lateral step ups blue step step 2 x 10 bilateral   Knee [place]ocks 20 x 5"  DOUBLE KNEE TO CHEST with green peanut 2x10 x 5"      Carina received the following manual therapy techniques: Joint mobilizations, Manual traction, and Soft tissue Mobilization were applied to the: L hip for 00 " "minutes, including:  Not today  [place] Distraction - attempted but increased pain  STM to L lateral and posterolateral  hip with hands in right sidelying with pillow  Muscle energy technique for right anterior innominate rotation     Carina participated in neuromuscular re-education activities to improve: Balance, Sense, and Proprioception for 8 minutes, including:  Single leg stance 3 x 30" - able to complete for 30" without LOB  intermittent upper extremity support  Tandem stance 3 x 30" - able to complete for 30" without LOB    Carina participated in dynamic functional therapeutic activities to improve functional performance for 0  minutes, including:  Sit to stand     Carina received the following supervised modalities after being cleared for contradictions:     Home Exercises Provided and Patient Education Provided:     Education provided:   - Continue HEP as prescribed    Written home exercises provided: Patient instructed to cont prior HEP.  Exercises were reviewed and Lawrence able to demonstrate them prior to the end of the session.  Carina demonstrated fair  understanding of the education provided.     See electronic medical record under Notes-Patient Instructions for exercises provided prior visit.    Assessment     Per eval: Carina is a 67 y.o. female referred to outpatient Physical Therapy with a medical diagnosis of Chronic left hip pain. Pt presents with 4-5 yr history of left hip pain, aggravated by community ambulation. Pt demonstrates decreased left hip range of motion, decreased lower extremity strength, impaired balance and gait, and decreased functional mobility. These impairments impact pts ability to stand and walk, perform household chores, perform transitions, and lead to increased fall risk with history of multiple falls in the last year.     No limp noted today upon presenting to clinic.   Patient able to increase activity as bolded today. No innominate rotation noted, held " "manual therapy due to consecutive visit pain free.  Patient reported no pain following therapy.  "Great"    Carina Is progressing well towards her goals.   Patient prognosis is Good.   Patient will continue to benefit from skilled outpatient physical therapy to address the deficits listed in the problem list box on initial evaluation, provide pt/family education and to maximize patient's level of independence in the home and community environment.     Pt's spiritual, cultural and educational needs considered and patient agreeable to plan of care and goals.  Anticipated barriers to physical therapy: comorbidities, chronicity    Goals:     Short Term Goals (4 Weeks):  1. Pt will be compliant with HEP to supplement PT in restoring pain free function. (Met)  2. Pt will improve impaired LE MMTs to >/= 4/5 to improve dynamic hip/knee support for functional tasks. (Progressing, not met)  3. Pt will be able to complete 6 MWT to improve walking tolerance. (Progressing, not met)     Long Term Goals (8 Weeks):  1. Pt will improve FOTO score to </= 32% limited to decrease perceived limitation with mobility. (Progressing, not met)  2. Pt will improve impaired LE MMTs to >/= 4+/5 to improve dynamic hip/knee support for functional tasks. (Progressing, not met)  3. Pt will improve hip flexion ROM to 0-110 deg to improve mobility for functional tasks. (Progressing, not met)  4. Pt will walk on level surfaces without AD or gait deficits to promote community ambulation .  (Progressing, not met)  5. Pt will perform 12 sit to  30 seconds to improve functional mobility.   (Progressing, not met)    Plan     Plan of care Certification: 4/21/2023 to 6/16/23.     Suze Tamez, PTA                "

## 2023-05-26 ENCOUNTER — CLINICAL SUPPORT (OUTPATIENT)
Dept: REHABILITATION | Facility: HOSPITAL | Age: 68
End: 2023-05-26
Payer: MEDICARE

## 2023-05-26 DIAGNOSIS — R29.898 DECREASED STRENGTH OF LOWER EXTREMITY: Primary | ICD-10-CM

## 2023-05-26 PROCEDURE — 97110 THERAPEUTIC EXERCISES: CPT | Mod: PN

## 2023-05-26 NOTE — PLAN OF CARE
"                            Physical Therapy discharge summary     Name: Carina Montaño  Clinic Number: [id number]    Therapy Diagnosis:   Encounter Diagnosis   Name Primary?    Decreased strength of lower extremity Yes         Physician: Adelaide De Leon MD    Visit Date: 5/26/2023  Physician Orders: PT Eval and Treat   Medical Diagnosis from Referral: M25.552,G89.29 (ICD-10-CM) - Chronic left hip pain  Evaluation Date: 4/21/2023  Authorization Period Expiration: 3/27/24  Plan of Care Expiration: 6/16/23  Visit # / Visits authorized: 9/16 + eval  FOTO: 5/5  PTA: 4/5    Time In: 8:00 am  Time Out: 8:40 am  Total Billable Time: 40 minutes (1 NMR  2TE)     Precautions: Standard    Subjective     Patient reports: no complaints of pain over the last few weeks.   States she is trying to ride her bicycle every other day with her . She does some exercises on her own. She still has some difficulty walking fast. She is fine with trips to grocery store.  Agreeable to discharge  She was partially compliant with home exercise program.  Response to previous treatment: "Good"  Functional change: walking more and able to ride her bicycle    Pain: 0/10 (tightness)  Location: left hip      Objective   Posture: slight pelvic obliquity, left toes out   Palpation: TTP left greater trochanter, gluteals   Sensation: intact light touch     Range of Motion/Strength:      Hip Right Left Pain/Dysfunction with Movement   AROM/PROM         flexion  100/110  97/102     extension  limited  Limited      abduction  Within normal limits   Within normal limits      adduction  Within normal limits   Within normal limits      Internal rotation  25  20     External rotation  60  45           L/E MMT Right Left Pain/Dysfunction with Movement   Hip Flexion 4+/5 4+/5     Hip Extension 4+/5 4+/5    Hip Abduction 4+/5 4+/5     Hip Adduction 5/5 5/5     Hip IR 4+/5 4+/5     Hip ER 4+/5 4+/5     Knee Flexion 4+/5 4+/5     Knee Extension 4+/5 4+/5   " "  Ankle DF 5/5 5/5     Ankle PF 4+/5 4+/5           Joint Mobility:   Hip: mod decreased mobility     Flexibility: full hamstring      Gait Analysis:Without AD Assistance ind Deviations: left foot external rotation      2MWT:515 ft  6MWT 1447 with pain onset at 4 min 8/10     TU:48 seconds   TUG Cutoff Scores:13.5          30 second sit-to-stand test (without U/E support): 12   30" sit to stand Cutoff Scores:15       Single Leg Stance: R 20 seconds, L 20 seconds NT        CMS Impairment/Limitation/Restriction for FOTO hip Survey     Therapist reviewed FOTO scores for Carina Montaño on 2023.   FOTO documents entered into [employer] - see Media section.     Limitation Score: 14%  Category: Mobility        Home Exercises Provided and Patient Education Provided:     Education provided:   - Continue HEP as prescribed    Written home exercises provided: Patient instructed to cont prior HEP.  Exercises were reviewed and Lawrence able to demonstrate them prior to the end of the session.  Carina demonstrated fair  understanding of the education provided.     See electronic medical record under Notes-Patient Instructions for exercises provided prior visit.    Assessment     Carina is a 67 y.o. female referred to outpatient Physical Therapy with a medical diagnosis of Chronic left hip pain. Pt presents with 4-5 yr history of left hip pain, aggravated by community ambulation. Pt has been to 10 visits to date and has made good improvements in pain and functional mobility. Good improvements in hip strength and endurance. She has minimal pain with ADLs and some reproduction of pain only when walking fast long periods of time. Able to perform household chores without pain. Pt feels she is ready to discharge. Updated home exercise program with good understanding. Pt to be discharged at this time.     Carina Is progressing well towards her goals.   Patient prognosis is Good.     Pt's spiritual, cultural and educational " needs considered and patient agreeable to plan of care and goals.  Anticipated barriers to physical therapy: comorbidities, chronicity    Goals:     Short Term Goals (4 Weeks):  1. Pt will be compliant with HEP to supplement PT in restoring pain free function. (Met)  2. Pt will improve impaired LE MMTs to >/= 4/5 to improve dynamic hip/knee support for functional tasks. (Met)  3. Pt will be able to complete 6 MWT to improve walking tolerance. (Met)     Long Term Goals (8 Weeks):  1. Pt will improve FOTO score to </= 32% limited to decrease perceived limitation with mobility. (met)  2. Pt will improve impaired LE MMTs to >/= 4+/5 to improve dynamic hip/knee support for functional tasks. (met)  3. Pt will improve hip flexion ROM to 0-110 deg to improve mobility for functional tasks. (Progressing, not met)  4. Pt will walk on level surfaces without AD or gait deficits to promote community ambulation .  (met)  5. Pt will perform 12 sit to  30 seconds to improve functional mobility.   met    Plan     Discharge to home exercise program     PRESTON ROGERS, PT

## 2023-05-26 NOTE — PROGRESS NOTES
"                            Physical Therapy Daily Treatment Note/ discharge summary     Name: Carina Montaño  Clinic Number: [id number]    Therapy Diagnosis:   Encounter Diagnosis   Name Primary?    Decreased strength of lower extremity Yes         Physician: Adelaide De Leon MD    Visit Date: 5/26/2023  Physician Orders: PT Eval and Treat   Medical Diagnosis from Referral: M25.552,G89.29 (ICD-10-CM) - Chronic left hip pain  Evaluation Date: 4/21/2023  Authorization Period Expiration: 3/27/24  Plan of Care Expiration: 6/16/23  Visit # / Visits authorized: 9/16 + eval  FOTO: 5/5  PTA: 4/5    Time In: 8:00 am  Time Out: 8:40 am  Total Billable Time: 40 minutes (1 NMR  2TE)     Precautions: Standard    Subjective     Patient reports: no complaints of pain over the last few weeks.   States she is trying to ride her bicycle every other day with her . She does some exercises on her own. She still has some difficulty walking fast. She is fine with trips to grocery store.  Agreeable to discharge  She  was partially  compliant with home exercise program.  Response to previous treatment: "Good"  Functional change: walking more and able to ride her bicycle    Pain: 0/10 (tightness)  Location: left hip      Objective   Posture: slight pelvic obliquity, left toes out   Palpation: TTP left greater trochanter, gluteals   Sensation: intact light touch     Range of Motion/Strength:      Hip Right Left Pain/Dysfunction with Movement   AROM/PROM         flexion  100/110  97/102     extension  limited  Limited      abduction  Within normal limits   Within normal limits      adduction  Within normal limits   Within normal limits      Internal rotation  25  20     External rotation  60  45           L/E MMT Right Left Pain/Dysfunction with Movement   Hip Flexion 4+/5 4+/5     Hip Extension 4+/5 4+/5    Hip Abduction 4+/5 4+/5     Hip Adduction 5/5 5/5     Hip IR 4+/5 4+/5     Hip ER 4+/5 4+/5     Knee Flexion 4+/5 4+/5     Knee " "Extension 4+/5 4+/5     Ankle DF 5/5 5/5     Ankle PF 4+/5 4+/5           Joint Mobility:   Hip: mod decreased mobility     Flexibility: full hamstring      Gait Analysis:Without AD Assistance ind Deviations: left foot external rotation      2MWT:515 ft  6MWT 1447 with pain onset at 4 min 8/10     TU:48 seconds   TUG Cutoff Scores:13.5          30 second sit-to-stand test (without U/E support): 12   30" sit to stand Cutoff Scores:15       Single Leg Stance: R 20 seconds, L 20 seconds NT        CMS Impairment/Limitation/Restriction for FOTO hip Survey     Therapist reviewed FOTO scores for Carina Montaño on 2023.   FOTO documents entered into [employer] - see Media section.     Limitation Score: 14%  Category: Mobility        Carina received therapeutic exercises to develop strength, endurance, flexibility, and ROM for 35 minutes including:  Objective measures  NUSTEP: 6 minutes L2.5  Sit to stand 2 x 10  Bridge 3 x 10    Not today  Straight leg raise 3 x 10  Supine isometric hip adduction 2 x 10 x 5" (with ball)  Clams in sidelying with GTB 3 x 10  Clams in supine with green theraband 2 x 10 x 5"  Sidelying hip abduction 2 x 10  Standing hip abduction 3x10 red theraband  Standing hip extension:  red theraband 3x10  Standing hip hikes on green pad 2 x 10 NP (Resume next)  Lateral step ups blue step step 2 x 10 bilateral   Knee [place] 20 x 5"  DOUBLE KNEE TO CHEST with green peanut 2x10 x 5"      Carina received the following manual therapy techniques: Joint mobilizations, Manual traction, and Soft tissue Mobilization were applied to the: L hip for 00 minutes, including:  Not today  Long [place] Distraction - attempted but increased pain  STM to L lateral and posterolateral  hip with hands in right sidelying with pillow  Muscle energy technique for right anterior innominate rotation     Carina participated in neuromuscular re-education activities to improve: Balance, Sense, and Proprioception for 0 minutes, " "including:  Single leg stance 3 x 30" - able to complete for 30" without LOB  intermittent upper extremity support  Tandem stance 3 x 30" - able to complete for 30" without LOB    Carina participated in dynamic functional therapeutic activities to improve functional performance for 0  minutes, including:  Sit to stand     Carina received the following supervised modalities after being cleared for contradictions:     Home Exercises Provided and Patient Education Provided:     Education provided:   - Continue HEP as prescribed    Written home exercises provided: Patient instructed to cont prior HEP.  Exercises were reviewed and Lawrence able to demonstrate them prior to the end of the session.  Carina demonstrated fair  understanding of the education provided.     See electronic medical record under Notes-Patient Instructions for exercises provided prior visit.    Assessment     Carina is a 67 y.o. female referred to outpatient Physical Therapy with a medical diagnosis of Chronic left hip pain. Pt presents with 4-5 yr history of left hip pain, aggravated by community ambulation. Pt has been to 10 visits to date and has made good improvements in pain and functional mobility. Good improvements in hip strength and endurance. She has minimal pain with ADLs and some reproduction of pain only when walking fast long periods of time. Able to perform household chores without pain. Pt feels she is ready to discharge. Updated home exercise program with good understanding. Pt to be discharged at this time.     Carina Is progressing well towards her goals.   Patient prognosis is Good.     Pt's spiritual, cultural and educational needs considered and patient agreeable to plan of care and goals.  Anticipated barriers to physical therapy: comorbidities, chronicity    Goals:     Short Term Goals (4 Weeks):  1. Pt will be compliant with HEP to supplement PT in restoring pain free function. (Met)  2. Pt will improve " impaired LE MMTs to >/= 4/5 to improve dynamic hip/knee support for functional tasks. (Met)  3. Pt will be able to complete 6 MWT to improve walking tolerance. (Met)     Long Term Goals (8 Weeks):  1. Pt will improve FOTO score to </= 32% limited to decrease perceived limitation with mobility. (met)  2. Pt will improve impaired LE MMTs to >/= 4+/5 to improve dynamic hip/knee support for functional tasks. (met)  3. Pt will improve hip flexion ROM to 0-110 deg to improve mobility for functional tasks. (Progressing, not met)  4. Pt will walk on level surfaces without AD or gait deficits to promote community ambulation .  (met)  5. Pt will perform 12 sit to  30 seconds to improve functional mobility.   met    Plan     Discharge to home exercise program     PRESTON ROGERS, PT

## 2023-07-12 ENCOUNTER — TELEPHONE (OUTPATIENT)
Dept: FAMILY MEDICINE | Facility: CLINIC | Age: 68
End: 2023-07-12
Payer: MEDICARE

## 2023-08-10 DIAGNOSIS — J45.51 SEVERE PERSISTENT ASTHMA WITH ACUTE EXACERBATION: ICD-10-CM

## 2023-08-10 NOTE — TELEPHONE ENCOUNTER
Care Due:                  Date            Visit Type   Department     Provider  --------------------------------------------------------------------------------                                EP -                              PRIMARY      [facility] INTERNAL  Last Visit: 03-      CARE ([facility])   MEDICINE       Adelaide De Leon                              [facility]                              PRIMARY      [facility] INTERNAL  Next Visit: 09-      CARE ([facility])   MEDICINE       Adelaide De Leon                                                            Last  Test          Frequency    Reason                     Performed    Due Date  --------------------------------------------------------------------------------    Lipid Panel.  12 months..  simvastatin..............  09-   09-    Health [facility] Embedded Care Due Messages. Reference number: [id number].   8/10/2023 5:16:20 PM CDT

## 2023-08-11 RX ORDER — MONTELUKAST SODIUM 10 MG/1
10 TABLET ORAL NIGHTLY
Qty: 90 TABLET | Refills: 3 | Status: SHIPPED | OUTPATIENT
Start: 2023-08-11

## 2023-09-11 ENCOUNTER — OFFICE VISIT (OUTPATIENT)
Dept: OTOLARYNGOLOGY | Facility: CLINIC | Age: 68
End: 2023-09-11
Payer: MEDICARE

## 2023-09-11 VITALS
DIASTOLIC BLOOD PRESSURE: 71 MMHG | WEIGHT: 169.06 LBS | HEART RATE: 80 BPM | SYSTOLIC BLOOD PRESSURE: 123 MMHG | BODY MASS INDEX: 31.95 KG/M2

## 2023-09-11 DIAGNOSIS — R09.A2 GLOBUS SENSATION: ICD-10-CM

## 2023-09-11 DIAGNOSIS — K21.9 LARYNGOPHARYNGEAL REFLUX (LPR): ICD-10-CM

## 2023-09-11 DIAGNOSIS — J30.9 CHRONIC ALLERGIC RHINITIS: Primary | Chronic | ICD-10-CM

## 2023-09-11 DIAGNOSIS — J34.3 HYPERTROPHY OF INFERIOR NASAL TURBINATE: Chronic | ICD-10-CM

## 2023-09-11 DIAGNOSIS — H90.3 SENSORINEURAL HEARING LOSS, BILATERAL: ICD-10-CM

## 2023-09-11 DIAGNOSIS — R49.0 DYSPHONIA: ICD-10-CM

## 2023-09-11 PROCEDURE — 99999 PR PBB SHADOW E&M-EST. PATIENT-LVL III: ICD-10-PCS | Mod: PBBFAC,,, | Performed by: OTOLARYNGOLOGY

## 2023-09-11 PROCEDURE — 99999 PR PBB SHADOW E&M-EST. PATIENT-LVL III: CPT | Mod: PBBFAC,,, | Performed by: OTOLARYNGOLOGY

## 2023-09-11 PROCEDURE — 31575 DIAGNOSTIC LARYNGOSCOPY: CPT | Mod: PBBFAC,PN | Performed by: OTOLARYNGOLOGY

## 2023-09-11 PROCEDURE — 99214 PR OFFICE/OUTPT VISIT, EST, LEVL IV, 30-39 MIN: ICD-10-PCS | Mod: 25,S$PBB,, | Performed by: OTOLARYNGOLOGY

## 2023-09-11 PROCEDURE — 99213 OFFICE O/P EST LOW 20 MIN: CPT | Mod: PBBFAC,PN | Performed by: OTOLARYNGOLOGY

## 2023-09-11 PROCEDURE — 31575 LARYNGOSCOPY: ICD-10-PCS | Mod: S$PBB,,, | Performed by: OTOLARYNGOLOGY

## 2023-09-11 PROCEDURE — 99214 OFFICE O/P EST MOD 30 MIN: CPT | Mod: 25,S$PBB,, | Performed by: OTOLARYNGOLOGY

## 2023-09-11 RX ORDER — PANTOPRAZOLE SODIUM 40 MG/1
40 TABLET, DELAYED RELEASE ORAL 2 TIMES DAILY
Qty: 60 TABLET | Refills: 3 | Status: SHIPPED | OUTPATIENT
Start: 2023-09-11 | End: 2023-12-12 | Stop reason: SDUPTHER

## 2023-09-11 NOTE — PROCEDURES
Laryngoscopy    Date/Time: 9/11/2023 10:00 AM    Performed by: Angela Hooper MD  Authorized by: Angela Hooper MD    Consent Done?:  Yes (Verbal)  Anesthesia:     Local anesthetic:  Lidocaine 2% and Yanick-Synephrine 1/2%  Laryngoscopy:     Areas examined:  Nasal cavities, nasopharynx, oropharynx, hypopharynx, larynx and vocal cords  Nose External:      No external nasal deformity  Nose Intranasal:      Mucosa no polyps     Mucosa ulcers not present     No mucosa lesions present     No septum gross deformity     Turbinates not enlarged  Nasopharynx:      No mucosa lesions     Adenoids not present     Posterior choanae patent     Eustachian tube patent  Larynx/hypopharynx:      No epiglottis lesions     No epiglottis edema     No AE folds lesions     No vocal cord polyps     Equal and normal bilateral     No hypopharynx lesions     No piriform sinus pooling     No piriform sinus lesions     Post cricoid edema (moderate)     Post cricoid erythema (moderate)

## 2023-09-11 NOTE — PROGRESS NOTES
Chief Complaint   Patient presents with    Follow-up     Same since last visit     Dizziness     Before but not now    .    HPI:     Carina Montaño is a 68 y.o. female who presents for evaluation of a several month history of nasal congestion, postnasal drip, facial pressure and pain.  She notes associated cough and sneezing. She does feel that her symptoms worsen with weather changes.  She describes difficulty breathing at night.  She does use sinus rinses or nasal sprays. She has been on Flonase and Astelin. She is also on Claritin and montelukast.  She feels that this is somewhat helpful.  She admits to midface pain and pressure.  She admits to rhinorrhea and postnasal drip. There is not maxillary tooth pain. She admits to frontal headaches.  She has had sinus or nasal surgery. She is unsure of exact procedure.  There is no history of sinonasal trauma. She notes that she has had hearing loss since childhood. She has had hearing tests in the past. She did not tolerate hearing aids well. She felt they were too loud. She thinks last hearing test was 10 years ago.       Interval HPI 9/11/2023:  Follow up visit. Reports that she still has some nasal congestion. She feels that her symptoms are stable.   She has been taking Flonase, Astelin, montelukast. She is using nasal saline rinses. She notes intermittent hoarseness and throat clearing.  She does have history of GERD. She takes Pantoprazole 40mg PO daily.       Past Medical History:   Diagnosis Date    Allergy     Arthritis     Asthma     Cancer     breast    Chronic diastolic (congestive) heart failure 12/8/2022    GERD (gastroesophageal reflux disease)     Hyperlipidemia     Hypertension     Prediabetes      Social History     Socioeconomic History    Marital status:     Number of children: 1   Occupational History    Occupation:     Tobacco Use    Smoking status: Never    Smokeless tobacco: Never   Substance and Sexual  Activity    Alcohol use: Yes     Comment: social    Drug use: No    Sexual activity: Yes     Social Determinants of Health     Financial Resource Strain: Low Risk  (1/12/2023)    Overall Financial Resource Strain (CARDIA)     Difficulty of Paying Living Expenses: Not very hard   Food Insecurity: No Food Insecurity (1/12/2023)    Hunger Vital Sign     Worried About Running Out of Food in the Last Year: Never true     Ran Out of Food in the Last Year: Never true   Transportation Needs: No Transportation Needs (1/12/2023)    PRAPARE - Transportation     Lack of Transportation (Medical): No     Lack of Transportation (Non-Medical): No   Physical Activity: Inactive (1/12/2023)    Exercise Vital Sign     Days of Exercise per Week: 0 days     Minutes of Exercise per Session: 0 min   Stress: No Stress Concern Present (1/12/2023)    [employer] [employer] - Occupational Stress Questionnaire     Feeling of Stress : Not at all   Social Connections: Unknown (1/12/2023)    Social Connection and Isolation Panel [NHANES]     Frequency of Communication with Friends and Family: Twice a week     Frequency of Social Gatherings with Friends and Family: Once a week     Active Member of Clubs or Organizations: Yes     Attends Club or Organization Meetings: 1 to 4 times per year     Marital Status:    Housing Stability: Low Risk  (1/12/2023)    Housing Stability Vital Sign     Unable to Pay for Housing in the Last Year: No     Number of Places Lived in the Last Year: 1     Unstable Housing in the Last Year: No     Past Surgical History:   Procedure Laterality Date    ABDOMINAL SURGERY      COLONOSCOPY N/A 5/11/2021    Procedure: COLONOSCOPY;  Surgeon: Ema Vidal MD;  Location: [facility];  Service: Endoscopy;  Laterality: N/A;  COVID TEST 5/8 SUPREP    ESOPHAGOGASTRODUODENOSCOPY N/A 5/17/2019    Procedure: ESOPHAGOGASTRODUODENOSCOPY (EGD);  Surgeon: Ema Vidal MD;  Location: [facility];  Service:  Endoscopy;  Laterality: N/A;    KNEE SURGERY      SHOULDER SURGERY       Family History   Problem Relation Age of Onset    Hypertension Mother     Cancer Mother         Pancreatic    Hypertension Father            Review of Systems  General: negative for chills, fever or weight loss  Psychological: negative for mood changes or depression  Ophthalmic: negative for blurry vision, photophobia or eye pain  ENT: see HPI  Respiratory: no cough, shortness of breath, or wheezing  Cardiovascular: no chest pain or dyspnea on exertion  Gastrointestinal: no abdominal pain, change in bowel habits, or black/ bloody stools  Musculoskeletal: negative for gait disturbance or muscular weakness  Neurological: no syncope or seizures; no ataxia  Dermatological: negative for puritis,  rash and jaundice  Hematologic/lymphatic: no easy bruising, no new lumps or bumps      Physical Exam:    Vitals:    09/11/23 1009   BP: 123/71   Pulse: 80         Constitutional: Well appearing / communicating without difficutly.  NAD.  Eyes: EOM I Bilaterally  Head/Face: Normocephalic.  Negative paranasal sinus pressure/tenderness.  Salivary glands WNL.  House Brackmann I Bilaterally.    Right Ear: Auricle normal appearance. External Auditory Canal within normal limits,TM w/o masses/lesions/perforations. TM mobility noted.   Left Ear: Auricle normal appearance. External Auditory Canal WNL,TM w/o masses/lesions/perforations. TM mobility noted.  Nose: No gross nasal septal deviation. Inferior Turbinates 3+ bilaterally. No septal perforation. No masses/lesions. External nasal skin appears normal without masses/lesions.  Oral Cavity: Gingiva/lips within normal limits.  Dentition/gingiva healthy appearing. Mucus membranes moist. Floor of mouth soft, no masses palpated. Oral Tongue mobile. Hard Palate appears normal.    Oropharynx: Base of tongue appears normal. No masses/lesions noted. Tonsillar fossa/pharyngeal wall without lesions. Posterior oropharynx WNL.   Soft palate without masses. Midline uvula.   Neck/Lymphatic: No LAD I-VI bilaterally.  No thyromegaly.  No masses noted on exam.    Mirror laryngoscopy/nasopharyngoscopy: Active gag reflex.  Unable to perform.          Diagnostic studies:  ImmunoCAP testing 3/23/2023: Class 0/1 to cat dander, cockroach, cypress, and white oak.         CT scan sinuses 3/24/2023: images interpreted by me and discussed with the patient in detail. FINDINGS:  Intracranial structures are unremarkable.  No incidental soft tissue masses are seen.  Frontal sinuses are hypoplastic.  There is mild mucosal thickening at the floor the left maxillary sinus.  Ethmoid, sphenoid, and frontal sinuses are clear.  Ostiomeatal complexes are patent.  Frontal ethmoidal sinus drainage routes are unremarkable.  Nasal passes is are clear.  Mastoid air cells and middle ear cavities are clear.          Tympanometry revealed a Type A tympanogram for both ears.  Audiogram results revealed a mild sloping to profound sensorineural hearing loss bilaterally.  Speech reception thresholds were obtained at 35dB for both ears and speech discrimination scores were 36% for the right ear and 44% for the left ear.           Assessment:    ICD-10-CM ICD-9-CM    1. Chronic allergic rhinitis  J30.9 477.9       2. Hypertrophy of inferior nasal turbinate  J34.3 478.0       3. Sensorineural hearing loss, bilateral  H90.3 389.18               The primary encounter diagnosis was Chronic allergic rhinitis. Diagnoses of Hypertrophy of inferior nasal turbinate and Sensorineural hearing loss, bilateral were also pertinent to this visit.      Plan:  No orders of the defined types were placed in this encounter.    Start nasal saline rinses b.i.d.  Continue Flonase 2 sprays per nostril daily  Continue Astelin 1 spray per nostril b.i.d.  Continue montelukast 10 mg PO daily. She has had dry eyes so will avoid oral antihistamines for now.   Increase Pantoprazole 40mg PO BID for 8 weeks.      Follow-up in approximately 2 months    Angela Jacobs MD

## 2023-09-21 ENCOUNTER — LAB VISIT (OUTPATIENT)
Dept: LAB | Facility: HOSPITAL | Age: 68
End: 2023-09-21
Attending: INTERNAL MEDICINE
Payer: MEDICARE

## 2023-09-21 DIAGNOSIS — Z00.00 ANNUAL PHYSICAL EXAM: ICD-10-CM

## 2023-09-21 LAB
25(OH)D3+25(OH)D2 SERPL-MCNC: 27 NG/ML (ref 30–96)
ALBUMIN SERPL BCP-MCNC: 3.8 G/DL (ref 3.5–5.2)
ALP SERPL-CCNC: 69 U/L (ref 55–135)
ALT SERPL W/O P-5'-P-CCNC: 35 U/L (ref 10–44)
ANION GAP SERPL CALC-SCNC: 4 MMOL/L (ref 8–16)
AST SERPL-CCNC: 25 U/L (ref 10–40)
BASOPHILS # BLD AUTO: 0.04 K/UL (ref 0–0.2)
BASOPHILS NFR BLD: 0.6 % (ref 0–1.9)
BILIRUB SERPL-MCNC: 0.5 MG/DL (ref 0.1–1)
BUN SERPL-MCNC: 14 MG/DL (ref 8–23)
CALCIUM SERPL-MCNC: 9.5 MG/DL (ref 8.7–10.5)
CHLORIDE SERPL-SCNC: 108 MMOL/L (ref 95–110)
CHOLEST SERPL-MCNC: 169 MG/DL (ref 120–199)
CHOLEST/HDLC SERPL: 4.6 {RATIO} (ref 2–5)
CO2 SERPL-SCNC: 30 MMOL/L (ref 23–29)
CREAT SERPL-MCNC: 0.8 MG/DL (ref 0.5–1.4)
DIFFERENTIAL METHOD: NORMAL
EOSINOPHIL # BLD AUTO: 0.4 K/UL (ref 0–0.5)
EOSINOPHIL NFR BLD: 6.6 % (ref 0–8)
ERYTHROCYTE [DISTWIDTH] IN BLOOD BY AUTOMATED COUNT: 13.1 % (ref 11.5–14.5)
EST. GFR  (NO RACE VARIABLE): >60 ML/MIN/1.73 M^2
ESTIMATED AVG GLUCOSE: 108 MG/DL (ref 68–131)
GLUCOSE SERPL-MCNC: 120 MG/DL (ref 70–110)
HBA1C MFR BLD: 5.4 % (ref 4–5.6)
HCT VFR BLD AUTO: 41.7 % (ref 37–48.5)
HDLC SERPL-MCNC: 37 MG/DL (ref 40–75)
HDLC SERPL: 21.9 % (ref 20–50)
HGB BLD-MCNC: 13.4 G/DL (ref 12–16)
IMM GRANULOCYTES # BLD AUTO: 0.02 K/UL (ref 0–0.04)
IMM GRANULOCYTES NFR BLD AUTO: 0.3 % (ref 0–0.5)
LDLC SERPL CALC-MCNC: 113.6 MG/DL (ref 63–159)
LYMPHOCYTES # BLD AUTO: 1.3 K/UL (ref 1–4.8)
LYMPHOCYTES NFR BLD: 20.5 % (ref 18–48)
MCH RBC QN AUTO: 30.1 PG (ref 27–31)
MCHC RBC AUTO-ENTMCNC: 32.1 G/DL (ref 32–36)
MCV RBC AUTO: 94 FL (ref 82–98)
MONOCYTES # BLD AUTO: 0.4 K/UL (ref 0.3–1)
MONOCYTES NFR BLD: 5.9 % (ref 4–15)
NEUTROPHILS # BLD AUTO: 4.1 K/UL (ref 1.8–7.7)
NEUTROPHILS NFR BLD: 66.1 % (ref 38–73)
NONHDLC SERPL-MCNC: 132 MG/DL
NRBC BLD-RTO: 0 /100 WBC
PLATELET # BLD AUTO: 251 K/UL (ref 150–450)
PMV BLD AUTO: 10.5 FL (ref 9.2–12.9)
POTASSIUM SERPL-SCNC: 4.1 MMOL/L (ref 3.5–5.1)
PROT SERPL-MCNC: 7.1 G/DL (ref 6–8.4)
RBC # BLD AUTO: 4.45 M/UL (ref 4–5.4)
SODIUM SERPL-SCNC: 142 MMOL/L (ref 136–145)
TRIGL SERPL-MCNC: 92 MG/DL (ref 30–150)
TSH SERPL DL<=0.005 MIU/L-ACNC: 3.66 UIU/ML (ref 0.4–4)
WBC # BLD AUTO: 6.25 K/UL (ref 3.9–12.7)

## 2023-09-21 PROCEDURE — 85025 COMPLETE CBC W/AUTO DIFF WBC: CPT | Performed by: INTERNAL MEDICINE

## 2023-09-21 PROCEDURE — 84443 ASSAY THYROID STIM HORMONE: CPT | Performed by: INTERNAL MEDICINE

## 2023-09-21 PROCEDURE — 36415 COLL VENOUS BLD VENIPUNCTURE: CPT | Mod: PO | Performed by: INTERNAL MEDICINE

## 2023-09-21 PROCEDURE — 83036 HEMOGLOBIN GLYCOSYLATED A1C: CPT | Performed by: INTERNAL MEDICINE

## 2023-09-21 PROCEDURE — 82306 VITAMIN D 25 HYDROXY: CPT | Performed by: INTERNAL MEDICINE

## 2023-09-21 PROCEDURE — 80053 COMPREHEN METABOLIC PANEL: CPT | Performed by: INTERNAL MEDICINE

## 2023-09-21 PROCEDURE — 80061 LIPID PANEL: CPT | Performed by: INTERNAL MEDICINE

## 2023-09-26 ENCOUNTER — OFFICE VISIT (OUTPATIENT)
Dept: INTERNAL MEDICINE | Facility: CLINIC | Age: 68
End: 2023-09-26
Payer: MEDICARE

## 2023-09-26 ENCOUNTER — HOSPITAL ENCOUNTER (OUTPATIENT)
Dept: RADIOLOGY | Facility: HOSPITAL | Age: 68
Discharge: HOME | End: 2023-09-26
Attending: INTERNAL MEDICINE
Payer: MEDICARE

## 2023-09-26 VITALS
HEART RATE: 86 BPM | OXYGEN SATURATION: 96 % | DIASTOLIC BLOOD PRESSURE: 66 MMHG | SYSTOLIC BLOOD PRESSURE: 110 MMHG | BODY MASS INDEX: 32.34 KG/M2 | WEIGHT: 171.31 LBS | HEIGHT: 61 IN

## 2023-09-26 DIAGNOSIS — J45.51 SEVERE PERSISTENT ASTHMA WITH ACUTE EXACERBATION: ICD-10-CM

## 2023-09-26 DIAGNOSIS — M25.532 LEFT WRIST PAIN: ICD-10-CM

## 2023-09-26 DIAGNOSIS — K21.9 GASTROESOPHAGEAL REFLUX DISEASE, UNSPECIFIED WHETHER ESOPHAGITIS PRESENT: ICD-10-CM

## 2023-09-26 DIAGNOSIS — I10 ESSENTIAL HYPERTENSION: Chronic | ICD-10-CM

## 2023-09-26 DIAGNOSIS — E55.9 VITAMIN D INSUFFICIENCY: ICD-10-CM

## 2023-09-26 DIAGNOSIS — M85.80 OSTEOPENIA, UNSPECIFIED LOCATION: ICD-10-CM

## 2023-09-26 DIAGNOSIS — E66.09 CLASS 1 OBESITY DUE TO EXCESS CALORIES WITH SERIOUS COMORBIDITY AND BODY MASS INDEX (BMI) OF 32.0 TO 32.9 IN ADULT: ICD-10-CM

## 2023-09-26 DIAGNOSIS — S62.102A CLOSED FRACTURE OF LEFT WRIST, INITIAL ENCOUNTER: Primary | ICD-10-CM

## 2023-09-26 DIAGNOSIS — E78.00 HYPERCHOLESTEREMIA: ICD-10-CM

## 2023-09-26 DIAGNOSIS — R73.03 PRE-DIABETES: ICD-10-CM

## 2023-09-26 PROCEDURE — 73110 X-RAY EXAM OF WRIST: CPT | Mod: TC,FY,LT

## 2023-09-26 PROCEDURE — 99999 PR PBB SHADOW E&M-EST. PATIENT-LVL III: ICD-10-PCS | Mod: PBBFAC,,, | Performed by: INTERNAL MEDICINE

## 2023-09-26 PROCEDURE — 99214 PR OFFICE/OUTPT VISIT, EST, LEVL IV, 30-39 MIN: ICD-10-PCS | Mod: S$PBB,,, | Performed by: INTERNAL MEDICINE

## 2023-09-26 PROCEDURE — 99999 PR PBB SHADOW E&M-EST. PATIENT-LVL III: CPT | Mod: PBBFAC,,, | Performed by: INTERNAL MEDICINE

## 2023-09-26 PROCEDURE — 99214 OFFICE O/P EST MOD 30 MIN: CPT | Mod: S$PBB,,, | Performed by: INTERNAL MEDICINE

## 2023-09-26 PROCEDURE — 73110 XR WRIST COMPLETE 3 VIEWS LEFT: ICD-10-PCS | Mod: 26,LT,, | Performed by: RADIOLOGY

## 2023-09-26 PROCEDURE — 73110 X-RAY EXAM OF WRIST: CPT | Mod: 26,LT,, | Performed by: RADIOLOGY

## 2023-09-26 PROCEDURE — 99213 OFFICE O/P EST LOW 20 MIN: CPT | Mod: PBBFAC,PO | Performed by: INTERNAL MEDICINE

## 2023-09-26 RX ORDER — PREDNISONE 20 MG/1
TABLET ORAL
Qty: 20 TABLET | Refills: 0 | Status: SHIPPED | OUTPATIENT
Start: 2023-09-26

## 2023-09-26 RX ORDER — BUDESONIDE AND FORMOTEROL FUMARATE DIHYDRATE 160; 4.5 UG/1; UG/1
2 AEROSOL RESPIRATORY (INHALATION) EVERY 12 HOURS
Qty: 10.2 G | Refills: 11 | Status: SHIPPED | OUTPATIENT
Start: 2023-09-26 | End: 2024-09-25

## 2023-09-26 RX ORDER — CHOLECALCIFEROL (VITAMIN D3) 25 MCG
1000 TABLET ORAL DAILY
COMMUNITY

## 2023-09-26 RX ORDER — ALBUTEROL SULFATE 0.83 MG/ML
SOLUTION RESPIRATORY (INHALATION)
Qty: 75 ML | Refills: 5 | Status: SHIPPED | OUTPATIENT
Start: 2023-09-26

## 2023-09-26 NOTE — PATIENT INSTRUCTIONS
Take over the counter vitamin D 1,000 units per day    For the left wrist pain:  We are getting an xray  Also use RICE therapy. R=rest, I=Ic, C=compression, E=elevation  Ok to use topical products such as icy hot. Ok to use tylenol.     I recommend using the Mediterranean diet, weight watchers or noom and regular exercise (at least 150 minutes per week of cardiovascular exercise) in order to improve your weight.

## 2023-09-26 NOTE — PROGRESS NOTES
Patient ID: Carina Montaño is a 68 y.o. female.    Chief Complaint: Annual Exam    HPI Carina is a 68 y.o. female with   hypertension, vitamin-D deficiency, GERD, severe persistent asthma, allergic rhinitis, prediabetes, and osteopenia who presents for routine follow-up of medical conditions.   Her hip pain is better since doing PT.   She fell about one month ago and hurt her left wrist. Has had pain in wrist since that time. Wants an xray.   Follows with ENT for chronic sinusitis and GERD.   Reviewed lab results from 9/21/23. She is not currently taking a vitamin D supplement.   She wants to lose weight.     Health Maintenance Topics with due status: Not Due       Topic Last Completion Date    TETANUS VACCINE 01/01/2015    Pneumococcal Vaccines (Age 65+) 09/10/2020    Colorectal Cancer Screening 05/11/2021    DEXA Scan 10/10/2022    Mammogram 09/14/2023    Hemoglobin A1c (Prediabetes) 09/21/2023    Lipid Panel 09/21/2023        Review of Systems   Musculoskeletal:  Positive for arthralgias.   All other systems reviewed and are negative.     Objective:     Vitals:    09/26/23 1049   BP: 110/66   Pulse: 86        Physical Exam  Vitals reviewed.   Constitutional:       General: She is not in acute distress.     Appearance: Normal appearance. She is well-developed. She is obese. She is not ill-appearing, toxic-appearing or diaphoretic.   HENT:      Head: Normocephalic and atraumatic.      Right Ear: External ear normal.      Left Ear: External ear normal.      Nose: Nose normal.   Eyes:      General: No scleral icterus.        Right eye: No discharge.         Left eye: No discharge.      Extraocular Movements: Extraocular movements intact.      Conjunctiva/sclera: Conjunctivae normal.   Cardiovascular:      Rate and Rhythm: Normal rate and regular rhythm.      Heart sounds: Normal heart sounds. No murmur heard.     No friction rub. No gallop.   Pulmonary:      Effort: Pulmonary effort is normal. No respiratory  distress.      Breath sounds: No stridor. Wheezing (diffuse, expiratory wheezing) present. No rhonchi or rales.   Abdominal:      General: Bowel sounds are normal. There is no distension.      Palpations: Abdomen is soft. There is no mass.      Tenderness: There is no abdominal tenderness. There is no guarding or rebound.      Hernia: No hernia is present.   Skin:     General: Skin is warm and dry.   Neurological:      General: No focal deficit present.      Mental Status: She is alert and oriented to person, place, and time. Mental status is at baseline.   Psychiatric:         Mood and Affect: Mood normal.         Behavior: Behavior normal.         Thought Content: Thought content normal.         Judgment: Judgment normal.         Assessment:       1. Severe persistent asthma with acute exacerbation Active   2. Left wrist pain Active   3. Essential hypertension Well controlled   4. Hypercholesteremia Well controlled   5. Pre-diabetes Well controlled   6. Gastroesophageal reflux disease, unspecified whether esophagitis present Chronic   7. Osteopenia, unspecified location Chronic   8. Vitamin D insufficiency Chronic   9. Class 1 obesity due to excess calories with serious comorbidity and body mass index (BMI) of 32.0 to 32.9 in adult Active       Plan:         Severe persistent asthma with acute exacerbation  Comments:  Cont current meds  Orders:  -     budesonide-formoterol 160-4.5 mcg (SYMBICORT) 160-4.5 mcg/actuation HFAA; Inhale 2 puffs into the lungs every 12 (twelve) hours.  Dispense: 10.2 g; Refill: 11  -     predniSONE (DELTASONE) 20 MG tablet; Take 3 pills daily for 3 days, then 2 pills daily for 3 days, then 1 pill daily for 3 days, then 1/2 pill daily for 4 days.  Dispense: 20 tablet; Refill: 0  -     albuterol (PROVENTIL) 2.5 mg /3 mL (0.083 %) nebulizer solution; USE 1 VIAL IN NEBULIZER 4 TIMES PER DAY PRN COUGH, WHEEZING OR SHORTNESS OF BREATH  Dispense: 75 mL; Refill: 5    Left wrist  pain  Comments:  See AVS  Orders:  -     X-Ray Wrist Complete 3 views Left; Future; Expected date: 09/26/2023    Essential hypertension  Comments:  Continue current medication  Orders:  -     Comprehensive Metabolic Panel; Future; Expected date: 03/26/2024    Hypercholesteremia  Comments:  Continue current medication    Pre-diabetes  Comments:  Continue current medication  Orders:  -     Hemoglobin A1C; Future; Expected date: 03/26/2024    Gastroesophageal reflux disease, unspecified whether esophagitis present  Comments:  Continue current medication    Osteopenia, unspecified location  Comments:  Continue current medication    Vitamin D insufficiency  Comments:  See AVS  Orders:  -     Vitamin D; Future; Expected date: 03/26/2024    Class 1 obesity due to excess calories with serious comorbidity and body mass index (BMI) of 32.0 to 32.9 in adult  Comments:  See AVS        RTC 6 months     Warning signs discussed, patient to call with any further issues or worsening of symptoms.       Parts of the above note were dictated using a voice dictation software. Please excuse any grammatical or typographical errors.

## 2023-10-02 DIAGNOSIS — J30.9 ALLERGIC RHINITIS, UNSPECIFIED SEASONALITY, UNSPECIFIED TRIGGER: ICD-10-CM

## 2023-10-02 RX ORDER — FLUTICASONE PROPIONATE 50 MCG
SPRAY, SUSPENSION (ML) NASAL
Qty: 48 ML | Refills: 3 | Status: SHIPPED | OUTPATIENT
Start: 2023-10-02

## 2023-10-02 NOTE — TELEPHONE ENCOUNTER
Refill Decision Note      Refill Decision Note   Carina Montaño  is requesting a refill authorization.  Brief Assessment and Rationale for Refill:  Approve     Medication Therapy Plan:         Comments:     Note composed:6:09 AM 10/02/2023             Appointments     Last Visit   9/26/2023 Adelaide De Leon MD   Next Visit   3/26/2024 Adelaide De Leon MD

## 2023-10-02 NOTE — TELEPHONE ENCOUNTER
No care due was identified.  [facility] Embedded Care Due Messages. Reference number: [id number].   10/02/2023 12:23:03 AM CDT

## 2023-10-03 ENCOUNTER — TELEPHONE (OUTPATIENT)
Dept: INTERNAL MEDICINE | Facility: CLINIC | Age: 68
End: 2023-10-03
Payer: MEDICARE

## 2023-10-04 ENCOUNTER — OFFICE VISIT (OUTPATIENT)
Dept: URGENT CARE | Facility: CLINIC | Age: 68
End: 2023-10-04
Payer: MEDICARE

## 2023-10-04 VITALS
HEART RATE: 85 BPM | DIASTOLIC BLOOD PRESSURE: 83 MMHG | OXYGEN SATURATION: 95 % | SYSTOLIC BLOOD PRESSURE: 142 MMHG | HEIGHT: 61 IN | RESPIRATION RATE: 20 BRPM | WEIGHT: 171 LBS | TEMPERATURE: 98 F | BODY MASS INDEX: 32.28 KG/M2

## 2023-10-04 DIAGNOSIS — S60.312A: ICD-10-CM

## 2023-10-04 DIAGNOSIS — Z23 NEED FOR TDAP VACCINATION: ICD-10-CM

## 2023-10-04 DIAGNOSIS — S61.002A AVULSION OF SKIN OF LEFT THUMB, INITIAL ENCOUNTER: Primary | ICD-10-CM

## 2023-10-04 PROCEDURE — 90471 IMMUNIZATION ADMIN: CPT | Mod: S$GLB,,, | Performed by: NURSE PRACTITIONER

## 2023-10-04 PROCEDURE — 99214 PR OFFICE/OUTPT VISIT, EST, LEVL IV, 30-39 MIN: ICD-10-PCS | Mod: 25,S$GLB,, | Performed by: NURSE PRACTITIONER

## 2023-10-04 PROCEDURE — 90715 TDAP VACCINE 7 YRS/> IM: CPT | Mod: S$GLB,,, | Performed by: NURSE PRACTITIONER

## 2023-10-04 PROCEDURE — 90471 TDAP VACCINE GREATER THAN OR EQUAL TO 7YO IM: ICD-10-PCS | Mod: S$GLB,,, | Performed by: NURSE PRACTITIONER

## 2023-10-04 PROCEDURE — 99214 OFFICE O/P EST MOD 30 MIN: CPT | Mod: 25,S$GLB,, | Performed by: NURSE PRACTITIONER

## 2023-10-04 PROCEDURE — 90715 TDAP VACCINE GREATER THAN OR EQUAL TO 7YO IM: ICD-10-PCS | Mod: S$GLB,,, | Performed by: NURSE PRACTITIONER

## 2023-10-04 RX ORDER — MUPIROCIN 20 MG/G
OINTMENT TOPICAL 2 TIMES DAILY
Qty: 30 G | Refills: 0 | Status: SHIPPED | OUTPATIENT
Start: 2023-10-04 | End: 2023-10-09

## 2023-10-04 NOTE — PROGRESS NOTES
"Subjective:      Patient ID: Carina Montaño is a 68 y.o. female.    Vitals:  height is 5' 1" (1.549 m) and weight is 77.6 kg (171 lb). Her oral temperature is 98 °F (36.7 °C). Her blood pressure is 142/83 (abnormal) and her pulse is 85. Her respiration is 20 and oxygen saturation is 95%.     Chief Complaint: Laceration (Left Thumb)    68 year old female presenting with left thumb laceration that happened around 11am this morning with a vegetable slicer. Patient's last tetanus was in 1972, she was trying to cut the cucumber and they cucumber slicer  slipped and cut her left hand thumb, bleeding controlled,  denies fever, body aches or chills, denies cough, wheezing or shortness of breath, denies nausea, vomiting, diarrhea or abdominal pain, denies chest pain or dizziness positional lightheadedness, denies sore throat or trouble swallowing, denies loss of taste or smell, or any other symptoms    \    Laceration   The incident occurred 6 to 12 hours ago. The laceration is located on the Left hand. The laceration mechanism was a metal edge. The pain is at a severity of 5/10. The pain is moderate. The pain has been Fluctuating since onset. Possible foreign bodies include metal. Her tetanus status is out of date.       Skin:  Positive for abrasion and avulsion. Negative for laceration and erythema.      Objective:     Physical Exam   Constitutional: She is oriented to person, place, and time. She appears well-developed.   HENT:   Head: Normocephalic and atraumatic. Head is without abrasion, without contusion and without laceration.   Ears:   Right Ear: External ear normal.   Left Ear: External ear normal.   Nose: Nose normal.   Mouth/Throat: Oropharynx is clear and moist and mucous membranes are normal.   Eyes: Conjunctivae, EOM and lids are normal. Pupils are equal, round, and reactive to light.   Neck: Trachea normal and phonation normal. Neck supple.   Cardiovascular: Normal rate, regular rhythm and normal heart " sounds.   Pulmonary/Chest: Effort normal and breath sounds normal. No stridor. No respiratory distress.   Musculoskeletal: Normal range of motion.         General: Normal range of motion.      Comments: Two small superficial skin avulsion noted to skin of left thumb, skin abrasion noted to left thumb, bleeding controlled, no erythema, swelling or tenderness noted, ROM intact of left thumb,NV intact   Neurological: She is alert and oriented to person, place, and time.   Skin: Skin is warm, dry, intact and no rash. Capillary refill takes less than 2 seconds. No abrasion, No burn, No bruising, No erythema and No ecchymosis   Psychiatric: Her speech is normal and behavior is normal. Judgment and thought content normal.   Nursing note and vitals reviewed.          Patient in no acute distress.  Vitals reassuring.  Discussed results/diagnosis/plan in depth with patient in clinic. Strict precautions given to patient to monitor for worsening signs and symptoms. Advised to follow up with primary.All questions answered. Strict ER precautions given. If your symptoms worsens or fail to improve you should go to the Emergency Room. Discharge and follow-up instructions given verbally/printed. Discharge and follow-up instructions discussed with the patient who expressed understanding and willingness to comply with my recommendations.Patient voiced understanding and in agreement with current treatment plan.     Please be advised this text was dictated with [employer] software and may contain errors due to translation.    Assessment:     1. Avulsion of skin of left thumb, initial encounter    2. Abrasion of skin of left thumb    3. Need for Tdap vaccination        Plan:       Avulsion of skin of left thumb, initial encounter    Abrasion of skin of left thumb    Need for Tdap vaccination    Other orders  -     (In Office Administered) Tdap Vaccine  -     mupirocin (BACTROBAN) 2 % ointment; Apply topically 2 (two) times daily. for 5 days   Dispense: 30 g; Refill: 0          Medical Decision Making:   Urgent Care Management:  Patient in no acute distress.  Vitals reassuring.  On exam, patient is nontoxic appearing and afebrile.  Lungs CTA.  Physical examination consistent with left thumb skin avulsion and abrasion noted.  No bleeding noted.  Full range of motion intact of left thumb.  Wound cleaned in clinic.  Tetanus updated in clinic.  Bactroban prescribed.  Red flags discussed with patient in detail.  Wound care discussed.  Medication prescribed and over-the-counter medication discussed with patient at length.  Proper hydration advised.  I reiterated the importance of further evaluation if no improvement symptoms and follow-up with primary. Patient voiced understanding and in agreement with current treatment plan.               Patient Instructions   PLEASE READ YOUR DISCHARGE INSTRUCTIONS ENTIRELY AS IT CONTAINS IMPORTANT INFORMATION.    Use the antibiotic ointment to the wound.       Please return or see your primary care doctor for signs of worsening infection (fever, worsening swelling/redness/pain, inability to move your extremity).    Please arrange follow up with your primary medical clinic as soon as possible. You must understand that you've received an Urgent Care treatment only and that you may be released before all of your medical problems are known or treated. You, the patient, will arrange for follow up as instructed. If your symptoms worsen or fail to improve you should go to the Emergency Room.  WE CANNOT RULE OUT ALL POSSIBLE CAUSES OF YOUR SYMPTOMS IN THE URGENT CARE SETTING PLEASE GO TO THE ER IF YOU FEELS YOUR CONDITION IS WORSENING OR YOU WOULD LIKE EMERGENT EVALUATION.

## 2023-10-05 NOTE — PATIENT INSTRUCTIONS
PLEASE READ YOUR DISCHARGE INSTRUCTIONS ENTIRELY AS IT CONTAINS IMPORTANT INFORMATION.    Use the antibiotic ointment to the wound.       Please return or see your primary care doctor for signs of worsening infection (fever, worsening swelling/redness/pain, inability to move your extremity).    Please arrange follow up with your primary medical clinic as soon as possible. You must understand that you've received an Urgent Care treatment only and that you may be released before all of your medical problems are known or treated. You, the patient, will arrange for follow up as instructed. If your symptoms worsen or fail to improve you should go to the Emergency Room.  WE CANNOT RULE OUT ALL POSSIBLE CAUSES OF YOUR SYMPTOMS IN THE URGENT CARE SETTING PLEASE GO TO THE ER IF YOU FEELS YOUR CONDITION IS WORSENING OR YOU WOULD LIKE EMERGENT EVALUATION.

## 2023-11-04 DIAGNOSIS — I10 ESSENTIAL HYPERTENSION: ICD-10-CM

## 2023-11-04 NOTE — TELEPHONE ENCOUNTER
Care Due:                  Date            Visit Type   Department     Provider  --------------------------------------------------------------------------------                                EP -                              PRIMARY      [facility] INTERNAL  Last Visit: 09-      CARE ([facility])   KAYDEN De Leon                              [facility]                              PRIMARY      [facility] INTERNAL  Next Visit: 03-      CARE ([facility])   KAYDEN De Leon                                                            Last  Test          Frequency    Reason                     Performed    Due Date  --------------------------------------------------------------------------------    Mg Level....  12 months..  alendronate..............  Not Found    Overdue    Phosphate...  12 months..  alendronate..............  Not Found    Overdue    [employer] Embedded Care Due Messages. Reference number: [id number].   11/04/2023 8:15:21 AM CDT

## 2023-11-05 NOTE — TELEPHONE ENCOUNTER
Refill Routing Note   Medication(s) are not appropriate for processing by [facility] for the following reason(s):      Required vitals abnormal    [facility] action(s):  Defer Care Due:  None identified     Medication Therapy Plan: BP 10/04/23 (!) 142/83      Appointments  past 12m or future 3m with PCP    Date Provider   Last Visit   9/26/2023 Adelaide De Leon MD   Next Visit   3/26/2024 Adelaide De Leon MD   ED visits in past 90 days: 0        Note composed:11:21 PM 11/04/2023

## 2023-11-07 RX ORDER — LOSARTAN POTASSIUM 100 MG/1
TABLET ORAL
Qty: 90 TABLET | Refills: 1 | Status: SHIPPED | OUTPATIENT
Start: 2023-11-07

## 2023-11-17 DIAGNOSIS — M79.645 THUMB PAIN, LEFT: Primary | ICD-10-CM

## 2023-11-18 ENCOUNTER — TELEPHONE (OUTPATIENT)
Dept: ORTHOPEDICS | Facility: CLINIC | Age: 68
End: 2023-11-18
Payer: MEDICARE

## 2023-11-20 ENCOUNTER — HOSPITAL ENCOUNTER (OUTPATIENT)
Dept: RADIOLOGY | Facility: HOSPITAL | Age: 68
Discharge: HOME | End: 2023-11-20
Attending: ORTHOPAEDIC SURGERY
Payer: MEDICARE

## 2023-11-20 ENCOUNTER — OFFICE VISIT (OUTPATIENT)
Dept: ORTHOPEDICS | Facility: CLINIC | Age: 68
End: 2023-11-20
Payer: MEDICARE

## 2023-11-20 ENCOUNTER — TELEPHONE (OUTPATIENT)
Dept: ORTHOPEDICS | Facility: CLINIC | Age: 68
End: 2023-11-20
Payer: MEDICARE

## 2023-11-20 DIAGNOSIS — S62.102A CLOSED FRACTURE OF LEFT WRIST, INITIAL ENCOUNTER: ICD-10-CM

## 2023-11-20 DIAGNOSIS — M79.645 THUMB PAIN, LEFT: ICD-10-CM

## 2023-11-20 DIAGNOSIS — M79.645 THUMB PAIN, LEFT: Primary | ICD-10-CM

## 2023-11-20 DIAGNOSIS — M18.12 PRIMARY OSTEOARTHRITIS OF FIRST CARPOMETACARPAL JOINT OF LEFT HAND: ICD-10-CM

## 2023-11-20 DIAGNOSIS — M65.4 DE QUERVAIN'S TENOSYNOVITIS, LEFT: Primary | ICD-10-CM

## 2023-11-20 PROCEDURE — 73130 X-RAY EXAM OF HAND: CPT | Mod: 26,LT,, | Performed by: RADIOLOGY

## 2023-11-20 PROCEDURE — 73130 X-RAY EXAM OF HAND: CPT | Mod: TC,PO,LT

## 2023-11-20 PROCEDURE — 99999PBSHW PR PBB SHADOW TECHNICAL ONLY FILED TO HB: Mod: PBBFAC,,,

## 2023-11-20 PROCEDURE — 99204 OFFICE O/P NEW MOD 45 MIN: CPT | Mod: S$PBB,25,, | Performed by: ORTHOPAEDIC SURGERY

## 2023-11-20 PROCEDURE — 99213 OFFICE O/P EST LOW 20 MIN: CPT | Mod: PBBFAC,PO | Performed by: ORTHOPAEDIC SURGERY

## 2023-11-20 PROCEDURE — 99999 PR PBB SHADOW E&M-EST. PATIENT-LVL III: CPT | Mod: PBBFAC,,, | Performed by: ORTHOPAEDIC SURGERY

## 2023-11-20 PROCEDURE — 99999PBSHW PR PBB SHADOW TECHNICAL ONLY FILED TO HB: ICD-10-PCS | Mod: PBBFAC,,,

## 2023-11-20 PROCEDURE — 99999 PR PBB SHADOW E&M-EST. PATIENT-LVL III: ICD-10-PCS | Mod: PBBFAC,,, | Performed by: ORTHOPAEDIC SURGERY

## 2023-11-20 PROCEDURE — 73130 XR HAND COMPLETE 3 VIEW LEFT: ICD-10-PCS | Mod: 26,LT,, | Performed by: RADIOLOGY

## 2023-11-20 PROCEDURE — 99204 PR OFFICE/OUTPT VISIT, NEW, LEVL IV, 45-59 MIN: ICD-10-PCS | Mod: S$PBB,25,, | Performed by: ORTHOPAEDIC SURGERY

## 2023-11-20 PROCEDURE — 20550 NJX 1 TENDON SHEATH/LIGAMENT: CPT | Mod: PBBFAC,PO,LT | Performed by: ORTHOPAEDIC SURGERY

## 2023-11-20 PROCEDURE — 20550 TENDON SHEATH: ICD-10-PCS | Mod: S$PBB,LT,, | Performed by: ORTHOPAEDIC SURGERY

## 2023-11-20 RX ADMIN — METHYLPREDNISOLONE ACETATE 40 MG: 40 INJECTION, SUSPENSION INTRALESIONAL; INTRAMUSCULAR; INTRASYNOVIAL; SOFT TISSUE at 10:11

## 2023-11-20 NOTE — PROGRESS NOTES
[facility]  History & Physical  Orthopedics    SUBJECTIVE:        Chief Complaint:   Chief Complaint   Patient presents with    Left Hand - Pain       Referring Provider: None    History of Present Illness:  Patient is a 68 y.o. right hand dominant female who presents today with complaints of left radial wrist and left thumb.    The patient is retired from HR at [employer]. Has 2 grandchildren.    Patient states that she rolled down a hill and fell with her left hand out to brace her fall mid September of this year. She states that her pain of her wrist and thumb has continued since then.     Of note: left thumb laceration that occurred on 10/04/2023.  She states that she was attempting to slice a cucumber and the knife slipped out of her hand.  She was seen at urgent care he was discharged with bacitracin and instructions to keep covered until follow-up.  Patient denies any fever nausea vomiting since the injury. Denies any numbness/tingling or loss of sensation.      The patient denies any fevers, chills, N/V, D/C and presents for evaluation.       Past Medical History:   Diagnosis Date    Allergy     Arthritis     Asthma     Cancer     breast    Chronic diastolic (congestive) heart failure 12/8/2022    GERD (gastroesophageal reflux disease)     Hyperlipidemia     Hypertension     Prediabetes      Past Surgical History:   Procedure Laterality Date    ABDOMINAL SURGERY      COLONOSCOPY N/A 5/11/2021    Procedure: COLONOSCOPY;  Surgeon: Ema Vidal MD;  Location: [facility];  Service: Endoscopy;  Laterality: N/A;  COVID TEST 5/8 SUPREP    ESOPHAGOGASTRODUODENOSCOPY N/A 5/17/2019    Procedure: ESOPHAGOGASTRODUODENOSCOPY (EGD);  Surgeon: Ema Vidal MD;  Location: [facility];  Service: Endoscopy;  Laterality: N/A;    KNEE SURGERY      SHOULDER SURGERY       Review of patient's allergies indicates:   Allergen Reactions    Grass pollen-perennial rye, standard Other (See Comments)     Lipitor [atorvastatin]     Strawberries [strawberry]      Social History     Social History Narrative    Not on file     Family History   Problem Relation Age of Onset    Hypertension Mother     Cancer Mother         Pancreatic    Hypertension Father          Current Outpatient Medications:     albuterol (PROVENTIL) 2.5 mg /3 mL (0.083 %) nebulizer solution, USE 1 VIAL IN NEBULIZER 4 TIMES PER DAY PRN COUGH, WHEEZING OR SHORTNESS OF BREATH, Disp: 75 mL, Rfl: 5    albuterol (PROVENTIL/VENTOLIN HFA) 90 mcg/actuation inhaler, INHALE 2 PUFFS INTO THE LUNGS EVERY 6 (SIX) HOURS AS NEEDED FOR WHEEZING OR SHORTNESS OF BREATH (OR COUGH)., Disp: 18 g, Rfl: 2    alendronate (FOSAMAX) 70 MG tablet, TAKE 1 TAB BY MOUTH EVERY WEEK WITH GLASS OF WATER/EMPTY STOMACH (DONT LIE DOWN OR EAT FOR 30 MIN), Disp: 4 tablet, Rfl: 11    azelastine (ASTELIN) 137 mcg (0.1 %) nasal spray, 1 spray (137 mcg total) by Nasal route 2 (two) times daily. for 14 days, Disp: 10 mL, Rfl: 11    budesonide-formoterol 160-4.5 mcg (SYMBICORT) 160-4.5 mcg/actuation HFAA, Inhale 2 puffs into the lungs every 12 (twelve) hours., Disp: 10.2 g, Rfl: 11    ciclopirox 1 % shampoo, APPLY 1 APPLICATION ON THE SKIN 2 TIMES WEEKLY, Disp: , Rfl:     fluticasone propionate (FLONASE) 50 mcg/actuation nasal spray, 2 SPRAYS (100 MCG TOTAL) BY EACH NARE ROUTE ONCE DAILY., Disp: 48 mL, Rfl: 3    losartan (COZAAR) 100 MG tablet, TAKE 1 TABLET BY MOUTH EVERY DAY, Disp: 90 tablet, Rfl: 1    metFORMIN (GLUCOPHAGE) 850 MG tablet, TAKE ONE TABLET BY MOUTH DAILY FOR 1 MONTH AND THEN INCREASE TO ONE TABLET BY MOUTH TWICE DAILY THEREAFTER, Disp: 180 tablet, Rfl: 3    montelukast (SINGULAIR) 10 mg tablet, TAKE 1 TABLET BY MOUTH EVERY DAY IN THE EVENING, Disp: 90 tablet, Rfl: 3    MULTIVIT,CALC,MINS/IRON/FOLIC (ONE-A-DAY WOMENS FORMULA ORAL), Take 2 tablets by mouth once daily., Disp: , Rfl:     pantoprazole (PROTONIX) 40 MG tablet, Take 1 tablet (40 mg total) by mouth 2 (two) times  daily., Disp: 60 tablet, Rfl: 3    predniSONE (DELTASONE) 20 MG tablet, Take 3 pills daily for 3 days, then 2 pills daily for 3 days, then 1 pill daily for 3 days, then 1/2 pill daily for 4 days., Disp: 20 tablet, Rfl: 0    simvastatin (ZOCOR) 20 MG tablet, TAKE 1 TABLET BY MOUTH EVERY DAY, Disp: 90 tablet, Rfl: 3    triamcinolone acetonide 0.1% (KENALOG) 0.1 % Lotn, Apply topically 2 (two) times daily. Apply to affected areas of itching. Do not apply to face or genitals., Disp: 60 mL, Rfl: 1    vitamin D (VITAMIN D3) 1000 units Tab, Take 1,000 Units by mouth once daily., Disp: , Rfl:     ROS    Review of Systems:  Constitutional: no fever or chills  Eyes: no visual changes  ENT: no nasal congestion or sore throat  Respiratory: no cough or shortness of breath  Cardiovascular: no chest pain  Gastrointestinal: no nausea or vomiting, tolerating diet  Musculoskeletal: arthralgias, pain, soreness, and decreased ROM    OBJECTIVE:      Vital Signs (Most Recent):  There were no vitals filed for this visit.  There is no height or weight on file to calculate BMI.    Physical Exam    Physical Exam:  Constitutional: The patient appears well-developed and well-nourished. No distress.   Head: Normocephalic and atraumatic.   Nose: Nose normal.   Eyes: Conjunctivae and EOM are normal.   Neck: No tracheal deviation present.   Cardiovascular: Normal rate and intact distal pulses.    Pulmonary/Chest: Effort normal. No respiratory distress.   Abdominal: There is no guarding.   Lymphatic: Negative for adenopathy   Neurological: The patient is alert.   Psychiatric: The patient has a normal mood and affect.     Bilateral Hand/Wrist Examination:    Observation/Inspection:  Swelling  left dorsal wrist   Deformity  none  Discoloration  none     Scars   none    Atrophy  none    HAND/WRIST EXAMINATION:     strength normal    + swelling left radial wrist, Tenderness to palpation left 1st dorsal extensor compartment, pain with the radial and  ulnar deviation, + Finkelstein's test, + WHAT Test, + cmc compression test otherwise ROM hand/wrist/elbow full, painless    Neurovascular Exam:  Digits WWP, brisk CR < 3s throughout  NVI motor/LTS to M/R/U nerves, radial pulse 2+  2+ biceps and brachioradialis reflexes    Diagnostic Results:     X-rays AP, lateral and oblique left hand taken today are independently reviewed by me and show healed left distal radius fracture, minimally displaced, Eaton stage III basilar thumb arthritis.     ASSESSMENT/PLAN:      68 y.o. yo female with   Encounter Diagnoses   Name Primary?    De Quervain's tenosynovitis, left Yes    Primary osteoarthritis of first carpometacarpal joint of left hand     Closed fracture of left wrist, initial encounter       Plan:  We have discussed the natural history of basilar thumb arthritis and de Quervain's tenosynovitis including treatment options such as splinting, oral and topical anti-inflammatories, cortisone injections and surgery. I have given a  thumb spica brace for comfort.    -I have offered her a selective injection. I have explained the risks, benefits, and alternatives of the procedure in detail.  The patient voices understanding and all questions have been answered. The patient agrees to proceed as planned. So after a sterile prep of the skin in the normal fashion the left 1st dorsal extensor compartment was injected using a 25 gauge needle with a combination of 1cc 1% plain lidocaine and 40 mg of methylprednisolone.  The patient is cautioned and immediate relief of pain is secondary to the local anesthetic and will be temporary.  After the anesthetic wears off there may be a increase in pain that may last for a few hours or a few days and they should use ice to help alleviate this flair up of pain. Patient tolerated the procedure well.    - F/U as needed for any worsening of symptoms  - Call with any questions/concerns in the interim         The patient's pathophysiology was  explained in detail with reference to x-rays, models, other visual aids as appropriate.  Treatment options were discussed in detail.  Questions were invited and answered to the patient's satisfaction. I reviewed Primary care , and other specialty's notes to better coordinate patient's care.          Lydia Boland MD    Please be aware that this note has been generated with the assistance of [email or web address] voice-to-text.  Please excuse any spelling or grammatical errors.

## 2023-11-24 RX ORDER — METHYLPREDNISOLONE ACETATE 40 MG/ML
40 INJECTION, SUSPENSION INTRA-ARTICULAR; INTRALESIONAL; INTRAMUSCULAR; SOFT TISSUE
Status: DISCONTINUED | OUTPATIENT
Start: 2023-11-20 | End: 2023-11-24 | Stop reason: HOSPADM

## 2023-11-24 NOTE — PROCEDURES
Tendon Sheath    Date/Time: 11/20/2023 10:00 AM    Performed by: Lydia Boland MD  Authorized by: Lydia Boland MD    Consent Done?:  Yes (Verbal)  Indications:  Pain  Prep: patient was prepped and draped in usual sterile fashion      Local anesthesia used?: Yes    Anesthesia:  Local infiltration  Local anesthetic:  Lidocaine 1% without epinephrine  Anesthetic total (ml):  1    Location:  Wrist  Site:  L first doral compartment  Needle size:  25 G  Approach:  Radial  Medications:  40 mg methylPREDNISolone acetate 40 mg/mL  Patient tolerance:  Patient tolerated the procedure well with no immediate complications

## 2023-11-30 DIAGNOSIS — E78.00 HYPERCHOLESTEREMIA: ICD-10-CM

## 2023-11-30 RX ORDER — SIMVASTATIN 20 MG/1
20 TABLET, FILM COATED ORAL DAILY
Qty: 90 TABLET | Refills: 3 | Status: SHIPPED | OUTPATIENT
Start: 2023-11-30

## 2023-11-30 NOTE — TELEPHONE ENCOUNTER
Refill Decision Note   Carinamemo Montaño  is requesting a refill authorization.  Brief Assessment and Rationale for Refill:  Defer     Medication Therapy Plan:  Allergy/Contraindication: Atorvastatin      Pharmacist review requested: Yes   Comments:     Note composed:1:59 PM 11/30/2023

## 2023-11-30 NOTE — TELEPHONE ENCOUNTER
Refill Routing Note   Medication(s) are not appropriate for processing by [facility] for the following reason(s):        Allergy or intolerance: no previous override by PCP     [facility] action(s):  Defer        Medication Therapy Plan: Allergy/Contraindication: Atorvastatin    Pharmacist review requested: Yes     Appointments  past 12m or future 3m with PCP    Date Provider   Last Visit   9/26/2023 Adelaide De Leon MD   Next Visit   3/26/2024 Adelaide De Leon MD   ED visits in past 90 days: 0        Note composed:12:28 PM 11/30/2023

## 2023-12-12 ENCOUNTER — OFFICE VISIT (OUTPATIENT)
Dept: OTOLARYNGOLOGY | Facility: CLINIC | Age: 68
End: 2023-12-12
Payer: MEDICARE

## 2023-12-12 VITALS
HEART RATE: 76 BPM | WEIGHT: 165.56 LBS | SYSTOLIC BLOOD PRESSURE: 140 MMHG | BODY MASS INDEX: 31.28 KG/M2 | DIASTOLIC BLOOD PRESSURE: 80 MMHG

## 2023-12-12 DIAGNOSIS — R49.0 DYSPHONIA: ICD-10-CM

## 2023-12-12 DIAGNOSIS — J34.3 HYPERTROPHY OF INFERIOR NASAL TURBINATE: ICD-10-CM

## 2023-12-12 DIAGNOSIS — K21.9 LARYNGOPHARYNGEAL REFLUX (LPR): ICD-10-CM

## 2023-12-12 DIAGNOSIS — J30.9 CHRONIC ALLERGIC RHINITIS: Primary | ICD-10-CM

## 2023-12-12 PROCEDURE — 99999 PR PBB SHADOW E&M-EST. PATIENT-LVL IV: ICD-10-PCS | Mod: PBBFAC,,, | Performed by: OTOLARYNGOLOGY

## 2023-12-12 PROCEDURE — 31575 LARYNGOSCOPY: ICD-10-PCS | Mod: S$PBB,,, | Performed by: OTOLARYNGOLOGY

## 2023-12-12 PROCEDURE — 99999 PR PBB SHADOW E&M-EST. PATIENT-LVL IV: CPT | Mod: PBBFAC,,, | Performed by: OTOLARYNGOLOGY

## 2023-12-12 PROCEDURE — 99214 OFFICE O/P EST MOD 30 MIN: CPT | Mod: 25,S$PBB,, | Performed by: OTOLARYNGOLOGY

## 2023-12-12 PROCEDURE — 31575 DIAGNOSTIC LARYNGOSCOPY: CPT | Mod: PBBFAC,PN | Performed by: OTOLARYNGOLOGY

## 2023-12-12 PROCEDURE — 99214 OFFICE O/P EST MOD 30 MIN: CPT | Mod: PBBFAC,PN | Performed by: OTOLARYNGOLOGY

## 2023-12-12 PROCEDURE — 99214 PR OFFICE/OUTPT VISIT, EST, LEVL IV, 30-39 MIN: ICD-10-PCS | Mod: 25,S$PBB,, | Performed by: OTOLARYNGOLOGY

## 2023-12-12 RX ORDER — PANTOPRAZOLE SODIUM 40 MG/1
40 TABLET, DELAYED RELEASE ORAL DAILY
Qty: 30 TABLET | Refills: 3 | Status: SHIPPED | OUTPATIENT
Start: 2023-12-12 | End: 2024-12-11

## 2023-12-12 NOTE — PROCEDURES
Laryngoscopy    Date/Time: 12/12/2023 10:00 AM    Performed by: Angela Hooper MD  Authorized by: Angela Hooper MD    Consent Done?:  Yes (Verbal)  Anesthesia:     Local anesthetic:  Lidocaine 2% and Yanick-Synephrine 1/2%  Laryngoscopy:     Areas examined:  Nasal cavities, nasopharynx, oropharynx, hypopharynx, larynx and vocal cords  Nose External:      No external nasal deformity  Nose Intranasal:      Mucosa no polyps     Mucosa ulcers not present     No mucosa lesions present     No septum gross deformity     Turbinates not enlarged  Nasopharynx:      No mucosa lesions     Adenoids not present     Posterior choanae patent     Eustachian tube patent  Larynx/hypopharynx:      No epiglottis lesions     No epiglottis edema     No AE folds lesions     No vocal cord polyps     Equal and normal bilateral     No hypopharynx lesions     No piriform sinus pooling     No piriform sinus lesions     Post cricoid edema (mild)     Post cricoid erythema (mild)     Interval improvement

## 2023-12-12 NOTE — PROGRESS NOTES
Chief Complaint   Patient presents with    Follow-up   .    HPI:     Carina Montaño is a 68 y.o. female who presents for evaluation of a several month history of nasal congestion, postnasal drip, facial pressure and pain.  She notes associated cough and sneezing. She does feel that her symptoms worsen with weather changes.  She describes difficulty breathing at night.  She does use sinus rinses or nasal sprays. She has been on Flonase and Astelin. She is also on Claritin and montelukast.  She feels that this is somewhat helpful.  She admits to midface pain and pressure.  She admits to rhinorrhea and postnasal drip. There is not maxillary tooth pain. She admits to frontal headaches.  She has had sinus or nasal surgery. She is unsure of exact procedure.  There is no history of sinonasal trauma. She notes that she has had hearing loss since childhood. She has had hearing tests in the past. She did not tolerate hearing aids well. She felt they were too loud. She thinks last hearing test was 10 years ago.       Interval HPI 9/11/2023:  Follow up visit. Reports that she still has some nasal congestion. She feels that her symptoms are stable.   She has been taking Flonase, Astelin, montelukast. She is using nasal saline rinses. She notes intermittent hoarseness and throat clearing.  She does have history of GERD. She takes Pantoprazole 40mg PO daily.     Interval HPI 12/12/2023:  Follow up visit. Reports that she has been doing well. She has been using Protonix 40mg PO BID, flonase, Astelin, and montelukast, saline. She does feel that she has intermittent hoarseness and vocal fatigue. She denies throat pain, dysphgia, odynophagia.       Past Medical History:   Diagnosis Date    Allergy     Arthritis     Asthma     Cancer     breast    Chronic diastolic (congestive) heart failure 12/8/2022    GERD (gastroesophageal reflux disease)     Hyperlipidemia     Hypertension     Prediabetes      Social History      Socioeconomic History    Marital status:     Number of children: 1   Occupational History    Occupation:     Tobacco Use    Smoking status: Never    Smokeless tobacco: Never   Substance and Sexual Activity    Alcohol use: Yes     Comment: social    Drug use: No    Sexual activity: Yes     Social Determinants of Health     Financial Resource Strain: Low Risk  (1/12/2023)    Overall Financial Resource Strain (CARDIA)     Difficulty of Paying Living Expenses: Not very hard   Food Insecurity: No Food Insecurity (1/12/2023)    Hunger Vital Sign     Worried About Running Out of Food in the Last Year: Never true     Ran Out of Food in the Last Year: Never true   Transportation Needs: No Transportation Needs (1/12/2023)    PRAPARE - Transportation     Lack of Transportation (Medical): No     Lack of Transportation (Non-Medical): No   Physical Activity: Inactive (1/12/2023)    Exercise Vital Sign     Days of Exercise per Week: 0 days     Minutes of Exercise per Session: 0 min   Stress: No Stress Concern Present (1/12/2023)    [employer] [place] [employer] - Occupational Stress Questionnaire     Feeling of Stress : Not at all   Social Connections: Unknown (1/12/2023)    Social Connection and Isolation Panel [NHANES]     Frequency of Communication with Friends and Family: Twice a week     Frequency of Social Gatherings with Friends and Family: Once a week     Active Member of Clubs or Organizations: Yes     Attends Club or Organization Meetings: 1 to 4 times per year     Marital Status:    Housing Stability: Low Risk  (1/12/2023)    Housing Stability Vital Sign     Unable to Pay for Housing in the Last Year: No     Number of Places Lived in the Last Year: 1     Unstable Housing in the Last Year: No     Past Surgical History:   Procedure Laterality Date    ABDOMINAL SURGERY      COLONOSCOPY N/A 5/11/2021    Procedure: COLONOSCOPY;  Surgeon: Ema Vidal MD;  Location:  [facility] ENDO;  Service: Endoscopy;  Laterality: N/A;  COVID TEST 5/8 SUPREP    ESOPHAGOGASTRODUODENOSCOPY N/A 5/17/2019    Procedure: ESOPHAGOGASTRODUODENOSCOPY (EGD);  Surgeon: Ema Vidal MD;  Location: [facility];  Service: Endoscopy;  Laterality: N/A;    KNEE SURGERY      SHOULDER SURGERY       Family History   Problem Relation Age of Onset    Hypertension Mother     Cancer Mother         Pancreatic    Hypertension Father            Review of Systems  General: negative for chills, fever or weight loss  Psychological: negative for mood changes or depression  Ophthalmic: negative for blurry vision, photophobia or eye pain  ENT: see HPI  Respiratory: no cough, shortness of breath, or wheezing  Cardiovascular: no chest pain or dyspnea on exertion  Gastrointestinal: no abdominal pain, change in bowel habits, or black/ bloody stools  Musculoskeletal: negative for gait disturbance or muscular weakness  Neurological: no syncope or seizures; no ataxia  Dermatological: negative for puritis,  rash and jaundice  Hematologic/lymphatic: no easy bruising, no new lumps or bumps      Physical Exam:    Vitals:    12/12/23 1013   BP: (!) 140/80   Pulse: 76         Constitutional: Well appearing / communicating without difficutly.  NAD.  Eyes: EOM I Bilaterally  Head/Face: Normocephalic.  Negative paranasal sinus pressure/tenderness.  Salivary glands WNL.  House Brackmann I Bilaterally.    Right Ear: Auricle normal appearance. External Auditory Canal within normal limits,TM w/o masses/lesions/perforations. TM mobility noted.   Left Ear: Auricle normal appearance. External Auditory Canal WNL,TM w/o masses/lesions/perforations. TM mobility noted.  Nose: No gross nasal septal deviation. Inferior Turbinates 3+ bilaterally. No septal perforation. No masses/lesions. External nasal skin appears normal without masses/lesions.  Oral Cavity: Gingiva/lips within normal limits.  Dentition/gingiva healthy appearing. Mucus membranes moist.  Floor of mouth soft, no masses palpated. Oral Tongue mobile. Hard Palate appears normal.    Oropharynx: Base of tongue appears normal. No masses/lesions noted. Tonsillar fossa/pharyngeal wall without lesions. Posterior oropharynx WNL.  Soft palate without masses. Midline uvula.   Neck/Lymphatic: No LAD I-VI bilaterally.  No thyromegaly.  No masses noted on exam.    Mirror laryngoscopy/nasopharyngoscopy: Active gag reflex.  Unable to perform.          Diagnostic studies:  ImmunoCAP testing 3/23/2023: Class 0/1 to cat dander, cockroach, cypress, and white oak.         CT scan sinuses 3/24/2023:  FINDINGS:  Intracranial structures are unremarkable.  No incidental soft tissue masses are seen.  Frontal sinuses are hypoplastic.  There is mild mucosal thickening at the floor the left maxillary sinus.  Ethmoid, sphenoid, and frontal sinuses are clear.  Ostiomeatal complexes are patent.  Frontal ethmoidal sinus drainage routes are unremarkable.  Nasal passes is are clear.  Mastoid air cells and middle ear cavities are clear.          Tympanometry revealed a Type A tympanogram for both ears.  Audiogram results revealed a mild sloping to profound sensorineural hearing loss bilaterally.  Speech reception thresholds were obtained at 35dB for both ears and speech discrimination scores were 36% for the right ear and 44% for the left ear.           Assessment:    ICD-10-CM ICD-9-CM    1. Chronic allergic rhinitis  J30.9 477.9       2. Laryngopharyngeal reflux (LPR)  K21.9 478.79       3. Hypertrophy of inferior nasal turbinate  J34.3 478.0       4. Dysphonia  R49.0 784.42 Ambulatory referral/consult to Speech Therapy                The primary encounter diagnosis was Chronic allergic rhinitis. Diagnoses of Laryngopharyngeal reflux (LPR), Hypertrophy of inferior nasal turbinate, and Dysphonia were also pertinent to this visit.      Plan:  Orders Placed This Encounter   Procedures    Ambulatory referral/consult to Speech Therapy    Decrease Pantoprazole 40mg PO daily   Refer to speech therapy   Continue nasal saline rinses b.i.d.  Continue Flonase 2 sprays per nostril daily  Continue Astelin 1 spray per nostril b.i.d.  Continue montelukast 10 mg PO daily. She has had dry eyes so will avoid oral antihistamines for now.     Follow-up in approximately 4 months    Angela Jacobs MD

## 2024-01-03 ENCOUNTER — OFFICE VISIT (OUTPATIENT)
Dept: FAMILY MEDICINE | Facility: CLINIC | Age: 69
End: 2024-01-03
Payer: MEDICARE

## 2024-01-03 VITALS
HEIGHT: 61 IN | WEIGHT: 167 LBS | BODY MASS INDEX: 31.53 KG/M2 | OXYGEN SATURATION: 99 % | SYSTOLIC BLOOD PRESSURE: 107 MMHG | DIASTOLIC BLOOD PRESSURE: 69 MMHG | HEART RATE: 88 BPM

## 2024-01-03 DIAGNOSIS — J45.40 MODERATE PERSISTENT ASTHMA WITHOUT COMPLICATION: ICD-10-CM

## 2024-01-03 DIAGNOSIS — K21.9 GASTROESOPHAGEAL REFLUX DISEASE WITHOUT ESOPHAGITIS: ICD-10-CM

## 2024-01-03 DIAGNOSIS — R01.1 MURMUR: ICD-10-CM

## 2024-01-03 DIAGNOSIS — I10 ESSENTIAL HYPERTENSION: Chronic | ICD-10-CM

## 2024-01-03 DIAGNOSIS — E78.00 HYPERCHOLESTEREMIA: ICD-10-CM

## 2024-01-03 DIAGNOSIS — Z00.00 ENCOUNTER FOR PREVENTIVE HEALTH EXAMINATION: Primary | ICD-10-CM

## 2024-01-03 DIAGNOSIS — H91.93 DECREASED HEARING OF BOTH EARS: ICD-10-CM

## 2024-01-03 DIAGNOSIS — I50.32 CHRONIC DIASTOLIC (CONGESTIVE) HEART FAILURE: ICD-10-CM

## 2024-01-03 DIAGNOSIS — R73.03 PRE-DIABETES: ICD-10-CM

## 2024-01-03 DIAGNOSIS — R35.0 URINE FREQUENCY: ICD-10-CM

## 2024-01-03 DIAGNOSIS — Z85.3 HISTORY OF CANCER OF LEFT BREAST: ICD-10-CM

## 2024-01-03 DIAGNOSIS — R42 DIZZINESS: ICD-10-CM

## 2024-01-03 DIAGNOSIS — I35.8 AORTIC VALVE SCLEROSIS: ICD-10-CM

## 2024-01-03 DIAGNOSIS — K76.0 NAFLD (NONALCOHOLIC FATTY LIVER DISEASE): ICD-10-CM

## 2024-01-03 PROBLEM — H91.90 DECREASED HEARING: Status: ACTIVE | Noted: 2024-01-03

## 2024-01-03 PROBLEM — C50.919 MALIGNANT NEOPLASM OF FEMALE BREAST, UNSPECIFIED ESTROGEN RECEPTOR STATUS, UNSPECIFIED LATERALITY, UNSPECIFIED SITE OF BREAST: Status: RESOLVED | Noted: 2022-03-23 | Resolved: 2024-01-03

## 2024-01-03 PROBLEM — Z12.11 SCREENING FOR MALIGNANT NEOPLASM OF COLON: Status: RESOLVED | Noted: 2021-05-11 | Resolved: 2024-01-03

## 2024-01-03 PROBLEM — S09.93XA FACIAL TRAUMA, INITIAL ENCOUNTER: Status: RESOLVED | Noted: 2019-09-16 | Resolved: 2024-01-03

## 2024-01-03 PROCEDURE — 99215 OFFICE O/P EST HI 40 MIN: CPT | Mod: PBBFAC,PO | Performed by: NURSE PRACTITIONER

## 2024-01-03 PROCEDURE — 99215 OFFICE O/P EST HI 40 MIN: CPT | Mod: S$PBB,25,, | Performed by: NURSE PRACTITIONER

## 2024-01-03 PROCEDURE — G0439 PPPS, SUBSEQ VISIT: HCPCS | Mod: ,,, | Performed by: NURSE PRACTITIONER

## 2024-01-03 PROCEDURE — 99999 PR PBB SHADOW E&M-EST. PATIENT-LVL V: CPT | Mod: PBBFAC,,, | Performed by: NURSE PRACTITIONER

## 2024-01-03 NOTE — PATIENT INSTRUCTIONS
Counseling and Referral of Other Preventative  (Italic type indicates deductible and co-insurance are waived)    Patient Name: Carina Montaño  Today's Date: 1/3/2024    Health Maintenance       Date Due Completion Date    Pneumococcal Vaccines (Age 65+) (4 - PPSV23 or PCV20) 05/02/2024 9/10/2020    Mammogram 09/14/2024 9/14/2023    Override on 11/17/2017: Done    Hemoglobin A1c (Prediabetes) 09/21/2024 9/21/2023    High Dose Statin 11/30/2024 11/30/2023    DEXA Scan 10/10/2025 10/10/2022    Colorectal Cancer Screening 05/11/2026 5/11/2021    Lipid Panel 09/21/2028 9/21/2023    TETANUS VACCINE 10/04/2033 10/4/2023    Override on 1/1/2015: Done        Orders Placed This Encounter   Procedures    Ambulatory referral/consult to Cardiology     The following information is provided to all patients.  This information is to help you find resources for any of the problems found today that may be affecting your health:                  Living healthy guide: www.[facility].[place].gov      Understanding Diabetes: www.diabetes.org      Eating healthy: www.cdc.gov/healthyweight      CDC home safety checklist: www.cdc.gov/steadi/patient.html      Agency on Aging: www.goea.[place].gov      Alcoholics anonymous (AA): www.aa.org      Physical Activity: www.luis miguel.nih.gov/kq8vafa      Tobacco use: www.quitwithusla.org

## 2024-01-03 NOTE — PROGRESS NOTES
"  Carina Montaño presented for a  Medicare AWV and comprehensive Health Risk Assessment today.      She is a patient of Dr De Leon and is new to me. She presents here today for medicare wellness visit with chief complaint of chronic dizziness x 9 months, urinary frequency x 2-3 weeks, and generalized fatigue for 9 months. She reports she notices dizziness after she sits up or stands, but has felt dizziness when laying down in bed ("feeling like room is spinning") and sometimes also with exertion. She sees ENT and is treated for chronic sinus congestion. She reports drinking water throughout the day, she does not follow low salt diet; She reports SOB with exertion, intermittent constipation, and urinary frequency; she  denies nausea, chest pain, vomiting, diarrhea, dysuria, hematuria, hematochezia. She has not tried taking any medication for dizziness. Her dizziness is better when she sits up slowly, stands slowly, and allows extra time prior to walking. If she feels dizzy, she sits down and this makes it better.     The following components were reviewed and updated:    Medical history  Family History  Social history  Allergies and Current Medications  Health Risk Assessment  Health Maintenance  Care Team         ** See Completed Assessments for Annual Wellness Visit within the encounter summary.**         The following assessments were completed:  Living Situation  CAGE  Depression Screening  Timed Get Up and Go  Whisper Test  Cognitive Function Screening    Nutrition Screening  ADL Screening  PAQ Screening      Review for Opioid Screening: Pt does not have Rx for Opioids      Review for Substance Use Disorders: Patient does not use substance        Review of Systems   Constitutional:  Positive for malaise/fatigue. Negative for fever and weight loss.   HENT:  Positive for congestion and hearing loss. Negative for ear pain.    Eyes:  Negative for blurred vision.   Cardiovascular:  Negative for chest pain and leg " swelling.   Gastrointestinal:  Positive for constipation. Negative for abdominal pain, blood in stool, diarrhea, melena, nausea and vomiting.   Genitourinary:  Positive for frequency. Negative for dysuria, flank pain, hematuria and urgency.   Musculoskeletal:  Negative for myalgias.   Skin:  Negative for rash.   Neurological:  Positive for dizziness. Negative for weakness and headaches.   Psychiatric/Behavioral:  Negative for memory loss and substance abuse.    All other systems reviewed and are negative.        Current Outpatient Medications:     albuterol (PROVENTIL) 2.5 mg /3 mL (0.083 %) nebulizer solution, USE 1 VIAL IN NEBULIZER 4 TIMES PER DAY PRN COUGH, WHEEZING OR SHORTNESS OF BREATH, Disp: 75 mL, Rfl: 5    albuterol (PROVENTIL/VENTOLIN HFA) 90 mcg/actuation inhaler, INHALE 2 PUFFS INTO THE LUNGS EVERY 6 (SIX) HOURS AS NEEDED FOR WHEEZING OR SHORTNESS OF BREATH (OR COUGH)., Disp: 18 g, Rfl: 2    alendronate (FOSAMAX) 70 MG tablet, TAKE 1 TAB BY MOUTH EVERY WEEK WITH GLASS OF WATER/EMPTY STOMACH (DONT LIE DOWN OR EAT FOR 30 MIN), Disp: 4 tablet, Rfl: 11    azelastine (ASTELIN) 137 mcg (0.1 %) nasal spray, 1 spray (137 mcg total) by Nasal route 2 (two) times daily. for 14 days, Disp: 10 mL, Rfl: 11    budesonide-formoterol 160-4.5 mcg (SYMBICORT) 160-4.5 mcg/actuation HFAA, Inhale 2 puffs into the lungs every 12 (twelve) hours., Disp: 10.2 g, Rfl: 11    ciclopirox 1 % shampoo, APPLY 1 APPLICATION ON THE SKIN 2 TIMES WEEKLY, Disp: , Rfl:     fluticasone propionate (FLONASE) 50 mcg/actuation nasal spray, 2 SPRAYS (100 MCG TOTAL) BY EACH NARE ROUTE ONCE DAILY., Disp: 48 mL, Rfl: 3    losartan (COZAAR) 100 MG tablet, TAKE 1 TABLET BY MOUTH EVERY DAY, Disp: 90 tablet, Rfl: 1    metFORMIN (GLUCOPHAGE) 850 MG tablet, TAKE ONE TABLET BY MOUTH DAILY FOR 1 MONTH AND THEN INCREASE TO ONE TABLET BY MOUTH TWICE DAILY THEREAFTER, Disp: 180 tablet, Rfl: 3    montelukast (SINGULAIR) 10 mg tablet, TAKE 1 TABLET BY MOUTH EVERY  "DAY IN THE EVENING, Disp: 90 tablet, Rfl: 3    MULTIVIT,CALC,MINS/IRON/FOLIC (ONE-A-DAY WOMENS FORMULA ORAL), Take 2 tablets by mouth once daily., Disp: , Rfl:     pantoprazole (PROTONIX) 40 MG tablet, Take 1 tablet (40 mg total) by mouth once daily., Disp: 30 tablet, Rfl: 3    predniSONE (DELTASONE) 20 MG tablet, Take 3 pills daily for 3 days, then 2 pills daily for 3 days, then 1 pill daily for 3 days, then 1/2 pill daily for 4 days., Disp: 20 tablet, Rfl: 0    simvastatin (ZOCOR) 20 MG tablet, Take 1 tablet (20 mg total) by mouth Daily., Disp: 90 tablet, Rfl: 3    triamcinolone acetonide 0.1% (KENALOG) 0.1 % Lotn, Apply topically 2 (two) times daily. Apply to affected areas of itching. Do not apply to face or genitals., Disp: 60 mL, Rfl: 1    vitamin D (VITAMIN D3) 1000 units Tab, Take 1,000 Units by mouth once daily., Disp: , Rfl:        Vitals:    01/03/24 1600 01/03/24 1640 01/03/24 1643 01/03/24 1646   BP: 104/62 121/71 117/73 107/69   Pulse: 101 80 82 88   SpO2: 99%      Weight: 75.8 kg (167 lb)      Height: 5' 1" (1.549 m)      PainSc: 0-No pain           Vitals:    01/03/24 1600 01/03/24 1640 01/03/24 1643 01/03/24 1646   BP: 104/62 121/71 117/73 107/69   BP Location:  Left arm Left arm Left arm   Patient Position:  Lying Sitting Standing   Pulse: 101 80 82 88   SpO2: 99%      Weight: 75.8 kg (167 lb)      Height: 5' 1" (1.549 m)         Physical Exam  Vitals and nursing note reviewed.   Constitutional:       General: She is not in acute distress.     Appearance: Normal appearance. She is obese. She is not ill-appearing.   HENT:      Head: Normocephalic and atraumatic.      Right Ear: Tympanic membrane, ear canal and external ear normal.      Left Ear: Tympanic membrane, ear canal and external ear normal.      Mouth/Throat:      Mouth: Mucous membranes are dry.   Eyes:      General: No scleral icterus.        Right eye: No discharge.         Left eye: No discharge.      Extraocular Movements: Extraocular " movements intact.      Conjunctiva/sclera: Conjunctivae normal.   Cardiovascular:      Rate and Rhythm: Normal rate.      Heart sounds: Murmur (more pronounced with forward leaning) heard.      Comments: Ortho blood pressure stats  Supine 121/71   HR 90  Sitting 117/73   HR 82  Stand   107/69   HR 88  Pulmonary:      Effort: Pulmonary effort is normal. No respiratory distress.      Breath sounds: Normal breath sounds. No wheezing or rales.   Musculoskeletal:      Cervical back: Normal range of motion.      Right lower leg: No edema.      Left lower leg: No edema.   Skin:     General: Skin is warm and dry.      Findings: No rash.   Neurological:      Mental Status: She is alert and oriented to person, place, and time.      Coordination: Romberg sign negative.      Gait: Gait normal.   Psychiatric:         Mood and Affect: Mood normal.         Behavior: Behavior normal. Behavior is cooperative.         Cognition and Memory: Cognition and memory normal.               Diagnoses and health risks identified today and associated recommendations/orders:    1. Encounter for preventive health examination  - Chart reviewed. Problem list updated. Discussed current medical diagnosis, current medications, medical/surgical/family/social history; updated provider list; documented vital signs; identified any cognitive impairment; and updated risk factor list. Addressed any outstanding health maintenance. Provided patient with personalized health advice. Continue to follow up with PCP and any specialists.     2. Chronic diastolic (congestive) heart failure  Chronic; stable on current treatment plan; follow up with PCP  - referral to cardiology    3. Aortic valve sclerosis  Chronic; stable on current treatment plan; follow up with PCP  - + murmur on exam, more pronounced with forward leaning  - pt complaint fatigue, dizziness; referral cardiology  - ortho BP negative, romberg negative, ears normal on exam  - Ambulatory  referral/consult to Cardiology; Future    4. Moderate persistent asthma without complication  Chronic; stable on current treatment plan; follow up with PCP  - taking symbicort, singulair, and albuterol prn    5. Decreased hearing of both ears  Chronic; stable on current treatment plan; follow up with PCP  - follow up ENT     6. Essential hypertension  Chronic; stable on current treatment plan; follow up with PCP  - taking losartan  - BP marginal low today  - instructed to keep BP log and bring to upcoming visit    7. Hypercholesteremia  Chronic; stable on current treatment plan; follow up with PCP  - taking statin    8. History of cancer of left breast  Chronic; stable on current treatment plan; follow up with PCP    9. Pre-diabetes  Chronic; stable on current treatment plan; follow up with PCP  - taking  metformin    10. Gastroesophageal reflux disease without esophagitis  Chronic; stable on current treatment plan; follow up with PCP  - taking ppi    11. NAFLD (nonalcoholic fatty liver disease)  Chronic; stable on current treatment plan; follow up with PCP  - recommend weight loss     12. Murmur  - murmur noted on exam, more pronounced with forward leaning position  - pt reports fatigue and dizziness with no previous cause identified  - referral to cardiology  - Ambulatory referral/consult to Cardiology; Future  - echo ordered    13. Urine frequency  - urine frequency reported, check UA with reflex ; follow up with pcp  - Urinalysis, Reflex to Urine Culture Urine, Clean Catch; Future    14. Dizziness  - dizziness x 9 months, ongoing, worse with quick movements, standing, and exertio  - negative rhomberg; ears normal on examl; + Murmur identified; orthoBP negative today   - referral to cardiology  - recommend checking Bps and keeping log to bring to upcoming visit  - follow up with PCP and ENT  - Orthostatic blood pressure - negative today     15. BMI 31.0-31.9,adult  - Recommendation for healthy diet and increasing  exercise as tolerated with goal of 150min/week . Recommend weight loss        Provided Carina with a 5-10 year written screening schedule and personal prevention plan. Recommendations were developed using the USPSTF age appropriate recommendations. Education, counseling, and referrals were provided as needed. After Visit Summary printed and given to patient which includes a list of additional screenings\tests needed.    Follow up in about 1 year (around 1/3/2025) for your next annual wellness visit.    Ashley Reynoso, ROROP-C    Advance Care Planning     I offered to discuss advanced care planning, including how to pick a person who would make decisions for you if you were unable to make them for yourself, called a health care power of , and what kind of decisions you might make such as use of life sustaining treatments such as ventilators and tube feeding when faced with a life limiting illness recorded on a living will that they will need to know. (How you want to be cared for as you near the end of your natural life)     X  Patient is unwilling to engage in a discussion regarding advance directives at this time.    40 minutes of total time spent on the chief complaint encounter, which includes face to face time and non-face to face time preparing to see the patient (eg, review of tests), Obtaining and/or reviewing separately obtained history, Documenting clinical information in the electronic or other health record, Independently interpreting results (not separately reported) and communicating results to the patient/family/caregiver, or Care coordination (not separately reported).

## 2024-01-03 NOTE — Clinical Note
Denise - saw your patient today for medicare wellness. Multiple chief complaints- dizziness, urine frequency, fatigue. I did hear +murmur on exam - referred to cardiology. I'm checking urine. I rescheduled labs you ordered to be done sooner. She will see you in March, getting labs done tomorrow jsut to make sure nothing urgent.   14. Dizziness - dizziness x 9 months, ongoing, worse with quick movements, standing, and exertio - negative rhomberg; ears normal on examl; + Murmur identified; orthoBP negative today  - referral to cardiology - recommend checking Bps and keeping log to bring to upcoming visit - follow up with PCP and ENT - Orthostatic blood pressure - negative today

## 2024-01-04 ENCOUNTER — TELEPHONE (OUTPATIENT)
Dept: INTERNAL MEDICINE | Facility: CLINIC | Age: 69
End: 2024-01-04
Payer: MEDICARE

## 2024-01-04 ENCOUNTER — CLINICAL SUPPORT (OUTPATIENT)
Dept: REHABILITATION | Facility: HOSPITAL | Age: 69
End: 2024-01-04
Attending: OTOLARYNGOLOGY
Payer: MEDICARE

## 2024-01-04 ENCOUNTER — LAB VISIT (OUTPATIENT)
Dept: LAB | Facility: HOSPITAL | Age: 69
End: 2024-01-04
Attending: INTERNAL MEDICINE
Payer: MEDICARE

## 2024-01-04 DIAGNOSIS — E55.9 VITAMIN D INSUFFICIENCY: ICD-10-CM

## 2024-01-04 DIAGNOSIS — R49.0 DYSPHONIA: ICD-10-CM

## 2024-01-04 DIAGNOSIS — I10 ESSENTIAL HYPERTENSION: Chronic | ICD-10-CM

## 2024-01-04 DIAGNOSIS — R73.03 PRE-DIABETES: ICD-10-CM

## 2024-01-04 LAB
25(OH)D3+25(OH)D2 SERPL-MCNC: 44 NG/ML (ref 30–96)
ALBUMIN SERPL BCP-MCNC: 3.3 G/DL (ref 3.5–5.2)
ALP SERPL-CCNC: 71 U/L (ref 55–135)
ALT SERPL W/O P-5'-P-CCNC: 19 U/L (ref 10–44)
ANION GAP SERPL CALC-SCNC: 8 MMOL/L (ref 8–16)
AST SERPL-CCNC: 16 U/L (ref 10–40)
BILIRUB SERPL-MCNC: 0.4 MG/DL (ref 0.1–1)
BUN SERPL-MCNC: 13 MG/DL (ref 8–23)
CALCIUM SERPL-MCNC: 9.4 MG/DL (ref 8.7–10.5)
CHLORIDE SERPL-SCNC: 106 MMOL/L (ref 95–110)
CO2 SERPL-SCNC: 29 MMOL/L (ref 23–29)
CREAT SERPL-MCNC: 0.8 MG/DL (ref 0.5–1.4)
EST. GFR  (NO RACE VARIABLE): >60 ML/MIN/1.73 M^2
ESTIMATED AVG GLUCOSE: 105 MG/DL (ref 68–131)
GLUCOSE SERPL-MCNC: 108 MG/DL (ref 70–110)
HBA1C MFR BLD: 5.3 % (ref 4–5.6)
POTASSIUM SERPL-SCNC: 3.7 MMOL/L (ref 3.5–5.1)
PROT SERPL-MCNC: 7.2 G/DL (ref 6–8.4)
SODIUM SERPL-SCNC: 143 MMOL/L (ref 136–145)

## 2024-01-04 PROCEDURE — 80053 COMPREHEN METABOLIC PANEL: CPT | Performed by: INTERNAL MEDICINE

## 2024-01-04 PROCEDURE — 92507 TX SP LANG VOICE COMM INDIV: CPT | Mod: PN

## 2024-01-04 PROCEDURE — 82306 VITAMIN D 25 HYDROXY: CPT | Performed by: INTERNAL MEDICINE

## 2024-01-04 PROCEDURE — 83036 HEMOGLOBIN GLYCOSYLATED A1C: CPT | Performed by: INTERNAL MEDICINE

## 2024-01-04 PROCEDURE — 36415 COLL VENOUS BLD VENIPUNCTURE: CPT | Mod: PO | Performed by: INTERNAL MEDICINE

## 2024-01-04 PROCEDURE — 92524 BEHAVRAL QUALIT ANALYS VOICE: CPT | Mod: PN

## 2024-01-04 NOTE — TELEPHONE ENCOUNTER
----- Message from Zaki Aguilar sent at 1/4/2024 11:30 AM CST -----  .Type:  Test Results    Who Called: pt  Name of Test (Lab/Mammo/Etc): lab  Date of Test: 1/4  Ordering Provider: sharmaine  Where the test was performed: [facility]  Would the patient rather a call back or a response via [facility]? call  Best Call Back Number:  [phone]  Additional Information:

## 2024-01-04 NOTE — PLAN OF CARE
[facility]  Speech Therapy Evaluation -Voice    Date: 1/4/2024     Name: Carina Montaño   MRN: [id number]    Therapy Diagnosis:   Encounter Diagnosis   Name Primary?    Dysphonia     Physician: Madeline Hooper  Physician Orders: Ambulatory Referral to Speech Therapy   Medical Diagnosis: dysphonia    Visit # / Visits Authorized:  1 / 1   Date of Evaluation:  1/4/2024   Insurance Authorization Period: 12/12/23 to 12/11/24  Plan of Care Certification:    1/4/2024 to 2/2/24      Time In:8:06   Time Out: 8:56   Total time: 50    Procedure   Qualitative Analysis of Voice and Resonance   Speech/lang/voice treat     Precautions: Standard  Subjective   Date of Onset: 12/12/23  History of Current Condition:  Carina Montaño is a 68 y.o. female who presents to [facility] Speech [facility] for evaluation secondary to dysphonia. Patient was referred to therapy by Madeline Hooper, which is the patient's otolaryngologist. Patient reports hoarseness for over a year ago.  Premature baby; hearing loss since birth per report.  Patient was attended evaluation alone.  Past Medical History: Carina Montaño  has a past medical history of Allergy, Arthritis, Asthma, Cancer, Chronic diastolic (congestive) heart failure (12/8/2022), GERD (gastroesophageal reflux disease), Hyperlipidemia, Hypertension, and Prediabetes.  Carina Montaño  has a past surgical history that includes Knee surgery; Shoulder surgery; Abdominal surgery; Esophagogastroduodenoscopy (N/A, 5/17/2019); and Colonoscopy (N/A, 5/11/2021).  Medical Hx and Allergies: Carina has a current medication list which includes the following prescription(s): albuterol, albuterol, alendronate, azelastine, budesonide-formoterol 160-4.5 mcg, ciclopirox, fluticasone propionate, losartan, metformin, montelukast, multivit,calc,mins/iron/folic, pantoprazole, prednisone, simvastatin, triamcinolone acetonide 0.1%, and vitamin d.  "  Review of patient's allergies indicates:   Allergen Reactions    Grass pollen-perennial rye, standard Other (See Comments)    Lipitor [atorvastatin]     Strawberries [strawberry]      Prior Therapy:  went to speech therapy as a child for how to use words.   Social History:  Patient lives with  in [place], Patient is currently driving  Occupation:  Retired 2020; computer orientation for the [employer]  Prior Level of Function: no significant dysphonia   Current Level of Function: mild-moderate dysphonia  Pain Scale: 0/10 on a Visual Analog Scale currently.  Pain Location: n/a  Patient's Therapy Goals:  to decrease hoarseness  Objective   Formal Assessment:  Voice Evaluation    Swallowing: none   Breathing: coughing sometimes early in morning or late in evening  Smokin packs/day   EtOH: 0-1 drinks/week "a little wine once in a while"  Caffeine: 1 cups/day (no sugar drinks, zero ice tea)  Reflux: yes; once in a while  Water: 4-8 oz/day (a little bit)    Laryngeal endoscopy per Dr. Angela Jacobs on 23:  Laryngoscopy:     Areas examined:  Nasal cavities, nasopharynx, oropharynx, hypopharynx,   larynx and vocal cords   Nose External:      No external nasal deformity   Nose Intranasal:      Mucosa no polyps      Mucosa ulcers not present      No mucosa lesions present      No septum gross deformity      Turbinates not enlarged   Nasopharynx:      No mucosa lesions      Adenoids not present      Posterior choanae patent      Eustachian tube patent   Larynx/hypopharynx:      No epiglottis lesions      No epiglottis edema      No AE folds lesions      No vocal cord polyps      Equal and normal bilateral      No hypopharynx lesions      No piriform sinus pooling      No piriform sinus lesions      Post cricoid edema (mild)      Post cricoid erythema (mild)      Interval improvement     Perceptual assessment:    -Quality: rough  -Volume:  increased projection  -Pitch: low F0  -Flexibility: " diminished    Habitual respiratory pattern: diaphragmatic.    MPT (ah > 18s WFL): 8.5 seconds  S/Z ratio (ratio of 1.4+ may indicate a degree of vocal fold dysfunction): unable to make /z/ sound    VHI-10 (completed to assess self-perceived handicap associated with dysphonia; >11 considered abnormal): 6/40   RSI (completed to assess the possible presence and/or severity of LPR symptoms and any relationship between this condition and the pt's dysphagia; score of ~15 may indicate LPR): 7/45    PRAAT Results:  Mean volume during sustained phonation: 82.1 dB (average is 60 dB in conversation taken at a distance of 50cm)  Mean volume in conversation: 66.3 dB (average is 60 dB in conversation taken at a distance of 50cm)    Treatment   Total Treatment Time Separate from Evaluation: 12 minutes   Treatment included the following:  Lax vox exercises  Head and neck stretches  Introduction to semi-occluded vocal exercises  Education on vocal hygiene, goals, and plan of care.      Education provided:   -role of Speech Therapy, goals/plan of care, scheduling/cancellations, insurance limitations with patient  -Additional Education provided:   Vocal Hygiene    Patient expressed understanding.     Home Program: Increase water intake, head and neck stretches, cup bubbles (lax vox exercises).  Assessment     Carina presents to [facility] status post medical diagnosis of dysphonia.     Interpretation of objective assessment:   She presents with mild dysphonia characterized by harsh/hoarse vocal quality, decreased breath support, decreased speech breath coordination.    Demonstrates impairments including limitations as described in the problem list.     Positive prognostic factors: motivation  Negative prognostic factors: none  Barriers to therapy: No barriers to therapy identified.     Patient's spiritual, cultural, and educational needs considered and patient agreeable to plan of care and goals.    Patient will  benefit from skilled therapy.    Rehab Potential: good    Short Term Goals: (4 weeks) Current Progress:   1. Pt will complete neck relaxation exercises 10x each per session/at home ind'ly.      Progressing/ Not Met 1/4/2024   Established this date   2. Patient will complete SOVT exercises and/or resonant-focused exercises with min A.    Progressing/ Not Met 1/4/2024   Established this date   3. Patient will improve the quality, efficiency, and ease of phonation through resonant and/or airflow exercises from the syllable to conversation level with 80% accuracy.    Progressing/ Not Met 1/4/2024   Established this date    4. Patient will increase awareness for voice conservations behaviors by following the 50/10 rule and reduce voice use in competing noise environments.     Progressing/ Not Met 1/4/2024   Established this date    5. Patient will demonstrate the ability to increase awareness of voicing behavior through self-monitoring to facilitate generalization in functional speaking situations with 80% accuracy.        Progressing/ Not Met 1/4/2024   Established this date        Long Term Goals: (8 weeks) Current Progress:   Patient will implement and adhere to vocal hygiene protocols on a daily basis, including the elimination of phonotraumatic behaviors.  Patient and clinician will facilitate changes in vocal function in order to restore functional use of voice for daily occupational, social, and emotional demands.  Pt will demonstrate improved vocal quality/loudness/intonantion for sustained vocalization/speech at the word/phrase/sentence/conversational level to communicate basic medical and social needs in functional living environment.    Established this date         Plan     Recommended Treatment Plan:  Patient will participate in the [facility] rehabilitation program for speech therapy 2 times per week for 4 weeks to address her  voice  deficits, to educate patient and their family, and to participate in a home  exercise program.    Other Recommendations:   None at this time    Therapist's Name:   Charlotte Cali CCC-SLP   1/4/2024

## 2024-01-04 NOTE — PATIENT INSTRUCTIONS
"VOCAL HYGIENE AND HYDRATION RECOMMENDATIONS    THINGS TO DO:  Drink plenty of waterabout  oz a day!  Try some of these tips to increase hydration as well.  Eat wet snacks such as grapes, melon, cucumbers, apple slices   Reduce intake coffee and other caffeinated and carbonated beverages   Suck on pastille lozenges or sugar free candies (not mentholated lozenges)   Use a room or personal humidifier   Inhale steam for a few minutes before speaking or singing   Pay attention to how, and how much, you use your voice.   Give yourself vocal breaks throughout the day.   Breathe when talking, take frequent pauses for breath.   Use a microphone when speaking in front of a group of 15 or more to reduce voice strain.   Learn how to use your voice correctly to get loud with voice therapy techniques.  Stay healthy. Eat right and get plenty of rest. Follow a reflux precaution diet if indicated.   ____________________________________________________________________     THINGS TO REDUCE:  Loud talking, yelling, cheering, or screaming. Voice injury can take place over a long time, or all at once.  If you need to talk loud or yell, be sure you are doing it properly.   Clearing your throat and forceful coughing. The vocal folds collect mucus when irritated or inflamed and this can cause the urge to clear your throat. The trauma of clearing the throat creates more inflammation and mucus. See tips above for keeping hydrated to assist with cutting back on throat clearing.  Instead of clearing your throat, try these behaviors until the sensation passes:   Take 3-4 consecutive sips of water   Silently clear with a hard exhale making an hhh sound or try a "silent cough"  Swallow hard   Sniff and swallow  Drying agents such as anti-histamines or decongestant medications. You should always take medications as prescribed, but keep in mind these will dry you out, so increase your hydration! "   ____________________________________________________________________     THINGS TO AVOID:  Inhalant irritants such as smoke, strong fumes, and allergens. Take advantage of 1-800-QUIT-NOW if you are ready to stop smoking.   Unnecessary non-speech noises, such as grunting during exercise, loud noises, or excessively high- or low-pitched sounds. Save your voice for talking and singing!   Whispering. Whispering places your vocal folds in a tenser position than usual.

## 2024-01-05 ENCOUNTER — TELEPHONE (OUTPATIENT)
Dept: FAMILY MEDICINE | Facility: CLINIC | Age: 69
End: 2024-01-05
Payer: MEDICARE

## 2024-01-05 DIAGNOSIS — N39.0 URINARY TRACT INFECTION WITHOUT HEMATURIA, SITE UNSPECIFIED: Primary | ICD-10-CM

## 2024-01-05 RX ORDER — CIPROFLOXACIN 500 MG/1
500 TABLET ORAL EVERY 12 HOURS
Qty: 14 TABLET | Refills: 0 | Status: SHIPPED | OUTPATIENT
Start: 2024-01-05 | End: 2024-01-12

## 2024-01-05 NOTE — TELEPHONE ENCOUNTER
Left message for patient. Antibiotics sent to pharmacy for UTI. Will notify patient if any changes are needed once culture and sensitivities result.   Ashley Reynoso, MSN, APRN, FNP-C  Family Medicine  Office [phone]

## 2024-01-09 ENCOUNTER — CLINICAL SUPPORT (OUTPATIENT)
Dept: REHABILITATION | Facility: HOSPITAL | Age: 69
End: 2024-01-09
Payer: MEDICARE

## 2024-01-09 DIAGNOSIS — R49.0 DYSPHONIA: Primary | ICD-10-CM

## 2024-01-09 PROCEDURE — 92507 TX SP LANG VOICE COMM INDIV: CPT | Mod: PN

## 2024-01-09 NOTE — PROGRESS NOTES
[facility]  Speech Therapy Treatment Note- Voice  Date: 1/9/2024     Name: Carina Montaño   MRN: [id number]   Therapy Diagnosis:   Encounter Diagnosis   Name Primary?    Dysphonia Yes   Physician: Cha Hooper*  Physician Orders: Ambulatory Referral to Speech Therapy   Medical Diagnosis: dysphonia    Visit #/ Visits Authorized: 1/ 20 (plus eval)  Date of Evaluation:  1/4/24  Insurance Authorization Period: 1/4/24 to 12/31/24  Plan of Care Expiration Date:   2/2/24  Extended Plan of Care:  n/a   Progress Note: 2/4/24     Time In:  8:50  Time Out:  9:30  Total Billable Time: 40     Precautions: Standard  Subjective:   Patient reports: pleasant; no complaints. I've been doing my head and neck stretches.    She was partially compliant to home exercise program.   Response to previous treatment: fair  Pain Scale: 0/10 on a Visual Analog Scale currently.  Pain Location: n/a  Objective:         UNTIMED  Procedure   Speech- Language- Voice Therapy        Short Term Goals: (4 weeks) Current Progress:   1. Pt will complete neck relaxation exercises 10x each per session/at home ind'ly.       Progressing/ Not Met 1/9/2024  Patient completed the following:  Head turn each way with 5 second hold each  Ear to shoulder (bilateral) with 5 second hold each  Head looking up and back with 5 second hold each way  Head rolls x 3 each way     Taught self laryngeal massage.     Lip trills: x 5  Yawn sigh x 5     2. Patient will complete SOVT exercises and/or resonant-focused exercises with min A.     Progressing/ Not Met  1/9/2024   Completed with 1 sets of:    - blow bubbles without sound x 10   - blow bubbles with sound x 10   - blow bubbles with sound pitch glide up x 10    - blow bubbles with sound pitch glide down  x 10    - blow bubbles with sound roller coaster (up and down) x 10     Vocal shaping provided throughout to decrease pitch breaks successfully.    3. Patient will improve the quality, efficiency,  "and ease of phonation through resonant and/or airflow exercises from the syllable to conversation level with 80% accuracy.     Progressing/ Not Met 1/9/2024   Resonant therapy exercises  Ma x 3  May x 3  Me x 3  Saul x 3  Moo x 3    Difficulty achieving a      4. Patient will increase awareness for voice conservations behaviors by following the 50/10 rule and reduce voice use in competing noise environments.      Progressing/ Not Met 1/9/2024  Introduced. Patient reported understanding.    5. Patient will demonstrate the ability to increase awareness of voicing behavior through self-monitoring to facilitate generalization in functional speaking situations with 80% accuracy.         Progressing/ Not Met 1/9/2024  Difficulty with awareness. Patient stated "I can't really tell when I'm hoarse and when I'm not hoarse."     Patient Education/Response:   Patient educated regarding the following:  Education on vocal hygiene, goals, and plan of care.     Home program established: Home Program: Lax vox exercises, Head and neck stretches, semi-occluded vocal exercises  Patient verbalized understanding to all above education provided.     See Electronic Medical Record under Patient Instructions for exercises provided throughout therapy.  Assessment:   First treatment session focused on education, head/ neck stretches and semi-occluded vocal tract exercises. Patient was able to complete exercises with mod A for breath support.  Able to achieve a good vocal quality during exercises and carry that over into sentences and conversation temporarily. However, the patient is having difficulty with self awareness of hoarseness. Hearing loss may be a contributing factor.     Carina is progressing well towards her goals. Current goals remain appropriate. Goals to be updated as necessary.     Patient prognosis is Good. Patient will continue to benefit from skilled outpatient speech and language therapy to address the deficits listed in " the problem list on initial evaluation, provide patient/family education and to maximize patient's level of independence in the home and community environment.   Medical necessity is demonstrated by the following IMPAIRMENTS:  Voice: Patient with decreased vocal intensity resulting in severely decreased speech intelligibility. Reportedly, this worsens over the phone negatively impacting his ability to effectively and efficiently explain an emergency situation to emergency operators via phone or in person    Barriers to Therapy: none  Patient's spiritual, cultural and educational needs considered and patient agreeable to plan of care and goals.  Plan:   Continue Plan of Care with focus on rehabilitation and compensation for dysphonia.    Charlotte Cali CCC-SLP   1/9/2024

## 2024-01-11 ENCOUNTER — CLINICAL SUPPORT (OUTPATIENT)
Dept: REHABILITATION | Facility: HOSPITAL | Age: 69
End: 2024-01-11
Payer: MEDICARE

## 2024-01-11 DIAGNOSIS — R49.0 DYSPHONIA: Primary | ICD-10-CM

## 2024-01-11 PROCEDURE — 92507 TX SP LANG VOICE COMM INDIV: CPT | Mod: PN

## 2024-01-11 NOTE — PROGRESS NOTES
[facility]  Speech Therapy Treatment Note- Voice  Date: 1/11/2024     Name: Carina Montaño   MRN: [id number]   Therapy Diagnosis:   Encounter Diagnosis   Name Primary?    Dysphonia Yes   Physician: Cha Hooper*  Physician Orders: Ambulatory Referral to Speech Therapy   Medical Diagnosis: dysphonia    Visit #/ Visits Authorized: 2/ 20 (plus eval)  Date of Evaluation:  1/4/24  Insurance Authorization Period: 1/4/24 to 12/31/24  Plan of Care Expiration Date:   2/2/24  Extended Plan of Care:  n/a   Progress Note: 2/4/24     Time In:  8:50  Time Out:  9:30  Total Billable Time: 40     Precautions: Standard  Subjective:   Patient reports: pleasant; no complaints. I've been doing my exercises at home.  She was partially compliant to home exercise program.   Response to previous treatment: fair  Pain Scale: 0/10 on a Visual Analog Scale currently.  Pain Location: n/a  Objective:         UNTIMED  Procedure   Speech- Language- Voice Therapy        Short Term Goals: (4 weeks) Current Progress:   1. Pt will complete neck relaxation exercises 10x each per session/at home ind'ly.       Progressing/ Not Met 1/11/2024  Patient completed the following:  Head turn each way with 5 second hold each  Ear to shoulder (bilateral) with 5 second hold each  Head looking up and back with 5 second hold each way  Head rolls x 3 each way     Taught self laryngeal massage.     Lip trills: x 5  Yawn sigh x 5     2. Patient will complete SOVT exercises and/or resonant-focused exercises with min A.     Progressing/ Not Met  1/11/2024   Completed with 1 sets of:    - blow bubbles without sound x 10   - blow bubbles with sound x 10   - blow bubbles with sound pitch glide up x 10    - blow bubbles with sound pitch glide down  x 10    - blow bubbles with sound roller coaster (up and down) x 10     Vocal shaping provided throughout to decrease pitch breaks successfully.    3. Patient will improve the quality, efficiency, and  "ease of phonation through resonant and/or airflow exercises from the syllable to conversation level with 80% accuracy.     Progressing/ Not Met 1/11/2024   Resonant therapy exercises -Not addressed this session.      4. Patient will increase awareness for voice conservations behaviors by following the 50/10 rule and reduce voice use in competing noise environments.      Progressing/ Not Met 1/11/2024  Reviewed.Patient reported understanding.    5. Patient will demonstrate the ability to increase awareness of voicing behavior through self-monitoring to facilitate generalization in functional speaking situations with 80% accuracy.         Progressing/ Not Met 1/11/2024  Difficulty with awareness. Patient stated "I can't really tell when I'm hoarse and when I'm not hoarse."    Improvement noted with vocal quality in conversation.     Patient Education/Response:   Patient educated regarding the following:  Education on vocal hygiene, goals, and plan of care.     Home program established: Home Program: Lax vox exercises, Head and neck stretches, semi-occluded vocal exercises  Patient verbalized understanding to all above education provided.     See Electronic Medical Record under Patient Instructions for exercises provided throughout therapy.  Assessment:   First treatment session focused on education, head/ neck stretches and semi-occluded vocal tract exercises. Patient was able to complete exercises with min-mod A for breath support.  Able to achieve a good vocal quality during exercises and carry that over into sentences and conversation temporarily. Overall, improvement in vocal quality since beginning therapy.    Carina is progressing well towards her goals. Current goals remain appropriate. Goals to be updated as necessary.     Patient prognosis is Good. Patient will continue to benefit from skilled outpatient speech and language therapy to address the deficits listed in the problem list on initial evaluation, " provide patient/family education and to maximize patient's level of independence in the home and community environment.   Medical necessity is demonstrated by the following IMPAIRMENTS:  Voice: Patient with decreased vocal intensity resulting in severely decreased speech intelligibility. Reportedly, this worsens over the phone negatively impacting his ability to effectively and efficiently explain an emergency situation to emergency operators via phone or in person    Barriers to Therapy: none  Patient's spiritual, cultural and educational needs considered and patient agreeable to plan of care and goals.  Plan:   Continue Plan of Care with focus on rehabilitation and compensation for dysphonia.    Charlotte Cali CCC-SLP   1/11/2024

## 2024-01-16 ENCOUNTER — CLINICAL SUPPORT (OUTPATIENT)
Dept: REHABILITATION | Facility: HOSPITAL | Age: 69
End: 2024-01-16
Payer: MEDICARE

## 2024-01-16 DIAGNOSIS — R49.0 DYSPHONIA: Primary | ICD-10-CM

## 2024-01-16 PROCEDURE — 92507 TX SP LANG VOICE COMM INDIV: CPT | Mod: PN

## 2024-01-16 NOTE — PROGRESS NOTES
[facility]  Speech Therapy Treatment Note- Voice  Date: 1/16/2024     Name: Carina Montaño   MRN: [id number]   Therapy Diagnosis:   Encounter Diagnosis   Name Primary?    Dysphonia Yes   Physician: Cha Hooper*  Physician Orders: Ambulatory Referral to Speech Therapy   Medical Diagnosis: dysphonia    Visit #/ Visits Authorized: 3/ 20 (plus eval)  Date of Evaluation:  1/4/24  Insurance Authorization Period: 1/4/24 to 12/31/24  Plan of Care Expiration Date:   2/2/24  Extended Plan of Care:  n/a   Progress Note: 2/4/24     Time In:  8:50  Time Out:  9:25  Total Billable Time: 35    Precautions: Standard  Subjective:   Patient reports: pleasant; no complaints. I've been doing my exercises at home.  She was partially compliant to home exercise program.   Response to previous treatment: fair  Pain Scale: 0/10 on a Visual Analog Scale currently.  Pain Location: n/a  Objective:         UNTIMED  Procedure   Speech- Language- Voice Therapy        Short Term Goals: (4 weeks) Current Progress:   1. Pt will complete neck relaxation exercises 10x each per session/at home ind'ly.       Progressing/ Not Met 1/16/2024  Patient completed the following:  Head turn each way with 5 second hold each  Ear to shoulder (bilateral) with 5 second hold each  Head looking up and back with 5 second hold each way  Head rolls x 3 each way     Taught self laryngeal massage.     Lip trills: x 5  Yawn sigh x 5     2. Patient will complete SOVT exercises and/or resonant-focused exercises with min A.     Progressing/ Not Met  1/16/2024   Completed with 1 sets of:    - blow bubbles without sound x 10   - blow bubbles with sound x 10   - blow bubbles with sound pitch glide up x 10    - blow bubbles with sound pitch glide down  x 10    - blow bubbles with sound roller coaster (up and down) x 10     Vocal shaping provided throughout to decrease pitch breaks successfully.    3. Patient will improve the quality, efficiency, and  ease of phonation through resonant and/or airflow exercises from the syllable to conversation level with 80% accuracy.     Progressing/ Not Met 1/16/2024   Resonant therapy exercises with mod cues and consistent vocal shaping.      4. Patient will increase awareness for voice conservations behaviors by following the 50/10 rule and reduce voice use in competing noise environments.      Progressing/ Not Met 1/16/2024  Reviewed.Patient reported understanding.    5. Patient will demonstrate the ability to increase awareness of voicing behavior through self-monitoring to facilitate generalization in functional speaking situations with 80% accuracy.         Progressing/ Not Met 1/16/2024  Improvement with awareness of vocal quality.     Improvement noted with vocal quality in conversation.     Read sentences with confidential voice x 10  Read a paragraph with confidential voice.   Min-mod cueing and shaping required.     Patient Education/Response:   Patient educated regarding the following:  Education on vocal hygiene, goals, and plan of care.     Home program established: Home Program: Lax vox exercises, Head and neck stretches, semi-occluded vocal exercises  Patient verbalized understanding to all above education provided.     See Electronic Medical Record under Patient Instructions for exercises provided throughout therapy.  Assessment:   Treatment session focused on education, head/ neck stretches and semi-occluded vocal tract exercises. Patient was able to complete exercises with min-mod A for breath support.  Able to achieve a good vocal quality during exercises and carry that over into sentences and conversation temporarily. Overall, improvement in vocal quality since beginning therapy.  Improvement noted in awareness of vocal quality.  Worked on achieving confidential voice while reading and in conversation with mod vocal shaping.     Carina is progressing well towards her goals. Current goals remain  appropriate. Goals to be updated as necessary.     Patient prognosis is Good. Patient will continue to benefit from skilled outpatient speech and language therapy to address the deficits listed in the problem list on initial evaluation, provide patient/family education and to maximize patient's level of independence in the home and community environment.   Medical necessity is demonstrated by the following IMPAIRMENTS:  Voice: Patient with decreased vocal intensity resulting in severely decreased speech intelligibility. Reportedly, this worsens over the phone negatively impacting his ability to effectively and efficiently explain an emergency situation to emergency operators via phone or in person    Barriers to Therapy: none  Patient's spiritual, cultural and educational needs considered and patient agreeable to plan of care and goals.  Plan:   Continue Plan of Care with focus on rehabilitation and compensation for dysphonia. Plan to discharge 1/20/24.    Charlotte Cali CCC-SLP   1/16/2024

## 2024-01-23 ENCOUNTER — CLINICAL SUPPORT (OUTPATIENT)
Dept: REHABILITATION | Facility: HOSPITAL | Age: 69
End: 2024-01-23
Payer: MEDICARE

## 2024-01-23 DIAGNOSIS — R49.0 DYSPHONIA: Primary | ICD-10-CM

## 2024-01-23 PROCEDURE — 92507 TX SP LANG VOICE COMM INDIV: CPT | Mod: PN

## 2024-01-23 NOTE — PROGRESS NOTES
[facility]  Speech Therapy Treatment Note- Voice  Date: 1/23/2024     Name: Carina Montaño   MRN: [id number]   Therapy Diagnosis:   Encounter Diagnosis   Name Primary?    Dysphonia Yes   Physician: Cha Hooper*  Physician Orders: Ambulatory Referral to Speech Therapy   Medical Diagnosis: dysphonia    Visit #/ Visits Authorized: 4/ 20 (plus eval)  Date of Evaluation:  1/4/24  Insurance Authorization Period: 1/4/24 to 12/31/24  Plan of Care Expiration Date:   2/2/24  Extended Plan of Care:  n/a   Progress Note: 2/4/24     Time In:  8:55  Time Out:  9:25  Total Billable Time: 30    Precautions: Standard  Subjective:   Patient reports: pleasant; no complaints. I've been doing my exercises at home. Ready to graduate from therapy.  She was partially compliant to home exercise program.   Response to previous treatment: fair  Pain Scale: 0/10 on a Visual Analog Scale currently.  Pain Location: n/a  Objective:         UNTIMED  Procedure   Speech- Language- Voice Therapy        Short Term Goals: (4 weeks) Current Progress:   1. Pt will complete neck relaxation exercises 10x each per session/at home ind'ly.      Patient completed the following:  Head turn each way with 5 second hold each  Ear to shoulder (bilateral) with 5 second hold each  Head looking up and back with 5 second hold each way  Head rolls x 3 each way     Patient completed self laryngeal massage for 30 seconds.     Lip trills: x 5  Yawn sigh x 5    Goal met 1/23/2024      2. Patient will complete SOVT exercises and/or resonant-focused exercises with min A.   Completed with 1 sets of:    - blow bubbles without sound x 10   - blow bubbles with sound x 10   - blow bubbles with sound pitch glide up x 10    - blow bubbles with sound pitch glide down  x 10    - blow bubbles with sound roller coaster (up and down) x 10     No vocal shaping needed; only a model.  Cue for increased length.    Goal met 1/23/2024    3. Patient will improve the  quality, efficiency, and ease of phonation through resonant and/or airflow exercises from the syllable to conversation level with 80% accuracy.    Resonant therapy exercises with min cues and no vocal shaping needed.      Goal met 1/23/2024    4. Patient will increase awareness for voice conservations behaviors by following the 50/10 rule and reduce voice use in competing noise environments.     Reviewed.Patient reported understanding. Goal met 1/23/2024    5. Patient will demonstrate the ability to increase awareness of voicing behavior through self-monitoring to facilitate generalization in functional speaking situations with 80% accuracy.        80% acc Independently     Goal met 1/23/2024      Read sentences with confidential voice x 20  Read a paragraph with confidential voice with supervision.    Patient Education/Response:   Patient educated regarding the following:  Education on vocal hygiene, goals, and plan of care.     Home program established: Home Program: Lax vox exercises, Head and neck stretches, semi-occluded vocal exercises  Patient verbalized understanding to all above education provided.     See Electronic Medical Record under Patient Instructions for exercises provided throughout therapy.  Assessment:   Treatment session focused on education, head/ neck stretches and semi-occluded vocal tract exercises. Patient was able to complete exercises with supervision.  Able to achieve a good vocal quality during exercises and carry that over into sentences and conversation. Overall, improvement in vocal quality since beginning therapy.  Improved breath support. Improvement noted in awareness of vocal quality. Mild intermittent dysphonia remains.  However, patient is able to utilize strategies and exercises Independently to improve her vocal quality. Patient met all goals. Discharge today.     Barriers to Therapy: none  Patient's spiritual, cultural and educational needs considered and patient agreeable  to plan of care and goals.  Plan:   Continue Plan of Care with focus on rehabilitation and compensation for dysphonia. Discharge today.    Charlotte Cali CCC-SLP   1/23/2024

## 2024-01-23 NOTE — PLAN OF CARE
Outpatient Therapy Discharge Summary   Discharge Date: 1/23/2024   Name: Carina Montaño  Clinic Number: [id number]  Therapy Diagnosis:   Encounter Diagnosis   Name Primary?    Dysphonia Yes     Physician: Cha Hooper*  Physician Orders: AMB referral to speech therapy  Medical Diagnosis: Dysphonia [R49.0]   Evaluation Date: 1/4/24    Date of Last visit: 1/23/2024   Total Visits Received: 4 plus eval    Assessment    Assessment of Current Status: Patient was able to complete exercises with supervision.  Able to achieve a good vocal quality during exercises and carry that over into sentences and conversation. Overall, improvement in vocal quality since beginning therapy.  Improved breath support. Improvement noted in awareness of vocal quality. Mild intermittent dysphonia remains.  However, patient is able to utilize strategies and exercises Independently to improve her vocal quality. Patient met all goals. Discharge today.      Goals:   Short Term Goals: (4 weeks) Current Progress:   1. Pt will complete neck relaxation exercises 10x each per session/at home ind'ly.      Patient completed the following:  Head turn each way with 5 second hold each  Ear to shoulder (bilateral) with 5 second hold each  Head looking up and back with 5 second hold each way  Head rolls x 3 each way     Patient completed self laryngeal massage for 30 seconds.     Lip trills: x 5  Yawn sigh x 5     Goal met 1/23/2024       2. Patient will complete SOVT exercises and/or resonant-focused exercises with min A.    Completed with 1 sets of:    - blow bubbles without sound x 10   - blow bubbles with sound x 10   - blow bubbles with sound pitch glide up x 10    - blow bubbles with sound pitch glide down  x 10    - blow bubbles with sound roller coaster (up and down) x 10     No vocal shaping needed; only a model.  Cue for increased length.     Goal met 1/23/2024    3. Patient will improve the quality, efficiency, and ease of phonation  through resonant and/or airflow exercises from the syllable to conversation level with 80% accuracy.    Resonant therapy exercises with min cues and no vocal shaping needed.      Goal met 1/23/2024    4. Patient will increase awareness for voice conservations behaviors by following the 50/10 rule and reduce voice use in competing noise environments.     Reviewed.Patient reported understanding. Goal met 1/23/2024    5. Patient will demonstrate the ability to increase awareness of voicing behavior through self-monitoring to facilitate generalization in functional speaking situations with 80% accuracy.        80% acc Independently      Goal met 1/23/2024        Long Term Goals:  Long Term Goals: (8 weeks)   Patient will implement and adhere to vocal hygiene protocols on a daily basis, including the elimination of phonotraumatic behaviors. Goal met  Patient and clinician will facilitate changes in vocal function in order to restore functional use of voice for daily occupational, social, and emotional demands. Goal met  Pt will demonstrate improved vocal quality/loudness/intonantion for sustained vocalization/speech at the word/phrase/sentence/conversational level to communicate basic medical and social needs in functional living environment. Goal met           Discharge reason: Patient has met all of her goals    Plan   This patient is discharged from Speech Therapy    Charlotte Cali CCC-SLP   1/23/2024

## 2024-02-02 ENCOUNTER — HOSPITAL ENCOUNTER (OUTPATIENT)
Dept: CARDIOLOGY | Facility: HOSPITAL | Age: 69
Discharge: HOME | End: 2024-02-02
Attending: NURSE PRACTITIONER
Payer: MEDICARE

## 2024-02-02 VITALS
DIASTOLIC BLOOD PRESSURE: 80 MMHG | HEIGHT: 61 IN | SYSTOLIC BLOOD PRESSURE: 125 MMHG | WEIGHT: 167 LBS | BODY MASS INDEX: 31.53 KG/M2 | HEART RATE: 70 BPM

## 2024-02-02 DIAGNOSIS — R01.1 MURMUR: ICD-10-CM

## 2024-02-02 LAB
ASCENDING AORTA: 2.85 CM
AV INDEX (PROSTH): 0.85
AV MEAN GRADIENT: 7 MMHG
AV PEAK GRADIENT: 14 MMHG
AV VALVE AREA BY VELOCITY RATIO: 2.03 CM²
AV VALVE AREA: 2.13 CM²
AV VELOCITY RATIO: 0.82
BSA FOR ECHO PROCEDURE: 1.81 M2
CV ECHO LV RWT: 0.37 CM
DOP CALC AO PEAK VEL: 1.84 M/S
DOP CALC AO VTI: 40.02 CM
DOP CALC LVOT AREA: 2.5 CM2
DOP CALC LVOT DIAMETER: 1.78 CM
DOP CALC LVOT PEAK VEL: 1.5 M/S
DOP CALC LVOT STROKE VOLUME: 85.09 CM3
DOP CALCLVOT PEAK VEL VTI: 34.21 CM
E WAVE DECELERATION TIME: 222.98 MSEC
E/A RATIO: 1.06
E/E' RATIO: 11.22 M/S
ECHO LV POSTERIOR WALL: 0.73 CM (ref 0.6–1.1)
FRACTIONAL SHORTENING: 37 % (ref 28–44)
INTERVENTRICULAR SEPTUM: 0.7 CM (ref 0.6–1.1)
LA MAJOR: 4.39 CM
LA MINOR: 4.12 CM
LA WIDTH: 3.34 CM
LEFT ATRIUM SIZE: 3.43 CM
LEFT ATRIUM VOLUME INDEX MOD: 20.5 ML/M2
LEFT ATRIUM VOLUME INDEX: 23.7 ML/M2
LEFT ATRIUM VOLUME MOD: 35.96 CM3
LEFT ATRIUM VOLUME: 41.39 CM3
LEFT INTERNAL DIMENSION IN SYSTOLE: 2.49 CM (ref 2.1–4)
LEFT VENTRICLE DIASTOLIC VOLUME INDEX: 39.59 ML/M2
LEFT VENTRICLE DIASTOLIC VOLUME: 69.28 ML
LEFT VENTRICLE MASS INDEX: 46 G/M2
LEFT VENTRICLE SYSTOLIC VOLUME INDEX: 12.7 ML/M2
LEFT VENTRICLE SYSTOLIC VOLUME: 22.15 ML
LEFT VENTRICULAR INTERNAL DIMENSION IN DIASTOLE: 3.98 CM (ref 3.5–6)
LEFT VENTRICULAR MASS: 79.89 G
LV LATERAL E/E' RATIO: 8.42 M/S
LV SEPTAL E/E' RATIO: 16.83 M/S
MV A" WAVE DURATION": 10.28 MSEC
MV PEAK A VEL: 0.95 M/S
MV PEAK E VEL: 1.01 M/S
MV STENOSIS PRESSURE HALF TIME: 64.66 MS
MV VALVE AREA P 1/2 METHOD: 3.4 CM2
PISA TR MAX VEL: 2.34 M/S
PULM VEIN S/D RATIO: 1.76
PV PEAK D VEL: 0.38 M/S
PV PEAK S VEL: 0.67 M/S
RA MAJOR: 3.73 CM
RA PRESSURE ESTIMATED: 3 MMHG
RA WIDTH: 2.78 CM
RIGHT VENTRICULAR END-DIASTOLIC DIMENSION: 2.23 CM
RV TB RVSP: 5 MMHG
SINUS: 2.73 CM
STJ: 2.6 CM
TDI LATERAL: 0.12 M/S
TDI SEPTAL: 0.06 M/S
TDI: 0.09 M/S
TR MAX PG: 22 MMHG
TRICUSPID ANNULAR PLANE SYSTOLIC EXCURSION: 1.53 CM
TV REST PULMONARY ARTERY PRESSURE: 25 MMHG
Z-SCORE OF LEFT VENTRICULAR DIMENSION IN END DIASTOLE: -1.96
Z-SCORE OF LEFT VENTRICULAR DIMENSION IN END SYSTOLE: -1.46

## 2024-02-02 PROCEDURE — 93306 TTE W/DOPPLER COMPLETE: CPT | Mod: 26,,, | Performed by: INTERNAL MEDICINE

## 2024-02-02 PROCEDURE — 93306 TTE W/DOPPLER COMPLETE: CPT

## 2024-02-06 ENCOUNTER — DOCUMENTATION ONLY (OUTPATIENT)
Dept: CARDIOLOGY | Facility: CLINIC | Age: 69
End: 2024-02-06
Payer: MEDICARE

## 2024-02-25 NOTE — PROGRESS NOTES
[facility]     SUBJECTIVE:     History of Present Illness:  Patient is a 68 y.o. female presents with murmur noted on exam     Primary Diagnosis:   Hypertension: positive  DM: borderline  Smoker: none  Family history of early CAD  Heart disease : no MI or heart failure ; recent exam showing a heart murmur  ROS  No chest pains  No shortness of breath; no PND or orthopnea  No syncope  No palpitations  Activity: no restrictions   Blood pressures good at home   Review of patient's allergies indicates:   Allergen Reactions    Grass pollen-perennial rye, standard Other (See Comments)    Lipitor [atorvastatin]     Strawberries [strawberry]        Past Medical History:   Diagnosis Date    Allergy     Arthritis     Asthma     Cancer     breast    Chronic diastolic (congestive) heart failure 12/8/2022    GERD (gastroesophageal reflux disease)     Hyperlipidemia     Hypertension     Prediabetes        Past Surgical History:   Procedure Laterality Date    ABDOMINAL SURGERY      COLONOSCOPY N/A 5/11/2021    Procedure: COLONOSCOPY;  Surgeon: Ema Vidal MD;  Location: [facility];  Service: Endoscopy;  Laterality: N/A;  COVID TEST 5/8 SUPREP    ESOPHAGOGASTRODUODENOSCOPY N/A 5/17/2019    Procedure: ESOPHAGOGASTRODUODENOSCOPY (EGD);  Surgeon: Ema Vidal MD;  Location: [facility];  Service: Endoscopy;  Laterality: N/A;    KNEE SURGERY      SHOULDER SURGERY             Past Hospitalization:         Cardiac meds:  Losartan 100 mg  Simvastatin 20 mg  Metformin     OBJECTIVE:     Vital Signs (Most Recent)  Vitals:    02/29/24 1413   BP: (!) 140/79   Pulse: 83   SpO2: 97%   Weight: 76.6 kg (168 lb 15.7 oz)         Physical Exam:  Neck: normal carotids, no bruits; normal JVP  Lungs :clear  Heart: RR, normal S1,S2, soft systolic ejection  murmurs, no gallops  Abd: no masses; no bruits;   Exts: normal DP and PT pulses bilaterally, normal radials; no edema noted               Labs  9/23: ; GFR normal; LFT's normal ,  TSH normal     Diagnostic Results:    1.EK/23:  sinus; nonspecific T   2.Echo: : normal EF; aortic sclerosis;   3. Stress test  4. cath    Chart review:        ASSESSMENT/PLAN:     Aortic valve sclerosis  Hypertension: controlled  Lipids great    Plan: reassurance  Return as needed     Nomi Gregory MD

## 2024-02-29 ENCOUNTER — OFFICE VISIT (OUTPATIENT)
Dept: CARDIOLOGY | Facility: CLINIC | Age: 69
End: 2024-02-29
Payer: MEDICARE

## 2024-02-29 VITALS
OXYGEN SATURATION: 97 % | HEART RATE: 83 BPM | DIASTOLIC BLOOD PRESSURE: 79 MMHG | SYSTOLIC BLOOD PRESSURE: 140 MMHG | WEIGHT: 169 LBS | BODY MASS INDEX: 31.93 KG/M2

## 2024-02-29 DIAGNOSIS — R01.1 MURMUR: ICD-10-CM

## 2024-02-29 DIAGNOSIS — I35.8 AORTIC VALVE SCLEROSIS: ICD-10-CM

## 2024-02-29 PROCEDURE — 99213 OFFICE O/P EST LOW 20 MIN: CPT | Mod: PBBFAC,PN | Performed by: INTERNAL MEDICINE

## 2024-02-29 PROCEDURE — 99999 PR PBB SHADOW E&M-EST. PATIENT-LVL III: CPT | Mod: PBBFAC,,, | Performed by: INTERNAL MEDICINE

## 2024-02-29 PROCEDURE — 99204 OFFICE O/P NEW MOD 45 MIN: CPT | Mod: S$PBB,,, | Performed by: INTERNAL MEDICINE

## 2024-03-04 ENCOUNTER — TELEPHONE (OUTPATIENT)
Dept: INTERNAL MEDICINE | Facility: CLINIC | Age: 69
End: 2024-03-04
Payer: MEDICARE

## 2024-03-05 ENCOUNTER — PATIENT MESSAGE (OUTPATIENT)
Dept: ADMINISTRATIVE | Facility: HOSPITAL | Age: 69
End: 2024-03-05
Payer: MEDICARE

## 2024-03-26 ENCOUNTER — OFFICE VISIT (OUTPATIENT)
Dept: INTERNAL MEDICINE | Facility: CLINIC | Age: 69
End: 2024-03-26
Payer: MEDICARE

## 2024-03-26 VITALS
HEART RATE: 81 BPM | HEIGHT: 61 IN | OXYGEN SATURATION: 96 % | WEIGHT: 168.44 LBS | DIASTOLIC BLOOD PRESSURE: 80 MMHG | BODY MASS INDEX: 31.8 KG/M2 | SYSTOLIC BLOOD PRESSURE: 134 MMHG

## 2024-03-26 DIAGNOSIS — M54.50 ACUTE LEFT-SIDED LOW BACK PAIN WITHOUT SCIATICA: ICD-10-CM

## 2024-03-26 DIAGNOSIS — R73.03 PREDIABETES: ICD-10-CM

## 2024-03-26 DIAGNOSIS — E66.9 OBESITY (BMI 30-39.9): ICD-10-CM

## 2024-03-26 DIAGNOSIS — R39.89 SUSPECTED UTI: Primary | ICD-10-CM

## 2024-03-26 DIAGNOSIS — M79.605 LEFT LEG PAIN: ICD-10-CM

## 2024-03-26 DIAGNOSIS — E78.00 HYPERCHOLESTEREMIA: ICD-10-CM

## 2024-03-26 DIAGNOSIS — M81.0 AGE-RELATED OSTEOPOROSIS WITHOUT CURRENT PATHOLOGICAL FRACTURE: ICD-10-CM

## 2024-03-26 DIAGNOSIS — M25.552 LEFT HIP PAIN: ICD-10-CM

## 2024-03-26 DIAGNOSIS — E55.9 VITAMIN D INSUFFICIENCY: ICD-10-CM

## 2024-03-26 DIAGNOSIS — I10 ESSENTIAL HYPERTENSION: Chronic | ICD-10-CM

## 2024-03-26 PROCEDURE — 99999 PR PBB SHADOW E&M-EST. PATIENT-LVL V: CPT | Mod: PBBFAC,,, | Performed by: INTERNAL MEDICINE

## 2024-03-26 PROCEDURE — 99215 OFFICE O/P EST HI 40 MIN: CPT | Mod: PBBFAC,PO | Performed by: INTERNAL MEDICINE

## 2024-03-26 PROCEDURE — 99999PBSHW PR PBB SHADOW TECHNICAL ONLY FILED TO HB: Mod: PBBFAC,,,

## 2024-03-26 PROCEDURE — 96372 THER/PROPH/DIAG INJ SC/IM: CPT | Mod: PBBFAC,PO

## 2024-03-26 PROCEDURE — 99214 OFFICE O/P EST MOD 30 MIN: CPT | Mod: S$PBB,,, | Performed by: INTERNAL MEDICINE

## 2024-03-26 RX ORDER — KETOROLAC TROMETHAMINE 30 MG/ML
60 INJECTION, SOLUTION INTRAMUSCULAR; INTRAVENOUS
Status: COMPLETED | OUTPATIENT
Start: 2024-03-26 | End: 2024-03-26

## 2024-03-26 RX ORDER — METHYLPREDNISOLONE 4 MG/1
TABLET ORAL
Qty: 21 EACH | Refills: 0 | Status: SHIPPED | OUTPATIENT
Start: 2024-03-26 | End: 2024-04-16

## 2024-03-26 RX ORDER — CLOBETASOL PROPIONATE 0.5 MG/G
AEROSOL, FOAM TOPICAL
COMMUNITY
Start: 2024-01-04

## 2024-03-26 RX ADMIN — KETOROLAC TROMETHAMINE 60 MG: 60 INJECTION, SOLUTION INTRAMUSCULAR at 09:03

## 2024-03-26 NOTE — PROGRESS NOTES
Patient ID: Carina Montaño is a 68 y.o. female.    Chief Complaint: Hypertension    HPI Carina is a 68 y.o. female with hypertension, vitamin-D deficiency, GERD, severe persistent asthma, allergic rhinitis, prediabetes, and osteopenia/osteoporosis who presents for routine follow-up of medical conditions.   She fell a couple of weeks ago and since that time has had persistent pain in her left lower back, left hip, left upper leg, and left knee.  She also thinks she may have a urinary tract infection at this time.  Reviewed lab results from 01/04/2024.    Health Maintenance Topics with due status: Not Due       Topic Last Completion Date    Pneumococcal Vaccines (Age 65+) 09/10/2020    Colorectal Cancer Screening 05/11/2021    DEXA Scan 10/10/2022    Mammogram 09/14/2023    Lipid Panel 09/21/2023    TETANUS VACCINE 10/04/2023    Hemoglobin A1c (Prediabetes) 01/04/2024    High Dose Statin 03/26/2024      Review of Systems   Genitourinary:         See HPI   Musculoskeletal:         See HPI   All other systems reviewed and are negative.      Objective:     Vitals:    03/26/24 0849   BP: 134/80   Pulse: 81        Physical Exam  Vitals reviewed.   Constitutional:       General: She is not in acute distress.     Appearance: Normal appearance. She is well-developed. She is obese. She is not ill-appearing, toxic-appearing or diaphoretic.   HENT:      Head: Normocephalic and atraumatic.      Right Ear: External ear normal.      Left Ear: External ear normal.      Nose: Nose normal.   Eyes:      General: No scleral icterus.        Right eye: No discharge.         Left eye: No discharge.      Extraocular Movements: Extraocular movements intact.      Conjunctiva/sclera: Conjunctivae normal.   Cardiovascular:      Rate and Rhythm: Normal rate and regular rhythm.      Heart sounds: Normal heart sounds. No murmur heard.     No friction rub. No gallop.   Pulmonary:      Effort: Pulmonary effort is normal. No respiratory distress.       Breath sounds: Normal breath sounds. No stridor. No wheezing, rhonchi or rales.   Musculoskeletal:      Comments: Positive for pain on rotation of left hip.   Skin:     General: Skin is warm and dry.   Neurological:      General: No focal deficit present.      Mental Status: She is alert and oriented to person, place, and time. Mental status is at baseline.   Psychiatric:         Mood and Affect: Mood normal.         Behavior: Behavior normal.         Thought Content: Thought content normal.         Judgment: Judgment normal.         Assessment:       1. Suspected UTI    2. Left leg pain    3. Left hip pain    4. Acute left-sided low back pain without sciatica    5. Essential hypertension Well controlled   6. Hypercholesteremia Well controlled   7. Obesity (BMI 30-39.9) Chronic   8. Vitamin D insufficiency Chronic   9. Prediabetes Inactive   10. Age-related osteoporosis without current pathological fracture Chronic       Plan:         Suspected UTI  -     Urinalysis; Future; Expected date: 03/26/2024  -     Urine culture; Future; Expected date: 03/26/2024    Left leg pain  -     ketorolac injection 60 mg  -     methylPREDNISolone (MEDROL DOSEPACK) 4 mg tablet; use as directed  Dispense: 21 each; Refill: 0  -     X-Ray Knee 3 View Left; Future; Expected date: 03/26/2024  -     X-Ray Femur 2 View Bilateral; Future; Expected date: 03/26/2024    Left hip pain  -     ketorolac injection 60 mg  -     methylPREDNISolone (MEDROL DOSEPACK) 4 mg tablet; use as directed  Dispense: 21 each; Refill: 0  -     X-Ray Hip 2 or 3 views Left (with Pelvis when performed); Future; Expected date: 03/26/2024    Acute left-sided low back pain without sciatica  -     ketorolac injection 60 mg  -     methylPREDNISolone (MEDROL DOSEPACK) 4 mg tablet; use as directed  Dispense: 21 each; Refill: 0  -     X-Ray Lumbar Spine 5 View; Future; Expected date: 03/26/2024    Essential hypertension  Comments:  Continue current  medication  Orders:  -     Comprehensive Metabolic Panel; Future; Expected date: 07/01/2024    Hypercholesteremia  Comments:  Continue current medication  Orders:  -     Lipid Panel; Future; Expected date: 07/01/2024    Obesity (BMI 30-39.9)  -     CBC Auto Differential; Future; Expected date: 07/01/2024    Vitamin D insufficiency  -     Vitamin D; Future; Expected date: 07/01/2024    Prediabetes  Comments:  continue to monitor  Orders:  -     Hemoglobin A1C; Future; Expected date: 07/01/2024    Age-related osteoporosis without current pathological fracture  Comments:  Continue current medication        RTC 6 months     Warning signs discussed, patient to call with any further issues or worsening of symptoms.       Parts of the above note were dictated using a voice dictation software. Please excuse any grammatical or typographical errors.

## 2024-03-27 ENCOUNTER — LAB VISIT (OUTPATIENT)
Dept: LAB | Facility: HOSPITAL | Age: 69
End: 2024-03-27
Attending: INTERNAL MEDICINE
Payer: MEDICARE

## 2024-03-27 DIAGNOSIS — R39.89 SUSPECTED UTI: ICD-10-CM

## 2024-03-27 LAB
BILIRUB UR QL STRIP: NEGATIVE
CLARITY UR REFRACT.AUTO: CLEAR
COLOR UR AUTO: YELLOW
GLUCOSE UR QL STRIP: NEGATIVE
HGB UR QL STRIP: NEGATIVE
KETONES UR QL STRIP: NEGATIVE
LEUKOCYTE ESTERASE UR QL STRIP: NEGATIVE
NITRITE UR QL STRIP: NEGATIVE
PH UR STRIP: 7 [PH] (ref 5–8)
PROT UR QL STRIP: NEGATIVE
SP GR UR STRIP: 1.02 (ref 1–1.03)
URN SPEC COLLECT METH UR: NORMAL

## 2024-03-27 PROCEDURE — 87086 URINE CULTURE/COLONY COUNT: CPT | Performed by: INTERNAL MEDICINE

## 2024-03-27 PROCEDURE — 81003 URINALYSIS AUTO W/O SCOPE: CPT | Performed by: INTERNAL MEDICINE

## 2024-03-29 LAB
BACTERIA UR CULT: NORMAL
BACTERIA UR CULT: NORMAL

## 2024-04-11 ENCOUNTER — HOSPITAL ENCOUNTER (OUTPATIENT)
Dept: RADIOLOGY | Facility: HOSPITAL | Age: 69
Discharge: HOME | End: 2024-04-11
Attending: INTERNAL MEDICINE
Payer: MEDICARE

## 2024-04-11 DIAGNOSIS — G89.29 CHRONIC LEFT-SIDED LOW BACK PAIN WITHOUT SCIATICA: Primary | ICD-10-CM

## 2024-04-11 DIAGNOSIS — M25.552 LEFT HIP PAIN: ICD-10-CM

## 2024-04-11 DIAGNOSIS — M54.50 CHRONIC LEFT-SIDED LOW BACK PAIN WITHOUT SCIATICA: Primary | ICD-10-CM

## 2024-04-11 DIAGNOSIS — M79.605 LEFT LEG PAIN: ICD-10-CM

## 2024-04-11 DIAGNOSIS — M54.50 ACUTE LEFT-SIDED LOW BACK PAIN WITHOUT SCIATICA: ICD-10-CM

## 2024-04-11 PROCEDURE — 73562 X-RAY EXAM OF KNEE 3: CPT | Mod: TC,FY,LT

## 2024-04-11 PROCEDURE — 73562 X-RAY EXAM OF KNEE 3: CPT | Mod: 26,LT,, | Performed by: RADIOLOGY

## 2024-04-11 PROCEDURE — 72110 X-RAY EXAM L-2 SPINE 4/>VWS: CPT | Mod: 26,,, | Performed by: RADIOLOGY

## 2024-04-11 PROCEDURE — 73502 X-RAY EXAM HIP UNI 2-3 VIEWS: CPT | Mod: TC,FY,LT

## 2024-04-11 PROCEDURE — 73552 X-RAY EXAM OF FEMUR 2/>: CPT | Mod: 26,50,, | Performed by: RADIOLOGY

## 2024-04-11 PROCEDURE — 73552 X-RAY EXAM OF FEMUR 2/>: CPT | Mod: TC,50,FY

## 2024-04-11 PROCEDURE — 72110 X-RAY EXAM L-2 SPINE 4/>VWS: CPT | Mod: TC,FY

## 2024-04-11 PROCEDURE — 73502 X-RAY EXAM HIP UNI 2-3 VIEWS: CPT | Mod: 26,LT,, | Performed by: RADIOLOGY

## 2024-04-15 ENCOUNTER — OFFICE VISIT (OUTPATIENT)
Dept: OTOLARYNGOLOGY | Facility: CLINIC | Age: 69
End: 2024-04-15
Payer: MEDICARE

## 2024-04-15 ENCOUNTER — CLINICAL SUPPORT (OUTPATIENT)
Dept: OTOLARYNGOLOGY | Facility: CLINIC | Age: 69
End: 2024-04-15
Payer: MEDICARE

## 2024-04-15 VITALS
HEART RATE: 73 BPM | BODY MASS INDEX: 32.14 KG/M2 | SYSTOLIC BLOOD PRESSURE: 175 MMHG | WEIGHT: 170.06 LBS | DIASTOLIC BLOOD PRESSURE: 80 MMHG

## 2024-04-15 DIAGNOSIS — H93.13 TINNITUS OF BOTH EARS: ICD-10-CM

## 2024-04-15 DIAGNOSIS — R49.0 DYSPHONIA: ICD-10-CM

## 2024-04-15 DIAGNOSIS — K21.9 LARYNGOPHARYNGEAL REFLUX (LPR): Chronic | ICD-10-CM

## 2024-04-15 DIAGNOSIS — J34.3 HYPERTROPHY OF INFERIOR NASAL TURBINATE: Chronic | ICD-10-CM

## 2024-04-15 DIAGNOSIS — J30.9 CHRONIC ALLERGIC RHINITIS: Primary | Chronic | ICD-10-CM

## 2024-04-15 DIAGNOSIS — H90.3 SENSORINEURAL HEARING LOSS, BILATERAL: Chronic | ICD-10-CM

## 2024-04-15 DIAGNOSIS — J30.9 ALLERGIC RHINITIS, UNSPECIFIED SEASONALITY, UNSPECIFIED TRIGGER: ICD-10-CM

## 2024-04-15 DIAGNOSIS — H90.3 SENSORINEURAL HEARING LOSS, BILATERAL: Primary | ICD-10-CM

## 2024-04-15 DIAGNOSIS — M85.80 OSTEOPENIA, UNSPECIFIED LOCATION: ICD-10-CM

## 2024-04-15 DIAGNOSIS — J45.51 SEVERE PERSISTENT ASTHMA WITH ACUTE EXACERBATION: ICD-10-CM

## 2024-04-15 PROCEDURE — 31575 DIAGNOSTIC LARYNGOSCOPY: CPT | Mod: PBBFAC,PN | Performed by: OTOLARYNGOLOGY

## 2024-04-15 PROCEDURE — 92557 COMPREHENSIVE HEARING TEST: CPT | Mod: PBBFAC,PN | Performed by: AUDIOLOGIST

## 2024-04-15 PROCEDURE — 99999 PR PBB SHADOW E&M-EST. PATIENT-LVL III: CPT | Mod: PBBFAC,,, | Performed by: OTOLARYNGOLOGY

## 2024-04-15 PROCEDURE — 99214 OFFICE O/P EST MOD 30 MIN: CPT | Mod: 25,S$PBB,, | Performed by: OTOLARYNGOLOGY

## 2024-04-15 PROCEDURE — 92567 TYMPANOMETRY: CPT | Mod: PBBFAC,PN | Performed by: AUDIOLOGIST

## 2024-04-15 PROCEDURE — 99213 OFFICE O/P EST LOW 20 MIN: CPT | Mod: PBBFAC,PN | Performed by: OTOLARYNGOLOGY

## 2024-04-15 RX ORDER — FLUTICASONE PROPIONATE 50 MCG
SPRAY, SUSPENSION (ML) NASAL
Qty: 48 ML | Refills: 3 | Status: SHIPPED | OUTPATIENT
Start: 2024-04-15

## 2024-04-15 RX ORDER — AZELASTINE 1 MG/ML
1 SPRAY, METERED NASAL 2 TIMES DAILY
Qty: 10 ML | Refills: 11 | Status: SHIPPED | OUTPATIENT
Start: 2024-04-15 | End: 2024-06-09 | Stop reason: SDUPTHER

## 2024-04-15 RX ORDER — MONTELUKAST SODIUM 10 MG/1
10 TABLET ORAL NIGHTLY
Qty: 90 TABLET | Refills: 3 | Status: SHIPPED | OUTPATIENT
Start: 2024-04-15

## 2024-04-15 NOTE — PROGRESS NOTES
Chief Complaint   Patient presents with    Follow-up     Still experiencing a runny nose   .    HPI:     Carina Montaño is a 68 y.o. female who presents for evaluation of a several month history of nasal congestion, postnasal drip, facial pressure and pain.  She notes associated cough and sneezing. She does feel that her symptoms worsen with weather changes.  She describes difficulty breathing at night.  She does use sinus rinses or nasal sprays. She has been on Flonase and Astelin. She is also on Claritin and montelukast.  She feels that this is somewhat helpful.  She admits to midface pain and pressure.  She admits to rhinorrhea and postnasal drip. There is not maxillary tooth pain. She admits to frontal headaches.  She has had sinus or nasal surgery. She is unsure of exact procedure.  There is no history of sinonasal trauma. She notes that she has had hearing loss since childhood. She has had hearing tests in the past. She did not tolerate hearing aids well. She felt they were too loud. She thinks last hearing test was 10 years ago.         Interval HPI 4/15/2024:  Follow up visit. Reports that she has been doing well. She has been using Protonix 40mg PO BID, flonase, Astelin, and montelukast, saline. She notes rhinorrhea continues to be an issue. She does feel that she has intermittent hoarseness and vocal fatigue but not as severe as it has been in the past. She did go to speech therapy and feels that this has helped. She denies throat pain, dysphgia, odynophagia.       Past Medical History:   Diagnosis Date    Allergy     Arthritis     Asthma     Cancer     breast    Chronic diastolic (congestive) heart failure 12/8/2022    GERD (gastroesophageal reflux disease)     Hyperlipidemia     Hypertension     Prediabetes      Social History     Socioeconomic History    Marital status:     Number of children: 1   Occupational History    Occupation:     Tobacco Use    Smoking  status: Never    Smokeless tobacco: Never   Substance and Sexual Activity    Alcohol use: Yes     Comment: social    Drug use: No    Sexual activity: Yes     Social Determinants of Health     Financial Resource Strain: Low Risk  (1/3/2024)    Overall Financial Resource Strain (CARDIA)     Difficulty of Paying Living Expenses: Not hard at all   Food Insecurity: No Food Insecurity (1/3/2024)    Hunger Vital Sign     Worried About Running Out of Food in the Last Year: Never true     Ran Out of Food in the Last Year: Never true   Transportation Needs: No Transportation Needs (1/3/2024)    PRAPARE - Transportation     Lack of Transportation (Medical): No     Lack of Transportation (Non-Medical): No   Physical Activity: Inactive (1/3/2024)    Exercise Vital Sign     Days of Exercise per Week: 0 days     Minutes of Exercise per Session: 0 min   Stress: No Stress Concern Present (1/3/2024)    [employer] - Occupational Stress Questionnaire     Feeling of Stress : Only a little   Social Connections: Moderately Integrated (1/3/2024)    Social Connection and Isolation Panel [NHANES]     Frequency of Communication with Friends and Family: More than three times a week     Frequency of Social Gatherings with Friends and Family: More than three times a week     Attends Yazidi Services: Never     Active Member of Clubs or Organizations: Yes     Attends Club or Organization Meetings: 1 to 4 times per year     Marital Status:    Housing Stability: Low Risk  (1/3/2024)    Housing Stability Vital Sign     Unable to Pay for Housing in the Last Year: No     Number of Places Lived in the Last Year: 1     Unstable Housing in the Last Year: No     Past Surgical History:   Procedure Laterality Date    ABDOMINAL SURGERY      COLONOSCOPY N/A 5/11/2021    Procedure: COLONOSCOPY;  Surgeon: Ema Vidal MD;  Location: [facility];  Service: Endoscopy;  Laterality: N/A;  COVID TEST 5/8 SUPREP     ESOPHAGOGASTRODUODENOSCOPY N/A 5/17/2019    Procedure: ESOPHAGOGASTRODUODENOSCOPY (EGD);  Surgeon: Ema Vidal MD;  Location: [facility];  Service: Endoscopy;  Laterality: N/A;    KNEE SURGERY      SHOULDER SURGERY       Family History   Problem Relation Name Age of Onset    Diabetes Mother      Hypertension Mother      Cancer Mother          Pancreatic    Pancreatic cancer Mother      Hypertension Father      Alzheimer's disease Father      Arthritis Brother x 1, half -mom     Diabetes Brother x1     No Known Problems Daughter x1            Review of Systems  General: negative for chills, fever or weight loss  Psychological: negative for mood changes or depression  Ophthalmic: negative for blurry vision, photophobia or eye pain  ENT: see HPI  Respiratory: no cough, shortness of breath, or wheezing  Cardiovascular: no chest pain or dyspnea on exertion  Gastrointestinal: no abdominal pain, change in bowel habits, or black/ bloody stools  Musculoskeletal: negative for gait disturbance or muscular weakness  Neurological: no syncope or seizures; no ataxia  Dermatological: negative for puritis,  rash and jaundice  Hematologic/lymphatic: no easy bruising, no new lumps or bumps      Physical Exam:    Vitals:    04/15/24 0900   BP: (!) 175/80   Pulse: 73         Constitutional: Well appearing / communicating without difficutly.  NAD.  Eyes: EOM I Bilaterally  Head/Face: Normocephalic.  Negative paranasal sinus pressure/tenderness.  Salivary glands WNL.  House Brackmann I Bilaterally.    Right Ear: Auricle normal appearance. External Auditory Canal within normal limits,TM w/o masses/lesions/perforations. TM mobility noted.   Left Ear: Auricle normal appearance. External Auditory Canal WNL,TM w/o masses/lesions/perforations. TM mobility noted.  Nose: No gross nasal septal deviation. Inferior Turbinates 3+ bilaterally. No septal perforation. No masses/lesions. External nasal skin appears normal without  masses/lesions.  Oral Cavity: Gingiva/lips within normal limits.  Dentition/gingiva healthy appearing. Mucus membranes moist. Floor of mouth soft, no masses palpated. Oral Tongue mobile. Hard Palate appears normal.    Oropharynx: Base of tongue appears normal. No masses/lesions noted. Tonsillar fossa/pharyngeal wall without lesions. Posterior oropharynx WNL.  Soft palate without masses. Midline uvula.   Neck/Lymphatic: No LAD I-VI bilaterally.  No thyromegaly.  No masses noted on exam.    Mirror laryngoscopy/nasopharyngoscopy: Active gag reflex.  Unable to perform.          Diagnostic studies:  ImmunoCAP testing 3/23/2023: Class 0/1 to cat dander, cockroach, cypress, and white oak.         CT scan sinuses 3/24/2023:  FINDINGS:  Intracranial structures are unremarkable.  No incidental soft tissue masses are seen.  Frontal sinuses are hypoplastic.  There is mild mucosal thickening at the floor the left maxillary sinus.  Ethmoid, sphenoid, and frontal sinuses are clear.  Ostiomeatal complexes are patent.  Frontal ethmoidal sinus drainage routes are unremarkable.  Nasal passes is are clear.  Mastoid air cells and middle ear cavities are clear.          Tympanometry revealed a Type A tympanogram for both ears.  Audiogram results revealed a mild sloping to profound sensorineural hearing loss bilaterally.  Speech reception thresholds were obtained at 35dB for both ears and speech discrimination scores were 36% for the right ear and 44% for the left ear.           Assessment:    ICD-10-CM ICD-9-CM    1. Chronic allergic rhinitis  J30.9 477.9       2. Laryngopharyngeal reflux (LPR)  K21.9 478.79 Ambulatory referral/consult to Gastroenterology      3. Hypertrophy of inferior nasal turbinate  J34.3 478.0       4. Dysphonia  R49.0 784.42       5. Sensorineural hearing loss, bilateral  H90.3 389.18       6. Severe persistent asthma with acute exacerbation Well controlled J45.51 493.92 montelukast (SINGULAIR) 10 mg tablet    Cont  current meds                  The primary encounter diagnosis was Chronic allergic rhinitis. Diagnoses of Laryngopharyngeal reflux (LPR), Hypertrophy of inferior nasal turbinate, Dysphonia, Sensorineural hearing loss, bilateral, and Severe persistent asthma with acute exacerbation were also pertinent to this visit.      Plan:  Orders Placed This Encounter   Procedures    Ambulatory referral/consult to Gastroenterology     Continue  Pantoprazole 40mg PO daily   GI referral placed. Last GI visit 2019.   Continue nasal saline rinses b.i.d.  Continue Flonase 2 sprays per nostril daily  Continue Astelin 1 spray per nostril b.i.d.  Continue montelukast 10 mg PO daily. She has had dry eyes so will avoid oral antihistamines for now.   Annual audiogram is due; will schedule.     Follow-up in approximately 4-6 months    Angela Jacobs MD

## 2024-04-15 NOTE — TELEPHONE ENCOUNTER
Care Due:                  Date            Visit Type   Department     Provider  --------------------------------------------------------------------------------                                EP -                              PRIMARY      [facility] INTERNAL  Last Visit: 03-      CARE ([facility])   MEDICINE       Adelaide De Leon  Next Visit: None Scheduled  None         None Found                                                            Last  Test          Frequency    Reason                     Performed    Due Date  --------------------------------------------------------------------------------    Mg Level....  12 months..  alendronate..............  Not Found    Overdue    Phosphate...  12 months..  alendronate..............  Not Found    Overdue    [employer] Embedded Care Due Messages. Reference number: [id number].   4/15/2024 6:43:52 PM CDT

## 2024-04-15 NOTE — PROCEDURES
Laryngoscopy    Date/Time: 4/15/2024 8:40 AM    Performed by: Angela Hooper MD  Authorized by: Angela Hooper MD    Consent Done?:  Yes (Verbal)  Anesthesia:     Local anesthetic:  Lidocaine 2% and Yanick-Synephrine 1/2%  Laryngoscopy:     Areas examined:  Nasal cavities, nasopharynx, oropharynx, hypopharynx, larynx and vocal cords  Nose External:      No external nasal deformity  Nose Intranasal:      Mucosa no polyps     Mucosa ulcers not present     No mucosa lesions present     No septum gross deformity     Turbinates not enlarged  Nasopharynx:      No mucosa lesions     Adenoids not present     Posterior choanae patent     Eustachian tube patent  Larynx/hypopharynx:      No epiglottis lesions     No epiglottis edema     No AE folds lesions     No vocal cord polyps     Equal and normal bilateral     No hypopharynx lesions     No piriform sinus pooling     No piriform sinus lesions     Post cricoid edema (trace)     Post cricoid erythema (mild)

## 2024-04-15 NOTE — PROGRESS NOTES
Carina Montaño was seen today in the clinic for an audiologic evaluation.  Her main complaint was hearing loss and tinnitus.    Tympanometry revealed a Type A tympanogram for both ears.  Audiogram results revealed a mild to profound sensorineural hearing loss bilaterally.  Speech reception thresholds were obtained at 45dB for the right ear and 40dB for the left ear.  Speech discrimination scores were 28% for the right ear and 32% for the left ear.    Recommendations:  Otologic evaluation  Annual evaluation  Hearing aid consult  Hearing protection in noise

## 2024-04-16 RX ORDER — ALENDRONATE SODIUM 70 MG/1
TABLET ORAL
Qty: 4 TABLET | Refills: 11 | Status: SHIPPED | OUTPATIENT
Start: 2024-04-16

## 2024-04-24 ENCOUNTER — CLINICAL SUPPORT (OUTPATIENT)
Dept: REHABILITATION | Facility: HOSPITAL | Age: 69
End: 2024-04-24
Attending: INTERNAL MEDICINE
Payer: MEDICARE

## 2024-04-24 DIAGNOSIS — R53.1 DECREASED STRENGTH: ICD-10-CM

## 2024-04-24 DIAGNOSIS — R26.89 IMPAIRED GAIT AND MOBILITY: ICD-10-CM

## 2024-04-24 DIAGNOSIS — G89.29 CHRONIC BILATERAL LOW BACK PAIN WITH LEFT-SIDED SCIATICA: Primary | ICD-10-CM

## 2024-04-24 DIAGNOSIS — M54.42 CHRONIC BILATERAL LOW BACK PAIN WITH LEFT-SIDED SCIATICA: Primary | ICD-10-CM

## 2024-04-24 PROBLEM — R29.898 DECREASED STRENGTH OF LOWER EXTREMITY: Status: RESOLVED | Noted: 2023-04-21 | Resolved: 2024-04-24

## 2024-04-24 PROBLEM — R49.0 DYSPHONIA: Status: RESOLVED | Noted: 2024-01-09 | Resolved: 2024-04-24

## 2024-04-24 PROCEDURE — 97162 PT EVAL MOD COMPLEX 30 MIN: CPT | Mod: PN

## 2024-04-24 PROCEDURE — 97112 NEUROMUSCULAR REEDUCATION: CPT | Mod: PN
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
## 2024-05-02 ENCOUNTER — CLINICAL SUPPORT (OUTPATIENT)
Dept: REHABILITATION | Facility: HOSPITAL | Age: 69
End: 2024-05-02
Payer: MEDICARE

## 2024-05-02 DIAGNOSIS — M54.42 CHRONIC BILATERAL LOW BACK PAIN WITH LEFT-SIDED SCIATICA: Primary | ICD-10-CM

## 2024-05-02 DIAGNOSIS — G89.29 CHRONIC BILATERAL LOW BACK PAIN WITH LEFT-SIDED SCIATICA: Primary | ICD-10-CM

## 2024-05-02 PROCEDURE — 97530 THERAPEUTIC ACTIVITIES: CPT | Mod: PN,CQ

## 2024-05-02 PROCEDURE — 97110 THERAPEUTIC EXERCISES: CPT | Mod: PN,CQ

## 2024-05-02 NOTE — PROGRESS NOTES
"[facility]   Physical Therapy Treatment Note      Name: Carina Montaño  Clinic Number: [id number]    Therapy Diagnosis:  Encounter Diagnosis   Name Primary?    Chronic bilateral low back pain with left-sided sciatica Yes             Physician: Adelaide De Leon MD    Visit Date: 5/2/2024         Encounter Diagnoses   Name Primary?    Chronic bilateral low back pain with left-sided sciatica Yes    Impaired gait and mobility      Decreased strength          Physician: Adelaide De Leon MD     Physician Orders: PT Eval and Treat   Medical Diagnosis from Referral: Chronic left-sided low back pain without sciatica [M54.50, G89.29]   Evaluation Date: 4/24/2024  Authorization Period Expiration: 12/31/2024  Plan of Care Expiration: 6/7/2024  Progress Note Due: 5/24/2024  Visit # / Visits authorized: 1/1   FOTO: 1/5     Precautions: Standard; history of cancer; hypertension, asthma     Time In: 1200  Time Out: 1255  Total Billable Time: 55 minutes    PTA Visit #: 1/5       Subjective     Patient reports: she did "some" of the exercises she was provided. "I was too busy with my grand kids."  She was compliant with home exercise program.  Response to previous treatment: Eval  Functional change: Ongoing    Pain: 5/10  Location: left back and back     Objective      Objective Measures updated at progress report unless specified.     Treatment     Carina received the treatments listed below:      therapeutic exercises to develop strength, endurance, ROM, flexibility, posture, and core stabilization for 30 minutes including:  Supine quad sets 15x5"   Straight leg raises 2x10 bilateral  Hook lying ADD isometric 20x5" holds  Hook lying ABD isometric 20x5" holds   Hook lying Bridge 2x10 with 5" holds  SIde lying clamshells 2x10 bilateral   Side lying reverse clamshells 2x10 bilateral   Cybex Leg press 2x10 with 5 plates          neuromuscular re-education activities to improve: Balance, " "Coordination, Kinesthetic, Sense, Proprioception, and Posture for 0 minutes. The following activities were included:  NP    therapeutic activities to improve functional performance for 25  minutes, including:  Reverse crunches 2 minutes with red Swiss ball  Lower trunk rotation 2 minutes with red Swiss ball  Sit to stand from chair 2x10 reps hands on thighs  Swiss ball rolls into fwd flexion   Step ups to 6" step 2x20 reps       Patient Education and Home Exercises       Education provided:   - home exercise program review    Written Home Exercises Provided: Patient instructed to cont prior HEP. Exercises were reviewed and Carina was able to demonstrate them prior to the end of the session.  Carina demonstrated good  understanding of the education provided. See Electronic Medical Record under Patient Instructions for exercises provided during therapy sessions    Assessment     Carina presents for her first follow up after evaluation. She was partially compliant with her home exercise program provided at evaluation. She exhibits very poor abdominal activation. We initiated core and hip strengthening interventions. She reports decreased pain following today's therapeutic interventions.      Carina Is progressing well towards her goals.   Patient prognosis is Good.     Patient will continue to benefit from skilled outpatient physical therapy to address the deficits listed in the problem list box on initial evaluation, provide pt/family education and to maximize pt's level of independence in the home and community environment.     Patient's spiritual, cultural and educational needs considered and pt agreeable to plan of care and goals.     Anticipated barriers to physical therapy: Sedentary lifestyle     Goals:   Short Term Goals (3 Weeks):  1. Patient will be compliant with home exercise program to supplement therapy in promoting functional mobility.  2. Patient will perform posterior sling pull with good " control to demonstrate improved core strength.  3. Patient will report no pain during thoracolumbar active range of motion to promote functional mobility.  4. Patient will lift and carry 20 lbs without pain to promote independent lifting  Long Term Goals (6 Weeks):   2. Patient will perform Sarhmann level 3 - partial range with good control to demonstrate improved core strength.  3. Patient will improve impaired lower extremity manual muscle tests to >/=4+/5 to improve strength for functional tasks.  4. Patient will report no pain during the night to promote full night's sleep.  Plan     Continue patient's plan of care     Rodrigue Joiner PTA

## 2024-05-03 ENCOUNTER — CLINICAL SUPPORT (OUTPATIENT)
Dept: REHABILITATION | Facility: HOSPITAL | Age: 69
End: 2024-05-03
Payer: MEDICARE

## 2024-05-03 DIAGNOSIS — G89.29 CHRONIC BILATERAL LOW BACK PAIN WITH LEFT-SIDED SCIATICA: Primary | ICD-10-CM

## 2024-05-03 DIAGNOSIS — M54.42 CHRONIC BILATERAL LOW BACK PAIN WITH LEFT-SIDED SCIATICA: Primary | ICD-10-CM

## 2024-05-03 PROBLEM — R53.1 DECREASED STRENGTH: Status: ACTIVE | Noted: 2024-05-03

## 2024-05-03 PROCEDURE — 97110 THERAPEUTIC EXERCISES: CPT | Mod: PN,CQ

## 2024-05-03 PROCEDURE — 97530 THERAPEUTIC ACTIVITIES: CPT | Mod: PN

## 2024-05-03 NOTE — PROGRESS NOTES
"[facility]   Physical Therapy Treatment Note      Name: Carina Montaño  Clinic Number: [id number]    Therapy Diagnosis:  Encounter Diagnosis   Name Primary?    Chronic bilateral low back pain with left-sided sciatica Yes           Physician: Adelaide De Leon MD    Visit Date: 5/3/2024         Encounter Diagnoses   Name Primary?    Chronic bilateral low back pain with left-sided sciatica Yes    Impaired gait and mobility      Decreased strength          Physician: Adelaide De Leon MD     Physician Orders: PT Eval and Treat   Medical Diagnosis from Referral: Chronic left-sided low back pain without sciatica [M54.50, G89.29]   Evaluation Date: 4/24/2024  Authorization Period Expiration: 12/31/2024  Plan of Care Expiration: 6/7/2024  Progress Note Due: 5/24/2024  Visit # / Visits authorized: 2/1   FOTO: 2/5     Precautions: Standard; history of cancer; hypertension, asthma     Time In: 0800  Time Out: 0855  Total Billable Time: 55 minutes    PTA Visit #: 1/5       Subjective     Patient reports: she was not compliant with her home exercise program   She was not compliant with home exercise program.  Response to previous treatment: Sore  Functional change: Ongoing    Pain: 5/10  Location: left back and back     Objective      Objective Measures updated at progress report unless specified.     Treatment     Carina received the treatments listed below:      therapeutic exercises to develop strength, endurance, ROM, flexibility, posture, and core stabilization for 30 minutes including:  Sci Fit bilateral upper extremity and lower extremity reciprocation 8 minutes   Supine quad sets 15x5"   SKTC 3x30" bilateral   Hamstring stretch 3x30" with stratch  Straight leg raises 2x10 bilateral  Hook lying ADD isometric 20x5" holds NP  Hook lying ABD isometric 20x5" holds NP   Hook lying Bridge 3x10 with 5" holds  SIde lying clamshells 2x10 bilateral red theraband   Side lying reverse clamshells " "2x10 bilateral   Cybex Leg press 3x10 with 5 plates          neuromuscular re-education activities to improve: Balance, Coordination, Kinesthetic, Sense, Proprioception, and Posture for 0 minutes. The following activities were included:  NP    therapeutic activities to improve functional performance for 25  minutes, including:  Reverse crunches 2 minutes with red Swiss ball  Lower trunk rotation 2 minutes with red Swiss ball  Sit to stand from chair 2x10 reps hands on thighs  Swiss ball rolls into fwd flexion   Fwd Step ups to 6" step 2x15 reps   Lateral Step ups to 6" step x15 reps    Patient Education and Home Exercises       Education provided:   - home exercise program review    Written Home Exercises Provided: Patient instructed to cont prior HEP. Exercises were reviewed and Carina was able to demonstrate them prior to the end of the session.  Carina demonstrated good  understanding of the education provided. See Electronic Medical Record under Patient Instructions for exercises provided during therapy sessions    Assessment     Carina presents for her second follow up after evaluation. She was not compliant with her home exercise program provided at evaluation. She continues to exhibit very poor abdominal activation. We continued and progressed core and hip strengthening interventions. She reports decreased pain following today's therapeutic interventions.      Carina Is progressing well towards her goals.   Patient prognosis is Good.     Patient will continue to benefit from skilled outpatient physical therapy to address the deficits listed in the problem list box on initial evaluation, provide pt/family education and to maximize pt's level of independence in the home and community environment.     Patient's spiritual, cultural and educational needs considered and pt agreeable to plan of care and goals.     Anticipated barriers to physical therapy:   Patient's spiritual, cultural and educational needs " considered and patient is agreeable to the plan of care and goals as stated below:      Anticipated Barriers for therapy: Sedentary lifestyle    Goals:   Short Term Goals (3 Weeks):  1. Patient will be compliant with home exercise program to supplement therapy in promoting functional mobility.  2. Patient will perform posterior sling pull with good control to demonstrate improved core strength.  3. Patient will report no pain during thoracolumbar active range of motion to promote functional mobility.  4. Patient will lift and carry 20 lbs without pain to promote independent lifting  Long Term Goals (6 Weeks):   2. Patient will perform Sarhmann level 3 - partial range with good control to demonstrate improved core strength.  3. Patient will improve impaired lower extremity manual muscle tests to >/=4+/5 to improve strength for functional tasks.  4. Patient will report no pain during the night to promote full night's sleep.     Plan     Continue patient's plan of care     Rodrigue Joiner PTA

## 2024-05-06 ENCOUNTER — CLINICAL SUPPORT (OUTPATIENT)
Dept: REHABILITATION | Facility: HOSPITAL | Age: 69
End: 2024-05-06
Payer: MEDICARE

## 2024-05-06 DIAGNOSIS — M54.42 CHRONIC BILATERAL LOW BACK PAIN WITH LEFT-SIDED SCIATICA: ICD-10-CM

## 2024-05-06 DIAGNOSIS — R26.89 IMPAIRED GAIT AND MOBILITY: Primary | ICD-10-CM

## 2024-05-06 DIAGNOSIS — G89.29 CHRONIC BILATERAL LOW BACK PAIN WITH LEFT-SIDED SCIATICA: ICD-10-CM

## 2024-05-06 DIAGNOSIS — R53.1 DECREASED STRENGTH: ICD-10-CM

## 2024-05-06 PROCEDURE — 97112 NEUROMUSCULAR REEDUCATION: CPT | Mod: PN

## 2024-05-06 NOTE — PROGRESS NOTES
"[facility]   Physical Therapy Treatment Note      Name: Carina Montaño  Clinic Number: [id number]    Therapy Diagnosis:  Encounter Diagnoses   Name Primary?    Impaired gait and mobility Yes    Chronic bilateral low back pain with left-sided sciatica     Decreased strength      Physician: Adelaide De Leon MD    Visit Date: 5/6/2024     Physician Orders: PT Eval and Treat   Medical Diagnosis from Referral: Chronic left-sided low back pain without sciatica [M54.50, G89.29]   Evaluation Date: 4/24/2024  Authorization Period Expiration: 12/31/2024  Plan of Care Expiration: 6/7/2024  Progress Note Due: 5/24/2024  Visit # / Visits authorized: 3/20 + 1  FOTO: 4/5  PTA Visit #: 0/5      Precautions: Standard; history of cancer; hypertension, asthma     Time In: 0802  Time Out: 0859  Total Billable Time: 23 minutes    Subjective     Patient reports: she's not having much pain right now. Pain at worst is 5/10  She was partially compliant with home exercise program.  Response to previous: decreased pain  Functional change: tolerates physical activity more    Pain: 0/10 at arrival and end  Location: left back and knee     Objective      5/6/2024:  Bed mobility: Rolling: Increased time to perform    Treatment     Carina received the treatments listed below:      therapeutic exercises to develop strength, endurance, ROM, flexibility, posture, and core stabilization for 9 minutes including:    Sidelying open books: 4x5 bilateral     Cybex leg press: 5.0 plates, 3x10 double leg     neuromuscular re-education activities to improve: Balance, Coordination, Kinesthetic, Sense, Proprioception, and Posture for 45 minutes. The following activities were included:    Transverse abdominus activation: x20  Sahrmann level 1: 2x10 bilateral  Double leg bridge: 3x10  Sidelying clamshells: 5"x20 bilateral. Verbal cues to prevent trunk rotation    Free Motion machine:  Straight arm pull downs: 3 lbs 2x10. Verbal " cues to maintain elbow extension  Rows: 3 lbs 2x10    therapeutic activities to improve functional performance for 3 minutes, including:    Unilateral farmer's carry: 20 lbs, 160 feet bilateral     Patient Education and Home Exercises       Education provided:   - home exercise program review    Written Home Exercises Provided: Patient instructed to cont prior HEP. Exercises were reviewed and Carina was able to demonstrate them prior to the end of the session.  Carina demonstrated good  understanding of the education provided. See Electronic Medical Record under Patient Instructions for exercises provided during therapy sessions    Assessment     Carina is a 68 y.o. female referred to outpatient Physical Therapy with a medical diagnosis of chronic left sided low back pain without sciatica. Patient presented to evaluation with back pain and radicular burning in left leg. Neither present present today. Introduced lifting with good trunk control. Increased fatigue with Sarhmann level 1 performance.    Carina Is progressing well towards her goals.   Patient prognosis is Good.   Patient will continue to benefit from skilled outpatient physical therapy to address the deficits listed in the problem list box on initial evaluation, provide pt/family education and to maximize pt's level of independence in the home and community environment.     Patient's spiritual, cultural and educational needs considered and pt agreeable to plan of care and goals.  Anticipated Barriers for therapy: Sedentary lifestyle    Short Term Goals (3 Weeks):  1. Patient will be compliant with home exercise program to supplement therapy in promoting functional mobility. Partially met 5/6  2. Patient will perform posterior sling pull with good control to demonstrate improved core strength. Progressing, not met   3. Patient will report no pain during thoracolumbar active range of motion to promote functional mobility. Progressing, not met   4.  Patient will lift and carry 20 lbs without pain to promote independent lifting. Met 5/6  Long Term Goals (6 Weeks):   2. Patient will perform Sarhmann level 3 - partial range with good control to demonstrate improved core strength. Progressing, not met   3. Patient will improve impaired lower extremity manual muscle tests to >/=4+/5 to improve strength for functional tasks. Progressing, not met   4. Patient will report no pain during the night to promote full night's sleep. Progressing, not met      Plan     Improve lumbopelvic stability and lower extremity strength.   Promote lifting and carrying.     Lorena Wells, PT, DPT, OCS

## 2024-05-08 ENCOUNTER — CLINICAL SUPPORT (OUTPATIENT)
Dept: REHABILITATION | Facility: HOSPITAL | Age: 69
End: 2024-05-08
Payer: MEDICARE

## 2024-05-08 DIAGNOSIS — R53.1 DECREASED STRENGTH: ICD-10-CM

## 2024-05-08 DIAGNOSIS — G89.29 CHRONIC BILATERAL LOW BACK PAIN WITH LEFT-SIDED SCIATICA: ICD-10-CM

## 2024-05-08 DIAGNOSIS — R26.89 IMPAIRED GAIT AND MOBILITY: Primary | ICD-10-CM

## 2024-05-08 DIAGNOSIS — I10 ESSENTIAL HYPERTENSION: ICD-10-CM

## 2024-05-08 DIAGNOSIS — R73.03 PREDIABETES: ICD-10-CM

## 2024-05-08 DIAGNOSIS — M54.42 CHRONIC BILATERAL LOW BACK PAIN WITH LEFT-SIDED SCIATICA: ICD-10-CM

## 2024-05-08 PROCEDURE — 97110 THERAPEUTIC EXERCISES: CPT | Mod: PN

## 2024-05-08 PROCEDURE — 97112 NEUROMUSCULAR REEDUCATION: CPT | Mod: PN

## 2024-05-08 PROCEDURE — 97140 MANUAL THERAPY 1/> REGIONS: CPT | Mod: PN

## 2024-05-08 RX ORDER — METFORMIN HYDROCHLORIDE 850 MG/1
TABLET ORAL
Qty: 180 TABLET | Refills: 3 | Status: SHIPPED | OUTPATIENT
Start: 2024-05-08

## 2024-05-08 NOTE — TELEPHONE ENCOUNTER
No care due was identified.  [facility] Embedded Care Due Messages. Reference number: [id number].   5/08/2024 1:31:00 PM CDT

## 2024-05-08 NOTE — PROGRESS NOTES
[facility]   Physical Therapy Treatment Note      Name: Carina Montaño  Clinic Number: [id number]    Therapy Diagnosis:  Encounter Diagnoses   Name Primary?    Impaired gait and mobility Yes    Chronic bilateral low back pain with left-sided sciatica     Decreased strength      Physician: Adelaide De Leon MD    Visit Date: 5/8/2024     Physician Orders: PT Eval and Treat   Medical Diagnosis from Referral: Chronic left-sided low back pain without sciatica [M54.50, G89.29]   Evaluation Date: 4/24/2024  Authorization Period Expiration: 12/31/2024  Plan of Care Expiration: 6/7/2024  Progress Note Due: 5/24/2024  Visit # / Visits authorized: 4/20 + 1  FOTO: Needs capture  PTA Visit #: 0/5      Precautions: Standard; history of cancer; hypertension, asthma     Time In: 0800  Time Out: 0854  Total Billable Time: 54 minutes    Subjective     Patient reports: left knee pain bothers her more than back. Pain occurs with prolonged sitting and subsequent sit>stands. Patient reports she hasn't had much back, groin or anterolateral thigh pain since evaluation.   She was partially compliant with home exercise program.  Response to previous: decreased back and groin pain  Functional change: no more burning in left anterolateral thigh    Pain: 5/10  Location: left medial knee and deep center of joint    Objective      5/8:   Manual muscle tests:   Hip flexion: 4/5 bilateral  Hip internal rotation: 5/5 right, 4+/5 left !  Hip external rotation: 4/5 bilateral !   Knee flexion: 4+/5 bilateral    Knee extension: 4/5 bilateral     !=left knee pain    Treatment     Carina received the treatments listed below:      therapeutic exercises to develop strength, endurance, ROM, flexibility, posture, and core stabilization for 24 minutes including objective:    3-way hip: 3x10 left   Cybex leg press: 6.0 plates, 3x10 double leg     4.0 plates, 2x10 left     neuromuscular re-education activities to improve:  "Balance, Coordination, Kinesthetic, Sense, Proprioception, and Posture for 21 minutes. The following activities were included:    Transverse abdominus activation: x20  Sahrmann level 1: 3x10 bilateral  Double leg bridge: 3x10  Quad sets: 5"x10 left     manual therapy techniques: were applied to the: left knee for 9 minutes, including:    Grade II-III patellar mobilizations  Passive knee flexion and extension    Patient Education and Home Exercises       Education provided:   - home exercise program review    Written Home Exercises Provided: From 4/24. Exercises were reviewed and Carina was able to demonstrate them prior to the end of the session.  aCrina demonstrated good  understanding of the education provided. See Electronic Medical Record under Patient Instructions for exercises provided during therapy sessions    Assessment     Carina is a 68 y.o. female that presents to outpatient Physical Therapy with complaints of back, left groin, and left knee pain. Back and groin pain are minimal; previous paresthesias to anterolateral thigh have resolved. Increased attention to knee via hip strengthening and antigravity closed chain loading with fatigue.      Carina Is progressing well towards her goals.   Patient prognosis is Good.   Patient will continue to benefit from skilled outpatient physical therapy to address the deficits listed in the problem list box on initial evaluation, provide pt/family education and to maximize pt's level of independence in the home and community environment.     Patient's spiritual, cultural and educational needs considered and pt agreeable to plan of care and goals.  Anticipated Barriers for therapy: Sedentary lifestyle    Short Term Goals (3 Weeks):  1. Patient will be compliant with home exercise program to supplement therapy in promoting functional mobility. Partially met 5/6  2. Patient will perform posterior sling pull with good control to demonstrate improved core " strength. Progressing, not met   3. Patient will report no pain during thoracolumbar active range of motion to promote functional mobility. Progressing, not met   4. Patient will lift and carry 20 lbs without pain to promote independent lifting. Met 5/6  Long Term Goals (6 Weeks):   2. Patient will perform Sarhmann level 3 - partial range with good control to demonstrate improved core strength. Progressing, not met   3. Patient will improve impaired lower extremity manual muscle tests to >/=4+/5 to improve strength for functional tasks. Progressing, not met   4. Patient will report no pain during the night to promote full night's sleep. Progressing, not met      Plan     Monitor back pain.   Focus attention on knee.     Lorena Wells, PT, DPT, OCS

## 2024-05-09 RX ORDER — LOSARTAN POTASSIUM 100 MG/1
TABLET ORAL
Qty: 90 TABLET | Refills: 1 | Status: SHIPPED | OUTPATIENT
Start: 2024-05-09

## 2024-05-09 NOTE — TELEPHONE ENCOUNTER
Refill Routing Note   Medication(s) are not appropriate for processing by [facility] for the following reason(s):        Required vitals abnormal    [facility] action(s):  Defer               Appointments  past 12m or future 3m with PCP    Date Provider   Last Visit   3/26/2024 Adelaide De Leon MD   Next Visit   Visit date not found Adelaide De Leon MD   ED visits in past 90 days: 0        Note composed:2:42 AM 05/09/2024

## 2024-05-14 ENCOUNTER — CLINICAL SUPPORT (OUTPATIENT)
Dept: REHABILITATION | Facility: HOSPITAL | Age: 69
End: 2024-05-14
Payer: MEDICARE

## 2024-05-14 DIAGNOSIS — G89.29 CHRONIC BILATERAL LOW BACK PAIN WITH LEFT-SIDED SCIATICA: ICD-10-CM

## 2024-05-14 DIAGNOSIS — R26.89 IMPAIRED GAIT AND MOBILITY: Primary | ICD-10-CM

## 2024-05-14 DIAGNOSIS — R53.1 DECREASED STRENGTH: ICD-10-CM

## 2024-05-14 DIAGNOSIS — M54.42 CHRONIC BILATERAL LOW BACK PAIN WITH LEFT-SIDED SCIATICA: ICD-10-CM

## 2024-05-14 PROCEDURE — 97530 THERAPEUTIC ACTIVITIES: CPT | Mod: KX,PN

## 2024-05-14 PROCEDURE — 97112 NEUROMUSCULAR REEDUCATION: CPT | Mod: KX,PN

## 2024-05-14 NOTE — PROGRESS NOTES
[facility]   Physical Therapy Treatment Note      Name: Carina Montaño  Clinic Number: [id number]    Therapy Diagnosis:  Encounter Diagnoses   Name Primary?    Impaired gait and mobility Yes    Chronic bilateral low back pain with left-sided sciatica     Decreased strength      Physician: Adelaide De Leon MD    Visit Date: 5/14/2024     Physician Orders: PT Eval and Treat   Medical Diagnosis from Referral: Chronic left-sided low back pain without sciatica [M54.50, G89.29]   Evaluation Date: 4/24/2024  Authorization Period Expiration: 12/31/2024  Plan of Care Expiration: 6/7/2024  Progress Note Due: 5/24/2024  Visit # / Visits authorized: 5/20 + 1  FOTO: NEXT  PTA Visit #: 0/5      Precautions: Standard; history of cancer; hypertension, asthma     Time In: 0808  Time Out: 0851  Total Billable Time: 24 minutes    Subjective     Patient reports: burning of left thigh during straight leg raise. Patient denies back or radicular thigh pain since last visit. Patient reports the last pain she had in her knee was over the weekend for 30 minutes after pivoting motion. Patient rates this pain as 5/10.  She was partially compliant with home exercise program.  Response to previous: no back pain; brief knee pain  Functional change: lower intensity pain during pivoting    Pain: 0/10  Location: left medial knee    Objective      Objective Measures updated at progress report unless specified.      Treatment     Carina received the treatments listed below:      therapeutic exercises to develop strength, endurance for 21 minutes including objective:    Straight leg raise: 3x10 bilateral  Sidelying hip abduction: 3x10 bilateral  Cybex leg press: 7.0 plates, 3x10 double leg     5.0 plates, 3x10 bilateral      neuromuscular re-education activities to improve: Coordination, Kinesthetic for 14 minutes. The following activities were included:    Sahrmann level 1: 3x10 bilateral  Double leg bridge: 3x10  "  Single leg bridge: 3x10 bilateral     therapeutic activities to improve functional performance for 8 minutes, including:    Sit<>stands: 5 lbs 2x10  Forward step ups: 6" step x10 left with unilateral upper extremity support    Patient Education and Home Exercises       Education provided:   - home exercise program review  - next visit on Monday - potential discharge if pain remains well controlled     Written Home Exercises Provided: From 4/24. Exercises were reviewed and Carina was able to demonstrate them prior to the end of the session.  Carina demonstrated good  understanding of the education provided. See Electronic Medical Record under Patient Instructions for exercises provided during therapy sessions    Assessment     Carina is a 68 y.o. female that presents to outpatient Physical Therapy with complaints of back, left groin, and left knee pain. Back and groin pain remain absent and knee pain is well controlled. Increased fatigue with sit<>stand and Cybex progressions.     Carina Is progressing well towards her goals.   Patient prognosis is Good.   Patient will continue to benefit from skilled outpatient physical therapy to address the deficits listed in the problem list box on initial evaluation, provide pt/family education and to maximize pt's level of independence in the home and community environment.     Patient's spiritual, cultural and educational needs considered and pt agreeable to plan of care and goals.  Anticipated Barriers for therapy: Sedentary lifestyle    Short Term Goals (3 Weeks):  1. Patient will be compliant with home exercise program to supplement therapy in promoting functional mobility. Partially met 5/6  2. Patient will perform posterior sling pull with good control to demonstrate improved core strength. Discharged 5/14  3. Patient will report no pain during thoracolumbar active range of motion to promote functional mobility. Met 5/14  4. Patient will lift and carry 20 lbs " without pain to promote independent lifting. Met 5/6  Long Term Goals (6 Weeks):   2. Patient will perform Sarhmann level 3 - partial range with good control to demonstrate improved core strength. Discharged 5/14  3. Patient will improve impaired lower extremity manual muscle tests to >/=4+/5 to improve strength for functional tasks. Progressing, not met   4. Patient will report no pain during the night to promote full night's sleep. Met 5/14     Plan     Continue focus on knee.   Discharge planning.    Lorena Wells, PT, DPT, OCS

## 2024-05-20 ENCOUNTER — CLINICAL SUPPORT (OUTPATIENT)
Dept: REHABILITATION | Facility: HOSPITAL | Age: 69
End: 2024-05-20
Payer: MEDICARE

## 2024-05-20 DIAGNOSIS — M54.42 CHRONIC BILATERAL LOW BACK PAIN WITH LEFT-SIDED SCIATICA: ICD-10-CM

## 2024-05-20 DIAGNOSIS — G89.29 CHRONIC BILATERAL LOW BACK PAIN WITH LEFT-SIDED SCIATICA: ICD-10-CM

## 2024-05-20 DIAGNOSIS — R53.1 DECREASED STRENGTH: ICD-10-CM

## 2024-05-20 DIAGNOSIS — R26.89 IMPAIRED GAIT AND MOBILITY: Primary | ICD-10-CM

## 2024-05-20 PROCEDURE — 97530 THERAPEUTIC ACTIVITIES: CPT | Mod: KX,PN

## 2024-05-20 PROCEDURE — 97112 NEUROMUSCULAR REEDUCATION: CPT | Mod: KX,PN

## 2024-05-20 NOTE — PROGRESS NOTES
[facility]   Physical Therapy Treatment/Discharge Note      Name: Carina Montaño  Clinic Number: [id number]    Therapy Diagnosis:  Encounter Diagnoses   Name Primary?    Impaired gait and mobility Yes    Chronic bilateral low back pain with left-sided sciatica     Decreased strength      Physician: Adelaide De Leon MD    Visit Date: 5/20/2024     Physician Orders: PT Eval and Treat   Medical Diagnosis from Referral: Chronic left-sided low back pain without sciatica [M54.50, G89.29]   Evaluation Date: 4/24/2024  Authorization Period Expiration: 12/31/2024  Plan of Care Expiration: 6/7/2024  Progress Note Due: 5/24/2024  Visit # / Visits authorized: 6/20 + 1  PTA Visit #: 0/5      Precautions: Standard; history of cancer; hypertension, asthma     Time In: 0801  Time Out: 0842  Total Billable Time: 41 minutes    Subjective     Patient reports: had some medial knee pain over the weekend. Patient thinks it's associated with weather. Patient is ready for therapy discharge at this time.   She was partially compliant with home exercise program.  Response to previous: well controlled knee pain  Functional change: 1 hr period of 5/10 pain this weekend. Otherwise low level pain    Pain: 0/10  Location: left medial knee    Objective      5/22/2024:    Lower extremity manual muscle tests Right Left Pain/dysfunction with movement   Hip flexion 4/5 4/5    Hip extension 4/5 4/5    Hip abduction 4/5 4/5    Hip adduction 4/5 4/5    Hip internal rotation 4/5 4+/5    Hip external rotation 4+/5 4+/5    Knee flexion 5/5 5/5    Knee extension 5/5 5/5    Ankle dorsiflexion 4+/5 5/5    Ankle plantarflexion 5/5 5/5      Treatment     Carina received the treatments listed below:      therapeutic exercises to develop strength, endurance for 12 minutes including objective:    Straight leg raise: 3x10 left   Sidelying hip abduction: 3x15 left     neuromuscular re-education activities to improve: Coordination,  "Kinesthetic for 14 minutes. The following activities were included:    Double leg bridge: 3x10   Single leg bridge: 3x10 bilateral     therapeutic activities to improve functional performance for 15 minutes, including:    Forward step up with contralateral knee drive: 6" step x20 left with bilateral upper extremity support  Lateral step down: 4 -> 6" step 3x10 left with bilateral upper extremity support    Patient Education and Home Exercises       Education provided:   - updated home exercise program   - discharge from physical therapy    Written Home Exercises Provided: From 5/20. Exercises were reviewed and Carina was able to demonstrate them prior to the end of the session.  Carina demonstrated good  understanding of the education provided. See Electronic Medical Record under Patient Instructions for exercises provided during therapy sessions    Assessment     Carina is a 68 y.o. female that presents to outpatient Physical Therapy with complaints of back, left groin, and left knee pain. Absent back and groin pain since last visit. Knee pain well managed. Patient is ready for and appropriate for discharge at this time.     Short Term Goals (3 Weeks):  1. Patient will be compliant with home exercise program to supplement therapy in promoting functional mobility. Partially met 5/6  2. Patient will perform posterior sling pull with good control to demonstrate improved core strength. Discharged 5/14  3. Patient will report no pain during thoracolumbar active range of motion to promote functional mobility. Met 5/14  4. Patient will lift and carry 20 lbs without pain to promote independent lifting. Met 5/6  Long Term Goals (6 Weeks):   2. Patient will perform Sarhmann level 3 - partial range with good control to demonstrate improved core strength. Discharged 5/14  3. Patient will improve impaired lower extremity manual muscle tests to >/=4+/5 to improve strength for functional tasks. Partially met 5/20  4. " Patient will report no pain during the night to promote full night's sleep. Met 5/14     Plan     Discharge from physical therapy.    Lorena Wells, PT, DPT, OCS

## 2024-06-09 ENCOUNTER — OFFICE VISIT (OUTPATIENT)
Dept: URGENT CARE | Facility: CLINIC | Age: 69
End: 2024-06-09
Payer: MEDICARE

## 2024-06-09 VITALS
BODY MASS INDEX: 32.1 KG/M2 | DIASTOLIC BLOOD PRESSURE: 77 MMHG | SYSTOLIC BLOOD PRESSURE: 122 MMHG | RESPIRATION RATE: 20 BRPM | HEART RATE: 79 BPM | OXYGEN SATURATION: 95 % | TEMPERATURE: 99 F | HEIGHT: 61 IN | WEIGHT: 170 LBS

## 2024-06-09 DIAGNOSIS — J30.9 ALLERGIC RHINITIS, UNSPECIFIED SEASONALITY, UNSPECIFIED TRIGGER: Primary | ICD-10-CM

## 2024-06-09 DIAGNOSIS — R05.9 COUGH, UNSPECIFIED TYPE: ICD-10-CM

## 2024-06-09 DIAGNOSIS — J45.909 MODERATE ASTHMA WITHOUT COMPLICATION, UNSPECIFIED WHETHER PERSISTENT: ICD-10-CM

## 2024-06-09 DIAGNOSIS — J02.9 SORE THROAT: ICD-10-CM

## 2024-06-09 PROBLEM — M54.42 CHRONIC BILATERAL LOW BACK PAIN WITH LEFT-SIDED SCIATICA: Status: RESOLVED | Noted: 2024-05-03 | Resolved: 2024-06-09

## 2024-06-09 PROBLEM — R26.89 IMPAIRED GAIT AND MOBILITY: Status: RESOLVED | Noted: 2022-04-06 | Resolved: 2024-06-09

## 2024-06-09 PROBLEM — R53.1 DECREASED STRENGTH: Status: RESOLVED | Noted: 2024-05-03 | Resolved: 2024-06-09

## 2024-06-09 PROBLEM — G89.29 CHRONIC BILATERAL LOW BACK PAIN WITH LEFT-SIDED SCIATICA: Status: RESOLVED | Noted: 2024-05-03 | Resolved: 2024-06-09

## 2024-06-09 LAB
CTP QC/QA: YES
CTP QC/QA: YES
MOLECULAR STREP A: NEGATIVE
SARS-COV-2 AG RESP QL IA.RAPID: NEGATIVE

## 2024-06-09 PROCEDURE — 87811 SARS-COV-2 COVID19 W/OPTIC: CPT | Mod: QW,S$GLB,, | Performed by: NURSE PRACTITIONER

## 2024-06-09 PROCEDURE — 87651 STREP A DNA AMP PROBE: CPT | Mod: QW,S$GLB,, | Performed by: NURSE PRACTITIONER

## 2024-06-09 PROCEDURE — 99214 OFFICE O/P EST MOD 30 MIN: CPT | Mod: S$GLB,,, | Performed by: NURSE PRACTITIONER

## 2024-06-09 RX ORDER — AZELASTINE 1 MG/ML
1 SPRAY, METERED NASAL 2 TIMES DAILY
Qty: 10 ML | Refills: 11 | Status: SHIPPED | OUTPATIENT
Start: 2024-06-09 | End: 2024-06-23

## 2024-06-09 RX ORDER — ALBUTEROL SULFATE 90 UG/1
2 AEROSOL, METERED RESPIRATORY (INHALATION) EVERY 6 HOURS PRN
Qty: 18 G | Refills: 2 | Status: SHIPPED | OUTPATIENT
Start: 2024-06-09

## 2024-06-09 RX ORDER — PREDNISONE 20 MG/1
20 TABLET ORAL DAILY
Qty: 3 TABLET | Refills: 0 | Status: SHIPPED | OUTPATIENT
Start: 2024-06-09 | End: 2024-06-12

## 2024-06-09 NOTE — PROGRESS NOTES
"Subjective:      Patient ID: Carina Montaño is a 68 y.o. female.    Vitals:  height is 5' 1" (1.549 m) and weight is 77.1 kg (170 lb). Her temperature is 98.5 °F (36.9 °C). Her blood pressure is 122/77 and her pulse is 79. Her respiration is 20 and oxygen saturation is 95%.     Chief Complaint: Sinus Problem    68 year old female presents today with sinus pressure, cough is worse at night, post nasal drip, sore throat, sputum production with a yellow color, chest congestion. Hx of Asthma and Bronchitis. Needs a refill for Albuterol inhaler. Symptoms started 08/07/2024. Treatments at home includes Robitussin, Honey, Vicks rub, nasal saline wash, Cough drops. Requesting a steroid injection. Requesting to be swabbed to COVID. Never had COVID before that she knows of. COVID and Flu vaccinated.     Sinus Problem  This is a new problem. The current episode started in the past 7 days. The problem is unchanged. There has been no fever. Her pain is at a severity of 5/10. Associated symptoms include congestion, coughing, headaches, sinus pressure and a sore throat. Pertinent negatives include no chills, diaphoresis, ear pain, hoarse voice, neck pain, shortness of breath, sneezing or swollen glands. Past treatments include oral decongestants.       Constitution: Negative for chills, sweating, fever and generalized weakness.   HENT:  Positive for congestion, postnasal drip, sinus pressure and sore throat. Negative for ear pain and sinus pain.    Neck: Negative for neck pain.   Cardiovascular:  Negative for chest pain and palpitations.   Respiratory:  Positive for cough and sputum production. Negative for chest tightness, shortness of breath, stridor and wheezing.    Allergic/Immunologic: Negative for sneezing.   Neurological:  Positive for headaches.      Objective:     Physical Exam   Constitutional: She is oriented to person, place, and time. She appears well-developed. She is cooperative.  Non-toxic appearance. She does not " appear ill. No distress.   HENT:   Head: Normocephalic and atraumatic.   Ears:   Right Ear: Hearing, tympanic membrane, external ear and ear canal normal. no impacted cerumen  Left Ear: Hearing, tympanic membrane, external ear and ear canal normal. no impacted cerumen  Nose: Mucosal edema, rhinorrhea and congestion present. No purulent discharge, sinus tenderness or nasal deformity. No epistaxis. Right sinus exhibits no maxillary sinus tenderness and no frontal sinus tenderness. Left sinus exhibits no maxillary sinus tenderness and no frontal sinus tenderness.   Mouth/Throat: Uvula is midline, oropharynx is clear and moist and mucous membranes are normal. No trismus in the jaw. Normal dentition. No uvula swelling. No oropharyngeal exudate, posterior oropharyngeal edema, posterior oropharyngeal erythema, tonsillar abscesses or cobblestoning. No tonsillar exudate.   Eyes: Conjunctivae and lids are normal. No scleral icterus.   Neck: Trachea normal and phonation normal. Neck supple. No edema present. No erythema present. No neck rigidity present.   Cardiovascular: Normal rate, regular rhythm, normal heart sounds and normal pulses.   Pulmonary/Chest: Effort normal and breath sounds normal. No respiratory distress. She has no decreased breath sounds. She has no rhonchi.   Abdominal: Normal appearance.   Musculoskeletal: Normal range of motion.         General: No deformity. Normal range of motion.   Neurological: She is alert and oriented to person, place, and time. She exhibits normal muscle tone. Coordination normal.   Skin: Skin is warm, dry, intact, not diaphoretic and not pale.   Psychiatric: Her speech is normal and behavior is normal. Judgment and thought content normal.   Nursing note and vitals reviewed.      Assessment:     1. Allergic rhinitis, unspecified seasonality, unspecified trigger Well controlled   2. Cough, unspecified type    3. Sore throat    4. Moderate asthma without complication, unspecified  whether persistent      Results for orders placed or performed in visit on 06/09/24   SARS Coronavirus 2 Antigen, POCT Manual Read   Result Value Ref Range    SARS Coronavirus 2 Antigen Negative Negative     Acceptable Yes    POCT Strep A, Molecular   Result Value Ref Range    Molecular Strep A, POC Negative Negative     Acceptable Yes         Plan:       Allergic rhinitis, unspecified seasonality, unspecified trigger  Comments:  Continue current medication  Orders:  -     azelastine (ASTELIN) 137 mcg (0.1 %) nasal spray; 1 spray (137 mcg total) by Nasal route 2 (two) times daily. for 14 days  Dispense: 10 mL; Refill: 11    Cough, unspecified type  -     SARS Coronavirus 2 Antigen, POCT Manual Read    Sore throat  -     POCT Strep A, Molecular    Moderate asthma without complication, unspecified whether persistent  -     albuterol (PROVENTIL/VENTOLIN HFA) 90 mcg/actuation inhaler; Inhale 2 puffs into the lungs every 6 (six) hours as needed for Wheezing or Shortness of Breath (or cough).  Dispense: 18 g; Refill: 2  -     predniSONE (DELTASONE) 20 MG tablet; Take 1 tablet (20 mg total) by mouth once daily. for 3 days  Dispense: 3 tablet; Refill: 0      You can try breathe right strips at night to help you breathe.  A cool mist humidifier in bedroom may help with cough and relieve stuffy nose.     Sore throat:  Lozenge, hard candy or honey.      Sinus rinses DO NOT USE TAP WATER, if you must, water must be a rolling boil for 1 minute, let it cool, then use.  May use distilled water, or over the counter nasal saline rinses.  Vics vapor rub in shower to help open nasal passages.  May use nasal gel to keep passages moisturized.  May use Nasal saline sprays during the day for added relief of congestion.   For those who go to the gym, please do not use the sauna or steam room now to clear sinuses.    During pollen season, change shirt if you are outside for a while when you go in.  Also wash  your face.  Do not touch your face with your hands.  Wash your hands often in general while ill, avoid face contact with hands.     Over the counter you can use Tylenol (acetominophen) or Ibuprofen for your minor aches and pains as long as you have no contraindications.    Good nutrition. Lots of rest. Plenty of fluids.      You must understand that you've received an Urgent Care treatment only and that you may be released before all your medical problems are known or treated. You, the patient, will arrange for follow up care as instructed.  Follow up with your PCP or specialty clinic as directed in the next 1-2 weeks if not improved or as needed.  You can call [phone] to schedule an appointment with the appropriate provider.  If your condition worsens we recommend that you receive another evaluation at the emergency room immediately or contact your primary medical clinics after hours call service to discuss your concerns.  Please return here or go to the Emergency Department for any concerns or worsening of condition.    If you were prescribed a narcotic or controlled medication, do not drive or operate heavy equipment or machinery while taking these medications.    Thank you for choosing [facility]!    Our goal in the Urgent Care is to always provide outstanding medical care. You may receive a survey by mail or e-mail in the next week regarding your experience today. We would greatly appreciate you completing and returning the survey. Your feedback provides us with a way to recognize our staff who provide very good care, and it helps us learn how to improve when your experience was below our aspiration of excellence.      We appreciate you trusting us with your medical care. We hope you feel better soon. We will be happy to take care of you for all of your future medical needs.   This note was prepared using voice-recognition software.  Although efforts are made to proofread the note, some errors  may persist in the final document.     Sincerely,    Yefri Mcgill DNP, FNP-C

## 2024-06-09 NOTE — PATIENT INSTRUCTIONS
You can try breathe right strips at night to help you breathe.  A cool mist humidifier in bedroom may help with cough and relieve stuffy nose.     Sore throat:  Lozenge, hard candy or honey.      Sinus rinses DO NOT USE TAP WATER, if you must, water must be a rolling boil for 1 minute, let it cool, then use.  May use distilled water, or over the counter nasal saline rinses.  Vics vapor rub in shower to help open nasal passages.  May use nasal gel to keep passages moisturized.  May use Nasal saline sprays during the day for added relief of congestion.   For those who go to the gym, please do not use the sauna or steam room now to clear sinuses.    During pollen season, change shirt if you are outside for a while when you go in.  Also wash your face.  Do not touch your face with your hands.  Wash your hands often in general while ill, avoid face contact with hands.     Over the counter you can use Tylenol (acetominophen) or Ibuprofen for your minor aches and pains as long as you have no contraindications.    Good nutrition. Lots of rest. Plenty of fluids.      You must understand that you've received an Urgent Care treatment only and that you may be released before all your medical problems are known or treated. You, the patient, will arrange for follow up care as instructed.  Follow up with your PCP or specialty clinic as directed in the next 1-2 weeks if not improved or as needed.  You can call [phone] to schedule an appointment with the appropriate provider.  If your condition worsens we recommend that you receive another evaluation at the emergency room immediately or contact your primary medical clinics after hours call service to discuss your concerns.  Please return here or go to the Emergency Department for any concerns or worsening of condition.    If you were prescribed a narcotic or controlled medication, do not drive or operate heavy equipment or machinery while taking these medications.    Thank  you for choosing [facility]!    Our goal in the Urgent Care is to always provide outstanding medical care. You may receive a survey by mail or e-mail in the next week regarding your experience today. We would greatly appreciate you completing and returning the survey. Your feedback provides us with a way to recognize our staff who provide very good care, and it helps us learn how to improve when your experience was below our aspiration of excellence.      We appreciate you trusting us with your medical care. We hope you feel better soon. We will be happy to take care of you for all of your future medical needs.   This note was prepared using voice-recognition software.  Although efforts are made to proofread the note, some errors may persist in the final document.     Sincerely,    Yefri Mcgill DNP, FNP-C

## 2024-07-25 NOTE — TELEPHONE ENCOUNTER
----- Message from Kelly Mcginnis sent at 11/16/2017 12:48 PM CST -----  Contact: patient [phone]  Pt is out of ranitidine and thought you were sending a new rx to [facility] but they said it was not sent. Please send a new rx to [facility]  [phone]. Please have the nurse call when this has been ordered.   

## 2024-07-27 ENCOUNTER — OFFICE VISIT (OUTPATIENT)
Dept: URGENT CARE | Facility: CLINIC | Age: 69
End: 2024-07-27
Payer: MEDICARE

## 2024-07-27 VITALS
TEMPERATURE: 98 F | HEIGHT: 61 IN | DIASTOLIC BLOOD PRESSURE: 85 MMHG | HEART RATE: 88 BPM | WEIGHT: 170 LBS | BODY MASS INDEX: 32.1 KG/M2 | OXYGEN SATURATION: 95 % | RESPIRATION RATE: 18 BRPM | SYSTOLIC BLOOD PRESSURE: 142 MMHG

## 2024-07-27 DIAGNOSIS — J02.9 VIRAL PHARYNGITIS: Primary | ICD-10-CM

## 2024-07-27 DIAGNOSIS — J02.9 SORE THROAT: ICD-10-CM

## 2024-07-27 DIAGNOSIS — R59.1 LYMPHADENOPATHY: ICD-10-CM

## 2024-07-27 DIAGNOSIS — R09.82 PND (POST-NASAL DRIP): ICD-10-CM

## 2024-07-27 LAB
CTP QC/QA: YES
MOLECULAR STREP A: NEGATIVE

## 2024-07-27 PROCEDURE — 99214 OFFICE O/P EST MOD 30 MIN: CPT | Mod: 25,S$GLB,,

## 2024-07-27 PROCEDURE — 96372 THER/PROPH/DIAG INJ SC/IM: CPT | Mod: S$GLB,,, | Performed by: FAMILY MEDICINE

## 2024-07-27 PROCEDURE — 87651 STREP A DNA AMP PROBE: CPT | Mod: QW,S$GLB,,

## 2024-07-27 RX ORDER — DEXAMETHASONE SODIUM PHOSPHATE 100 MG/10ML
10 INJECTION INTRAMUSCULAR; INTRAVENOUS ONCE
Status: COMPLETED | OUTPATIENT
Start: 2024-07-27 | End: 2024-07-27

## 2024-07-27 RX ORDER — FLUTICASONE PROPIONATE 50 MCG
1 SPRAY, SUSPENSION (ML) NASAL 2 TIMES DAILY
Qty: 15.8 ML | Refills: 0 | Status: SHIPPED | OUTPATIENT
Start: 2024-07-27 | End: 2024-08-26

## 2024-07-27 RX ORDER — GUAIFENESIN AND DEXTROMETHORPHAN HYDROBROMIDE 1200; 60 MG/1; MG/1
1 TABLET, EXTENDED RELEASE ORAL 2 TIMES DAILY
Qty: 20 TABLET | Refills: 0 | Status: SHIPPED | OUTPATIENT
Start: 2024-07-27 | End: 2024-08-06

## 2024-07-27 RX ADMIN — DEXAMETHASONE SODIUM PHOSPHATE 10 MG: 100 INJECTION INTRAMUSCULAR; INTRAVENOUS at 09:07

## 2024-07-27 NOTE — PATIENT INSTRUCTIONS
Congestion: Mucinex twice daily for 10 days   Fever/Pain: Alternate Tylenol and Ibuprofen as needed every 4-6 hours  Nasal congestion/Runny nose: Nasal spray twice daily as needed  Monitor for chest pain, shortness of breath, worsening of symptoms, or fever unresponsive to medication.   Please drink plenty of fluids.  Please get plenty of rest.  Please return here or go to the Emergency Department for any concerns or worsening of condition.  If you were prescribed antibiotics, please take them to completion.  If you were given wait & see antibiotics, please wait 5-7 days before taking them, and only take them if your symptoms have worsened or not improved.  If you do begin taking the antibiotics, please take them to completion.  If you were prescribed a narcotic medication, do not drive or operate heavy equipment or machinery while taking these medications.  If you were given a steroid shot in the clinic and have also been given a prescription for a steroid such as Prednisone or a Medrol Dose Pack, please begin taking them tomorrow.  If you do not have Hypertension or any history of palpitations, it is ok to take over the counter Sudafed or Mucinex D or Allegra-D or Claritin-D or Zyrtec-D.  If you do take one of the above, it is ok to combine that with plain over the counter Mucinex or Allegra or Claritin or Zyrtec.  If for example you are taking Zyrtec -D, you can combine that with Mucinex, but not Mucinex-D.  If you are taking Mucinex-D, you can combine that with plain Allegra or Claritin or Zyrtec.   If you do have Hypertension or palpitations, it is safe to take Coricidin HBP for relief of sinus symptoms.  If not allergic, please take over the counter Tylenol (Acetaminophen) and/or Motrin (Ibuprofen) as directed for control of pain and/or fever.  Please follow up with your primary care doctor or specialist as needed.    If you  smoke, please stop smoking.

## 2024-07-27 NOTE — PROGRESS NOTES
"Subjective:      Patient ID: Carina Montaño is a 69 y.o. female.    Vitals:  height is 5' 1" (1.549 m) and weight is 77.1 kg (170 lb). Her oral temperature is 98.3 °F (36.8 °C). Her blood pressure is 142/85 (abnormal) and her pulse is 88. Her respiration is 18 and oxygen saturation is 95%.     Chief Complaint: Sore Throat    Pt is a 70 yo female with PMHx of HTN, GERD. She is presenting with sore throat mainly on the right side, hoarseness, neck pain, PND, trouble swallowing, plugged ear sensation. Onset of symptoms in the middle of the night after waking up in pain. Denies any SOB, fever, chills, myalgia, CP, ABD pain, N/V. Pt reports using warm gargles without relief. Denies any known sick contacts.      Sore Throat   This is a new problem. The current episode started today. The problem has been rapidly improving. The pain is worse on the right side. There has been no fever. The pain is at a severity of 8/10. The pain is moderate. Associated symptoms include a hoarse voice, a plugged ear sensation, neck pain and trouble swallowing. Pertinent negatives include no abdominal pain, congestion, coughing, diarrhea, drooling, ear discharge, ear pain, headaches, shortness of breath, swollen glands or vomiting. She has had no exposure to strep or mono. She has tried gargles for the symptoms. The treatment provided no relief.       Constitution: Negative for activity change, appetite change, chills and fever.   HENT:  Positive for postnasal drip, sore throat, trouble swallowing and voice change (hoarse). Negative for ear pain, ear discharge, drooling, congestion, sinus pain and sinus pressure.    Neck: Positive for neck pain.   Cardiovascular:  Negative for chest pain and sob on exertion.   Eyes:  Negative for eye trauma, eye discharge, eye itching, eye redness, photophobia and blurred vision.   Respiratory:  Negative for cough, shortness of breath, wheezing and asthma.    Gastrointestinal:  Negative for abdominal pain, " nausea, vomiting, constipation and diarrhea.   Genitourinary:  Negative for dysuria, frequency, urgency, urine decreased and hematuria.   Musculoskeletal:  Negative for pain and muscle ache.   Skin:  Negative for color change, rash and hives.   Allergic/Immunologic: Negative for seasonal allergies, asthma, hives and sneezing.   Neurological:  Negative for dizziness, light-headedness, headaches and altered mental status.   Psychiatric/Behavioral:  Negative for altered mental status and confusion.       Objective:     Physical Exam   Constitutional: She is oriented to person, place, and time. She appears well-developed. She is cooperative.  Non-toxic appearance. She does not appear ill. No distress.      Comments:Pt sitting erect on examination table. No acute respiratory distress, no use of accessory muscles, no notice of nasal flaring. Pt speaking to me in complete sentences, but does sound hoarse.        HENT:   Head: Normocephalic and atraumatic.   Ears:   Right Ear: Hearing, tympanic membrane, external ear and ear canal normal.   Left Ear: Hearing, tympanic membrane, external ear and ear canal normal.   Nose: Nose normal. No mucosal edema, rhinorrhea, nasal deformity or congestion. No epistaxis. Right sinus exhibits no maxillary sinus tenderness and no frontal sinus tenderness. Left sinus exhibits no maxillary sinus tenderness and no frontal sinus tenderness.   Mouth/Throat: Uvula is midline and mucous membranes are normal. Mucous membranes are moist. No trismus in the jaw. Normal dentition. No uvula swelling. Posterior oropharyngeal erythema present. No oropharyngeal exudate or posterior oropharyngeal edema. Oropharynx is clear.   Eyes: Conjunctivae and lids are normal. Pupils are equal, round, and reactive to light. No scleral icterus. Extraocular movement intact   Neck: Trachea normal and phonation normal. Neck supple. No edema present. No erythema present. No neck rigidity present.   Cardiovascular: Normal  rate, regular rhythm, normal heart sounds and normal pulses.   Pulmonary/Chest: Effort normal and breath sounds normal. No accessory muscle usage. No apnea, no tachypnea and no bradypnea. No respiratory distress. She has no decreased breath sounds. She has no wheezes. She has no rhonchi.   Lungs clear to auscultation B/L  No stridor or signs of respiratory distress         Comments: Lungs clear to auscultation B/L  No stridor or signs of respiratory distress    Abdominal: Normal appearance.   Musculoskeletal: Normal range of motion.         General: No deformity. Normal range of motion.   Lymphadenopathy:     She has cervical adenopathy.        Right cervical: Superficial cervical adenopathy present. No posterior cervical adenopathy present.       Left cervical: Superficial cervical adenopathy present. No posterior cervical adenopathy present.   Neurological: She is alert and oriented to person, place, and time. She exhibits normal muscle tone. Coordination normal.   Skin: Skin is warm, dry, intact, not diaphoretic and not pale.   Psychiatric: Her speech is normal and behavior is normal. Judgment and thought content normal.   Nursing note and vitals reviewed.    Results for orders placed or performed in visit on 07/27/24   POCT Strep A, Molecular   Result Value Ref Range    Molecular Strep A, POC Negative Negative     Acceptable Yes          Assessment:     1. Viral pharyngitis    2. Sore throat    3. PND (post-nasal drip)    4. Lymphadenopathy        Plan:   I have reviewed the patient chart and pertinent past imaging/labs.      Viral pharyngitis    Sore throat  -     POCT Strep A, Molecular    PND (post-nasal drip)  -     fluticasone propionate (FLONASE) 50 mcg/actuation nasal spray; 1 spray (50 mcg total) by Each Nostril route 2 (two) times a day.  Dispense: 15.8 mL; Refill: 0  -     dextromethorphan-guaiFENesin (MUCINEX DM) 60-1,200 mg per 12 hr tablet; Take 1 tablet by mouth 2 (two) times a day.  for 10 days  Dispense: 20 tablet; Refill: 0    Lymphadenopathy  -     dexAMETHasone injection 10 mg

## 2024-07-31 ENCOUNTER — PATIENT OUTREACH (OUTPATIENT)
Dept: ADMINISTRATIVE | Facility: HOSPITAL | Age: 69
End: 2024-07-31
Payer: MEDICARE

## 2024-07-31 NOTE — PROGRESS NOTES
Population Health Chart Review & Patient Outreach Details      Additional [facility] Notes:               Updates Requested / Reviewed:      Updated Care Coordination Note, Care Everywhere, and Immunizations Reconciliation Completed or Queried: [place]         Health Maintenance Topics Overdue:      VB Score: 1     Uncontrolled BP    Pneumonia Vaccine                  Health Maintenance Topic(s) Outreach Outcomes & Actions Taken:    Provider Pt Reattribution - Outreach Outcomes & Actions Taken  : Upcoming Appt with Another Provider Already Scheduled

## 2024-08-23 ENCOUNTER — OFFICE VISIT (OUTPATIENT)
Dept: OTOLARYNGOLOGY | Facility: CLINIC | Age: 69
End: 2024-08-23
Payer: MEDICARE

## 2024-08-23 ENCOUNTER — PATIENT MESSAGE (OUTPATIENT)
Dept: ADMINISTRATIVE | Facility: HOSPITAL | Age: 69
End: 2024-08-23
Payer: MEDICARE

## 2024-08-23 VITALS
BODY MASS INDEX: 32.53 KG/M2 | HEART RATE: 93 BPM | DIASTOLIC BLOOD PRESSURE: 75 MMHG | WEIGHT: 172.19 LBS | SYSTOLIC BLOOD PRESSURE: 116 MMHG

## 2024-08-23 DIAGNOSIS — H90.3 SENSORINEURAL HEARING LOSS, BILATERAL: Chronic | ICD-10-CM

## 2024-08-23 DIAGNOSIS — K21.9 LARYNGOPHARYNGEAL REFLUX (LPR): Chronic | ICD-10-CM

## 2024-08-23 DIAGNOSIS — R49.0 DYSPHONIA: Chronic | ICD-10-CM

## 2024-08-23 DIAGNOSIS — J30.9 CHRONIC ALLERGIC RHINITIS: Primary | Chronic | ICD-10-CM

## 2024-08-23 PROCEDURE — 99213 OFFICE O/P EST LOW 20 MIN: CPT | Mod: PBBFAC,PN | Performed by: OTOLARYNGOLOGY

## 2024-08-23 PROCEDURE — 99999 PR PBB SHADOW E&M-EST. PATIENT-LVL III: CPT | Mod: PBBFAC,,, | Performed by: OTOLARYNGOLOGY

## 2024-08-23 RX ORDER — PANTOPRAZOLE SODIUM 40 MG/1
40 TABLET, DELAYED RELEASE ORAL DAILY
Qty: 30 TABLET | Refills: 3 | Status: SHIPPED | OUTPATIENT
Start: 2024-08-23 | End: 2025-08-23

## 2024-08-23 RX ORDER — FLUTICASONE PROPIONATE 50 MCG
SPRAY, SUSPENSION (ML) NASAL
Qty: 48 ML | Refills: 3 | Status: SHIPPED | OUTPATIENT
Start: 2024-08-23

## 2024-08-23 RX ORDER — MONTELUKAST SODIUM 10 MG/1
10 TABLET ORAL NIGHTLY
Qty: 90 TABLET | Refills: 3 | Status: SHIPPED | OUTPATIENT
Start: 2024-08-23

## 2024-08-23 RX ORDER — AZELASTINE 1 MG/ML
1 SPRAY, METERED NASAL 2 TIMES DAILY
Qty: 10 ML | Refills: 11 | Status: SHIPPED | OUTPATIENT
Start: 2024-08-23 | End: 2024-09-06

## 2024-08-23 NOTE — PROCEDURES
Laryngoscopy    Date/Time: 8/23/2024 1:00 PM    Performed by: Angela Hooper MD  Authorized by: Angela Hooper MD    Consent Done?:  Yes (Verbal)  Anesthesia:     Local anesthetic:  Lidocaine 2% and Yanick-Synephrine 1/2%  Laryngoscopy:     Areas examined:  Nasal cavities, nasopharynx, oropharynx, hypopharynx, larynx and vocal cords  Nose External:      No external nasal deformity  Nose Intranasal:      Mucosa no polyps     Mucosa ulcers not present     No mucosa lesions present     No septum gross deformity     Turbinates not enlarged  Nasopharynx:      No mucosa lesions     Adenoids not present     Posterior choanae patent     Eustachian tube patent  Larynx/hypopharynx:      No epiglottis lesions     No epiglottis edema     No AE folds lesions     No vocal cord polyps     Equal and normal bilateral     No hypopharynx lesions     No piriform sinus pooling     No piriform sinus lesions     Post cricoid edema (mild)     Post cricoid erythema (mild)

## 2024-08-23 NOTE — PROGRESS NOTES
Chief Complaint   Patient presents with    Follow-up   .    HPI:     Carina Montaño is a 69 y.o. female who presents for evaluation of a several month history of nasal congestion, postnasal drip, facial pressure and pain.  She notes associated cough and sneezing. She does feel that her symptoms worsen with weather changes.  She describes difficulty breathing at night.  She does use sinus rinses or nasal sprays. She has been on Flonase and Astelin. She is also on Claritin and montelukast.  She feels that this is somewhat helpful.  She admits to midface pain and pressure.  She admits to rhinorrhea and postnasal drip. There is not maxillary tooth pain. She admits to frontal headaches.  She has had sinus or nasal surgery. She is unsure of exact procedure.  There is no history of sinonasal trauma. She notes that she has had hearing loss since childhood. She has had hearing tests in the past. She did not tolerate hearing aids well. She felt they were too loud. She thinks last hearing test was 10 years ago.         Interval HPI 8/23/2024:  Follow up visit. Reports that she has been doing well. She has been using Protonix 40mg PO daily, flonase, Astelin, and montelukast, saline. She notes rhinorrhea continues to be an issue. She denies difficulty swallowing.  She does feel that she has intermittent hoarseness and vocal fatigue but not as severe as it has been in the past. She denies throat pain, dysphgia, odynophagia. She denies facial pain/pressure or headaches.       Past Medical History:   Diagnosis Date    Allergy     Arthritis     Asthma     Cancer     breast    Chronic diastolic (congestive) heart failure 12/8/2022    GERD (gastroesophageal reflux disease)     Hyperlipidemia     Hypertension     Prediabetes      Social History     Socioeconomic History    Marital status:     Number of children: 1   Occupational History    Occupation:     Tobacco Use    Smoking status: Never     Smokeless tobacco: Never   Substance and Sexual Activity    Alcohol use: Yes     Comment: social    Drug use: No    Sexual activity: Yes     Social Determinants of Health     Financial Resource Strain: Low Risk  (1/3/2024)    Overall Financial Resource Strain (CARDIA)     Difficulty of Paying Living Expenses: Not hard at all   Food Insecurity: No Food Insecurity (1/3/2024)    Hunger Vital Sign     Worried About Running Out of Food in the Last Year: Never true     Ran Out of Food in the Last Year: Never true   Transportation Needs: No Transportation Needs (1/3/2024)    PRAPARE - Transportation     Lack of Transportation (Medical): No     Lack of Transportation (Non-Medical): No   Physical Activity: Inactive (1/3/2024)    Exercise Vital Sign     Days of Exercise per Week: 0 days     Minutes of Exercise per Session: 0 min   Stress: No Stress Concern Present (1/3/2024)    [employer] - Occupational Stress Questionnaire     Feeling of Stress : Only a little   Housing Stability: Low Risk  (1/3/2024)    Housing Stability Vital Sign     Unable to Pay for Housing in the Last Year: No     Number of Places Lived in the Last Year: 1     Unstable Housing in the Last Year: No     Past Surgical History:   Procedure Laterality Date    ABDOMINAL SURGERY      COLONOSCOPY N/A 5/11/2021    Procedure: COLONOSCOPY;  Surgeon: Ema Vidal MD;  Location: [facility];  Service: Endoscopy;  Laterality: N/A;  COVID TEST 5/8 SUPREP    ESOPHAGOGASTRODUODENOSCOPY N/A 5/17/2019    Procedure: ESOPHAGOGASTRODUODENOSCOPY (EGD);  Surgeon: Ema Vidal MD;  Location: [facility];  Service: Endoscopy;  Laterality: N/A;    KNEE SURGERY      SHOULDER SURGERY       Family History   Problem Relation Name Age of Onset    Diabetes Mother      Hypertension Mother      Cancer Mother          Pancreatic    Pancreatic cancer Mother      Hypertension Father      Alzheimer's disease Father      Arthritis Brother x 1, half -mom      Diabetes Brother x1     No Known Problems Daughter x1            Review of Systems  General: negative for chills, fever or weight loss  Psychological: negative for mood changes or depression  Ophthalmic: negative for blurry vision, photophobia or eye pain  ENT: see HPI  Respiratory: no cough, shortness of breath, or wheezing  Cardiovascular: no chest pain or dyspnea on exertion  Gastrointestinal: no abdominal pain, change in bowel habits, or black/ bloody stools  Musculoskeletal: negative for gait disturbance or muscular weakness  Neurological: no syncope or seizures; no ataxia  Dermatological: negative for puritis,  rash and jaundice  Hematologic/lymphatic: no easy bruising, no new lumps or bumps      Physical Exam:    Vitals:    08/23/24 1311   BP: 116/75   Pulse: 93         Constitutional: Well appearing / communicating without difficutly.  NAD.  Eyes: EOM I Bilaterally  Head/Face: Normocephalic.  Negative paranasal sinus pressure/tenderness.  Salivary glands WNL.  House Brackmann I Bilaterally.    Right Ear: Auricle normal appearance. External Auditory Canal within normal limits,TM w/o masses/lesions/perforations. TM mobility noted.   Left Ear: Auricle normal appearance. External Auditory Canal WNL,TM w/o masses/lesions/perforations. TM mobility noted.  Nose: No gross nasal septal deviation. Inferior Turbinates 3+ bilaterally. No septal perforation. No masses/lesions. External nasal skin appears normal without masses/lesions.  Oral Cavity: Gingiva/lips within normal limits.  Dentition/gingiva healthy appearing. Mucus membranes moist. Floor of mouth soft, no masses palpated. Oral Tongue mobile. Hard Palate appears normal.    Oropharynx: Base of tongue appears normal. No masses/lesions noted. Tonsillar fossa/pharyngeal wall without lesions. Posterior oropharynx WNL.  Soft palate without masses. Midline uvula.   Neck/Lymphatic: No LAD I-VI bilaterally.  No thyromegaly.  No masses noted on exam.    Mirror  laryngoscopy/nasopharyngoscopy: Active gag reflex.  Unable to perform.          Diagnostic studies:  ImmunoCAP testing 3/23/2023: Class 0/1 to cat dander, cockroach, cypress, and white oak.         CT scan sinuses 3/24/2023:  FINDINGS:  Intracranial structures are unremarkable.  No incidental soft tissue masses are seen.  Frontal sinuses are hypoplastic.  There is mild mucosal thickening at the floor the left maxillary sinus.  Ethmoid, sphenoid, and frontal sinuses are clear.  Ostiomeatal complexes are patent.  Frontal ethmoidal sinus drainage routes are unremarkable.  Nasal passes is are clear.  Mastoid air cells and middle ear cavities are clear.          Tympanometry revealed a Type A tympanogram for both ears.  Audiogram results revealed a mild sloping to profound sensorineural hearing loss bilaterally.  Speech reception thresholds were obtained at 35dB for both ears and speech discrimination scores were 36% for the right ear and 44% for the left ear.           Assessment:    ICD-10-CM ICD-9-CM    1. Chronic allergic rhinitis Well controlled J30.9 477.9 azelastine (ASTELIN) 137 mcg (0.1 %) nasal spray      fluticasone propionate (FLONASE) 50 mcg/actuation nasal spray    Continue current medication      2. Laryngopharyngeal reflux (LPR)  K21.9 478.79 Laryngoscopy      3. Dysphonia  R49.0 784.42 Laryngoscopy      4. Sensorineural hearing loss, bilateral  H90.3 389.18                     The primary encounter diagnosis was Chronic allergic rhinitis. Diagnoses of Laryngopharyngeal reflux (LPR), Dysphonia, and Sensorineural hearing loss, bilateral were also pertinent to this visit.      Plan:  Orders Placed This Encounter   Procedures    Laryngoscopy     Continue  Pantoprazole 40mg PO daily   GI referral placed. Last GI visit 2019.   Continue nasal saline rinses b.i.d.  Continue Flonase 2 sprays per nostril daily  Continue Astelin 1 spray per nostril b.i.d.  Continue montelukast 10 mg PO daily. She has had dry eyes  so will avoid oral antihistamines for now.   Annual audiogram due 3/2025; will schedule at next visit.     Follow-up in approximately 4-6 months    Angela Jacobs MD

## 2024-09-16 ENCOUNTER — TELEPHONE (OUTPATIENT)
Dept: FAMILY MEDICINE | Facility: CLINIC | Age: 69
End: 2024-09-16
Payer: MEDICARE

## 2024-09-23 ENCOUNTER — LAB VISIT (OUTPATIENT)
Dept: LAB | Facility: HOSPITAL | Age: 69
End: 2024-09-23
Attending: INTERNAL MEDICINE
Payer: MEDICARE

## 2024-09-23 DIAGNOSIS — E78.00 HYPERCHOLESTEREMIA: ICD-10-CM

## 2024-09-23 DIAGNOSIS — E66.9 OBESITY (BMI 30-39.9): ICD-10-CM

## 2024-09-23 DIAGNOSIS — E55.9 VITAMIN D INSUFFICIENCY: ICD-10-CM

## 2024-09-23 DIAGNOSIS — R73.03 PREDIABETES: ICD-10-CM

## 2024-09-23 DIAGNOSIS — I10 ESSENTIAL HYPERTENSION: Chronic | ICD-10-CM

## 2024-09-23 LAB
25(OH)D3+25(OH)D2 SERPL-MCNC: 46 NG/ML (ref 30–96)
ALBUMIN SERPL BCP-MCNC: 3.8 G/DL (ref 3.5–5.2)
ALP SERPL-CCNC: 67 U/L (ref 55–135)
ALT SERPL W/O P-5'-P-CCNC: 43 U/L (ref 10–44)
ANION GAP SERPL CALC-SCNC: 9 MMOL/L (ref 8–16)
AST SERPL-CCNC: 31 U/L (ref 10–40)
BASOPHILS # BLD AUTO: 0.02 K/UL (ref 0–0.2)
BASOPHILS NFR BLD: 0.3 % (ref 0–1.9)
BILIRUB SERPL-MCNC: 0.7 MG/DL (ref 0.1–1)
BUN SERPL-MCNC: 14 MG/DL (ref 8–23)
CALCIUM SERPL-MCNC: 9.9 MG/DL (ref 8.7–10.5)
CHLORIDE SERPL-SCNC: 109 MMOL/L (ref 95–110)
CHOLEST SERPL-MCNC: 152 MG/DL (ref 120–199)
CHOLEST/HDLC SERPL: 3.8 {RATIO} (ref 2–5)
CO2 SERPL-SCNC: 25 MMOL/L (ref 23–29)
CREAT SERPL-MCNC: 0.8 MG/DL (ref 0.5–1.4)
DIFFERENTIAL METHOD BLD: NORMAL
EOSINOPHIL # BLD AUTO: 0.3 K/UL (ref 0–0.5)
EOSINOPHIL NFR BLD: 4.3 % (ref 0–8)
ERYTHROCYTE [DISTWIDTH] IN BLOOD BY AUTOMATED COUNT: 13.5 % (ref 11.5–14.5)
EST. GFR  (NO RACE VARIABLE): >60 ML/MIN/1.73 M^2
ESTIMATED AVG GLUCOSE: 114 MG/DL (ref 68–131)
GLUCOSE SERPL-MCNC: 123 MG/DL (ref 70–110)
HBA1C MFR BLD: 5.6 % (ref 4–5.6)
HCT VFR BLD AUTO: 40.1 % (ref 37–48.5)
HDLC SERPL-MCNC: 40 MG/DL (ref 40–75)
HDLC SERPL: 26.3 % (ref 20–50)
HGB BLD-MCNC: 13.3 G/DL (ref 12–16)
IMM GRANULOCYTES # BLD AUTO: 0.03 K/UL (ref 0–0.04)
IMM GRANULOCYTES NFR BLD AUTO: 0.5 % (ref 0–0.5)
LDLC SERPL CALC-MCNC: 94.6 MG/DL (ref 63–159)
LYMPHOCYTES # BLD AUTO: 1.5 K/UL (ref 1–4.8)
LYMPHOCYTES NFR BLD: 24 % (ref 18–48)
MCH RBC QN AUTO: 30.4 PG (ref 27–31)
MCHC RBC AUTO-ENTMCNC: 33.2 G/DL (ref 32–36)
MCV RBC AUTO: 92 FL (ref 82–98)
MONOCYTES # BLD AUTO: 0.5 K/UL (ref 0.3–1)
MONOCYTES NFR BLD: 7.2 % (ref 4–15)
NEUTROPHILS # BLD AUTO: 4 K/UL (ref 1.8–7.7)
NEUTROPHILS NFR BLD: 63.7 % (ref 38–73)
NONHDLC SERPL-MCNC: 112 MG/DL
NRBC BLD-RTO: 0 /100 WBC
PLATELET # BLD AUTO: 231 K/UL (ref 150–450)
PMV BLD AUTO: 10.2 FL (ref 9.2–12.9)
POTASSIUM SERPL-SCNC: 3.9 MMOL/L (ref 3.5–5.1)
PROT SERPL-MCNC: 7 G/DL (ref 6–8.4)
RBC # BLD AUTO: 4.37 M/UL (ref 4–5.4)
SODIUM SERPL-SCNC: 143 MMOL/L (ref 136–145)
TRIGL SERPL-MCNC: 87 MG/DL (ref 30–150)
WBC # BLD AUTO: 6.28 K/UL (ref 3.9–12.7)

## 2024-09-23 PROCEDURE — 80053 COMPREHEN METABOLIC PANEL: CPT | Performed by: INTERNAL MEDICINE

## 2024-09-23 PROCEDURE — 80061 LIPID PANEL: CPT | Performed by: INTERNAL MEDICINE

## 2024-09-23 PROCEDURE — 82306 VITAMIN D 25 HYDROXY: CPT | Performed by: INTERNAL MEDICINE

## 2024-09-23 PROCEDURE — 85025 COMPLETE CBC W/AUTO DIFF WBC: CPT | Performed by: INTERNAL MEDICINE

## 2024-09-23 PROCEDURE — 83036 HEMOGLOBIN GLYCOSYLATED A1C: CPT | Performed by: INTERNAL MEDICINE

## 2024-09-23 PROCEDURE — 36415 COLL VENOUS BLD VENIPUNCTURE: CPT | Performed by: INTERNAL MEDICINE

## 2024-09-27 ENCOUNTER — OFFICE VISIT (OUTPATIENT)
Dept: FAMILY MEDICINE | Facility: CLINIC | Age: 69
End: 2024-09-27
Payer: MEDICARE

## 2024-09-27 ENCOUNTER — TELEPHONE (OUTPATIENT)
Dept: FAMILY MEDICINE | Facility: CLINIC | Age: 69
End: 2024-09-27

## 2024-09-27 VITALS
WEIGHT: 176.56 LBS | HEIGHT: 61 IN | OXYGEN SATURATION: 98 % | HEART RATE: 93 BPM | SYSTOLIC BLOOD PRESSURE: 122 MMHG | DIASTOLIC BLOOD PRESSURE: 62 MMHG | BODY MASS INDEX: 33.34 KG/M2

## 2024-09-27 DIAGNOSIS — I10 ESSENTIAL HYPERTENSION: Chronic | ICD-10-CM

## 2024-09-27 DIAGNOSIS — Z23 IMMUNIZATION DUE: ICD-10-CM

## 2024-09-27 DIAGNOSIS — J45.40 MODERATE PERSISTENT ASTHMA WITHOUT COMPLICATION: ICD-10-CM

## 2024-09-27 DIAGNOSIS — M25.562 CHRONIC PAIN OF LEFT KNEE: Primary | ICD-10-CM

## 2024-09-27 DIAGNOSIS — R73.03 PRE-DIABETES: ICD-10-CM

## 2024-09-27 DIAGNOSIS — Z12.31 ENCOUNTER FOR SCREENING MAMMOGRAM FOR MALIGNANT NEOPLASM OF BREAST: ICD-10-CM

## 2024-09-27 DIAGNOSIS — G89.29 CHRONIC PAIN OF LEFT KNEE: Primary | ICD-10-CM

## 2024-09-27 DIAGNOSIS — Z78.0 POSTMENOPAUSAL: ICD-10-CM

## 2024-09-27 DIAGNOSIS — I70.0 AORTIC ATHEROSCLEROSIS: ICD-10-CM

## 2024-09-27 DIAGNOSIS — Z85.3 HISTORY OF CANCER OF LEFT BREAST: ICD-10-CM

## 2024-09-27 DIAGNOSIS — E78.00 HYPERCHOLESTEREMIA: ICD-10-CM
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
## 2024-09-27 PROCEDURE — 99999 PR PBB SHADOW E&M-EST. PATIENT-LVL V: CPT | Mod: PBBFAC,,, | Performed by: STUDENT IN AN ORGANIZED HEALTH CARE EDUCATION/TRAINING PROGRAM

## 2024-09-27 PROCEDURE — 99215 OFFICE O/P EST HI 40 MIN: CPT | Mod: PBBFAC,PO | Performed by: STUDENT IN AN ORGANIZED HEALTH CARE EDUCATION/TRAINING PROGRAM

## 2024-09-27 RX ORDER — BUDESONIDE AND FORMOTEROL FUMARATE DIHYDRATE 160; 4.5 UG/1; UG/1
2 AEROSOL RESPIRATORY (INHALATION) EVERY 12 HOURS
Qty: 10.2 G | Refills: 11 | Status: SHIPPED | OUTPATIENT
Start: 2024-09-27 | End: 2025-09-27

## 2024-09-27 NOTE — TELEPHONE ENCOUNTER
Care Due:                  Date            Visit Type   Department     Provider  --------------------------------------------------------------------------------                                             [facility]  Last Visit: 09-      [facility]          KAYDEN Cortez                               -                              [facility]  Next Visit: 03-      [facility] ([facility])   [facility]       Lino Cortez                                                            Last  Test          Frequency    Reason                     Performed    Due Date  --------------------------------------------------------------------------------    Mg Level....  12 months..  alendronate..............  Not Found    Overdue    Phosphate...  12 months..  alendronate..............  Not Found    Overdue    [employer] Embedded Care Due Messages. Reference number: [id number].   9/27/2024 4:19:59 PM CDT

## 2024-09-27 NOTE — PROGRESS NOTES
History & Physical  [facility]      SUBJECTIVE:     History of Present Illness:  Patient is a 69 y.o. female presents to clinic to establish care. Current diagnoses include  hypertension, vitamin-D deficiency, GERD, severe persistent asthma, allergic rhinitis, prediabetes, CHF, and osteopenia/osteoporosis, h/o breast cancer. Established with ENT, cardiology.     Knee pain: Having some L knee pain. Had surgery on this in 2013/2014 after a fall. Pain is intermittent. Sometimes has to take breaks. Can't walk as much as she used to. Pain started a couple of months ago. Pain on both sides of the anterior knee. Doesn't radiate. Fell a couple of months ago which triggered the knee pain. Landed on all 4's.     HTN: Currently on losartan. Doesn't check this at home.     HLD: Currently on pravastatin. Most recent LDL 94.6. Noted mild aortic atherosclerosis on imaging in the past.     PreDM: Recent labs with Hgb A1C 5.6%. Doing well on metformin.     Asthma: On simbicort BID. Doing well. Weather seems to trigger asthma.     Due for mammogram. Likes to continue to get pap smears every 2 years. Would like to see OB/Gyn for this. Previously seen at [facility] but would like to switch to [facility].     Review of patient's allergies indicates:   Allergen Reactions    Grass pollen-perennial rye, standard Other (See Comments)    Lipitor [atorvastatin]     Strawberries [strawberry]        Past Medical History:   Diagnosis Date    Allergy     Arthritis     Asthma     Cancer     breast    Chronic diastolic (congestive) heart failure 12/8/2022    GERD (gastroesophageal reflux disease)     Hyperlipidemia     Hypertension     Prediabetes      Past Surgical History:   Procedure Laterality Date    ABDOMINAL SURGERY      COLONOSCOPY N/A 5/11/2021    Procedure: COLONOSCOPY;  Surgeon: Ema Vidal MD;  Location: [facility];  Service: Endoscopy;  Laterality: N/A;  COVID TEST 5/8 SUPREP     "ESOPHAGOGASTRODUODENOSCOPY N/A 5/17/2019    Procedure: ESOPHAGOGASTRODUODENOSCOPY (EGD);  Surgeon: Ema Vidal MD;  Location: [facility];  Service: Endoscopy;  Laterality: N/A;    KNEE SURGERY      SHOULDER SURGERY       Family History   Problem Relation Name Age of Onset    Diabetes Mother      Hypertension Mother      Cancer Mother          Pancreatic    Pancreatic cancer Mother      Hypertension Father      Alzheimer's disease Father      Arthritis Brother x 1, half -mom     Diabetes Brother x1     No Known Problems Daughter x1      Social History     Tobacco Use    Smoking status: Never    Smokeless tobacco: Never   Substance Use Topics    Alcohol use: Yes     Comment: social    Drug use: No        OBJECTIVE:     Vital Signs (Most Recent)  Vitals:    09/27/24 1450   BP: 122/62   BP Location: Left arm   Patient Position: Sitting   BP Method: Medium (Manual)   Pulse: 93   SpO2: 98%   Weight: 80.1 kg (176 lb 9.4 oz)   Height: 5' 1" (1.549 m)     BMI: 33.37    Physical Exam:  Gen: No apparent distress, well nourished and developed, appears stated age  CV: RRR, S1 and S2 present, no LE edema  Resp: CTAB, normal respiratory effort  MSK: No TTP of R knee or ankle. Negative anterior/posterior drawer, thessaly.   Neuro: Muscle strength 5/5 in LE b/l   Psych: Logical thought process, answers questions appropriately      ASSESSMENT/PLAN:   69 y.o.female presents to clinic to establish care.     1. Chronic pain of left knee  Known OA in the knee. Anticipate improvement with conservative management. Declined knee injection today. Should RTC if no improvement in 2 months. Pt verbalized understanding and was in agreement with the plan.    - Ambulatory referral/consult to Physical/Occupational Therapy; Future    2. Essential hypertension  Well controlled on current regimen.      3. Hypercholesteremia  Well controlled on current regimen.      4. Pre-diabetes  Well controlled on current regimen.      5. Moderate persistent " asthma without complication  Well controlled on current regimen.    - budesonide-formoterol 160-4.5 mcg (SYMBICORT) 160-4.5 mcg/actuation HFAA; Inhale 2 puffs into the lungs every 12 (twelve) hours.  Dispense: 10.2 g; Refill: 11    6. Aortic atherosclerosis  Noted on prior imaging. On statin.     7. Encounter for screening mammogram for malignant neoplasm of breast  Due for screen, agreeable  - Mammo Digital Screening Bilat w/ Giorgi; Future    8. Immunization due  Agreeable  - influenza (adjuvanted) (Fluad) 45 mcg/0.5 mL IM vaccine (> or = 66 yo) 0.5 mL    9. History of cancer of left breast  Requests OB/Gyn referral for well woman exams.   - Ambulatory referral/consult to Obstetrics / Gynecology; Future    10. Postmenopausal  As above  - Ambulatory referral/consult to Obstetrics / Gynecology; Future     Follow-up: 6 months for routine healthcare or sooner PRN      Lino Cortez DO  Family Medicine

## 2024-09-28 RX ORDER — FLUTICASONE PROPIONATE AND SALMETEROL 250; 50 UG/1; UG/1
POWDER RESPIRATORY (INHALATION)
Refills: 0 | OUTPATIENT
Start: 2024-09-28

## 2024-09-28 NOTE — TELEPHONE ENCOUNTER
Pharmacy is requesting that a PRIOR AUTHORIZATION be completed for the Symbicort. Please forward this request to the staff member handling PAs for your clinic. Thank you.      Note composed:11:10 AM 09/28/2024

## 2024-10-03 ENCOUNTER — HOSPITAL ENCOUNTER (OUTPATIENT)
Dept: RADIOLOGY | Facility: HOSPITAL | Age: 69
Discharge: HOME | End: 2024-10-03
Attending: STUDENT IN AN ORGANIZED HEALTH CARE EDUCATION/TRAINING PROGRAM
Payer: MEDICARE

## 2024-10-03 DIAGNOSIS — Z12.31 ENCOUNTER FOR SCREENING MAMMOGRAM FOR MALIGNANT NEOPLASM OF BREAST: ICD-10-CM

## 2024-10-03 PROCEDURE — 77067 SCR MAMMO BI INCL CAD: CPT | Mod: TC

## 2024-10-11 ENCOUNTER — CLINICAL SUPPORT (OUTPATIENT)
Dept: REHABILITATION | Facility: HOSPITAL | Age: 69
End: 2024-10-11
Attending: STUDENT IN AN ORGANIZED HEALTH CARE EDUCATION/TRAINING PROGRAM
Payer: MEDICARE

## 2024-10-11 DIAGNOSIS — R29.898 DECREASED STRENGTH OF LOWER EXTREMITY: ICD-10-CM

## 2024-10-11 DIAGNOSIS — M25.562 CHRONIC PAIN OF LEFT KNEE: Primary | ICD-10-CM

## 2024-10-11 DIAGNOSIS — G89.29 CHRONIC PAIN OF LEFT KNEE: Primary | ICD-10-CM

## 2024-10-11 PROCEDURE — 97162 PT EVAL MOD COMPLEX 30 MIN: CPT | Mod: PN

## 2024-10-11 PROCEDURE — 97112 NEUROMUSCULAR REEDUCATION: CPT | Mod: PN

## 2024-10-11 NOTE — PLAN OF CARE
[facility] [place] [facility]   Physical Therapy Initial Evaluation      Name: Carina Montaño  [facility] Number: [id number]    Therapy Diagnosis:   Encounter Diagnosis   Name Primary?    Chronic pain of left knee         Physician: Lino Cortez DO    Physician Orders: PT Eval and Treat   Medical Diagnosis from Referral: Chronic pain of left knee [M25.562, G89.29]   Evaluation Date: 10/11/2024  Authorization Period Expiration: 12/31/2024  Plan of Care Expiration: 11/22/2024  Progress Note Due: 11/11/2024  Visit # / Visits authorized: 1/1   FOTO: 1/3    Precautions: Standard; hypertension, allergies and asthma; history of cancer    Time In: 0807  Time Out: ***  Total Billable Time: *** minutes    Subjective     Date of onset: Chronic; exacerbation 1 month ago    History of current condition - Carina reports: return of left knee pain one month ago. Patient fell onto left knee two weeks ago from potential lack of foot clearance. Patient visited Dr. Cortez and was informed of potential arthritis. She deferred an injection and opted to return to therapy. Patient denies popping, locking, or clicking of knee. Previous surgery to knee after fall at work.     Falls: 2 weeks ago    Imaging X-Ray 3/2024: Femorotibial DJD with medial compartmental narrowing on weight-bearing view. A few tiny patellar marginal osteophytes. No acute fracture, dislocation, or osseous destruction.     Prior Therapy: Discharged in May 2024. Patient reports relief of pain  Social History: Patient lives with her  in a single story house with 8 steps to enter with bilateral hand rail. Patient denies need for household or community assistance  Occupation: Patient is retired  Prior Level of Function: Recurrent left knee pain  Current Level of Function: Independent ambulation. Pain affecting sleep and squating/bending    Pain:  Current 3/10, worst 7/10, best 3/10   Location: Left knee joint  Description: Ache  Aggravating Factors:  "Walking  Easing Factors: Sitting    Patients goals: Decrease pain     Medical History:   Past Medical History:   Diagnosis Date    Allergy     Arthritis     Asthma     Breast cancer     Cancer     breast    Chronic diastolic (congestive) heart failure 12/08/2022    GERD (gastroesophageal reflux disease)     Hyperlipidemia     Hypertension     Prediabetes      Surgical History:   Carina Montaño  has a past surgical history that includes Knee surgery; Shoulder surgery; Abdominal surgery; Esophagogastroduodenoscopy (N/A, 05/17/2019); Colonoscopy (N/A, 05/11/2021); Breast biopsy; and Breast cyst excision.    Medications:   Carina has a current medication list which includes the following prescription(s): albuterol, albuterol, alendronate, azelastine, budesonide-formoterol 160-4.5 mcg, ciclopirox, clobetasol-emollient, fluticasone propionate, losartan, metformin, montelukast, multivit,calc,mins/iron/folic, pantoprazole, simvastatin, triamcinolone acetonide 0.1%, and vitamin d.    Allergies:   Review of patient's allergies indicates:   Allergen Reactions    Grass pollen-perennial rye, standard Other (See Comments)    Lipitor [atorvastatin]     Strawberries [strawberry]         Objective      !=pain    Posture: Bilateral genu varus and toe out  Palpation: Tenderness to left tibiofemoral joint line   Decreased lateral patellar glide right, mild hypomobility in all directions left     Knee range of motion:   Active: 4-0-130 degrees right, 0-3-125 degrees left   Passive: 0-2-130 degrees left !    Gross movement:  Gait: Left knee flexion in stance  30" Chair Stand: 11 with upper extremity support    Lower extremity strength with Se[employer]right handheld dynamometer Right Left Pain/dysfunction with movement   (approx 4 sec hold w/ max contraction)   Hip flexion 14.9 kg  13.3 kg     Hip abduction 22.2 kg  17.1 kg     Quadriceps 16.3 kg  13.7 kg     Hamstrings 11.8 kg  11.4 kg       Intake Outcome Measure for FOTO Knee " Survey    Therapist reviewed FOTO scores for Carina Montaño on 10/11/2024.   FOTO report - see Media section or FOTO account episode details.    Intake Score: 22%     Treatment     Total Treatment time (time-based codes) separate from Evaluation: *** minutes     Carina received the treatments listed below:      therapeutic exercises to develop {AMB PT PROGRESS OBJECTIVE:84269} for *** minutes including:  ***    manual therapy techniques: {AMB PT PROGRESS MANUAL THERAPY:21523} were applied to the: *** for *** minutes, including:  ***    neuromuscular re-education activities to improve: {AMB PT PROGRESS NEURO RE-ED:22175} for *** minutes. The following activities were included:  ***    therapeutic activities to improve functional performance for ***  minutes, including:  ***    gait training to improve functional mobility and safety for ***  minutes, including:  ***    direct contact modalities after being cleared for contraindications: {AMB PT PROGRESS DIRECT CONTACT MODES:32226}    supervised modalities after being cleared for contradictions: {AMB PT SUPERVISED MODES:42050}    hot pack for *** minutes to ***.    cold pack for *** minutes to ***.    Patient Education and Home Exercises     Education provided:   - ***    Written Home Exercises Provided: {Home Exercise Program:60056}. Exercises were reviewed and Carina was able to demonstrate them prior to the end of the session.  Carina demonstrated {Desc; good/fair/poor:14459} understanding of the education provided. See EMR under Patient Instructions for exercises provided during therapy sessions.    Assessment     Carina is a 69 y.o. female referred to outpatient Physical Therapy with a medical diagnosis of ***. Patient presents with ***    Patient prognosis is {REHAB PROGNOSIS [facility]:52424}.   Patient will benefit from skilled outpatient Physical Therapy to address the deficits stated above and in the chart below, provide patient /family education, and to  maximize patientt's level of independence.     Plan of care discussed with patient: {YES:80921}  Patient's spiritual, cultural and educational needs considered and patient is agreeable to the plan of care and goals as stated below:     Anticipated Barriers for therapy: ***    Medical Necessity is demonstrated by the following  History  Co-morbidities and personal factors that may impact the plan of care [] LOW: no personal factors / co-morbidities  [] MODERATE: 1-2 personal factors / co-morbidities  [] HIGH: 3+ personal factors / co-morbidities    Moderate / High Support Documentation:   Co-morbidities affecting plan of care: Allergies, Arthritis, Asthma, Cancer, Hard of Hearing, High Blood Pressure    Personal Factors:   no deficits     Examination  Body Structures and Functions, activity limitations and participation restrictions that may impact the plan of care [] LOW: addressing 1-2 elements  [x] MODERATE: 3+ elements  [] HIGH: 4+ elements (please support below)    Moderate / High Support Documentation: Above     Clinical Presentation [] LOW: stable  [x] MODERATE: Evolving  [] HIGH: Unstable     Decision Making/ Complexity Score: moderate       Short = Long Term Goals (6 Weeks):   1. Patient will improve left lower extremity strength to at least 90% of right lower extremity strength as measured via Micro[employer]et handheld dynamometer to improve strength for closed chain tasks. Progressing, not met  2. Patient will improve the total FOTO Knee Survey Score to >/= 51% to demonstrate increased perceived functional mobility. Progressing, not met  3. Patient will perform at least 12 sit to stands in 30 seconds without UE support to demonstrate increased functional strength. Progressing, not met  4. Patient will improve left knee active range of motion to ***0-120 degrees to promote higher level transfers and transitions without limitation. Progressing, not met    Plan     Plan of care Certification: 10/11/2024 to  "***.    Outpatient Physical Therapy {NUMBERS 1-5:22559} times weekly for {0-10:58126::"0"} weeks to include the following interventions: {TX PLAN:87416}.     Lorena Wells, PT        Physician's Signature: _________________________________________ Date: ________________  "

## 2024-10-12 PROBLEM — M25.562 CHRONIC PAIN OF LEFT KNEE: Status: ACTIVE | Noted: 2024-10-12

## 2024-10-12 PROBLEM — R29.898 DECREASED STRENGTH OF LOWER EXTREMITY: Status: ACTIVE | Noted: 2024-10-12

## 2024-10-12 PROBLEM — G89.29 CHRONIC PAIN OF LEFT KNEE: Status: ACTIVE | Noted: 2024-10-12

## 2024-10-13 NOTE — PLAN OF CARE
[facility]   Physical Therapy Initial Evaluation      Name: Carina Montaño  Clinic Number: [id number]    Therapy Diagnosis:   Encounter Diagnoses   Name Primary?    Chronic pain of left knee Yes    Decreased strength of lower extremity         Physician: Lino Cortez DO    Physician Orders: PT Eval and Treat   Medical Diagnosis from Referral: Chronic pain of left knee [M25.562, G89.29]   Evaluation Date: 10/11/2024  Authorization Period Expiration: 12/31/2024  Plan of Care Expiration: 11/22/2024  Progress Note Due: 11/11/2024  Visit # / Visits authorized: 1/1   FOTO: 1/3    Precautions: Standard; hypertension, allergies and asthma; history of cancer    Time In: 0807  Time Out: 0852  Total Billable Time: 45 minutes    Subjective     Date of onset: Chronic; exacerbation 1 month ago    History of current condition - Carina reports: return of left knee pain one month ago. Patient fell onto left knee two weeks ago from potential lack of foot clearance. Patient visited Dr. Cortez and was informed of potential arthritis. She deferred an injection and opted to return to therapy. Patient denies popping, locking, or clicking of knee. Previous surgery to knee after fall at work.     Falls: 2 weeks ago    Imaging X-Ray 3/2024: Femorotibial DJD with medial compartmental narrowing on weight-bearing view. A few tiny patellar marginal osteophytes. No acute fracture, dislocation, or osseous destruction.     Prior Therapy: Discharged in May 2024. Patient reports relief of pain  Social History: Patient lives with her  in a single story house with 8 steps to enter with bilateral hand rail. Patient denies need for household or community assistance  Occupation: Patient is retired  Prior Level of Function: Recurrent left knee pain  Current Level of Function: Independent ambulation. Pain affecting sleep and squating/bending    Pain:  Current 3/10, worst 7/10, best 3/10   Location: Left knee  "joint  Description: Ache  Aggravating Factors: Walking  Easing Factors: Sitting    Patients goals: Decrease pain     Medical History:   Past Medical History:   Diagnosis Date    Allergy     Arthritis     Asthma     Breast cancer     Cancer     breast    Chronic diastolic (congestive) heart failure 12/08/2022    GERD (gastroesophageal reflux disease)     Hyperlipidemia     Hypertension     Prediabetes      Surgical History:   Carina Montaño  has a past surgical history that includes Knee surgery; Shoulder surgery; Abdominal surgery; Esophagogastroduodenoscopy (N/A, 05/17/2019); Colonoscopy (N/A, 05/11/2021); Breast biopsy; and Breast cyst excision.    Medications:   Carina has a current medication list which includes the following prescription(s): albuterol, albuterol, alendronate, azelastine, budesonide-formoterol 160-4.5 mcg, ciclopirox, clobetasol-emollient, fluticasone propionate, losartan, metformin, montelukast, multivit,calc,mins/iron/folic, pantoprazole, simvastatin, triamcinolone acetonide 0.1%, and vitamin d.    Allergies:   Review of patient's allergies indicates:   Allergen Reactions    Grass pollen-perennial rye, standard Other (See Comments)    Lipitor [atorvastatin]     Strawberries [strawberry]         Objective      !=pain    Posture: Bilateral genu varus and toe out  Palpation: Tenderness to left tibiofemoral joint line   Decreased lateral patellar glide right, mild hypomobility in all directions left     Knee range of motion:   Active: 4-0-130 degrees right, 0-3-125 degrees left   Passive: 0-2-130 degrees left !    Gross movement:  Gait: Left knee flexion in stance  30" Chair Stand: 11 with upper extremity support    Lower extremity strength with i2O Water handheld dynamometer Right Left Pain/dysfunction with movement   (approx 4 sec hold w/ max contraction)   Hip flexion 14.9 kg  13.3 kg     Hip abduction 22.2 kg  17.1 kg     Quadriceps 16.3 kg  13.7 kg     Hamstrings 11.8 kg  11.4 kg   " "    Special tests: Right Left   Valgus stress  - -   Varus stress  - -   Lachman - -   Anterior drawer * *   Posterior drawer * *   Posterior sag - -   Quadriceps activation - -   Dinorah's - medial - +   Dinorah's - lateral - +   *mild laxity bilateral     Meniscal testing:    Pain on Dinorah's: +   Joint line tenderness: +   Report of catching/locking: -   Pain with forced hyperextension: +   Pain with maximal flexion: +  *If 3/5 (+) = 77% positive predictive value (PPV); if 5/5 (+) = 93% PPV. PPV decreases in value if ACL tear present, increases if DJD present    Intake Outcome Measure for FOTO Knee Survey    Therapist reviewed FOTO scores for Carina Montaño on 10/11/2024.   FOTO report - see Media section or FOTO account episode details.    Intake Score: 22%     Treatment     Total Treatment time (time-based codes) separate from Evaluation: 12 minutes     Carina received the treatments listed below:      neuromuscular re-education activities to improve: Coordination and Kinesthetic for 12 minutes. The following activities were included:    Quad set with heel lift: 5"x10 left. Verbal and tactile cues for lift  4-way hip: 5x left     Patient Education and Home Exercises     Education provided:   - Findings; prognosis and plan of care  - Home exercise program    Written Home Exercises Provided: On 10/11. Exercises were reviewed and Carina was able to demonstrate them prior to the end of the session.  Carina demonstrated good  understanding of the education provided. See EMR under Patient Instructions for exercises provided during therapy sessions.    Assessment     Carina is a 69 y.o. female referred to outpatient Physical Therapy with a medical diagnosis of chronic pain of left knee. Patient returns to the clinic with recurrence of pain worsened by direct fall. Joint +-/ meniscal dysfunction in differential. Initial difficulty isolating quad during quad sets. Good kinesthetic understanding during 4-way " hip.     Patient prognosis is Good.   Patient will benefit from skilled outpatient Physical Therapy to address the deficits stated above and in the chart below, provide patient /family education, and to maximize patientt's level of independence.     Plan of care discussed with patient: Yes  Patient's spiritual, cultural and educational needs considered and patient is agreeable to the plan of care and goals as stated below:     Anticipated Barriers for therapy: Imaging; chronicity    Medical Necessity is demonstrated by the following  History  Co-morbidities and personal factors that may impact the plan of care [] LOW: no personal factors / co-morbidities  [] MODERATE: 1-2 personal factors / co-morbidities  [x] HIGH: 3+ personal factors / co-morbidities    Moderate / High Support Documentation:   Co-morbidities affecting plan of care: Allergies, Arthritis, Asthma, Cancer, Hard of Hearing, High Blood Pressure    Personal Factors:   no deficits     Examination  Body Structures and Functions, activity limitations and participation restrictions that may impact the plan of care [] LOW: addressing 1-2 elements  [x] MODERATE: 3+ elements  [] HIGH: 4+ elements (please support below)    Moderate / High Support Documentation: Above     Clinical Presentation [] LOW: stable  [x] MODERATE: Evolving  [] HIGH: Unstable     Decision Making/ Complexity Score: moderate       Short = Long Term Goals (6 Weeks):   1. Patient will improve left lower extremity strength to at least 90% of right lower extremity strength as measured via MicroFet handheld dynamometer to improve strength for closed chain tasks. Progressing, not met  2. Patient will improve the total FOTO Knee Survey Score to >/= 51% to demonstrate increased perceived functional mobility. Progressing, not met  3. Patient will perform at least 12 sit to stands in 30 seconds without UE support to demonstrate increased functional strength. Progressing, not met  4. Patient will walk  15 minutes, 3x/week without pain to increase physical activity.     Plan     Plan of care Certification: 10/11/2024 to 11/22/2024.    Outpatient Physical Therapy 2 times weekly for 6 weeks to include the following interventions: Electrical Stimulation -, Gait Training, Manual Therapy, Moist Heat/ Ice, Neuromuscular Re-ed, Patient Education, Self Care, Therapeutic Activities, and Therapeutic Exercise.     Lorena Wells, PT, DPT, OCS        Physician's Signature: _________________________________________ Date: ________________

## 2024-10-14 ENCOUNTER — CLINICAL SUPPORT (OUTPATIENT)
Dept: REHABILITATION | Facility: HOSPITAL | Age: 69
End: 2024-10-14
Payer: MEDICARE

## 2024-10-14 DIAGNOSIS — R29.898 DECREASED STRENGTH OF LOWER EXTREMITY: ICD-10-CM

## 2024-10-14 DIAGNOSIS — G89.29 CHRONIC PAIN OF LEFT KNEE: Primary | ICD-10-CM

## 2024-10-14 DIAGNOSIS — M25.562 CHRONIC PAIN OF LEFT KNEE: Primary | ICD-10-CM

## 2024-10-14 PROCEDURE — 97530 THERAPEUTIC ACTIVITIES: CPT | Mod: PN,CQ

## 2024-10-14 PROCEDURE — 97112 NEUROMUSCULAR REEDUCATION: CPT | Mod: PN,CQ

## 2024-10-14 NOTE — PROGRESS NOTES
"[facility]   Physical Therapy Treatment Note      Name: Carina Montaño  Clinic Number: [id number]    Therapy Diagnosis:   Encounter Diagnoses   Name Primary?    Chronic pain of left knee Yes    Decreased strength of lower extremity      Physician: Lino Cortez DO    Visit Date: 10/14/2024  Physician Orders: PT Eval and Treat   Medical Diagnosis from Referral: Chronic pain of left knee [M25.562, G89.29]   Evaluation Date: 10/11/2024  Authorization Period Expiration: 12/31/2024  Plan of Care Expiration: 11/22/2024  Progress Note Due: 11/11/2024  Visit # / Visits authorized: 1/10, (+1)  FOTO: 1/3     Precautions: Standard; hypertension, allergies and asthma; history of cancer     Time In: 8:00 AM   Time Out: 9:00 AM   Total Billable Time: 55 minutes (2 NMR, 2 TA)  PTA Visit #: 1/5     Subjective     Patient reports: agreeable to PT session .  She was compliant with home exercise program.  Response to previous treatment: 1 st after  Functional change: 1st after    Pain: 5/10 Ache  Location: left knee (general)     Objective      Objective Measures updated at progress report unless specified.     Treatment     Carian received the treatments listed below:      neuromuscular re-education activities to improve: Coordination and Kinesthetic for 30 minutes. The following activities were included:     Quad set with heel lift: 5"x10 left. Verbal and tactile cues for lift  4-way hip: 2x10 left   Lumbar Bridging 2x10 B  Hip adduction 10"x10 w/ theraball  [facility] PROGRAM level 3  (LE's only) x 7 mi  therapeutic activities to improve functional performance for 25  minutes, including:  SL clamshells w/ RTB 5" 2x10   LAQ (long arc quads) 3x10 3# L  Cybex Leg press 3x10 4 plates B          3x10 3 plates SL   Heel Raises 3x10 3 plates B  Cybex LAQ 15# 3x10 B     Patient Education and Home Exercises       Education provided:   - cont HEP     Written Home Exercises Provided: Pt instructed to " continue prior HEP. Exercises were reviewed and Carina was able to demonstrate them prior to the end of the session.  Carina demonstrated good  understanding of the education provided. See Electronic Medical Record under Patient Instructions for exercises provided during therapy sessions    Assessment     Carina is a 69 y.o. female referred to outpatient Physical Therapy with a medical diagnosis of chronic pain of left knee.   Carina completed today's session focusing on hip / knee strengthening and ROM. No reports of adverse event with additional activities today noted. Verbal instructions provided for pt to perform instructed activities at a paced speed (tends to perform quickly and disregards technique)    Carina Is progressing well towards her goals.   Patient prognosis is Good.     Patient will continue to benefit from skilled outpatient physical therapy to address the deficits listed in the problem list box on initial evaluation, provide pt/family education and to maximize pt's level of independence in the home and community environment.     Patient's spiritual, cultural and educational needs considered and pt agreeable to plan of care and goals.     Anticipated barriers to physical therapy: chronicity    Goals:     Short = Long Term Goals (6 Weeks):   1. Patient will improve left lower extremity strength to at least 90% of right lower extremity strength as measured via MicroFet handheld dynamometer to improve strength for closed chain tasks. Progressing, not met  2. Patient will improve the total FOTO Knee Survey Score to >/= 51% to demonstrate increased perceived functional mobility. Progressing, not met  3. Patient will perform at least 12 sit to stands in 30 seconds without UE support to demonstrate increased functional strength. Progressing, not met  4. Patient will walk 15 minutes, 3x/week without pain to increase physical activity.     Plan   Cont to advance PT per established POC.   Plan of  care Certification: 10/11/2024 to 11/22/2024.     Outpatient Physical Therapy 2 times weekly for 6 weeks to include the following interventions: Electrical Stimulation -, Gait Training, Manual Therapy, Moist Heat/ Ice, Neuromuscular Re-ed, Patient Education, Self Care, Therapeutic Activities, and Therapeutic Exercise.     Juan J Mckinnon, PTA

## 2024-10-16 ENCOUNTER — CLINICAL SUPPORT (OUTPATIENT)
Dept: REHABILITATION | Facility: HOSPITAL | Age: 69
End: 2024-10-16
Payer: MEDICARE

## 2024-10-16 DIAGNOSIS — M25.562 CHRONIC PAIN OF LEFT KNEE: Primary | ICD-10-CM

## 2024-10-16 DIAGNOSIS — G89.29 CHRONIC PAIN OF LEFT KNEE: Primary | ICD-10-CM

## 2024-10-16 DIAGNOSIS — R29.898 DECREASED STRENGTH OF LOWER EXTREMITY: ICD-10-CM

## 2024-10-16 PROCEDURE — 97530 THERAPEUTIC ACTIVITIES: CPT | Mod: KX,PN,CQ

## 2024-10-16 PROCEDURE — 97112 NEUROMUSCULAR REEDUCATION: CPT | Mod: KX,PN,CQ

## 2024-10-16 NOTE — PROGRESS NOTES
"[facility]   Physical Therapy Treatment Note      Name: Carina Montaño  Clinic Number: [id number]    Therapy Diagnosis:   Encounter Diagnoses   Name Primary?    Chronic pain of left knee Yes    Decreased strength of lower extremity      Physician: Lino Cortez DO    Visit Date: 10/16/2024  Physician Orders: PT Eval and Treat   Medical Diagnosis from Referral: Chronic pain of left knee [M25.562, G89.29]   Evaluation Date: 10/11/2024  Authorization Period Expiration: 12/31/2024  Plan of Care Expiration: 11/22/2024  Progress Note Due: 11/11/2024  Visit # / Visits authorized: 1/10, (+1)  FOTO: 1/3     Precautions: Standard; hypertension, allergies and asthma; history of cancer     Time In: 8:00 AM   Time Out: 9:00 AM   Total Billable Time: 55 minutes (2 NMR, 2 TA)  PTA Visit #: 1/5     Subjective     Patient reports: "I've been doing the exercises at home in the morning and evening". Pt agreeable to PT session  She was compliant with home exercise program.  Response to previous treatment: 1 st after  Functional change: 1st after    Pain: 5/10 Ache  Location: left knee (general)     Objective      Objective Measures updated at progress report unless specified.     Treatment     Carina received the treatments listed below:      neuromuscular re-education activities to improve: Coordination and Kinesthetic for 30 minutes. The following activities were included:     Quad set with heel lift: 5"x10 left. Verbal and tactile cues for lift  4-way hip: 2x10 left   Lumbar Bridging 3x10 B  Hip adduction 10"x10 w/ theraball  [facility] PROGRAM level 3  (LE's only) x 7 mi    therapeutic activities to improve functional performance for 25  minutes, including:  SL clamshells w/ RTB 5" 2x10   LAQ (long arc quads) 3x10 3# L  Cybex Leg press 3x10 4 plates B          3x10 3 plates SL   Heel Raises 3x10 3 plates B  Cybex LAQ 15# 3x10 B     Patient Education and Home Exercises       Education provided:   - " cont HEP     Written Home Exercises Provided: Pt instructed to continue prior HEP. Exercises were reviewed and Carina was able to demonstrate them prior to the end of the session.  Carina demonstrated good  understanding of the education provided. See Electronic Medical Record under Patient Instructions for exercises provided during therapy sessions    Assessment     Carina is a 69 y.o. female referred to outpatient Physical Therapy with a medical diagnosis of chronic pain of left knee.   Carina completed today's session focusing on hip / knee strengthening and ROM. No reports of adverse event with additional activities today noted. Verbal instructions provided for pt to perform instructed activities at a paced speed (tends to perform quickly and disregards technique)    Carina Is progressing well towards her goals.   Patient prognosis is Good.     Patient will continue to benefit from skilled outpatient physical therapy to address the deficits listed in the problem list box on initial evaluation, provide pt/family education and to maximize pt's level of independence in the home and community environment.     Patient's spiritual, cultural and educational needs considered and pt agreeable to plan of care and goals.     Anticipated barriers to physical therapy: chronicity    Goals:     Short = Long Term Goals (6 Weeks):   1. Patient will improve left lower extremity strength to at least 90% of right lower extremity strength as measured via MicroFet handheld dynamometer to improve strength for closed chain tasks. Progressing, not met  2. Patient will improve the total FOTO Knee Survey Score to >/= 51% to demonstrate increased perceived functional mobility. Progressing, not met  3. Patient will perform at least 12 sit to stands in 30 seconds without UE support to demonstrate increased functional strength. Progressing, not met  4. Patient will walk 15 minutes, 3x/week without pain to increase physical activity.      Plan   Cont to advance PT per established POC.   Plan of care Certification: 10/11/2024 to 11/22/2024.     Outpatient Physical Therapy 2 times weekly for 6 weeks to include the following interventions: Electrical Stimulation -, Gait Training, Manual Therapy, Moist Heat/ Ice, Neuromuscular Re-ed, Patient Education, Self Care, Therapeutic Activities, and Therapeutic Exercise.     Juan J Mckinnon, PTA

## 2024-10-21 ENCOUNTER — CLINICAL SUPPORT (OUTPATIENT)
Dept: REHABILITATION | Facility: HOSPITAL | Age: 69
End: 2024-10-21
Payer: MEDICARE

## 2024-10-21 DIAGNOSIS — M25.562 CHRONIC PAIN OF LEFT KNEE: Primary | ICD-10-CM

## 2024-10-21 DIAGNOSIS — R29.898 DECREASED STRENGTH OF LOWER EXTREMITY: ICD-10-CM

## 2024-10-21 DIAGNOSIS — G89.29 CHRONIC PAIN OF LEFT KNEE: Primary | ICD-10-CM

## 2024-10-21 PROCEDURE — 97112 NEUROMUSCULAR REEDUCATION: CPT | Mod: PN

## 2024-10-21 PROCEDURE — 97530 THERAPEUTIC ACTIVITIES: CPT | Mod: PN

## 2024-10-29 ENCOUNTER — OFFICE VISIT (OUTPATIENT)
Dept: FAMILY MEDICINE | Facility: CLINIC | Age: 69
End: 2024-10-29
Payer: MEDICARE

## 2024-10-29 VITALS
SYSTOLIC BLOOD PRESSURE: 134 MMHG | HEART RATE: 98 BPM | OXYGEN SATURATION: 97 % | DIASTOLIC BLOOD PRESSURE: 62 MMHG | BODY MASS INDEX: 32.84 KG/M2 | WEIGHT: 173.94 LBS | HEIGHT: 61 IN

## 2024-10-29 DIAGNOSIS — M17.0 PRIMARY OSTEOARTHRITIS OF BOTH KNEES: Primary | ICD-10-CM

## 2024-10-29 PROCEDURE — 99214 OFFICE O/P EST MOD 30 MIN: CPT | Mod: PBBFAC,PO | Performed by: STUDENT IN AN ORGANIZED HEALTH CARE EDUCATION/TRAINING PROGRAM

## 2024-10-29 PROCEDURE — 99999 PR PBB SHADOW E&M-EST. PATIENT-LVL IV: CPT | Mod: PBBFAC,,, | Performed by: STUDENT IN AN ORGANIZED HEALTH CARE EDUCATION/TRAINING PROGRAM

## 2024-10-29 PROCEDURE — 99214 OFFICE O/P EST MOD 30 MIN: CPT | Mod: S$PBB,,, | Performed by: STUDENT IN AN ORGANIZED HEALTH CARE EDUCATION/TRAINING PROGRAM

## 2024-10-29 PROCEDURE — G2211 COMPLEX E/M VISIT ADD ON: HCPCS | Mod: S$PBB,,, | Performed by: STUDENT IN AN ORGANIZED HEALTH CARE EDUCATION/TRAINING PROGRAM

## 2024-10-29 RX ORDER — DICLOFENAC SODIUM 10 MG/G
2 GEL TOPICAL 3 TIMES DAILY PRN
Qty: 450 G | Refills: 1 | Status: SHIPPED | OUTPATIENT
Start: 2024-10-29

## 2024-10-30 ENCOUNTER — CLINICAL SUPPORT (OUTPATIENT)
Dept: REHABILITATION | Facility: HOSPITAL | Age: 69
End: 2024-10-30
Payer: MEDICARE

## 2024-10-30 DIAGNOSIS — G89.29 CHRONIC PAIN OF LEFT KNEE: Primary | ICD-10-CM

## 2024-10-30 DIAGNOSIS — R29.898 DECREASED STRENGTH OF LOWER EXTREMITY: ICD-10-CM

## 2024-10-30 DIAGNOSIS — M25.562 CHRONIC PAIN OF LEFT KNEE: Primary | ICD-10-CM

## 2024-10-30 PROCEDURE — 97112 NEUROMUSCULAR REEDUCATION: CPT | Mod: KX,PN

## 2024-10-30 PROCEDURE — 97110 THERAPEUTIC EXERCISES: CPT | Mod: KX,PN

## 2024-11-01 ENCOUNTER — CLINICAL SUPPORT (OUTPATIENT)
Dept: REHABILITATION | Facility: HOSPITAL | Age: 69
End: 2024-11-01
Payer: MEDICARE

## 2024-11-01 ENCOUNTER — OFFICE VISIT (OUTPATIENT)
Dept: FAMILY MEDICINE | Facility: CLINIC | Age: 69
End: 2024-11-01
Payer: MEDICARE

## 2024-11-01 VITALS
HEART RATE: 89 BPM | HEIGHT: 61 IN | SYSTOLIC BLOOD PRESSURE: 142 MMHG | WEIGHT: 175.25 LBS | OXYGEN SATURATION: 98 % | BODY MASS INDEX: 33.09 KG/M2 | DIASTOLIC BLOOD PRESSURE: 73 MMHG

## 2024-11-01 DIAGNOSIS — I10 ESSENTIAL HYPERTENSION: Chronic | ICD-10-CM

## 2024-11-01 DIAGNOSIS — M25.562 CHRONIC PAIN OF LEFT KNEE: Primary | ICD-10-CM

## 2024-11-01 DIAGNOSIS — G89.29 CHRONIC PAIN OF LEFT KNEE: Primary | ICD-10-CM

## 2024-11-01 DIAGNOSIS — R29.898 DECREASED STRENGTH OF LOWER EXTREMITY: ICD-10-CM

## 2024-11-01 DIAGNOSIS — M17.0 PRIMARY OSTEOARTHRITIS OF BOTH KNEES: Primary | ICD-10-CM

## 2024-11-01 PROCEDURE — 97140 MANUAL THERAPY 1/> REGIONS: CPT | Mod: KX,PN

## 2024-11-01 PROCEDURE — 99215 OFFICE O/P EST HI 40 MIN: CPT | Mod: PBBFAC,PO | Performed by: STUDENT IN AN ORGANIZED HEALTH CARE EDUCATION/TRAINING PROGRAM

## 2024-11-01 PROCEDURE — 97112 NEUROMUSCULAR REEDUCATION: CPT | Mod: KX,PN

## 2024-11-01 PROCEDURE — 99999 PR PBB SHADOW E&M-EST. PATIENT-LVL V: CPT | Mod: PBBFAC,,, | Performed by: STUDENT IN AN ORGANIZED HEALTH CARE EDUCATION/TRAINING PROGRAM

## 2024-11-01 RX ORDER — LIDOCAINE HYDROCHLORIDE 10 MG/ML
4 INJECTION, SOLUTION INFILTRATION; PERINEURAL
Status: DISCONTINUED | OUTPATIENT
Start: 2024-11-01 | End: 2024-11-01 | Stop reason: HOSPADM

## 2024-11-01 RX ORDER — TRIAMCINOLONE ACETONIDE 40 MG/ML
40 INJECTION, SUSPENSION INTRA-ARTICULAR; INTRAMUSCULAR
Status: DISCONTINUED | OUTPATIENT
Start: 2024-11-01 | End: 2024-11-01 | Stop reason: HOSPADM

## 2024-11-01 RX ADMIN — TRIAMCINOLONE ACETONIDE 40 MG: 40 INJECTION, SUSPENSION INTRA-ARTICULAR; INTRAMUSCULAR at 09:11

## 2024-11-01 RX ADMIN — LIDOCAINE HYDROCHLORIDE 4 ML: 10 INJECTION, SOLUTION INFILTRATION; PERINEURAL at 09:11

## 2024-11-03 NOTE — PROGRESS NOTES
[facility]   Physical Therapy Treatment Note      Name: Carina Montaño  Clinic Number: [id number]    Therapy Diagnosis:   Encounter Diagnoses   Name Primary?    Chronic pain of left knee Yes    Decreased strength of lower extremity      Physician: Lino Cortez DO    Visit Date: 11/4/2024  Physician Orders: PT Eval and Treat   Medical Diagnosis from Referral: Chronic pain of left knee [M25.562, G89.29]   Evaluation Date: 10/11/2024  Authorization Period Expiration: 12/31/2024  Plan of Care Expiration: 11/22/2024  Progress Note Due: 11/11/2024  Visit # / Visits authorized: 6/10 + 1   FOTO: 2nd complete 11/1. Final to be administered at discharge  PTA Visit #: 0/5      Precautions: Standard; hypertension, allergies and asthma; history of cancer     Time In: 0800  Time Out: 0855  Total Billable Time: 55 minutes    Subjective     Patient reports: she visited orthopedic and received a left knee injection Friday. She will receive one in her right knee in two weeks. On Saturday, patient was walking quickly and thinks she tripped resulting in fall onto her left knee. Patient thinks she may need to visit ortho before her next appointment. Patient denies increased pain from this.   She was compliant with home exercise program.  Response to previous treatment: decreased pain  Functional change improved tolerance to standing and walking    Pain: 2/10 at arrival; 0/10 at end   Location: left knee      Objective      Objective Measures updated at progress report unless specified.     Treatment     Carina received the treatments listed below:      neuromuscular re-education activities to improve: Coordination and Kinesthetic for 32 minutes. The following activities were included:    Straight leg raise: 3x15 bilateral   Double leg bridge with abduction: green theraband, 3x10     Cybex double leg leg press: 7.0 plates, 3x10 double leg   Cybex single leg press: 4.0 plates, 3x10 bilateral   Hip  "abduction: red theraband 3x10 bilateral     therapeutic activities to improve functional performance for 23 minutes, including:     Recumbent bike: 5' at level 4 and 50+ RPM    Sit to stands: Standard chair + Airex foam square, 2 kg ball 3x10  Forward step ups with contralateral knee drive: 4" step, 2x10 bilateral with unilateral upper extremity support  Update home exercise program     Patient Education and Home Exercises       Education provided:   - updated home exercise program   - keeping appointment in 2 weeks should be fine as patient is not in increased pain or with functional impairments since fall    Written Home Exercises Provided: On 10/11. Exercises were reviewed and Carina was able to demonstrate them prior to the end of the session.  Carina demonstrated good  understanding of the education provided. See Electronic Medical Record under Patient Instructions for exercises provided during therapy sessions    Assessment     Carina is a 69 y.o. female referred to outpatient Physical Therapy with a recurrent left knee pain. Joint +-/ meniscal dysfunction in differential. Carryover in pain relief despite fall on Saturday. Left knee injection might contribute.     Carina Is progressing well towards her goals.   Patient prognosis is Good.   Patient will continue to benefit from skilled outpatient physical therapy to address the deficits listed in the problem list box on initial evaluation, provide pt/family education and to maximize pt's level of independence in the home and community environment.     Patient's spiritual, cultural and educational needs considered and pt agreeable to plan of care and goals.  Anticipated barriers to physical therapy: chronicity    Short = Long Term Goals (6 Weeks):   1. Patient will improve left lower extremity strength to at least 90% of right lower extremity strength as measured via MicroFet handheld dynamometer to improve strength for closed chain tasks. Progressing, " not met  2. Patient will improve the total FOTO Knee Survey Score to >/= 51% to demonstrate increased perceived functional mobility. Progressing, not met  3. Patient will perform at least 12 sit to stands in 30 seconds without UE support to demonstrate increased functional strength. Progressing, not met  4. Patient will walk 15 minutes, 3x/week without pain to increase physical activity. Progressing, not met     Plan     Hip and knee strengthening.   Add more closed chain activities against gravity.     Lorena Wells, PT, DPT, OCS

## 2024-11-04 ENCOUNTER — CLINICAL SUPPORT (OUTPATIENT)
Dept: REHABILITATION | Facility: HOSPITAL | Age: 69
End: 2024-11-04
Payer: MEDICARE

## 2024-11-04 DIAGNOSIS — M25.562 CHRONIC PAIN OF LEFT KNEE: Primary | ICD-10-CM

## 2024-11-04 DIAGNOSIS — G89.29 CHRONIC PAIN OF LEFT KNEE: Primary | ICD-10-CM

## 2024-11-04 DIAGNOSIS — R29.898 DECREASED STRENGTH OF LOWER EXTREMITY: ICD-10-CM

## 2024-11-04 PROCEDURE — 97530 THERAPEUTIC ACTIVITIES: CPT | Mod: KX,PN

## 2024-11-04 PROCEDURE — 97112 NEUROMUSCULAR REEDUCATION: CPT | Mod: KX,PN

## 2024-11-11 ENCOUNTER — CLINICAL SUPPORT (OUTPATIENT)
Dept: REHABILITATION | Facility: HOSPITAL | Age: 69
End: 2024-11-11
Payer: MEDICARE

## 2024-11-11 DIAGNOSIS — R29.898 DECREASED STRENGTH OF LOWER EXTREMITY: ICD-10-CM

## 2024-11-11 DIAGNOSIS — G89.29 CHRONIC PAIN OF LEFT KNEE: Primary | ICD-10-CM

## 2024-11-11 DIAGNOSIS — M25.562 CHRONIC PAIN OF LEFT KNEE: Primary | ICD-10-CM

## 2024-11-11 PROCEDURE — 97530 THERAPEUTIC ACTIVITIES: CPT | Mod: KX,PN

## 2024-11-11 PROCEDURE — 97112 NEUROMUSCULAR REEDUCATION: CPT | Mod: KX,PN

## 2024-11-11 NOTE — PROGRESS NOTES
[facility]   Physical Therapy Treatment Note      Name: Carina Montaño  Clinic Number: [id number]    Therapy Diagnosis:   Encounter Diagnoses   Name Primary?    Chronic pain of left knee Yes    Decreased strength of lower extremity      Physician: Lino Cortez DO    Visit Date: 11/11/2024  Physician Orders: PT Eval and Treat   Medical Diagnosis from Referral: Chronic pain of left knee [M25.562, G89.29]   Evaluation Date: 10/11/2024  Authorization Period Expiration: 12/31/2024  Plan of Care Expiration: 11/22/2024  Progress Note Due: 11/11/2024  Visit # / Visits authorized: 7/10 + 1   FOTO: 2nd complete 11/1. Final to be administered at discharge  PTA Visit #: 0/5      Precautions: Standard; hypertension, allergies and asthma; history of cancer     Time In: 0809  Time Out: 0858  Total Billable Time: 23 minutes    Subjective     Patient reports: she receives an injection in her right knee this week.  She was compliant with home exercise program.  Response to previous treatment: decreased pain  Functional change ran many errands this weekend without 3/10 pain at worst    Pain: 0/10 at arrival; 0/10 at end   Location: left knee      Objective      Objective Measures updated at progress report unless specified.     Treatment     Carina received the treatments listed below:      neuromuscular re-education activities to improve: Coordination and Kinesthetic for 34 minutes. The following activities were included:    Straight leg raise: 3x15 bilateral   Sidelying hip abduction: 3x15 bilateral     Cybex single leg press: 3.0 plates, 2x10 bilateral   Cybex double leg knee extension: Dial at 5, 2x10  Cybex double leg hamstring curls: 3.0 plates, 2x10    therapeutic activities to improve functional performance for 15 minutes, including:     Recumbent bike: 5' at level 5 and goal of 50+ RPM    Sit to stands: Standard chair, 10x. Additional 2x10 with 2 kg ball  Forward step ups with contralateral  "knee drive: 4" step, 2x10 bilateral with unilateral upper extremity support    Patient Education and Home Exercises       Education provided:   - continue updated home exercise program     Written Home Exercises Provided: On 10/11. Exercises were reviewed and Carina was able to demonstrate them prior to the end of the session.  Carina demonstrated good  understanding of the education provided. See Electronic Medical Record under Patient Instructions for exercises provided during therapy sessions    Assessment     Carina is a 69 y.o. female referred to outpatient Physical Therapy with a recurrent left knee pain. Joint +-/ meniscal dysfunction in differential. Progressed depth of sit to stands without pain. Also progressed depth of leg press, but at decreased weight. Initial contralateral hip pain during sidelying hip abduction that decreased with increased flexion of that knee and hip.    Carina Is progressing well towards her goals.   Patient prognosis is Good.   Patient will continue to benefit from skilled outpatient physical therapy to address the deficits listed in the problem list box on initial evaluation, provide pt/family education and to maximize pt's level of independence in the home and community environment.     Patient's spiritual, cultural and educational needs considered and pt agreeable to plan of care and goals.  Anticipated barriers to physical therapy: chronicity    Short = Long Term Goals (6 Weeks):   1. Patient will improve left lower extremity strength to at least 90% of right lower extremity strength as measured via MicroFet handheld dynamometer to improve strength for closed chain tasks. Progressing, not met  2. Patient will improve the total FOTO Knee Survey Score to >/= 51% to demonstrate increased perceived functional mobility. Progressing, not met  3. Patient will perform at least 12 sit to stands in 30 seconds without UE support to demonstrate increased functional strength. " Progressing, not met  4. Patient will walk 15 minutes, 3x/week without pain to increase physical activity. Progressing, not met     Plan     Hip and knee strengthening.   Add more closed chain activities against gravity.     Lorena Wells, PT, DPT, OCS

## 2024-11-13 ENCOUNTER — CLINICAL SUPPORT (OUTPATIENT)
Dept: REHABILITATION | Facility: HOSPITAL | Age: 69
End: 2024-11-13
Payer: MEDICARE

## 2024-11-13 DIAGNOSIS — M25.562 CHRONIC PAIN OF LEFT KNEE: Primary | ICD-10-CM

## 2024-11-13 DIAGNOSIS — R29.898 DECREASED STRENGTH OF LOWER EXTREMITY: ICD-10-CM

## 2024-11-13 DIAGNOSIS — G89.29 CHRONIC PAIN OF LEFT KNEE: Primary | ICD-10-CM

## 2024-11-13 PROCEDURE — 97110 THERAPEUTIC EXERCISES: CPT | Mod: KX,PN

## 2024-11-13 PROCEDURE — 97112 NEUROMUSCULAR REEDUCATION: CPT | Mod: KX,PN

## 2024-11-13 PROCEDURE — 97530 THERAPEUTIC ACTIVITIES: CPT | Mod: KX,PN

## 2024-11-13 NOTE — PROGRESS NOTES
[facility]   Physical Therapy Treatment Note      Name: Carina Montaño  Clinic Number: [id number]    Therapy Diagnosis:   Encounter Diagnoses   Name Primary?    Chronic pain of left knee Yes    Decreased strength of lower extremity      Physician: Lino Cortez DO    Visit Date: 11/13/2024  Physician Orders: PT Eval and Treat   Medical Diagnosis from Referral: Chronic pain of left knee [M25.562, G89.29]   Evaluation Date: 10/11/2024  Authorization Period Expiration: 12/31/2024  Plan of Care Expiration: 11/22/2024  Visit # / Visits authorized: 8/10 + 1   FOTO: 2nd complete 11/1. Final to be administered at discharge  PTA Visit #: 0/5      Precautions: Standard; hypertension, allergies and asthma; history of cancer     Time In: 1002  Time Out: 1056  Total Billable Time: 53 minutes    Subjective     Patient reports: she receives an injection in her right knee Friday. Patient notes anterolateral thigh fatigue from recumbent bike performance from 40 -> 50 RPM  She was compliant with home exercise program.  Response to previous treatment: no pain since last visit  Functional change no functional issues since last visit    Pain: 0/10 at arrival; 0/10 at end   Location: left knee      Objective      Objective Measures updated at progress report unless specified.     Treatment     Carina received the treatments listed below:      neuromuscular re-education activities to improve: Coordination and Kinesthetic for 33 minutes. The following activities were included:    Straight leg raise: 3x15 bilateral   Sidelying hip abduction: 3x15 bilateral   Sidelying hip adduction: 3x10 bilateral     Cybex single leg press: 5.0 plates, 2x10 bilateral   Cybex unilateral knee extension: Dial at 0, 2x10 bilateral   Cybex unilateral hamstring curls: 2.0 plates, 2x10 bilateral     manual therapy techniques: were applied to the: bilateral knee for 8 minutes, including:    Grade III-IV patellar  "mobilizations  Passive knee flexion and extension    therapeutic activities to improve functional performance for 13 minutes, including:     Recumbent bike: 5' at level 5 and of 50+ RPM    Sit to stands: Standard chair, 10x. Additional 2x10 with 2 kg ball  Forward step ups with contralateral knee drive: 6" step, 2x10 bilateral with bilateral upper extremity support    Patient Education and Home Exercises       Education provided:   - continue updated home exercise program     Written Home Exercises Provided: On 10/11. Exercises were reviewed and Carina was able to demonstrate them prior to the end of the session.  Carina demonstrated good  understanding of the education provided. See Electronic Medical Record under Patient Instructions for exercises provided during therapy sessions    Assessment     Carina is a 69 y.o. female referred to outpatient Physical Therapy with a recurrent left knee pain. Joint +-/ meniscal dysfunction in differential. Steady improvement in pain since 11/4 visit; injection on 11/1 may contribute. Able to progress open chain Cybex exercises to unilateral and leg press weight to that at session before last. Increased step up height without issues bilaterally.     Carina Is progressing well towards her goals.   Patient prognosis is Good.   Patient will continue to benefit from skilled outpatient physical therapy to address the deficits listed in the problem list box on initial evaluation, provide pt/family education and to maximize pt's level of independence in the home and community environment.     Patient's spiritual, cultural and educational needs considered and pt agreeable to plan of care and goals.  Anticipated barriers to physical therapy: chronicity    Short = Long Term Goals (6 Weeks):   1. Patient will improve left lower extremity strength to at least 90% of right lower extremity strength as measured via [employer] handheld dynamometer to improve strength for closed chain " tasks. Progressing, not met  2. Patient will improve the total FOTO Knee Survey Score to >/= 51% to demonstrate increased perceived functional mobility. Progressing, not met  3. Patient will perform at least 12 sit to stands in 30 seconds without UE support to demonstrate increased functional strength. Progressing, not met  4. Patient will walk 15 minutes, 3x/week without pain to increase physical activity. Progressing, not met     Plan     Hip and knee strengthening.   Add more closed chain activities against gravity.     Potential discharge next week pending low pain levels.    Lorena Wells, PT, DPT, OCS

## 2024-11-15 ENCOUNTER — OFFICE VISIT (OUTPATIENT)
Dept: FAMILY MEDICINE | Facility: CLINIC | Age: 69
End: 2024-11-15
Payer: MEDICARE

## 2024-11-15 VITALS
DIASTOLIC BLOOD PRESSURE: 90 MMHG | HEIGHT: 61 IN | OXYGEN SATURATION: 97 % | HEART RATE: 73 BPM | BODY MASS INDEX: 32.8 KG/M2 | WEIGHT: 173.75 LBS | SYSTOLIC BLOOD PRESSURE: 150 MMHG

## 2024-11-15 DIAGNOSIS — M17.0 PRIMARY OSTEOARTHRITIS OF BOTH KNEES: ICD-10-CM

## 2024-11-15 DIAGNOSIS — I10 ESSENTIAL HYPERTENSION: Primary | Chronic | ICD-10-CM

## 2024-11-15 PROCEDURE — 99999 PR PBB SHADOW E&M-EST. PATIENT-LVL IV: CPT | Mod: PBBFAC,,, | Performed by: STUDENT IN AN ORGANIZED HEALTH CARE EDUCATION/TRAINING PROGRAM

## 2024-11-15 PROCEDURE — 99214 OFFICE O/P EST MOD 30 MIN: CPT | Mod: PBBFAC,PO | Performed by: STUDENT IN AN ORGANIZED HEALTH CARE EDUCATION/TRAINING PROGRAM

## 2024-11-15 RX ORDER — TRIAMCINOLONE ACETONIDE 40 MG/ML
40 INJECTION, SUSPENSION INTRA-ARTICULAR; INTRAMUSCULAR
Status: DISCONTINUED | OUTPATIENT
Start: 2024-11-15 | End: 2024-11-15 | Stop reason: HOSPADM

## 2024-11-15 RX ORDER — LIDOCAINE HYDROCHLORIDE 10 MG/ML
4 INJECTION, SOLUTION INFILTRATION; PERINEURAL
Status: DISCONTINUED | OUTPATIENT
Start: 2024-11-15 | End: 2024-11-15 | Stop reason: HOSPADM

## 2024-11-15 RX ADMIN — TRIAMCINOLONE ACETONIDE 40 MG: 40 INJECTION, SUSPENSION INTRA-ARTICULAR; INTRAMUSCULAR at 09:11

## 2024-11-15 RX ADMIN — LIDOCAINE HYDROCHLORIDE 4 ML: 10 INJECTION, SOLUTION INFILTRATION; PERINEURAL at 09:11

## 2024-11-15 NOTE — PROGRESS NOTES
Progress Note  [facility]    Subjective:     Carina Montaño is a 69 y.o. year old female with current diagnoses of  hypertension, vitamin-D deficiency, GERD, severe persistent asthma, allergic rhinitis, prediabetes, CHF, and osteopenia/osteoporosis, h/o breast cancer  who presents to clinic for R knee injection.     HTN: Did not take medications today. Does not have BP cuff to check at home. No CP, SOB or LE edema.     Pt had L knee injection. Day after she received this injection she fell on her knee. No swelling or bruising. Pain overall improved and doing well now.     Patient Active Problem List    Diagnosis Date Noted    Primary osteoarthritis of both knees 11/15/2024    Chronic pain of left knee 10/12/2024    Decreased strength of lower extremity 10/12/2024    Aortic atherosclerosis 09/27/2024    Decreased hearing 01/03/2024    Class 1 obesity due to excess calories with serious comorbidity and body mass index (BMI) of 30.0 to 30.9 in adult 03/28/2023    Chronic diastolic (congestive) heart failure 12/08/2022    Aortic valve sclerosis 12/08/2022    Chronic left hip pain 04/06/2022    Pain aggravated by walking 04/06/2022    Decreased range of left hip movement 04/06/2022    Moderate persistent asthma without complication 03/10/2021    Pre-diabetes 09/16/2020    NAFLD (nonalcoholic fatty liver disease) 02/28/2018    Allergic sinusitis 03/28/2017    Essential hypertension 09/29/2016    Hypercholesteremia 09/29/2016    Obesity (BMI 30-39.9) 09/29/2016    History of cancer of left breast 09/29/2016    Low bone mass 09/29/2016    Gastroesophageal reflux disease 09/29/2016    Prolonged QT interval 09/29/2016        Review of patient's allergies indicates:   Allergen Reactions    Grass pollen-perennial rye, standard Other (See Comments)    Lipitor [atorvastatin]     Strawberries [strawberry]         Past Medical History:   Diagnosis Date    Allergy     Arthritis     Asthma      "Breast cancer     Cancer     breast    Chronic diastolic (congestive) heart failure 12/08/2022    GERD (gastroesophageal reflux disease)     Hyperlipidemia     Hypertension     Prediabetes       Past Surgical History:   Procedure Laterality Date    ABDOMINAL SURGERY      BREAST BIOPSY      BREAST CYST EXCISION      COLONOSCOPY N/A 05/11/2021    Procedure: COLONOSCOPY;  Surgeon: Ema Vidal MD;  Location: [facility] ENDO;  Service: Endoscopy;  Laterality: N/A;  COVID TEST 5/8 SUPREP    ESOPHAGOGASTRODUODENOSCOPY N/A 05/17/2019    Procedure: ESOPHAGOGASTRODUODENOSCOPY (EGD);  Surgeon: Ema Vidal MD;  Location: [facility] ENDO;  Service: Endoscopy;  Laterality: N/A;    KNEE SURGERY      SHOULDER SURGERY        Family History   Problem Relation Name Age of Onset    Diabetes Mother      Hypertension Mother      Cancer Mother          Pancreatic    Pancreatic cancer Mother      Hypertension Father      Alzheimer's disease Father      Arthritis Brother x 1, half -mom     Diabetes Brother x1     No Known Problems Daughter x1       Social History     Tobacco Use    Smoking status: Never    Smokeless tobacco: Never   Substance Use Topics    Alcohol use: Yes     Comment: social        Objective:     Vitals:    11/15/24 1005   BP: (!) 150/90   BP Location: Left arm   Patient Position: Sitting   Pulse: 73   SpO2: 97%   Weight: 78.8 kg (173 lb 11.6 oz)   Height: 5' 1" (1.549 m)     BMI: 32.82    Gen: No apparent distress, well nourished and developed, appears stated age  CV: RRR, S1 and S2 present, no LE edema  Resp: CTAB, normal respiratory effort  MSK: L knee without bruising, swelling or warmth.     Large Joint Aspiration/Injection: R knee    Date/Time: 11/15/2024 9:40 AM    Performed by: Lino Cortez DO  Authorized by: Lino Cortez, DO    Consent Done?:  Yes (Written)  Indications:  Pain and arthritis    Local anesthesia used?: Yes    Local anesthetic:  Lidocaine spray    Details:  Needle Size:  25 G  Approach:  " Anteromedial  Location:  Knee  Site:  R knee  Medications:  4 mL LIDOcaine HCL 10 mg/ml (1%) 10 mg/mL (1 %); 40 mg triamcinolone acetonide 40 mg/mL  Patient tolerance:  Patient tolerated the procedure well with no immediate complications        Assessment/Plan:     Carina Montaño is a 69 y.o. year old female who presents to clinic for knee injection.     1. Essential hypertension (Primary)  Will have patient RTC for nurse BP check next week to ensure BP still well controlled on current regimen. Pt verbalized understanding and was in agreement with the plan.      2. Primary osteoarthritis of both knees  R knee injection performed, tolerated procedure well.   - Large Joint Aspiration/Injection: R knee       Follow-up: nurse BP check in 1 week, routine healthcare in 4 months as previously scheduled.       Lino Cortez DO  Family Medicine

## 2024-11-18 ENCOUNTER — CLINICAL SUPPORT (OUTPATIENT)
Dept: REHABILITATION | Facility: HOSPITAL | Age: 69
End: 2024-11-18
Payer: MEDICARE

## 2024-11-18 DIAGNOSIS — M25.562 CHRONIC PAIN OF LEFT KNEE: Primary | ICD-10-CM

## 2024-11-18 DIAGNOSIS — G89.29 CHRONIC PAIN OF LEFT KNEE: Primary | ICD-10-CM

## 2024-11-18 DIAGNOSIS — R29.898 DECREASED STRENGTH OF LOWER EXTREMITY: ICD-10-CM

## 2024-11-18 PROCEDURE — 97110 THERAPEUTIC EXERCISES: CPT | Mod: KX,PN

## 2024-11-18 PROCEDURE — 97530 THERAPEUTIC ACTIVITIES: CPT | Mod: KX,PN

## 2024-11-18 NOTE — PROGRESS NOTES
"[facility]   Physical Therapy Treatment/Discharge Note      Name: Carina Montaño  Clinic Number: [id number]    Therapy Diagnosis:   Encounter Diagnoses   Name Primary?    Chronic pain of left knee Yes    Decreased strength of lower extremity      Physician: Lino Cortez DO    Visit Date: 11/18/2024  Physician Orders: PT Eval and Treat   Medical Diagnosis from Referral: Chronic pain of left knee [M25.562, G89.29]   Evaluation Date: 10/11/2024  Authorization Period Expiration: 12/31/2024  Plan of Care Expiration: 11/22/2024  Visit # / Visits authorized: 9/10 + 1   FOTO: 2nd complete 11/1. Final to be administered at discharge  PTA Visit #: 0/5      Precautions: Standard; hypertension, allergies and asthma; history of cancer     Time In: 0802  Time Out: 0827  Total Billable Time: 23 minutes    Subjective     Patient reports: she had an injection in her right knee Friday. Patient tripped over her 's tailgate this weekend and fell on her left knee again. However, patient reports no pain. Patient is ready for discharge at this time.   She was compliant with home exercise program.  Response to previous treatment: continued absence of pain since 11/11 visit   Functional change no functional issues since 11/11 visit    Pain: 0/10 at arrival; 0/10 at end   Location: left knee      Objective      11/18:    !=pain    Gross movement:  30" Chair Stand: 12    Lower extremity strength with [employer] handheld dynamometer Right Left Pain/dysfunction with movement   (approx 4 sec hold w/ max contraction)   Hip flexion 11 kg  10.6 kg     Hip abduction 19 kg  22 kg     Quadriceps 19.5 kg  24.2 kg     Hamstrings 13.4 kg  14.9 kg       Outcome Measure for FOTO Knee Survey    Therapist reviewed FOTO scores for Carina Montaño on 11/18/2024.   FOTO report - see Media section or FOTO account episode details.    Score: 56%     Treatment     Carina received the treatments listed below:      therapeutic " "exercises to develop strength for 11 minutes including:    Strength testing and FOTO    therapeutic activities to improve functional performance for 16 minutes, including:     Home exercise program review  30" chair stand    Patient Education and Home Exercises       Education provided:   - updated home exercise program     Written Home Exercises Provided: On 11/18. Exercises were reviewed and Carina was able to demonstrate them prior to the end of the session.  Carina demonstrated good  understanding of the education provided. See Electronic Medical Record under Patient Instructions for exercises provided during therapy sessions    Assessment     Carina is a 69 y.o. female referred to outpatient Physical Therapy with a recurrent left knee pain. Joint +-/ meniscal dysfunction in differential. Steady improvement in pain since 11/4 visit; absent since 11/11. Patient is ready and appropriate for discharge at this time.    Carina Is progressing well towards her goals.   Patient prognosis is Good.   Patient will continue to benefit from skilled outpatient physical therapy to address the deficits listed in the problem list box on initial evaluation, provide pt/family education and to maximize pt's level of independence in the home and community environment.     Patient's spiritual, cultural and educational needs considered and pt agreeable to plan of care and goals.  Anticipated barriers to physical therapy: Chronicity    Short = Long Term Goals (6 Weeks):   1. Patient will improve left lower extremity strength to at least 90% of right lower extremity strength as measured via [employer] handheld dynamometer to improve strength for closed chain tasks. Met 11/18  2. Patient will improve the total FOTO Knee Survey Score to >/= 51% to demonstrate increased perceived functional mobility. Met 11/18  3. Patient will perform at least 12 sit to stands in 30 seconds without UE support to demonstrate increased functional " strength. Met 11/18  4. Patient will walk 15 minutes, 3x/week without pain to increase physical activity. Not performed 11/18    Plan     Discharge from physical therapy.     Lorena Wells, PT, DPT, OCS

## 2024-11-21 ENCOUNTER — CLINICAL SUPPORT (OUTPATIENT)
Dept: FAMILY MEDICINE | Facility: CLINIC | Age: 69
End: 2024-11-21
Payer: MEDICARE

## 2024-11-21 DIAGNOSIS — I10 HYPERTENSION, UNSPECIFIED TYPE: Primary | ICD-10-CM

## 2025-01-06 DIAGNOSIS — E78.00 HYPERCHOLESTEREMIA: ICD-10-CM

## 2025-01-06 NOTE — TELEPHONE ENCOUNTER
Care Due:                  Date            Visit Type   Department     Provider  --------------------------------------------------------------------------------                                EP -                              PRIMARY      [facility]  Last Visit: 11-      CARE ([facility])   KAYDEN Cortez                              EP -                              PRIMARY      [facility]  Next Visit: 03-      CARE ([facility])   [facility]       Lino Cortez                                                            Last  Test          Frequency    Reason                     Performed    Due Date  --------------------------------------------------------------------------------    Mg Level....  12 months..  alendronate..............  Not Found    Overdue    Phosphate...  12 months..  alendronate..............  Not Found    Overdue    [employer] Embedded Care Due Messages. Reference number: [id number].   1/06/2025 10:05:26 AM CST

## 2025-01-07 ENCOUNTER — OFFICE VISIT (OUTPATIENT)
Dept: OBSTETRICS AND GYNECOLOGY | Facility: CLINIC | Age: 70
End: 2025-01-07
Payer: MEDICARE

## 2025-01-07 VITALS
DIASTOLIC BLOOD PRESSURE: 83 MMHG | HEART RATE: 79 BPM | SYSTOLIC BLOOD PRESSURE: 133 MMHG | BODY MASS INDEX: 32.9 KG/M2 | WEIGHT: 174.25 LBS | HEIGHT: 61 IN

## 2025-01-07 DIAGNOSIS — Z12.89 ENCOUNTER FOR PELVIC SCREENING FOR CANCER: Primary | ICD-10-CM

## 2025-01-07 DIAGNOSIS — Z12.31 SCREENING MAMMOGRAM FOR BREAST CANCER: ICD-10-CM

## 2025-01-07 DIAGNOSIS — Z12.4 PAP SMEAR FOR CERVICAL CANCER SCREENING: ICD-10-CM

## 2025-01-07 DIAGNOSIS — N81.4 CYSTOCELE WITH PROLAPSE: ICD-10-CM

## 2025-01-07 DIAGNOSIS — Z85.3 HISTORY OF CANCER OF LEFT BREAST: ICD-10-CM

## 2025-01-07 DIAGNOSIS — Z78.0 POSTMENOPAUSAL: ICD-10-CM

## 2025-01-07 PROCEDURE — 99215 OFFICE O/P EST HI 40 MIN: CPT | Mod: PBBFAC,PO | Performed by: OBSTETRICS & GYNECOLOGY

## 2025-01-07 PROCEDURE — 87624 HPV HI-RISK TYP POOLED RSLT: CPT | Performed by: OBSTETRICS & GYNECOLOGY

## 2025-01-07 PROCEDURE — 99999 PR PBB SHADOW E&M-EST. PATIENT-LVL V: CPT | Mod: PBBFAC,,, | Performed by: OBSTETRICS & GYNECOLOGY

## 2025-01-07 PROCEDURE — G0101 CA SCREEN;PELVIC/BREAST EXAM: HCPCS | Mod: PBBFAC,PO | Performed by: OBSTETRICS & GYNECOLOGY

## 2025-01-07 PROCEDURE — G0101 CA SCREEN;PELVIC/BREAST EXAM: HCPCS | Mod: S$PBB,,, | Performed by: OBSTETRICS & GYNECOLOGY

## 2025-01-07 PROCEDURE — 88142 CYTOPATH C/V THIN LAYER: CPT | Performed by: OBSTETRICS & GYNECOLOGY

## 2025-01-07 RX ORDER — SIMVASTATIN 20 MG/1
20 TABLET, FILM COATED ORAL DAILY
Qty: 90 TABLET | Refills: 3 | Status: SHIPPED | OUTPATIENT
Start: 2025-01-07

## 2025-01-07 RX ORDER — PANTOPRAZOLE SODIUM 40 MG/1
40 TABLET, DELAYED RELEASE ORAL DAILY
Qty: 30 TABLET | Refills: 3 | Status: SHIPPED | OUTPATIENT
Start: 2025-01-07 | End: 2026-01-07

## 2025-01-07 NOTE — TELEPHONE ENCOUNTER
Refill Routing Note   Medication(s) are not appropriate for processing by [facility] for the following reason(s):        No active prescription written by provider    [facility] action(s):  Defer               Appointments  past 12m or future 3m with PCP    Date Provider   Last Visit   11/15/2024 Lino Cortez, DO   Next Visit   3/21/2025 Lino Cortez, DO   ED visits in past 90 days: 0        Note composed:9:50 PM 01/06/2025

## 2025-01-07 NOTE — PROGRESS NOTES
"  Chief Complaint   Patient presents with    Annual Exam       HISTORY OF PRESENT ILLNESS:   Carina Montaño is a 69 y.o. female  who presents for well woman exam.  No LMP recorded. Patient is postmenopausal..  She is ,"menopausal at age 50. Never did HRT. She has has no complaints.     Past Medical History:   Diagnosis Date    Allergy     Arthritis     Asthma     Breast cancer     Cancer     breast    Chronic diastolic (congestive) heart failure 12/08/2022    GERD (gastroesophageal reflux disease)     Hyperlipidemia     Hypertension     Prediabetes           Past Surgical History:   Procedure Laterality Date    ABDOMINAL SURGERY      BREAST BIOPSY      BREAST CYST EXCISION      COLONOSCOPY N/A 05/11/2021    Procedure: COLONOSCOPY;  Surgeon: Ema Vdial MD;  Location: [facility];  Service: Endoscopy;  Laterality: N/A;  COVID TEST 5/8 SUPREP    ESOPHAGOGASTRODUODENOSCOPY N/A 05/17/2019    Procedure: ESOPHAGOGASTRODUODENOSCOPY (EGD);  Surgeon: Ema Vidal MD;  Location: [facility];  Service: Endoscopy;  Laterality: N/A;    KNEE SURGERY      SHOULDER SURGERY           Social History     Socioeconomic History    Marital status:     Number of children: 1   Occupational History    Occupation:     Tobacco Use    Smoking status: Never    Smokeless tobacco: Never   Substance and Sexual Activity    Alcohol use: Yes     Comment: social    Drug use: No    Sexual activity: Yes     Social Drivers of Health     Financial Resource Strain: Low Risk  (1/3/2024)    Overall Financial Resource Strain (CARDIA)     Difficulty of Paying Living Expenses: Not hard at all   Food Insecurity: No Food Insecurity (1/3/2024)    Hunger Vital Sign     Worried About Running Out of Food in the Last Year: Never true     Ran Out of Food in the Last Year: Never true   Transportation Needs: No Transportation Needs (1/3/2024)    PRAPARE - Transportation     Lack of Transportation (Medical): No     Lack of " "Transportation (Non-Medical): No   Physical Activity: Inactive (1/3/2024)    Exercise Vital Sign     Days of Exercise per Week: 0 days     Minutes of Exercise per Session: 0 min   Stress: No Stress Concern Present (1/3/2024)    [employer] - Occupational Stress Questionnaire     Feeling of Stress : Only a little   Housing Stability: Low Risk  (1/3/2024)    Housing Stability Vital Sign     Unable to Pay for Housing in the Last Year: No     Number of Places Lived in the Last Year: 1     Unstable Housing in the Last Year: No       Family History   Problem Relation Name Age of Onset    Diabetes Mother      Hypertension Mother      Cancer Mother          Pancreatic    Pancreatic cancer Mother      Hypertension Father      Alzheimer's disease Father      Arthritis Brother x 1, half -mom     Diabetes Brother x1     No Known Problems Daughter x1          OB History    Para Term  AB Living   1 1 1         SAB IAB Ectopic Multiple Live Births                  # Outcome Date GA Lbr Armando/2nd Weight Sex Type Anes PTL Lv   1 Term                COMPREHENSIVE GYN HISTORY:  PAP History: Denies abnormal Paps  Infection History: Denies STDs. Denies PID  Benign History: Denies uterine fibroids. Denies ovarian cysts. Denies endometriosis Denies other conditions.  Cancer History: Denies cervical cancer. Denies uterine cancer or hyperplasia. Denies ovarian cancer. Denies vulvar cancer or pre-cancer. Denies vaginal cancer or pre-cancer. Denies breast cancer. Denies colon cancer.  Cycle: nl age/monthly; menopause at 50, never did HRT   Had breast cancer in , reports didn't need chemo or radiation     ROS:  Negative     /83 (Patient Position: Sitting)   Pulse 79   Ht 5' 1" (1.549 m)   Wt 79.1 kg (174 lb 4.4 oz)   BMI 32.93 kg/m²     APPEARANCE: Well nourished, well developed, in no acute distress.    BREASTS: Symmetrical, no skin changes or visible lesions. No palpable masses, nipple " discharge or adenopathy bilaterally.  PELVIC:   VULVA: No lesions. Normal female genitalia.  URETHRAL MEATUS: Normal size and location, no lesions, no prolapse.  URETHRA: No masses, tenderness, prolapse or scarring.  VAGINA: Moist and well rugated, no discharge, Aa 0 Ba0, C-2, D-2, Ap -1, Bp-1, GH: 4 PB 2  CERVIX: No lesions and discharge.  UTERUS: Normal size, regular shape, mobile, non-tender, bladder base nontender.  ADNEXA: No masses or tenderness.  PERINEUM: Normal, no masses or lesions     Data Reviewed:     Lipid profile:   Lab Results   Component Value Date    CHOL 152 09/23/2024    CHOL 169 09/21/2023    CHOL 151 09/20/2022     Lab Results   Component Value Date    HDL 40 09/23/2024    HDL 37 (L) 09/21/2023    HDL 44 09/20/2022     Lab Results   Component Value Date    LDLCALC 94.6 09/23/2024    LDLCALC 113.6 09/21/2023    LDLCALC 91.6 09/20/2022     Lab Results   Component Value Date    TRIG 87 09/23/2024    TRIG 92 09/21/2023    TRIG 77 09/20/2022     Lab Results   Component Value Date    CHOLHDL 26.3 09/23/2024    CHOLHDL 21.9 09/21/2023    CHOLHDL 29.1 09/20/2022     Colonoscopy: 2021, repeat 2026  DEXA: done 2022    1. Encounter for pelvic screening for cancer    2. History of cancer of left breast    3. Postmenopausal    4. Pap smear for cervical cancer screening    5. Screening mammogram for breast cancer        Plan: 1. Routine gyn annual exam. s/p normal breast exam and MMG ordered.   Pap with HPV cotesting ordered, discussed after 65 can stop, will do this one and she will think about it, will need to be every 2 years with medicare.    Lipid Profile, needed every 5 years, up to date. Fasting glucose, needed every 3 years, up to date. TSH, needed every 5 years, up to date.  Colonoscopy up to date. DEXA up to date    2. Discussed uterine prolapse, discussed prevention and ways to not make it worse such as preventing constipation, chronic cough, heavy lifting. Will start Kegel exercises and discussed  pelvic floor PT which she declines for now. discussed if so worsens then would recommend surgical vs pessary intervention.       F/u in 1 yr or PRN

## 2025-01-11 LAB
FINAL PATHOLOGIC DIAGNOSIS: NORMAL
Lab: NORMAL

## 2025-01-22 ENCOUNTER — PATIENT MESSAGE (OUTPATIENT)
Dept: OBSTETRICS AND GYNECOLOGY | Facility: HOSPITAL | Age: 70
End: 2025-01-22
Payer: MEDICARE

## 2025-02-22 DIAGNOSIS — Z00.00 ENCOUNTER FOR MEDICARE ANNUAL WELLNESS EXAM: ICD-10-CM

## 2025-03-06 ENCOUNTER — OFFICE VISIT (OUTPATIENT)
Dept: URGENT CARE | Facility: CLINIC | Age: 70
End: 2025-03-06
Payer: MEDICARE

## 2025-03-06 VITALS
SYSTOLIC BLOOD PRESSURE: 125 MMHG | DIASTOLIC BLOOD PRESSURE: 82 MMHG | BODY MASS INDEX: 32.85 KG/M2 | RESPIRATION RATE: 17 BRPM | WEIGHT: 174 LBS | HEIGHT: 61 IN | HEART RATE: 105 BPM | TEMPERATURE: 99 F | OXYGEN SATURATION: 96 %

## 2025-03-06 DIAGNOSIS — J34.89 NASAL CONGESTION WITH RHINORRHEA: ICD-10-CM

## 2025-03-06 DIAGNOSIS — J06.9 VIRAL URI WITH COUGH: Primary | ICD-10-CM

## 2025-03-06 DIAGNOSIS — R09.81 NASAL CONGESTION WITH RHINORRHEA: ICD-10-CM

## 2025-03-06 LAB
CTP QC/QA: YES
CTP QC/QA: YES
POC MOLECULAR INFLUENZA A AGN: NEGATIVE
POC MOLECULAR INFLUENZA B AGN: NEGATIVE
SARS CORONAVIRUS 2 ANTIGEN: NEGATIVE

## 2025-03-06 RX ORDER — GUAIFENESIN AND DEXTROMETHORPHAN HYDROBROMIDE 1200; 60 MG/1; MG/1
1 TABLET, EXTENDED RELEASE ORAL EVERY 12 HOURS PRN
Qty: 20 TABLET | Refills: 0 | Status: SHIPPED | OUTPATIENT
Start: 2025-03-06 | End: 2025-03-16

## 2025-03-06 RX ORDER — PROMETHAZINE HYDROCHLORIDE AND DEXTROMETHORPHAN HYDROBROMIDE 6.25; 15 MG/5ML; MG/5ML
5 SYRUP ORAL NIGHTLY PRN
Qty: 118 ML | Refills: 0 | Status: SHIPPED | OUTPATIENT
Start: 2025-03-06 | End: 2025-03-30

## 2025-03-06 RX ORDER — CETIRIZINE HYDROCHLORIDE 10 MG/1
10 TABLET ORAL DAILY PRN
Qty: 30 TABLET | Refills: 0 | Status: SHIPPED | OUTPATIENT
Start: 2025-03-06 | End: 2025-04-05

## 2025-03-06 NOTE — PROGRESS NOTES
"Subjective:      Patient ID: Carina Montaño is a 69 y.o. female.    Vitals:  height is 5' 1" (1.549 m) and weight is 78.9 kg (174 lb). Her oral temperature is 98.8 °F (37.1 °C). Her blood pressure is 125/82 and her pulse is 105. Her respiration is 17 and oxygen saturation is 96%.     Chief Complaint: Sinus Problem    Carina Montaño is a 69 y.o. female with hx of allergic rhinitis, asthma, breast cancer, hypertension, hyperlipidemia who complains of  congestion, watery eyes, headaches. Sx started yesterday and patient is taking otc medication (robitussin, tylenol, michael/tea/honey).        She uses astelin and flonase for allergic rhinitis. Sees ENT.     Sinus Problem  This is a new problem. The current episode started yesterday. Associated symptoms include congestion, coughing, headaches, a hoarse voice, sinus pressure, sneezing and a sore throat. Pertinent negatives include no chills, diaphoresis, ear pain, shortness of breath or swollen glands. The treatment provided mild relief.       Constitution: Positive for fatigue. Negative for chills, sweating, fever and generalized weakness.   HENT:  Positive for congestion, postnasal drip, sinus pain, sinus pressure and sore throat. Negative for ear pain, ear discharge, foreign body in ear, trouble swallowing and voice change.    Cardiovascular:  Negative for chest pain, leg swelling, palpitations and sob on exertion.   Respiratory:  Positive for cough, sputum production and asthma. Negative for shortness of breath and wheezing.    Gastrointestinal:  Negative for nausea and vomiting.   Musculoskeletal:  Negative for muscle cramps and muscle ache.   Allergic/Immunologic: Positive for environmental allergies, seasonal allergies, asthma, recurrent sinus infections and sneezing.   Neurological:  Positive for headaches.      Objective:     Physical Exam   Constitutional: She is oriented to person, place, and time. She is cooperative. No distress.      Comments:Patient is " awake and alert, sitting up in exam chair, speaking and answering in complete sentences     normalawake  HENT:   Head: Normocephalic and atraumatic.      Comments: Patient observed breathing through mouth, sniffling, and frequently clearing throat.    Ears:   Right Ear: External ear and ear canal normal. A middle ear effusion is present.   Left Ear: External ear and ear canal normal. A middle ear effusion is present.   Nose: Mucosal edema, rhinorrhea and congestion present.   Mouth/Throat: Uvula is midline and mucous membranes are normal. Mucous membranes are moist. Posterior oropharyngeal erythema present. No oropharyngeal exudate. Tonsils are 0 on the right. Tonsils are 0 on the left. No tonsillar exudate. Oropharynx is clear.      Comments:  postnasal discharge noted on the posterior pharyngeal wall    Eyes: Conjunctivae and lids are normal. Pupils are equal, round, and reactive to light. Extraocular movement intact vision grossly intact gaze aligned appropriately   Neck: Neck supple.   Cardiovascular: Normal rate, regular rhythm, normal heart sounds and normal pulses.   Pulmonary/Chest: Effort normal and breath sounds normal. No respiratory distress. She has no wheezes. She has no rhonchi. She has no rales.   Abdominal: Normal appearance.   Musculoskeletal: Normal range of motion.         General: Normal range of motion.      Cervical back: She exhibits no tenderness.   Lymphadenopathy:     She has no cervical adenopathy.   Neurological: She is alert and oriented to person, place, and time.   Skin: Skin is warm.   Psychiatric: Her behavior is normal. Mood, judgment and thought content normal.   Nursing note and vitals reviewed.      Assessment:     1. Viral URI with cough    2. Nasal congestion with rhinorrhea      Patient presents with clinical exam findings and history consistent with above.      On exam, patient is nontoxic appearing and vitals are stable.      Diagnostic testing results were reviewed and  discussed with patient/guardian.   Tests ordered in clinic:  Results for orders placed or performed in visit on 03/06/25   SARS Coronavirus 2 Antigen, POCT Manual Read    Collection Time: 03/06/25 12:48 PM   Result Value Ref Range    SARS Coronavirus 2 Antigen Negative Negative, Presumptive Negative     Acceptable Yes    POCT Influenza A/B MOLECULAR    Collection Time: 03/06/25  1:14 PM   Result Value Ref Range    POC Molecular Influenza A Ag Negative Negative    POC Molecular Influenza B Ag Negative Negative     Acceptable Yes        Previous progress notes/admissions/labs and medications were reviewed.  Reviewed GFR > 60 from CMP on 9/23/24.    Plan:   Continue astelin or flonase nasal spray    Viral URI with cough  -     promethazine-dextromethorphan (PROMETHAZINE-DM) 6.25-15 mg/5 mL Syrp; Take 5 mLs by mouth nightly as needed (cough).  Dispense: 118 mL; Refill: 0  -     dextromethorphan-guaiFENesin (MUCINEX DM) 60-1,200 mg per 12 hr tablet; Take 1 tablet by mouth every 12 (twelve) hours as needed (productive cough).  Dispense: 20 tablet; Refill: 0  -     Ambulatory referral/consult to Internal Medicine    Nasal congestion with rhinorrhea  -     SARS Coronavirus 2 Antigen, POCT Manual Read  -     POCT Influenza A/B MOLECULAR  -     cetirizine (ZYRTEC) 10 MG tablet; Take 1 tablet (10 mg total) by mouth daily as needed for Rhinitis or Allergies.  Dispense: 30 tablet; Refill: 0                    1) See orders for this visit as documented in the electronic medical record.  2) Symptomatic therapy suggested: use acetaminophen/ibuprofen every 6-8 hours prn pain or fever, push fluids.   3) Call or return to clinic prn if these symptoms worsen or fail to improve as anticipated.    Discussed results/diagnosis/plan with patient in clinic.  We had shared decision making for patient's treatment. Patient verbalized understanding and in agreement with current treatment plan.     Patient was  "instructed to return for re-evaluation with urgent care or PCP for continued outpatient workup and management if symptoms do not improve/worsening symptoms. Strict ED versus clinic precautions given in depth.    Discharge and follow-up instructions given verbally/printed with the patient who expressed understanding. The instructions and results are also available on [facility].              [facility] "Latoya" TRACEY Escudero          Patient Instructions   Recommend oral antihistamine (claritin, zyrtec, allegra,xyzal),oral decongestant (pseudoephedrine)  for rhinorrhea/ear congestion <3 days if blood pressure is <130/80mmhg, steroid nasal spray (flonase) +/- antihistamine nasal spray (azelastine), prescription (promethazine-DM) at night due to drowsiness  /OTC cough medicine (Mucinex Dm 12 hour),  Tylenol (Acetaminophen) and/or Motrin (Ibuprofen) as directed for control of pain and/or fever.      Please drink plenty of fluids.  Please get plenty of rest.  Nasal irrigation with a saline spray or Netti Pot like device per their directions is also recommended.  If you  smoke, please stop smoking.    To help ease a sore throat, you can:  Use a sore throat spray.  Suck on hard candy or throat lozenges.  Gargle with warm saltwater a few times each day. Mix of 1/4 teaspoon (1.25 grams) salt in 8 ounces (240 mL) of warm water.  Use a cool mist humidifier to help you breathe easier.    If you negative (-) for a COVID test today and you are continuing to have symptoms, it is recommended to repeat the test in 48 hours x 3. If you continue to be negative, you may return to school/work once you have improved symptoms and no fever for 24 hours without any medications. This applies to all viral illnesses.     Discussed prescriptions and over-the-counter medicines to help with patient's symptoms:  A steroid nose spray (flonase) and antihistamine nasal spray (azelastine) can help with a stuffy nose. It can also help with drainage down the back " of your throat.  An antihistamine (loratadine,zyrtec,allegra, xyzal) can help with itching, sneezing, or runny nose.  An antihistamine eye drop can help with itchy eyes.  A decongestant (pseudoephedrine,  Phenylephrine, oxymetazoline aka afrin nasal spray) can help with a stuffy nose. Take <10 days for congestion and rhinorrhea. Once symptoms improve, proceed with loratadine/zyrtec once a day. These ingredients can keep you up all night, decrease appetite, feel jittery, and raise blood pressure with long term use.  OTC Coricidin can be used for patients with hypertension and palpitations because you cannot use ingredients such as pseudoephedrine and phenylephrine for oral decongestants.  Coricidin HBP Cough & Cold (Chlorpheniramine/Dextromethorphan)  Coricidin HBP Maximum Strength Multi-Symptom Flu  (Acetaminophen,Dextromethorphan, Chlorpheniramine)  Coricidin HBP® Maximum Strength Cold & Flu Day/Night (Acetaminophen,Dextromethorphan, Doxylamine, Guaifenesin)  Coricidin HBP Chest Congestion & Cough or Mucinex DM 12 hour  (Dextromethorphan + Guaifenesin)    Medications that control cough are suppressants and expectorants. Suppressants are tessalon pearls and dextromethorphan. If you have a productive cough with sputum, you need an expectorant called guaifenesin. Dextromethorphan and Guaifenesin are active ingredients in many OTC cough/cold medications such as Dayquil/Nyquil, Mucinex, and Robitussin Mucus+Chest Congestion.            Common Cold Medicine Ingredients Cheat sheet  Acetaminophen (APAP) -pain reliever/fever reducer  Dextromethorphan - cough suppressant  Guaifenesin - expectorant/thins and loosens mucus  Phenylephrine - nasal decongestant  Diphenhydramine or Doxylamine succinate - antihistamine, helps you fall asleep  Promethazine or Brompheniramine - Prescription strength antihistamines    If not allergic, take Tylenol (Acetaminophen) 650 mg to  1 g every 6 hours as needed  and/or Motrin (Ibuprofen) 600  to 800 mg every 6 hours as needed for fever or pain.        Please remember that you have received care at an urgent care today. Urgent cares are not emergency rooms and are not equipped to handle life threatening emergencies and cannot rule in or out certain medical conditions and you may be released before all of your medical problems are known or treated.     Please arrange follow up with your primary care physician or speciality clinic within 2-5 days if your signs and symptoms have not resolved or worsen.     Patient can call our Referral Hotline at [phone] to make an appointment.      Please return here or go to the Emergency Department for any concerns or worsening of condition.  Signs of infection. These include a fever of 100.4°F (38°C) or higher, chills, cough, more sputum or change in color of sputum.  You are having so much trouble breathing that you can only say one or two words at a time.  You need to sit upright at all times to be able to breathe and or cannot lie down.  You have trouble breathing when talking or sitting still.  You have a fever of 100.4°F (38°C) or higher or chills.  You have chest pain when you cough, have trouble breathing but can still talk in full sentences, or cough up blood.

## 2025-03-06 NOTE — PATIENT INSTRUCTIONS
Recommend oral antihistamine (claritin, zyrtec, allegra,xyzal),oral decongestant (pseudoephedrine)  for rhinorrhea/ear congestion <3 days if blood pressure is <130/80mmhg, steroid nasal spray (flonase) +/- antihistamine nasal spray (azelastine), prescription (promethazine-DM) at night due to drowsiness  /OTC cough medicine (Mucinex Dm 12 hour),  Tylenol (Acetaminophen) and/or Motrin (Ibuprofen) as directed for control of pain and/or fever.      Please drink plenty of fluids.  Please get plenty of rest.  Nasal irrigation with a saline spray or Netti Pot like device per their directions is also recommended.  If you  smoke, please stop smoking.    To help ease a sore throat, you can:  Use a sore throat spray.  Suck on hard candy or throat lozenges.  Gargle with warm saltwater a few times each day. Mix of 1/4 teaspoon (1.25 grams) salt in 8 ounces (240 mL) of warm water.  Use a cool mist humidifier to help you breathe easier.    If you negative (-) for a COVID test today and you are continuing to have symptoms, it is recommended to repeat the test in 48 hours x 3. If you continue to be negative, you may return to school/work once you have improved symptoms and no fever for 24 hours without any medications. This applies to all viral illnesses.     Discussed prescriptions and over-the-counter medicines to help with patient's symptoms:  A steroid nose spray (flonase) and antihistamine nasal spray (azelastine) can help with a stuffy nose. It can also help with drainage down the back of your throat.  An antihistamine (loratadine,zyrtec,allegra, xyzal) can help with itching, sneezing, or runny nose.  An antihistamine eye drop can help with itchy eyes.  A decongestant (pseudoephedrine,  Phenylephrine, oxymetazoline aka afrin nasal spray) can help with a stuffy nose. Take <10 days for congestion and rhinorrhea. Once symptoms improve, proceed with loratadine/zyrtec once a day. These ingredients can keep you up all night,  decrease appetite, feel jittery, and raise blood pressure with long term use.  OTC Coricidin can be used for patients with hypertension and palpitations because you cannot use ingredients such as pseudoephedrine and phenylephrine for oral decongestants.  Coricidin HBP Cough & Cold (Chlorpheniramine/Dextromethorphan)  Coricidin HBP Maximum Strength Multi-Symptom Flu  (Acetaminophen,Dextromethorphan, Chlorpheniramine)  Coricidin HBP® Maximum Strength Cold & Flu Day/Night (Acetaminophen,Dextromethorphan, Doxylamine, Guaifenesin)  Coricidin HBP Chest Congestion & Cough or Mucinex DM 12 hour  (Dextromethorphan + Guaifenesin)    Medications that control cough are suppressants and expectorants. Suppressants are tessalon pearls and dextromethorphan. If you have a productive cough with sputum, you need an expectorant called guaifenesin. Dextromethorphan and Guaifenesin are active ingredients in many OTC cough/cold medications such as Dayquil/Nyquil, Mucinex, and Robitussin Mucus+Chest Congestion.            Common Cold Medicine Ingredients Cheat sheet  Acetaminophen (APAP) -pain reliever/fever reducer  Dextromethorphan - cough suppressant  Guaifenesin - expectorant/thins and loosens mucus  Phenylephrine - nasal decongestant  Diphenhydramine or Doxylamine succinate - antihistamine, helps you fall asleep  Promethazine or Brompheniramine - Prescription strength antihistamines    If not allergic, take Tylenol (Acetaminophen) 650 mg to  1 g every 6 hours as needed  and/or Motrin (Ibuprofen) 600 to 800 mg every 6 hours as needed for fever or pain.        Please remember that you have received care at an urgent care today. Urgent cares are not emergency rooms and are not equipped to handle life threatening emergencies and cannot rule in or out certain medical conditions and you may be released before all of your medical problems are known or treated.     Please arrange follow up with your primary care physician or speciality clinic  within 2-5 days if your signs and symptoms have not resolved or worsen.     Patient can call our Referral Hotline at [phone] to make an appointment.      Please return here or go to the Emergency Department for any concerns or worsening of condition.  Signs of infection. These include a fever of 100.4°F (38°C) or higher, chills, cough, more sputum or change in color of sputum.  You are having so much trouble breathing that you can only say one or two words at a time.  You need to sit upright at all times to be able to breathe and or cannot lie down.  You have trouble breathing when talking or sitting still.  You have a fever of 100.4°F (38°C) or higher or chills.  You have chest pain when you cough, have trouble breathing but can still talk in full sentences, or cough up blood.

## 2025-03-06 NOTE — LETTER
"  March 6, 2025      [facility] - [address]  [place] [address]  Phone: [phone]  Fax: [phone]       Patient: Carina Montaño   YOB: 1955  Date of Visit: 03/06/2025    To Whom It May Concern:    Darrell Montaño  was at [facility] on 03/06/2025. The patient may return to work/school on 3/7/25 with no restrictions. If you have any questions or concerns, or if I can be of further assistance, please do not hesitate to contact me.    Sincerely,        Theresajuan Escudero PA-C (Jackie)       "

## 2025-03-10 ENCOUNTER — TELEPHONE (OUTPATIENT)
Dept: OTOLARYNGOLOGY | Facility: CLINIC | Age: 70
End: 2025-03-10
Payer: MEDICARE

## 2025-03-10 ENCOUNTER — NURSE TRIAGE (OUTPATIENT)
Dept: ADMINISTRATIVE | Facility: CLINIC | Age: 70
End: 2025-03-10
Payer: MEDICARE

## 2025-03-10 NOTE — TELEPHONE ENCOUNTER
[facility] RN  Numbness and weakness in legs  Patient refused to get triaged, wants an ENT appt????   Sinus congestion.   Reason for Disposition   Requesting referral to a specialist    Protocols used: Information Only Call - No Triage-A-[place]

## 2025-03-10 NOTE — TELEPHONE ENCOUNTER
Spoke to pt she stated that she went to an urgent care two days ago and got some medications that she is using for nasal congestion and post nasal drippings. I added her to the wait list for a sooner appointment, she agrees and thanked me.        --- Message from Keon sent at 3/10/2025  1:35 PM CDT -----  Regarding: APPOINTMENT  Contact: Self  Pt stated she is having some nasal congestion w/ post nasal drip. Pt ask for a call.Contact info  [phone] (home)

## 2025-03-19 DIAGNOSIS — I10 ESSENTIAL HYPERTENSION: ICD-10-CM

## 2025-03-19 RX ORDER — LOSARTAN POTASSIUM 100 MG/1
100 TABLET ORAL DAILY
Qty: 90 TABLET | Refills: 1 | Status: SHIPPED | OUTPATIENT
Start: 2025-03-19

## 2025-03-19 NOTE — TELEPHONE ENCOUNTER
Refill Routing Note   Medication(s) are not appropriate for processing by [facility] for the following reason(s):        No active prescription written by provider    [facility] action(s):  Defer             Appointments  past 12m or future 3m with PCP    Date Provider   Last Visit   11/15/2024 Lino Cortez, DO   Next Visit   3/21/2025 Lino Cortez, DO   ED visits in past 90 days: 0        Note composed:4:53 PM 03/19/2025

## 2025-03-19 NOTE — TELEPHONE ENCOUNTER
Care Due:                  Date            Visit Type   Department     Provider  --------------------------------------------------------------------------------                                EP -                              PRIMARY      [facility]  Last Visit: 11-      CARE ([facility])   KAYDEN Cortez                               -                              [facility]  Next Visit: 03-      CARE ([facility])   [facility]       Lino Cortez                                                            Last  Test          Frequency    Reason                     Performed    Due Date  --------------------------------------------------------------------------------    Mg Level....  12 months..  alendronate..............  Not Found    Overdue    Phosphate...  12 months..  alendronate..............  Not Found    Overdue    [employer] Embedded Care Due Messages. Reference number: [id number].   3/19/2025 1:56:47 PM CDT

## 2025-03-21 ENCOUNTER — OFFICE VISIT (OUTPATIENT)
Dept: FAMILY MEDICINE | Facility: CLINIC | Age: 70
End: 2025-03-21
Payer: MEDICARE

## 2025-03-21 ENCOUNTER — LAB VISIT (OUTPATIENT)
Dept: LAB | Facility: HOSPITAL | Age: 70
End: 2025-03-21
Attending: STUDENT IN AN ORGANIZED HEALTH CARE EDUCATION/TRAINING PROGRAM
Payer: MEDICARE

## 2025-03-21 VITALS
BODY MASS INDEX: 30.14 KG/M2 | SYSTOLIC BLOOD PRESSURE: 130 MMHG | OXYGEN SATURATION: 98 % | HEART RATE: 102 BPM | DIASTOLIC BLOOD PRESSURE: 80 MMHG | WEIGHT: 159.63 LBS | HEIGHT: 61 IN

## 2025-03-21 DIAGNOSIS — I10 ESSENTIAL HYPERTENSION: Primary | Chronic | ICD-10-CM

## 2025-03-21 DIAGNOSIS — J30.9 ALLERGIC SINUSITIS: ICD-10-CM

## 2025-03-21 DIAGNOSIS — R29.6 FREQUENT FALLS: ICD-10-CM

## 2025-03-21 DIAGNOSIS — M25.562 CHRONIC PAIN OF LEFT KNEE: ICD-10-CM

## 2025-03-21 DIAGNOSIS — R42 VERTIGO: ICD-10-CM

## 2025-03-21 DIAGNOSIS — M25.561 CHRONIC PAIN OF RIGHT KNEE: ICD-10-CM

## 2025-03-21 DIAGNOSIS — G89.29 CHRONIC PAIN OF LEFT KNEE: ICD-10-CM

## 2025-03-21 DIAGNOSIS — G89.29 CHRONIC PAIN OF RIGHT KNEE: ICD-10-CM

## 2025-03-21 DIAGNOSIS — I10 ESSENTIAL HYPERTENSION: Chronic | ICD-10-CM

## 2025-03-21 DIAGNOSIS — I70.0 AORTIC ATHEROSCLEROSIS: ICD-10-CM

## 2025-03-21 DIAGNOSIS — J45.40 MODERATE PERSISTENT ASTHMA WITHOUT COMPLICATION: ICD-10-CM

## 2025-03-21 DIAGNOSIS — I50.32 CHRONIC DIASTOLIC (CONGESTIVE) HEART FAILURE: ICD-10-CM

## 2025-03-21 LAB
ALBUMIN SERPL BCP-MCNC: 3.7 G/DL (ref 3.5–5.2)
ALP SERPL-CCNC: 76 U/L (ref 40–150)
ALT SERPL W/O P-5'-P-CCNC: 29 U/L (ref 10–44)
ANION GAP SERPL CALC-SCNC: 10 MMOL/L (ref 8–16)
AST SERPL-CCNC: 25 U/L (ref 10–40)
BILIRUB SERPL-MCNC: 0.5 MG/DL (ref 0.1–1)
BUN SERPL-MCNC: 13 MG/DL (ref 8–23)
CALCIUM SERPL-MCNC: 9.3 MG/DL (ref 8.7–10.5)
CHLORIDE SERPL-SCNC: 107 MMOL/L (ref 95–110)
CO2 SERPL-SCNC: 26 MMOL/L (ref 23–29)
CREAT SERPL-MCNC: 0.7 MG/DL (ref 0.5–1.4)
EST. GFR  (NO RACE VARIABLE): >60 ML/MIN/1.73 M^2
GLUCOSE SERPL-MCNC: 121 MG/DL (ref 70–110)
POTASSIUM SERPL-SCNC: 4 MMOL/L (ref 3.5–5.1)
PROT SERPL-MCNC: 7.1 G/DL (ref 6–8.4)
SODIUM SERPL-SCNC: 143 MMOL/L (ref 136–145)
TSH SERPL DL<=0.005 MIU/L-ACNC: 2.61 UIU/ML (ref 0.4–4)

## 2025-03-21 PROCEDURE — 80053 COMPREHEN METABOLIC PANEL: CPT | Performed by: STUDENT IN AN ORGANIZED HEALTH CARE EDUCATION/TRAINING PROGRAM

## 2025-03-21 PROCEDURE — 99215 OFFICE O/P EST HI 40 MIN: CPT | Mod: PBBFAC,PO | Performed by: STUDENT IN AN ORGANIZED HEALTH CARE EDUCATION/TRAINING PROGRAM

## 2025-03-21 PROCEDURE — 36415 COLL VENOUS BLD VENIPUNCTURE: CPT | Mod: PO | Performed by: STUDENT IN AN ORGANIZED HEALTH CARE EDUCATION/TRAINING PROGRAM

## 2025-03-21 PROCEDURE — 99999 PR PBB SHADOW E&M-EST. PATIENT-LVL V: CPT | Mod: PBBFAC,,, | Performed by: STUDENT IN AN ORGANIZED HEALTH CARE EDUCATION/TRAINING PROGRAM

## 2025-03-21 PROCEDURE — 84443 ASSAY THYROID STIM HORMONE: CPT | Performed by: STUDENT IN AN ORGANIZED HEALTH CARE EDUCATION/TRAINING PROGRAM

## 2025-03-21 RX ORDER — MECLIZINE HCL 12.5 MG 12.5 MG/1
12.5 TABLET ORAL 3 TIMES DAILY PRN
Qty: 30 TABLET | Refills: 0 | Status: SHIPPED | OUTPATIENT
Start: 2025-03-21

## 2025-03-21 NOTE — PROGRESS NOTES
Progress Note  [facility]    Subjective:     Carina Montaño is a 69 y.o. year old female with current diagnoses of hypertension, vitamin-D deficiency, GERD, severe persistent asthma, allergic rhinitis, prediabetes, CHF, and osteopenia/osteoporosis, h/o breast cancer who presents to clinic for f/u    HTN: Bp at home 130/80's. Doing well. No light headedness.     Dizzy: Will get dizzy every once in a while.  Happened with laying flat. Has had this before. Having more allergy issues recently. Taking zyrtec daily.     Having some L knee pain after a fall. Thinks she needs a walker to help with balance because keeps tripping over things and falling. Knee injection in November helped but worse off. Knee pain started prior to the fall.     Patient Active Problem List    Diagnosis Date Noted    Primary osteoarthritis of both knees 11/15/2024    Chronic pain of left knee 10/12/2024    Decreased strength of lower extremity 10/12/2024    Aortic atherosclerosis 09/27/2024    Decreased hearing 01/03/2024    Class 1 obesity due to excess calories with serious comorbidity and body mass index (BMI) of 30.0 to 30.9 in adult 03/28/2023    Chronic diastolic (congestive) heart failure 12/08/2022    Aortic valve sclerosis 12/08/2022    Chronic left hip pain 04/06/2022    Pain aggravated by walking 04/06/2022    Decreased range of left hip movement 04/06/2022    Moderate persistent asthma without complication 03/10/2021    Pre-diabetes 09/16/2020    NAFLD (nonalcoholic fatty liver disease) 02/28/2018    Allergic sinusitis 03/28/2017    Essential hypertension 09/29/2016    Hypercholesteremia 09/29/2016    Obesity (BMI 30-39.9) 09/29/2016    History of cancer of left breast 09/29/2016    Low bone mass 09/29/2016    Gastroesophageal reflux disease 09/29/2016    Prolonged QT interval 09/29/2016        Review of patient's allergies indicates:   Allergen Reactions    Grass pollen-perennial rye, standard  "Other (See Comments)    Lipitor [atorvastatin]     Strawberries [strawberry]         Past Medical History:   Diagnosis Date    Allergy     Arthritis     Asthma     Breast cancer     Cancer     breast    Chronic diastolic (congestive) heart failure 12/08/2022    GERD (gastroesophageal reflux disease)     Hyperlipidemia     Hypertension     Prediabetes       Past Surgical History:   Procedure Laterality Date    ABDOMINAL SURGERY      BREAST BIOPSY      BREAST CYST EXCISION      COLONOSCOPY N/A 05/11/2021    Procedure: COLONOSCOPY;  Surgeon: Ema Vidal MD;  Location: [facility] ENDO;  Service: Endoscopy;  Laterality: N/A;  COVID TEST 5/8 SUPREP    ESOPHAGOGASTRODUODENOSCOPY N/A 05/17/2019    Procedure: ESOPHAGOGASTRODUODENOSCOPY (EGD);  Surgeon: Ema Vidal MD;  Location: [facility] ENDO;  Service: Endoscopy;  Laterality: N/A;    KNEE SURGERY      SHOULDER SURGERY        Family History   Problem Relation Name Age of Onset    Diabetes Mother      Hypertension Mother      Cancer Mother          Pancreatic    Pancreatic cancer Mother      Hypertension Father      Alzheimer's disease Father      Arthritis Brother x 1, half -mom     Diabetes Brother x1     No Known Problems Daughter x1       Social History     Tobacco Use    Smoking status: Never    Smokeless tobacco: Never   Substance Use Topics    Alcohol use: Yes     Comment: social        Objective:     Vitals:    03/21/25 0924   BP: 130/80   BP Location: Right arm   Patient Position: Sitting   Pulse: 102   SpO2: 98%   Weight: 72.4 kg (159 lb 9.8 oz)   Height: 5' 1" (1.549 m)     BMI: 30.16    Gen: No apparent distress, well nourished and developed, appears stated age  CV: RRR, S1 and S2 present, no LE edema  Resp: CTAB, normal respiratory effort  MSK: No edema of the knees b/l  HEENT: Clear fluid behind TM b/l, congestion present    Assessment/Plan:     Carina Montaño is a 69 y.o. year old female who presents to clinic for f/u    1. Essential hypertension " (Primary)  Well controlled on current regimen.  Do not feel BP contributing to falls at this time.   - Comprehensive metabolic panel; Future  - TSH; Future    2. Vertigo  2/2 worsening of sinusitis. Antihistamin and nasal spray proper use discussed. Seeing ENT for continued management. Medication, side effects and proper use discussed. Discussed risks of sleepiness and falls with this medication and should only take if at home. Pt verbalized understanding and was in agreement with the plan.    - meclizine (ANTIVERT) 12.5 mg tablet; Take 1 tablet (12.5 mg total) by mouth 3 (three) times daily as needed for Dizziness.  Dispense: 30 tablet; Refill: 0    3. Allergic sinusitis  As abvoe  - meclizine (ANTIVERT) 12.5 mg tablet; Take 1 tablet (12.5 mg total) by mouth 3 (three) times daily as needed for Dizziness.  Dispense: 30 tablet; Refill: 0    4. Aortic atherosclerosis  Stable on statin    5. Moderate persistent asthma without complication  Strong allergy component to this. Doing well at this time.     6. Chronic pain of left knee  Will refer to ortho since steroid injection in November without significant relief. Pt verbalized understanding and was in agreement with the plan.    - Ambulatory referral/consult to Orthopedics; Future  - WALKER FOR HOME USE    7. Chronic pain of right knee  As above  - Ambulatory referral/consult to Orthopedics; Future  - WALKER FOR HOME USE    8. Frequent falls  Likely related to knee pain. Will order walker.   - WALKER FOR HOME USE    9. Chronic diastolic (congestive) heart failure  Stable. No signs of exacerbation at this time.        Follow-up: 5 months for HTN        Lino Cortez DO  Family Medicine

## 2025-03-24 ENCOUNTER — RESULTS FOLLOW-UP (OUTPATIENT)
Dept: FAMILY MEDICINE | Facility: CLINIC | Age: 70
End: 2025-03-24

## 2025-03-31 DIAGNOSIS — G89.29 CHRONIC PAIN OF LEFT KNEE: Primary | ICD-10-CM

## 2025-03-31 DIAGNOSIS — M25.562 CHRONIC PAIN OF LEFT KNEE: Primary | ICD-10-CM

## 2025-04-01 ENCOUNTER — OFFICE VISIT (OUTPATIENT)
Dept: ORTHOPEDICS | Facility: CLINIC | Age: 70
End: 2025-04-01
Payer: MEDICARE

## 2025-04-01 ENCOUNTER — HOSPITAL ENCOUNTER (OUTPATIENT)
Facility: HOSPITAL | Age: 70
Discharge: HOME | End: 2025-04-01
Attending: ORTHOPAEDIC SURGERY
Payer: MEDICARE

## 2025-04-01 ENCOUNTER — RESEARCH ENCOUNTER (OUTPATIENT)
Dept: RESEARCH | Facility: HOSPITAL | Age: 70
End: 2025-04-01
Payer: MEDICARE

## 2025-04-01 VITALS — HEIGHT: 61 IN | WEIGHT: 159.63 LBS | BODY MASS INDEX: 30.14 KG/M2

## 2025-04-01 DIAGNOSIS — G89.29 CHRONIC PAIN OF LEFT KNEE: ICD-10-CM

## 2025-04-01 DIAGNOSIS — M25.561 CHRONIC PAIN OF RIGHT KNEE: ICD-10-CM

## 2025-04-01 DIAGNOSIS — G89.29 CHRONIC PAIN OF RIGHT KNEE: ICD-10-CM

## 2025-04-01 DIAGNOSIS — M25.562 CHRONIC PAIN OF LEFT KNEE: ICD-10-CM

## 2025-04-01 DIAGNOSIS — M17.12 PRIMARY OSTEOARTHRITIS OF LEFT KNEE: Primary | ICD-10-CM

## 2025-04-01 PROCEDURE — 99999PBSHW PR PBB SHADOW TECHNICAL ONLY FILED TO HB: Mod: JZ,PBBFAC,,

## 2025-04-01 PROCEDURE — 99214 OFFICE O/P EST MOD 30 MIN: CPT | Mod: PBBFAC,25,PN | Performed by: ORTHOPAEDIC SURGERY

## 2025-04-01 PROCEDURE — 20610 DRAIN/INJ JOINT/BURSA W/O US: CPT | Mod: PBBFAC,PN,LT | Performed by: ORTHOPAEDIC SURGERY

## 2025-04-01 PROCEDURE — 73564 X-RAY EXAM KNEE 4 OR MORE: CPT | Mod: 26,LT,, | Performed by: STUDENT IN AN ORGANIZED HEALTH CARE EDUCATION/TRAINING PROGRAM

## 2025-04-01 PROCEDURE — 73564 X-RAY EXAM KNEE 4 OR MORE: CPT | Mod: TC,PN,LT

## 2025-04-01 PROCEDURE — 99999 PR PBB SHADOW E&M-EST. PATIENT-LVL IV: CPT | Mod: PBBFAC,,, | Performed by: ORTHOPAEDIC SURGERY

## 2025-04-01 RX ORDER — BUPIVACAINE HYDROCHLORIDE 5 MG/ML
5 INJECTION, SOLUTION PERINEURAL
Status: DISCONTINUED | OUTPATIENT
Start: 2025-04-01 | End: 2025-04-01 | Stop reason: HOSPADM

## 2025-04-01 RX ORDER — KETOROLAC TROMETHAMINE 30 MG/ML
30 INJECTION, SOLUTION INTRAMUSCULAR; INTRAVENOUS
Status: DISCONTINUED | OUTPATIENT
Start: 2025-04-01 | End: 2025-04-01 | Stop reason: HOSPADM

## 2025-04-01 RX ORDER — LIDOCAINE HYDROCHLORIDE 10 MG/ML
4 INJECTION, SOLUTION INFILTRATION; PERINEURAL
Status: DISCONTINUED | OUTPATIENT
Start: 2025-04-01 | End: 2025-04-01 | Stop reason: HOSPADM

## 2025-04-01 RX ADMIN — BUPIVACAINE HYDROCHLORIDE 5 ML: 5 INJECTION, SOLUTION PERINEURAL at 10:04

## 2025-04-01 RX ADMIN — KETOROLAC TROMETHAMINE 30 MG: 30 INJECTION, SOLUTION INTRAMUSCULAR; INTRAVENOUS at 10:04

## 2025-04-01 RX ADMIN — LIDOCAINE HYDROCHLORIDE 4 ML: 10 INJECTION, SOLUTION INFILTRATION; PERINEURAL at 10:04

## 2025-04-01 NOTE — PROGRESS NOTES
Protocol: innovations in [employer] Outcomes Registry (Luis)  Protocol#: Luis  IRB#: 2021.156  Version Date: 10-Ochoa-2023  PI: Elfego Ann MD  Patient Initials: R.W.     Study Recruitment Note:     Research coordinator met with patient, in private clinic room, to discuss above mentioned protocol. Patient is alert and oriented x 3. Mood and affect appropriate to the situation. Patient states that she is not participating in any other research studies. Patient states understanding about the diagnosis of osteoarthritis of the knee and of the procedure to being done on that knee.  Purpose of study reviewed with the patient.  Patient states understanding of this information.   Length of study and number of participants reviewed with patient; patient states understanding of the length of the study.   Study procedure discussed in detail with patient. Patient states understanding of this information.  Potential benefits of study along with costs and/or payment reimbursement per insurance discussed with patient; Patient states understanding that insurance will cover cost of all standard of care medications and procedures. Patient aware of $20 payment for qualifying visits with completed questionnaires.  Alternative treatment methods discussed with patient; Patient states understanding of this information.   Study related questions/compensation for injury, and whom to contact regarding rights as a research subject all reviewed with patient; Patient verbalizes understanding of this information.   Voluntary participation and withdrawal from research stressed to patient; patient states understanding that she may withdraw consent at any time without compromise to care.   Confidentiality and HIPAA discussed with patient. Patient verbalizes understanding of this information.        Patient states her interest in study and will consider participation at a later date. Study brochure and paper copy of consent form was given to patient  for review. She was instructed to call if she has any questions or concerns about study or upcoming OA treatments.

## 2025-04-01 NOTE — PROGRESS NOTES
[facility] [address]        Subjective:     Patient ID: Carina Montaño is a 69 y.o. female.    Chief Complaint: Pain of the Left Knee         Patient ID: Carina Montaño is a 69 y.o. female.    Chief Complaint: Pain of the Left Knee    Patient complains of at 1 year of increasing left knee pain.  She states that the pain is over both the anterior and medial aspect of the knee.  It is worsened by prolonged standing/activities.  She has tried physical therapy.  She has had 1 injection in November of 2024.    KNEE PAIN: Complains of pain to the leftknee.   PAIN LOCATED: lateral and medial  ONSET: 1 year ago.  QUALITY:  Patient states the pain is worsening  HISTORY: Previous knee injury/surgery: No    ASSOCIATED SYMPTOM AND TRIGGERS:Standing/Weightbearing, walking, trouble w stairs, stiffness, swelling, limping, stiffness w sitting   Has tried and failed cardiovascular activities such as walking, biking and resistance exercises  USES ASSISTIVE DEVICE: walker  RELIEVED BY:rest, avoiding painful activities  PATIENT DENIES: bruising, redness, deformity      Past Medical History:   Diagnosis Date    Allergy     Arthritis     Asthma     Breast cancer     Cancer     breast    Chronic diastolic (congestive) heart failure 12/08/2022    GERD (gastroesophageal reflux disease)     Hyperlipidemia     Hypertension     Prediabetes         Past Surgical History:   Procedure Laterality Date    ABDOMINAL SURGERY      BREAST BIOPSY      BREAST CYST EXCISION      COLONOSCOPY N/A 05/11/2021    Procedure: COLONOSCOPY;  Surgeon: Ema Vidal MD;  Location: [facility];  Service: Endoscopy;  Laterality: N/A;  COVID TEST 5/8 SUPREP    ESOPHAGOGASTRODUODENOSCOPY N/A 05/17/2019    Procedure: ESOPHAGOGASTRODUODENOSCOPY (EGD);  Surgeon: Ema Vidal MD;  Location: [facility];  Service: Endoscopy;  Laterality: N/A;    KNEE SURGERY      SHOULDER SURGERY          Current Outpatient Medications   Medication Instructions    albuterol  (PROVENTIL) 2.5 mg /3 mL (0.083 %) nebulizer solution USE 1 VIAL IN NEBULIZER 4 TIMES PER DAY PRN COUGH, WHEEZING OR SHORTNESS OF BREATH    albuterol (PROVENTIL/VENTOLIN HFA) 90 mcg/actuation inhaler 2 puffs, Inhalation, Every 6 hours PRN    alendronate (FOSAMAX) 70 MG tablet TAKE 1 TAB BY MOUTH EVERY WEEK WITH GLASS OF WATER/EMPTY STOMACH (DONT LIE DOWN OR EAT FOR 30 MIN)    azelastine (ASTELIN) 137 mcg, Nasal, 2 times daily    budesonide-formoterol 160-4.5 mcg (SYMBICORT) 160-4.5 mcg/actuation HFAA 2 puffs, Inhalation, Every 12 hours    cetirizine (ZYRTEC) 10 mg, Oral, Daily PRN    ciclopirox 1 % shampoo APPLY 1 APPLICATION ON THE SKIN 2 TIMES WEEKLY    clobetasoL-emollient (OLUX-E) 0.05 % topical foam APPLY 1 GM ON THE SKIN SINGLE DOSE    diclofenac sodium (VOLTAREN ARTHRITIS PAIN) 2 g, Topical (Top), 3 times daily PRN    fluticasone propionate (FLONASE) 50 mcg/actuation nasal spray 2 SPRAYS (100 MCG TOTAL) BY EACH NARE ROUTE ONCE DAILY.    losartan (COZAAR) 100 mg, Oral, Daily    meclizine (ANTIVERT) 12.5 mg, Oral, 3 times daily PRN    metFORMIN (GLUCOPHAGE) 850 MG tablet TAKE ONE TABLET BY MOUTH DAILY FOR 1 MONTH AND THEN INCREASE TO ONE TABLET BY MOUTH TWICE DAILY THEREAFTER    montelukast (SINGULAIR) 10 mg, Oral, Nightly    MULTIVIT,CALC,MINS/IRON/FOLIC (ONE-A-DAY WOMENS FORMULA ORAL) 2 tablets, Daily    pantoprazole (PROTONIX) 40 mg, Oral, Daily    simvastatin (ZOCOR) 20 mg, Oral, Daily    triamcinolone acetonide 0.1% (KENALOG) 0.1 % Lotn Topical (Top), 2 times daily, Apply to affected areas of itching. Do not apply to face or genitals.    vitamin D (VITAMIN D3) 1,000 Units, Daily        Review of patient's allergies indicates:   Allergen Reactions    Grass pollen-perennial rye, standard Other (See Comments)    Lipitor [atorvastatin]     Strawberries [strawberry]        Social History[1]    Family History   Problem Relation Name Age of Onset    Diabetes Mother      Hypertension Mother      Cancer Mother           Pancreatic    Pancreatic cancer Mother      Hypertension Father      Alzheimer's disease Father      Arthritis Brother x 1, half -mom     Diabetes Brother x1     No Known Problems Daughter x1          Review of systems negative except for noted in HPI    Objective:   Physical Exam:     Left knee  No scars, abrasions, open wound  Tender to palpation over the medial and lateral joint line  Knee range of motion from 5-105 degrees  Knee stable to varus/valgus stress  Negative anterior/posterior drawer  Motor intact distally  Sensation intact distally    Imaging:  Left knee extra  X-Ray knee reviewed, KL 4 changes with joint space narrowing, sclerosis, and osteophytosis.      Assessment:    Carina Montaño is a 69 y.o. female with KL4 left knee osteoarthritis    Encounter Diagnoses   Name Primary?    Chronic pain of left knee     Chronic pain of right knee     Primary osteoarthritis of left knee Yes       Plan :    Discussed with the patient that she has very advanced osteoarthritis of her left knee.  We went over treatment options with her and she wants to move forward with a left knee Toradol injection today.  We will also talked about getting her scheduled for Iovera.    We injected the left knee w 1cc of ketorolac, marcaine and lidocaine under sterile conditions with the patient's informed consent for severe bone on bone knee oa. KL score 4     I discussed a new treatment therapy called Iovera, which is cryotherapy, to provide symptomatic relief along the sensory distribution of the infrapatellar tendon branch of the saphenous nerve, AFCN, and LFCN.  I am requesting Prior Authorization for peripheral nerve treatment using thermal energy (cryoneurolysis) in accordance with the FDA-cleared indication of the iovera° system.  In a few patients there can be continued pain along the treated nerve but the exact risk has not been quantified but seems to be rare. The patient was given patient information and literature to  review prior to the procedure as well. Based on this, the patient feels the benefits outweigh the risks.    To effectively treat anterior knee pain in this patient, it is recommended that the following nerves be treated with cryoneurolysis.  The nerves include the anterior femoral cutaneous nerve (AFCN) and the infrapatellar saphenous nerve (ISN).1    Procedure Description:  Cryoneurolysis has been well characterized in pain relief and substantial published literature exists regarding this modality. The treatment temporarily blocks nerve conduction along peripheral nerve pathways using the application of intense, highly focused cold (thermal energy) via a closed tip needle by cryogenic fluid (liquid nitrous oxide [N20]) inserted through the skin to the selected site for the treatment of pain. A frozen zone forms around the tip of the needle assembly and the adjacent nerve.      This procedure is designed to temporarily degenerate the nerve fiber that sends pain signals.     Various nerve location techniques may be utilized, including: anatomic landmarks, nerve stimulation, non-invasive ultrasound, and/or fluoroscopy. Once the location of the nerve has been determined, a small amount of local anesthetic is administered to the area. The device is then positioned at the treatment site, the closed tip needle is inserted, and the focused cold (thermal energy) is applied near/adjacent to the target nerve, stopping pain signals.    Sedation is not required during the procedure as to allow the patient to give real-time feedback to the physician. This provides additional confirmation that the target nerve has been treated.    This therapy is reasonable and necessary for the patient's knee condition. Clinical publications on cryoneurolysis are attached for your reference.    References    Percutaneous freezing of sensory nerves prior to total knee arthroplasty, BERTIN Flannery M. Parsons, J Harris, J Volaufova, R.  Jocelyn, The Knee (23), [phone].  Chandrakant EDILIA, Hieu IJ, Khoi GIANFRANCO. Nerve supply of the human knee and its functional importance. Am J Sports Med. 1982;10(6):329-335.         Orders Placed This Encounter   Procedures    Large Joint Aspiration/Injection: L knee    Iovera        Jimenez Fish MD   04/01/2025           [1]   Social History  Socioeconomic History    Marital status:     Number of children: 1   Occupational History    Occupation:     Tobacco Use    Smoking status: Never    Smokeless tobacco: Never   Substance and Sexual Activity    Alcohol use: Yes     Comment: social    Drug use: No    Sexual activity: Yes     Social Drivers of Health     Financial Resource Strain: Low Risk  (1/3/2024)    Overall Financial Resource Strain (CARDIA)     Difficulty of Paying Living Expenses: Not hard at all   Food Insecurity: No Food Insecurity (1/3/2024)    Hunger Vital Sign     Worried About Running Out of Food in the Last Year: Never true     Ran Out of Food in the Last Year: Never true   Transportation Needs: No Transportation Needs (1/3/2024)    PRAPARE - Transportation     Lack of Transportation (Medical): No     Lack of Transportation (Non-Medical): No   Physical Activity: Inactive (1/3/2024)    Exercise Vital Sign     Days of Exercise per Week: 0 days     Minutes of Exercise per Session: 0 min   Stress: No Stress Concern Present (1/3/2024)    [employer] - Occupational Stress Questionnaire     Feeling of Stress : Only a little   Housing Stability: Low Risk  (1/3/2024)    Housing Stability Vital Sign     Unable to Pay for Housing in the Last Year: No     Number of Places Lived in the Last Year: 1     Unstable Housing in the Last Year: No

## 2025-04-01 NOTE — PROCEDURES
Large Joint Aspiration/Injection: L knee    Date/Time: 4/1/2025 10:45 AM    Performed by: Elfego Ann MD  Authorized by: Elfego Ann MD    Consent Done?:  Yes (Verbal)  Indications:  Arthritis  Site marked: the procedure site was marked    Timeout: prior to procedure the correct patient, procedure, and site was verified    Prep: patient was prepped and draped in usual sterile fashion      Local anesthesia used?: Yes    Local anesthetic:  Topical anesthetic    Details:  Needle Size:  22 G  Ultrasonic Guidance for needle placement?: No    Approach:  Anterolateral  Location:  Knee  Site:  L knee  Medications:  30 mg ketorolac 30 mg/mL (1 mL); 5 mL BUPivacaine 0.5 % (5 mg/mL); 4 mL LIDOcaine HCL 10 mg/ml (1%) 10 mg/mL (1 %)  Patient tolerance:  Patient tolerated the procedure well with no immediate complications

## 2025-04-16 ENCOUNTER — OFFICE VISIT (OUTPATIENT)
Dept: OTOLARYNGOLOGY | Facility: CLINIC | Age: 70
End: 2025-04-16
Payer: MEDICARE

## 2025-04-16 VITALS
BODY MASS INDEX: 33.32 KG/M2 | HEART RATE: 91 BPM | WEIGHT: 176.38 LBS | DIASTOLIC BLOOD PRESSURE: 85 MMHG | SYSTOLIC BLOOD PRESSURE: 143 MMHG

## 2025-04-16 DIAGNOSIS — H93.13 TINNITUS OF BOTH EARS: Chronic | ICD-10-CM

## 2025-04-16 DIAGNOSIS — J30.9 CHRONIC ALLERGIC RHINITIS: Primary | Chronic | ICD-10-CM

## 2025-04-16 DIAGNOSIS — K21.9 LARYNGOPHARYNGEAL REFLUX (LPR): Chronic | ICD-10-CM

## 2025-04-16 DIAGNOSIS — H90.3 SENSORINEURAL HEARING LOSS, BILATERAL: Chronic | ICD-10-CM

## 2025-04-16 DIAGNOSIS — J01.80 OTHER SUBACUTE SINUSITIS: ICD-10-CM

## 2025-04-16 PROCEDURE — 31231 NASAL ENDOSCOPY DX: CPT | Mod: PBBFAC,PN | Performed by: OTOLARYNGOLOGY

## 2025-04-16 PROCEDURE — 99214 OFFICE O/P EST MOD 30 MIN: CPT | Mod: 25,S$PBB,, | Performed by: OTOLARYNGOLOGY

## 2025-04-16 PROCEDURE — 99213 OFFICE O/P EST LOW 20 MIN: CPT | Mod: PBBFAC,PN | Performed by: OTOLARYNGOLOGY

## 2025-04-16 PROCEDURE — 99999 PR PBB SHADOW E&M-EST. PATIENT-LVL III: CPT | Mod: PBBFAC,,, | Performed by: OTOLARYNGOLOGY

## 2025-04-16 RX ORDER — CETIRIZINE HYDROCHLORIDE 10 MG/1
10 TABLET ORAL DAILY PRN
Qty: 30 TABLET | Refills: 0 | Status: SHIPPED | OUTPATIENT
Start: 2025-04-16 | End: 2025-05-16

## 2025-04-16 RX ORDER — AMOXICILLIN AND CLAVULANATE POTASSIUM 875; 125 MG/1; MG/1
1 TABLET, FILM COATED ORAL 2 TIMES DAILY
Qty: 20 TABLET | Refills: 0 | Status: SHIPPED | OUTPATIENT
Start: 2025-04-16 | End: 2025-04-26

## 2025-04-16 RX ORDER — METHYLPREDNISOLONE 4 MG/1
TABLET ORAL
Qty: 1 EACH | Refills: 0 | Status: SHIPPED | OUTPATIENT
Start: 2025-04-16

## 2025-04-16 RX ORDER — PANTOPRAZOLE SODIUM 40 MG/1
40 TABLET, DELAYED RELEASE ORAL DAILY
Qty: 30 TABLET | Refills: 3 | Status: SHIPPED | OUTPATIENT
Start: 2025-04-16 | End: 2026-04-16

## 2025-04-16 NOTE — PROGRESS NOTES
Chief Complaint   Patient presents with    Follow-up     Occasional sore throat, some allergies   .    HPI:     Carina Montaño is a 69 y.o. female who presents for evaluation of a several month history of nasal congestion, postnasal drip, facial pressure and pain.  She notes associated cough and sneezing. She does feel that her symptoms worsen with weather changes.  She describes difficulty breathing at night.  She does use sinus rinses or nasal sprays. She has been on Flonase and Astelin. She is also on Claritin and montelukast.  She feels that this is somewhat helpful.  She admits to midface pain and pressure.  She admits to rhinorrhea and postnasal drip. There is not maxillary tooth pain. She admits to frontal headaches.  She has had sinus or nasal surgery. She is unsure of exact procedure.  There is no history of sinonasal trauma. She notes that she has had hearing loss since childhood. She has had hearing tests in the past. She did not tolerate hearing aids well. She felt they were too loud. She thinks last hearing test was 10 years ago.         Interval HPI 4/16/2025:  Follow up visit. Reports increased nasal congestion, post-nasal drip, and left sided pressure for several weeks.  She has been using Protonix 40mg PO daily, flonase, Astelin, and montelukast, saline.  She does feel that she has intermittent hoarseness and vocal fatigue but not as severe as it has been in the past. She denies throat pain, dysphgia, odynophagia. She denies facial pain/pressure or headaches.       Past Medical History:   Diagnosis Date    Allergy     Arthritis     Asthma     Breast cancer     Cancer     breast    Chronic diastolic (congestive) heart failure 12/08/2022    GERD (gastroesophageal reflux disease)     Hyperlipidemia     Hypertension     Prediabetes      Social History     Socioeconomic History    Marital status:     Number of children: 1   Occupational History    Occupation:      Tobacco Use    Smoking status: Never    Smokeless tobacco: Never   Substance and Sexual Activity    Alcohol use: Yes     Comment: social    Drug use: No    Sexual activity: Yes     Social Drivers of Health     Financial Resource Strain: Low Risk  (1/3/2024)    Overall Financial Resource Strain (CARDIA)     Difficulty of Paying Living Expenses: Not hard at all   Food Insecurity: No Food Insecurity (1/3/2024)    Hunger Vital Sign     Worried About Running Out of Food in the Last Year: Never true     Ran Out of Food in the Last Year: Never true   Transportation Needs: No Transportation Needs (1/3/2024)    PRAPARE - Transportation     Lack of Transportation (Medical): No     Lack of Transportation (Non-Medical): No   Physical Activity: Inactive (1/3/2024)    Exercise Vital Sign     Days of Exercise per Week: 0 days     Minutes of Exercise per Session: 0 min   Stress: No Stress Concern Present (1/3/2024)    [employer] - Occupational Stress Questionnaire     Feeling of Stress : Only a little   Housing Stability: Low Risk  (1/3/2024)    Housing Stability Vital Sign     Unable to Pay for Housing in the Last Year: No     Number of Places Lived in the Last Year: 1     Unstable Housing in the Last Year: No     Past Surgical History:   Procedure Laterality Date    ABDOMINAL SURGERY      BREAST BIOPSY      BREAST CYST EXCISION      COLONOSCOPY N/A 05/11/2021    Procedure: COLONOSCOPY;  Surgeon: Ema Vidal MD;  Location: [facility];  Service: Endoscopy;  Laterality: N/A;  COVID TEST 5/8 SUPREP    ESOPHAGOGASTRODUODENOSCOPY N/A 05/17/2019    Procedure: ESOPHAGOGASTRODUODENOSCOPY (EGD);  Surgeon: Ema Vidal MD;  Location: [facility];  Service: Endoscopy;  Laterality: N/A;    KNEE SURGERY      SHOULDER SURGERY       Family History   Problem Relation Name Age of Onset    Diabetes Mother      Hypertension Mother      Cancer Mother          Pancreatic    Pancreatic cancer Mother       Hypertension Father      Alzheimer's disease Father      Arthritis Brother x 1, half -mom     Diabetes Brother x1     No Known Problems Daughter x1            Review of Systems  General: negative for chills, fever or weight loss  Psychological: negative for mood changes or depression  Ophthalmic: negative for blurry vision, photophobia or eye pain  ENT: see HPI  Respiratory: no cough, shortness of breath, or wheezing  Cardiovascular: no chest pain or dyspnea on exertion  Gastrointestinal: no abdominal pain, change in bowel habits, or black/ bloody stools  Musculoskeletal: negative for gait disturbance or muscular weakness  Neurological: no syncope or seizures; no ataxia  Dermatological: negative for puritis,  rash and jaundice  Hematologic/lymphatic: no easy bruising, no new lumps or bumps      Physical Exam:    Vitals:    04/16/25 1528   BP: (!) 143/85   Pulse: 91         Constitutional: Well appearing / communicating without difficutly.  NAD.  Eyes: EOM I Bilaterally  Head/Face: Normocephalic.  Negative paranasal sinus pressure/tenderness.  Salivary glands WNL.  House Brackmann I Bilaterally.    Right Ear: Auricle normal appearance. External Auditory Canal within normal limits,TM w/o masses/lesions/perforations. TM mobility noted.   Left Ear: Auricle normal appearance. External Auditory Canal WNL,TM w/o masses/lesions/perforations. TM mobility noted.  Nose: No gross nasal septal deviation. Inferior Turbinates 3+ bilaterally. No septal perforation. No masses/lesions. External nasal skin appears normal without masses/lesions.  Oral Cavity: Gingiva/lips within normal limits.  Dentition/gingiva healthy appearing. Mucus membranes moist. Floor of mouth soft, no masses palpated. Oral Tongue mobile. Hard Palate appears normal.    Oropharynx: Base of tongue appears normal. No masses/lesions noted. Tonsillar fossa/pharyngeal wall without lesions. Posterior oropharynx WNL.  Soft palate without masses. Midline uvula.    Neck/Lymphatic: No LAD I-VI bilaterally.  No thyromegaly.  No masses noted on exam.    Mirror laryngoscopy/nasopharyngoscopy: Active gag reflex.  Unable to perform.          Diagnostic studies:  ImmunoCAP testing 3/23/2023: Class 0/1 to cat dander, cockroach, cypress, and white oak.         CT scan sinuses 3/24/2023:  FINDINGS:  Intracranial structures are unremarkable.  No incidental soft tissue masses are seen.  Frontal sinuses are hypoplastic.  There is mild mucosal thickening at the floor the left maxillary sinus.  Ethmoid, sphenoid, and frontal sinuses are clear.  Ostiomeatal complexes are patent.  Frontal ethmoidal sinus drainage routes are unremarkable.  Nasal passes is are clear.  Mastoid air cells and middle ear cavities are clear.          Tympanometry revealed a Type A tympanogram for both ears.  Audiogram results revealed a mild sloping to profound sensorineural hearing loss bilaterally.  Speech reception thresholds were obtained at 35dB for both ears and speech discrimination scores were 36% for the right ear and 44% for the left ear.           Assessment:    ICD-10-CM ICD-9-CM    1. Chronic allergic rhinitis  J30.9 477.9       2. Sensorineural hearing loss, bilateral  H90.3 389.18       3. Tinnitus of both ears  H93.13 388.30       4. Laryngopharyngeal reflux (LPR)  K21.9 478.79               The primary encounter diagnosis was Chronic allergic rhinitis. Diagnoses of Sensorineural hearing loss, bilateral, Tinnitus of both ears, and Laryngopharyngeal reflux (LPR) were also pertinent to this visit.      Plan:  No orders of the defined types were placed in this encounter.    Start Augmentin 875 mg PO BID for 10 days.   Continue  Pantoprazole 40mg PO daily   Keep GI follow up.   Continue nasal saline rinses b.i.d.  Continue Flonase 2 sprays per nostril daily  Continue Astelin 1 spray per nostril b.i.d.  Continue montelukast 10 mg PO daily. She has had dry eyes so will avoid oral antihistamines for now.    Annual audiogram due. Pt will schedule.     Follow-up in approximately 4-6 months    Angela Jacobs MD

## 2025-04-17 NOTE — PROCEDURES
Nasal/sinus endoscopy    Date/Time: 4/16/2025 3:20 PM    Performed by: Angela Hooper MD  Authorized by: Angela Hooper MD    Consent Done?:  Yes (Verbal)  Anesthesia:     Local anesthetic:  Lidocaine 2% and Yanick-Synephrine 1/2%  Nose:     Procedure Performed:  Nasal Endoscopy  External:      No external nasal deformity  Intranasal:      Mucosa no polyps     Mucosa ulcers not present     No mucosa lesions present     Enlarged turbinates     No septum gross deformity  Nasopharynx:      Posterior choanae patent     Eustachian tube patent     Bilateral inferior turbinate edema; + purulence in right inferior meatus and posterior nasal cavity. Bilateral middle turbinate edema with narrowing of the middle meatus; +superior turbinate edema; + edematous  narrowing of right sphenoethmoid recess with purulence drainage, right superior turbinate within normal limits; patent right sphenoethmoidal recess.

## 2025-04-22 ENCOUNTER — PROCEDURE VISIT (OUTPATIENT)
Dept: ORTHOPEDICS | Facility: CLINIC | Age: 70
End: 2025-04-22
Payer: MEDICARE

## 2025-04-22 ENCOUNTER — RESEARCH ENCOUNTER (OUTPATIENT)
Dept: RESEARCH | Facility: HOSPITAL | Age: 70
End: 2025-04-22
Payer: MEDICARE

## 2025-04-22 VITALS — WEIGHT: 174.63 LBS | HEIGHT: 61 IN | BODY MASS INDEX: 32.97 KG/M2

## 2025-04-22 DIAGNOSIS — M25.562 CHRONIC PAIN OF LEFT KNEE: ICD-10-CM

## 2025-04-22 DIAGNOSIS — G89.29 CHRONIC PAIN OF LEFT KNEE: ICD-10-CM

## 2025-04-22 DIAGNOSIS — M17.12 PRIMARY OSTEOARTHRITIS OF LEFT KNEE: ICD-10-CM

## 2025-04-22 PROCEDURE — 64640 INJECTION TREATMENT OF NERVE: CPT | Mod: S$PBB,LT,, | Performed by: ORTHOPAEDIC SURGERY

## 2025-04-22 PROCEDURE — 99499 UNLISTED E&M SERVICE: CPT | Mod: S$PBB,,, | Performed by: ORTHOPAEDIC SURGERY

## 2025-04-22 PROCEDURE — 64640 INJECTION TREATMENT OF NERVE: CPT | Mod: PBBFAC,PN | Performed by: ORTHOPAEDIC SURGERY

## 2025-04-22 NOTE — PROCEDURES
Procedure Note Iovera:    DATE OF PROCEDURE:  04/22/2025    PREOPERATIVE DIAGNOSIS: left knee pain.     POSTOPERATIVE DIAGNOSIS: left knee pain.     PROCEDURE: Iovera treatment of anterior femoral cutaneous nerve, Lateral femoral cut nerve, and both branches of infrapatellar saphenous nerve using at least 4 different punctures to treat all 4 nerves. (cpt 64640 x4)    ATTENDING SURGEON: Elfego Ann M.D.   ASSISTANT: franklin george    COMPLICATIONS: None.     IMPLANTS:  None    ESTIMATED BLOOD LOSS:  < 5cc    SPECIMENS REMOVED:  None    ANESTHESIA: Local lidocaine    INDICATIONS FOR PROCEDURE: This is a 69 y.o. female with longstanding knee pain. They have failed non operative management including injections.I discussed a new treatment therapy called Iovera, which is cryotherapy, to provide symptomatic relief along the sensory distribution of the infrapatellar tendon branch of the saphenous nerve  and AFCN. The patient elected to move forward with this  We did discuss the fact that this is a fairly novel procedure and there is very limited scientific data around this.  However, it FDA approved.  The patient was given patient information and literature to review prior to the procedure as well.  Based on this, the patient agreed to move forward with doing the procedure.      PROCEDURE:    The patient was placed supine on the exam table and the proximal medial aspect of the left tibia and anterior aspect of distal femur was prepped with sterile Betadine and alcohol.  A line was drawn extending approximately 5 cm medial to inferior pole of the patella distally to a point approximately 5 cm medial to the tibial tubercle.  A second line was drawn in a medial to lateral direction the width of the patella approximately 7 cm proximal to the patella. We then infiltrated the skin with lidocaine along both lines using a 25g needle. We then introduced the Iovera device along these lines and this device penetrated the skin, creating  cryotherapy to both branches of the infrapatellar saphenous nerve, a third treatment to the anterior femoral cutaneous nerve, and fourth LFCN. 7 punctures of the skin were made to treat the 2 branches of the ISN and another 7 punctures were made to treat the AFCN and LFCN. There were a total of 4 nerves treated with iovera. The patient tolerated the procedure well with no problems.

## 2025-04-22 NOTE — PROGRESS NOTES
Protocol: innovations in [employer] Outcomes Registry (Luis)  Protocol#: Luis  IRB#: 2021.156  Version Date: 10-Ochoa-2023  PI: Elfego Ann MD  Patient Initials: r.w.     Declined Study Note    Patient is in today to be treated for OA knee pain. Patient was approached about participation in Luis Study. Patient wished to wait to consent to study.  Will re-approach patient about study at a later date when she has family present to assist with smartphone device.    She proceeded with Iovera treatment, administered by Martine Orlando.

## 2025-05-09 NOTE — PROGRESS NOTES
Subjective:       Patient ID: Carina Montaño is a 62 y.o. female.    Chief Complaint: Cough (chest congestion)    HPI   Pt with HTN/asthma is here for evaluation of 5 days of worsening sinus/chest congestion, productive cough, post nasal drip, wheezing/SOB. Pt has not tried any OTC medications for relief.   Review of Systems   Constitutional: Negative for activity change, appetite change, chills, diaphoresis, fatigue, fever and unexpected weight change.   HENT: Positive for congestion, postnasal drip, rhinorrhea, sinus pain and sore throat. Negative for sinus pressure, sneezing, trouble swallowing and voice change.    Respiratory: Positive for cough, shortness of breath and wheezing.    Cardiovascular: Negative for chest pain, palpitations and leg swelling.   Gastrointestinal: Negative for abdominal pain, blood in stool, constipation, diarrhea, nausea and vomiting.   Genitourinary: Negative for dysuria.   Musculoskeletal: Negative for arthralgias and myalgias.   Skin: Negative for rash and wound.   Allergic/Immunologic: Negative for environmental allergies and food allergies.   Hematological: Negative for adenopathy. Does not bruise/bleed easily.       Objective:      Physical Exam   Constitutional: She is oriented to person, place, and time. She appears well-developed and well-nourished. No distress.   HENT:   Head: Normocephalic and atraumatic.   Right Ear: External ear normal.   Left Ear: External ear normal.   Nose: Mucosal edema and rhinorrhea present.   Mouth/Throat: Oropharynx is clear and moist. No oropharyngeal exudate.   Eyes: Conjunctivae and EOM are normal. Pupils are equal, round, and reactive to light. Right eye exhibits no discharge. Left eye exhibits no discharge. No scleral icterus.   Neck: Neck supple. No JVD present.   Cardiovascular: Normal rate, regular rhythm, normal heart sounds and intact distal pulses.    Pulmonary/Chest: Effort normal and breath sounds normal. No respiratory distress. She  What Type Of Note Output Would You Prefer (Optional)?: Bullet Format Hpi Title: Evaluation of Skin Lesions has no wheezes. She has no rales.   Abdominal: Soft. Bowel sounds are normal. There is no tenderness.   Musculoskeletal: She exhibits no edema.   Lymphadenopathy:     She has no cervical adenopathy.   Neurological: She is alert and oriented to person, place, and time.   Skin: Skin is warm and dry. No rash noted. She is not diaphoretic. No pallor.       Assessment:       1. Viral sinusitis    2. Acute bronchitis, unspecified organism    3. HTN, goal below 130/80        Plan:    1. Start Claritin/Flonase, kenalog 40 mg IM       No decongestants 2/2 HTN   2. Rx Cheratussin AC TID PRN   3. Controlled, CPT   How Severe Are Your Spot(S)?: mild Family Member: Father Additional History: Pt states they’ve noticed new red spots on her abdomen and face after she had her c section 4 years ago.

## 2025-08-04 ENCOUNTER — PATIENT MESSAGE (OUTPATIENT)
Dept: ADMINISTRATIVE | Facility: HOSPITAL | Age: 70
End: 2025-08-04
Payer: MEDICARE

## 2025-08-04 DIAGNOSIS — M85.80 OSTEOPENIA, UNSPECIFIED LOCATION: ICD-10-CM

## 2025-08-04 DIAGNOSIS — Z13.820 ENCOUNTER FOR OSTEOPOROSIS SCREENING IN ASYMPTOMATIC POSTMENOPAUSAL PATIENT: Primary | ICD-10-CM

## 2025-08-04 DIAGNOSIS — Z78.0 ENCOUNTER FOR OSTEOPOROSIS SCREENING IN ASYMPTOMATIC POSTMENOPAUSAL PATIENT: Primary | ICD-10-CM

## 2025-08-04 RX ORDER — ALENDRONATE SODIUM 70 MG/1
TABLET ORAL
Qty: 4 TABLET | Refills: 6 | Status: SHIPPED | OUTPATIENT
Start: 2025-08-04

## 2025-08-07 ENCOUNTER — HOSPITAL ENCOUNTER (OUTPATIENT)
Dept: RADIOLOGY | Facility: HOSPITAL | Age: 70
Discharge: HOME | End: 2025-08-07
Payer: MEDICARE

## 2025-08-07 DIAGNOSIS — Z13.820 ENCOUNTER FOR OSTEOPOROSIS SCREENING IN ASYMPTOMATIC POSTMENOPAUSAL PATIENT: ICD-10-CM

## 2025-08-07 DIAGNOSIS — Z78.0 ENCOUNTER FOR OSTEOPOROSIS SCREENING IN ASYMPTOMATIC POSTMENOPAUSAL PATIENT: ICD-10-CM

## 2025-08-07 PROCEDURE — 77080 DXA BONE DENSITY AXIAL: CPT | Mod: TC

## 2025-08-07 PROCEDURE — 77080 DXA BONE DENSITY AXIAL: CPT | Mod: 26,,, | Performed by: RADIOLOGY
